# Patient Record
Sex: FEMALE | Race: WHITE | Employment: UNEMPLOYED | URBAN - METROPOLITAN AREA
[De-identification: names, ages, dates, MRNs, and addresses within clinical notes are randomized per-mention and may not be internally consistent; named-entity substitution may affect disease eponyms.]

---

## 2017-01-04 ENCOUNTER — OFFICE VISIT (OUTPATIENT)
Dept: FAMILY MEDICINE CLINIC | Age: 62
End: 2017-01-04

## 2017-01-04 VITALS
BODY MASS INDEX: 45.99 KG/M2 | OXYGEN SATURATION: 97 % | DIASTOLIC BLOOD PRESSURE: 80 MMHG | SYSTOLIC BLOOD PRESSURE: 120 MMHG | HEART RATE: 76 BPM | RESPIRATION RATE: 14 BRPM | HEIGHT: 67 IN | TEMPERATURE: 98.1 F | WEIGHT: 293 LBS

## 2017-01-04 DIAGNOSIS — N18.2 CKD (CHRONIC KIDNEY DISEASE), STAGE 2 (MILD): ICD-10-CM

## 2017-01-04 DIAGNOSIS — I10 ESSENTIAL HYPERTENSION: ICD-10-CM

## 2017-01-04 DIAGNOSIS — D50.8 OTHER IRON DEFICIENCY ANEMIA: ICD-10-CM

## 2017-01-04 DIAGNOSIS — E87.5 HYPERKALEMIA: ICD-10-CM

## 2017-01-04 DIAGNOSIS — E66.01 MORBID OBESITY DUE TO EXCESS CALORIES (HCC): ICD-10-CM

## 2017-01-04 DIAGNOSIS — J18.9 CAP (COMMUNITY ACQUIRED PNEUMONIA): Primary | ICD-10-CM

## 2017-01-04 LAB
ANION GAP SERPL CALCULATED.3IONS-SCNC: 17 MMOL/L (ref 3–16)
BUN BLDV-MCNC: 32 MG/DL (ref 7–20)
CALCIUM SERPL-MCNC: 9.1 MG/DL (ref 8.3–10.6)
CHLORIDE BLD-SCNC: 101 MMOL/L (ref 99–110)
CO2: 18 MMOL/L (ref 21–32)
CREAT SERPL-MCNC: 1.7 MG/DL (ref 0.6–1.1)
GFR AFRICAN AMERICAN: 37
GFR NON-AFRICAN AMERICAN: 31
GLUCOSE BLD-MCNC: 142 MG/DL (ref 70–99)
POTASSIUM SERPL-SCNC: 4.8 MMOL/L (ref 3.5–5.1)
SODIUM BLD-SCNC: 136 MMOL/L (ref 136–145)

## 2017-01-04 PROCEDURE — 99214 OFFICE O/P EST MOD 30 MIN: CPT | Performed by: FAMILY MEDICINE

## 2017-01-04 RX ORDER — PREDNISONE 20 MG/1
TABLET ORAL
Qty: 10 TABLET | Refills: 0 | Status: SHIPPED | OUTPATIENT
Start: 2017-01-04 | End: 2017-01-23

## 2017-01-04 RX ORDER — PROMETHAZINE HYDROCHLORIDE AND CODEINE PHOSPHATE 6.25; 1 MG/5ML; MG/5ML
SYRUP ORAL
Qty: 120 ML | Refills: 0 | Status: SHIPPED | OUTPATIENT
Start: 2017-01-04 | End: 2017-01-18 | Stop reason: SDUPTHER

## 2017-01-04 RX ORDER — LEVOFLOXACIN 500 MG/1
TABLET, FILM COATED ORAL
Qty: 7 TABLET | Refills: 0 | Status: SHIPPED | OUTPATIENT
Start: 2017-01-04 | End: 2017-01-23

## 2017-01-17 RX ORDER — FLUOXETINE HYDROCHLORIDE 40 MG/1
80 CAPSULE ORAL DAILY
Qty: 180 CAPSULE | Refills: 3 | Status: SHIPPED | OUTPATIENT
Start: 2017-01-17 | End: 2018-01-15 | Stop reason: SDUPTHER

## 2017-01-18 RX ORDER — PROMETHAZINE HYDROCHLORIDE AND CODEINE PHOSPHATE 6.25; 1 MG/5ML; MG/5ML
SYRUP ORAL
Qty: 120 ML | Refills: 0 | Status: SHIPPED | OUTPATIENT
Start: 2017-01-18 | End: 2017-01-23

## 2017-01-23 ENCOUNTER — OFFICE VISIT (OUTPATIENT)
Dept: FAMILY MEDICINE CLINIC | Age: 62
End: 2017-01-23

## 2017-01-23 VITALS
SYSTOLIC BLOOD PRESSURE: 136 MMHG | HEIGHT: 67 IN | DIASTOLIC BLOOD PRESSURE: 88 MMHG | HEART RATE: 102 BPM | BODY MASS INDEX: 45.99 KG/M2 | OXYGEN SATURATION: 97 % | TEMPERATURE: 98.4 F | WEIGHT: 293 LBS | RESPIRATION RATE: 12 BRPM

## 2017-01-23 DIAGNOSIS — J44.1 CHRONIC OBSTRUCTIVE PULMONARY DISEASE WITH ACUTE EXACERBATION (HCC): Primary | ICD-10-CM

## 2017-01-23 DIAGNOSIS — R05.9 COUGH: ICD-10-CM

## 2017-01-23 DIAGNOSIS — F17.200 TOBACCO USE DISORDER: ICD-10-CM

## 2017-01-23 DIAGNOSIS — R06.2 WHEEZING: ICD-10-CM

## 2017-01-23 PROCEDURE — 99214 OFFICE O/P EST MOD 30 MIN: CPT | Performed by: NURSE PRACTITIONER

## 2017-01-23 RX ORDER — ALBUTEROL SULFATE 2.5 MG/3ML
2.5 SOLUTION RESPIRATORY (INHALATION) ONCE
Status: COMPLETED | OUTPATIENT
Start: 2017-01-23 | End: 2017-01-23

## 2017-01-23 RX ADMIN — ALBUTEROL SULFATE 2.5 MG: 2.5 SOLUTION RESPIRATORY (INHALATION) at 10:52

## 2017-01-23 ASSESSMENT — PATIENT HEALTH QUESTIONNAIRE - PHQ9
SUM OF ALL RESPONSES TO PHQ9 QUESTIONS 1 & 2: 0
2. FEELING DOWN, DEPRESSED OR HOPELESS: 0
SUM OF ALL RESPONSES TO PHQ QUESTIONS 1-9: 0
1. LITTLE INTEREST OR PLEASURE IN DOING THINGS: 0

## 2017-01-23 ASSESSMENT — ENCOUNTER SYMPTOMS
SHORTNESS OF BREATH: 1
CHEST TIGHTNESS: 1
RHINORRHEA: 1
WHEEZING: 1
SINUS PRESSURE: 0
COUGH: 1
SORE THROAT: 0

## 2017-01-24 ENCOUNTER — HOSPITAL ENCOUNTER (OUTPATIENT)
Dept: OTHER | Age: 62
Discharge: HOME | End: 2017-01-24
Attending: FAMILY MEDICINE | Admitting: FAMILY MEDICINE

## 2017-01-24 DIAGNOSIS — M54.5 CHRONIC LOW BACK PAIN, UNSPECIFIED BACK PAIN LATERALITY, WITH SCIATICA PRESENCE UNSPECIFIED: ICD-10-CM

## 2017-01-24 DIAGNOSIS — G89.29 CHRONIC LOW BACK PAIN, UNSPECIFIED BACK PAIN LATERALITY, WITH SCIATICA PRESENCE UNSPECIFIED: ICD-10-CM

## 2017-01-24 DIAGNOSIS — J18.9 CAP (COMMUNITY ACQUIRED PNEUMONIA): ICD-10-CM

## 2017-01-24 RX ORDER — CLARITHROMYCIN 500 MG/1
TABLET, COATED ORAL
Qty: 20 TABLET | Refills: 0 | Status: SHIPPED | OUTPATIENT
Start: 2017-01-24 | End: 2017-09-11 | Stop reason: ALTCHOICE

## 2017-01-24 RX ORDER — HYDROCODONE BITARTRATE AND ACETAMINOPHEN 7.5; 325 MG/1; MG/1
1 TABLET ORAL EVERY 6 HOURS PRN
Qty: 120 TABLET | Refills: 0 | Status: SHIPPED | OUTPATIENT
Start: 2017-01-24 | End: 2017-01-24 | Stop reason: SDUPTHER

## 2017-01-31 RX ORDER — PROMETHAZINE HYDROCHLORIDE AND CODEINE PHOSPHATE 6.25; 1 MG/5ML; MG/5ML
SYRUP ORAL
COMMUNITY
Start: 2017-01-18 | End: 2017-01-31 | Stop reason: SDUPTHER

## 2017-02-01 RX ORDER — PROMETHAZINE HYDROCHLORIDE AND CODEINE PHOSPHATE 6.25; 1 MG/5ML; MG/5ML
5 SYRUP ORAL 4 TIMES DAILY PRN
Qty: 120 ML | Refills: 0 | Status: SHIPPED | OUTPATIENT
Start: 2017-02-01 | End: 2017-02-28 | Stop reason: SDUPTHER

## 2017-02-20 ENCOUNTER — TELEPHONE (OUTPATIENT)
Dept: FAMILY MEDICINE CLINIC | Age: 62
End: 2017-02-20

## 2017-02-20 RX ORDER — PREDNISONE 20 MG/1
TABLET ORAL
Qty: 10 TABLET | Refills: 0 | Status: SHIPPED | OUTPATIENT
Start: 2017-02-20 | End: 2017-09-11 | Stop reason: ALTCHOICE

## 2017-02-28 RX ORDER — PROMETHAZINE HYDROCHLORIDE AND CODEINE PHOSPHATE 6.25; 1 MG/5ML; MG/5ML
5 SYRUP ORAL 4 TIMES DAILY PRN
Qty: 120 ML | Refills: 0 | Status: SHIPPED | OUTPATIENT
Start: 2017-02-28 | End: 2017-03-13 | Stop reason: SDUPTHER

## 2017-03-13 RX ORDER — PROMETHAZINE HYDROCHLORIDE AND CODEINE PHOSPHATE 6.25; 1 MG/5ML; MG/5ML
5 SYRUP ORAL 4 TIMES DAILY PRN
Qty: 120 ML | Refills: 0 | Status: SHIPPED | OUTPATIENT
Start: 2017-03-13 | End: 2017-09-11 | Stop reason: ALTCHOICE

## 2017-03-20 DIAGNOSIS — M54.5 CHRONIC LOW BACK PAIN, UNSPECIFIED BACK PAIN LATERALITY, WITH SCIATICA PRESENCE UNSPECIFIED: ICD-10-CM

## 2017-03-20 DIAGNOSIS — G89.29 CHRONIC LOW BACK PAIN, UNSPECIFIED BACK PAIN LATERALITY, WITH SCIATICA PRESENCE UNSPECIFIED: ICD-10-CM

## 2017-03-20 RX ORDER — HYDROCODONE BITARTRATE AND ACETAMINOPHEN 7.5; 325 MG/1; MG/1
1 TABLET ORAL EVERY 6 HOURS PRN
Qty: 120 TABLET | Refills: 0 | Status: SHIPPED | OUTPATIENT
Start: 2017-03-20 | End: 2017-03-27

## 2017-03-20 RX ORDER — HYDROCODONE BITARTRATE AND ACETAMINOPHEN 7.5; 325 MG/1; MG/1
TABLET ORAL
Status: CANCELLED | OUTPATIENT
Start: 2017-03-20

## 2017-04-17 RX ORDER — HYDROCODONE BITARTRATE AND ACETAMINOPHEN 7.5; 325 MG/1; MG/1
1 TABLET ORAL EVERY 6 HOURS PRN
Qty: 120 TABLET | Refills: 0 | Status: SHIPPED | OUTPATIENT
Start: 2017-04-17 | End: 2017-05-15 | Stop reason: SDUPTHER

## 2017-05-15 ENCOUNTER — TELEPHONE (OUTPATIENT)
Dept: FAMILY MEDICINE CLINIC | Age: 62
End: 2017-05-15

## 2017-05-15 ENCOUNTER — OFFICE VISIT (OUTPATIENT)
Dept: FAMILY MEDICINE CLINIC | Age: 62
End: 2017-05-15

## 2017-05-15 VITALS
DIASTOLIC BLOOD PRESSURE: 80 MMHG | SYSTOLIC BLOOD PRESSURE: 130 MMHG | OXYGEN SATURATION: 97 % | WEIGHT: 289 LBS | BODY MASS INDEX: 45.26 KG/M2 | HEART RATE: 61 BPM

## 2017-05-15 DIAGNOSIS — D50.8 OTHER IRON DEFICIENCY ANEMIA: ICD-10-CM

## 2017-05-15 DIAGNOSIS — E78.2 MIXED HYPERLIPIDEMIA: ICD-10-CM

## 2017-05-15 DIAGNOSIS — G56.02 CARPAL TUNNEL SYNDROME OF LEFT WRIST: ICD-10-CM

## 2017-05-15 DIAGNOSIS — N17.9 AKI (ACUTE KIDNEY INJURY) (HCC): Primary | ICD-10-CM

## 2017-05-15 DIAGNOSIS — N17.9 AKI (ACUTE KIDNEY INJURY) (HCC): ICD-10-CM

## 2017-05-15 DIAGNOSIS — I10 ESSENTIAL HYPERTENSION: ICD-10-CM

## 2017-05-15 DIAGNOSIS — R79.89 ELEVATED SERUM CREATININE: ICD-10-CM

## 2017-05-15 DIAGNOSIS — I25.10 CORONARY ARTERY DISEASE INVOLVING NATIVE CORONARY ARTERY OF NATIVE HEART WITHOUT ANGINA PECTORIS: ICD-10-CM

## 2017-05-15 DIAGNOSIS — G89.4 CHRONIC PAIN DISORDER: ICD-10-CM

## 2017-05-15 LAB
A/G RATIO: 1.6 (ref 1.1–2.2)
ALBUMIN SERPL-MCNC: 4.4 G/DL (ref 3.4–5)
ALP BLD-CCNC: 101 U/L (ref 40–129)
ALT SERPL-CCNC: 10 U/L (ref 10–40)
ANION GAP SERPL CALCULATED.3IONS-SCNC: 19 MMOL/L (ref 3–16)
AST SERPL-CCNC: 13 U/L (ref 15–37)
BILIRUB SERPL-MCNC: <0.2 MG/DL (ref 0–1)
BUN BLDV-MCNC: 42 MG/DL (ref 7–20)
CALCIUM SERPL-MCNC: 9.2 MG/DL (ref 8.3–10.6)
CHLORIDE BLD-SCNC: 105 MMOL/L (ref 99–110)
CHOLESTEROL, TOTAL: 259 MG/DL (ref 0–199)
CO2: 18 MMOL/L (ref 21–32)
CREAT SERPL-MCNC: 1.7 MG/DL (ref 0.6–1.1)
GFR AFRICAN AMERICAN: 37
GFR NON-AFRICAN AMERICAN: 31
GLOBULIN: 2.8 G/DL
GLUCOSE BLD-MCNC: 97 MG/DL (ref 70–99)
HCT VFR BLD CALC: 39.4 % (ref 36–48)
HDLC SERPL-MCNC: 44 MG/DL (ref 40–60)
HEMOGLOBIN: 12.2 G/DL (ref 12–16)
LDL CHOLESTEROL CALCULATED: ABNORMAL MG/DL
LDL CHOLESTEROL DIRECT: 127 MG/DL
MCH RBC QN AUTO: 28.2 PG (ref 26–34)
MCHC RBC AUTO-ENTMCNC: 31.1 G/DL (ref 31–36)
MCV RBC AUTO: 90.7 FL (ref 80–100)
PDW BLD-RTO: 17.5 % (ref 12.4–15.4)
PLATELET # BLD: 257 K/UL (ref 135–450)
PMV BLD AUTO: 7.3 FL (ref 5–10.5)
POTASSIUM SERPL-SCNC: 5.5 MMOL/L (ref 3.5–5.1)
RBC # BLD: 4.35 M/UL (ref 4–5.2)
SODIUM BLD-SCNC: 142 MMOL/L (ref 136–145)
TOTAL PROTEIN: 7.2 G/DL (ref 6.4–8.2)
TRIGL SERPL-MCNC: 441 MG/DL (ref 0–150)
TSH SERPL DL<=0.05 MIU/L-ACNC: 0.8 UIU/ML (ref 0.27–4.2)
VLDLC SERPL CALC-MCNC: ABNORMAL MG/DL
WBC # BLD: 7.6 K/UL (ref 4–11)

## 2017-05-15 PROCEDURE — 99214 OFFICE O/P EST MOD 30 MIN: CPT | Performed by: FAMILY MEDICINE

## 2017-05-15 RX ORDER — HYDROCODONE BITARTRATE AND ACETAMINOPHEN 7.5; 325 MG/1; MG/1
1 TABLET ORAL EVERY 6 HOURS PRN
Qty: 120 TABLET | Refills: 0 | Status: SHIPPED | OUTPATIENT
Start: 2017-05-15 | End: 2017-06-12 | Stop reason: SDUPTHER

## 2017-05-16 ENCOUNTER — PROCEDURE VISIT (OUTPATIENT)
Dept: NEUROLOGY | Age: 62
End: 2017-05-16

## 2017-05-16 DIAGNOSIS — G56.02 LEFT CARPAL TUNNEL SYNDROME: Primary | ICD-10-CM

## 2017-05-16 LAB
ESTIMATED AVERAGE GLUCOSE: 105.4 MG/DL
HBA1C MFR BLD: 5.3 %

## 2017-05-16 PROCEDURE — 95909 NRV CNDJ TST 5-6 STUDIES: CPT | Performed by: PSYCHIATRY & NEUROLOGY

## 2017-05-16 PROCEDURE — 95886 MUSC TEST DONE W/N TEST COMP: CPT | Performed by: PSYCHIATRY & NEUROLOGY

## 2017-05-17 DIAGNOSIS — G56.02 CARPAL TUNNEL SYNDROME OF LEFT WRIST: Primary | ICD-10-CM

## 2017-05-18 ENCOUNTER — TELEPHONE (OUTPATIENT)
Dept: FAMILY MEDICINE CLINIC | Age: 62
End: 2017-05-18

## 2017-06-12 DIAGNOSIS — G89.4 CHRONIC PAIN DISORDER: ICD-10-CM

## 2017-06-12 RX ORDER — HYDROCODONE BITARTRATE AND ACETAMINOPHEN 7.5; 325 MG/1; MG/1
1 TABLET ORAL EVERY 6 HOURS PRN
Qty: 120 TABLET | Refills: 0 | Status: SHIPPED | OUTPATIENT
Start: 2017-06-12 | End: 2017-06-12 | Stop reason: SDUPTHER

## 2017-06-12 RX ORDER — HYDROCODONE BITARTRATE AND ACETAMINOPHEN 7.5; 325 MG/1; MG/1
1 TABLET ORAL EVERY 6 HOURS PRN
Qty: 120 TABLET | Refills: 0 | Status: SHIPPED | OUTPATIENT
Start: 2017-06-12 | End: 2017-07-10 | Stop reason: SDUPTHER

## 2017-07-10 DIAGNOSIS — G89.4 CHRONIC PAIN DISORDER: ICD-10-CM

## 2017-07-10 RX ORDER — HYDROCODONE BITARTRATE AND ACETAMINOPHEN 7.5; 325 MG/1; MG/1
1 TABLET ORAL EVERY 6 HOURS PRN
Qty: 120 TABLET | Refills: 0 | Status: SHIPPED | OUTPATIENT
Start: 2017-07-10 | End: 2017-08-08 | Stop reason: SDUPTHER

## 2017-08-08 DIAGNOSIS — G89.4 CHRONIC PAIN DISORDER: ICD-10-CM

## 2017-08-08 RX ORDER — HYDROCODONE BITARTRATE AND ACETAMINOPHEN 7.5; 325 MG/1; MG/1
1 TABLET ORAL EVERY 6 HOURS PRN
Qty: 120 TABLET | Refills: 0 | Status: SHIPPED | OUTPATIENT
Start: 2017-08-08 | End: 2017-09-06 | Stop reason: SDUPTHER

## 2017-08-15 ENCOUNTER — OFFICE VISIT (OUTPATIENT)
Dept: FAMILY MEDICINE CLINIC | Age: 62
End: 2017-08-15

## 2017-08-15 VITALS
RESPIRATION RATE: 18 BRPM | DIASTOLIC BLOOD PRESSURE: 88 MMHG | SYSTOLIC BLOOD PRESSURE: 145 MMHG | BODY MASS INDEX: 45.52 KG/M2 | HEART RATE: 81 BPM | OXYGEN SATURATION: 98 % | WEIGHT: 290 LBS | HEIGHT: 67 IN

## 2017-08-15 DIAGNOSIS — Z95.1 STATUS POST CORONARY ARTERY BYPASS GRAFT: ICD-10-CM

## 2017-08-15 DIAGNOSIS — I25.10 CORONARY ARTERY DISEASE INVOLVING NATIVE CORONARY ARTERY OF NATIVE HEART WITHOUT ANGINA PECTORIS: ICD-10-CM

## 2017-08-15 DIAGNOSIS — F17.200 TOBACCO USE DISORDER: Primary | ICD-10-CM

## 2017-08-15 DIAGNOSIS — G56.02 CARPAL TUNNEL SYNDROME OF LEFT WRIST: ICD-10-CM

## 2017-08-15 DIAGNOSIS — E66.01 MORBID OBESITY DUE TO EXCESS CALORIES (HCC): ICD-10-CM

## 2017-08-15 DIAGNOSIS — E78.2 MIXED HYPERLIPIDEMIA: ICD-10-CM

## 2017-08-15 DIAGNOSIS — I10 ESSENTIAL HYPERTENSION: ICD-10-CM

## 2017-08-15 PROCEDURE — 99214 OFFICE O/P EST MOD 30 MIN: CPT | Performed by: FAMILY MEDICINE

## 2017-08-21 ENCOUNTER — OFFICE VISIT (OUTPATIENT)
Dept: ORTHOPEDIC SURGERY | Age: 62
End: 2017-08-21

## 2017-08-21 VITALS
HEIGHT: 67 IN | WEIGHT: 290 LBS | DIASTOLIC BLOOD PRESSURE: 87 MMHG | HEART RATE: 68 BPM | SYSTOLIC BLOOD PRESSURE: 136 MMHG | RESPIRATION RATE: 16 BRPM | BODY MASS INDEX: 45.52 KG/M2

## 2017-08-21 DIAGNOSIS — G56.02 CARPAL TUNNEL SYNDROME OF LEFT WRIST: ICD-10-CM

## 2017-08-21 PROCEDURE — 99243 OFF/OP CNSLTJ NEW/EST LOW 30: CPT | Performed by: ORTHOPAEDIC SURGERY

## 2017-09-01 DIAGNOSIS — G89.4 CHRONIC PAIN DISORDER: ICD-10-CM

## 2017-09-01 RX ORDER — HYDROCODONE BITARTRATE AND ACETAMINOPHEN 7.5; 325 MG/1; MG/1
1 TABLET ORAL EVERY 6 HOURS PRN
Qty: 120 TABLET | Refills: 0 | OUTPATIENT
Start: 2017-09-01

## 2017-09-06 RX ORDER — HYDROCODONE BITARTRATE AND ACETAMINOPHEN 7.5; 325 MG/1; MG/1
1 TABLET ORAL EVERY 6 HOURS PRN
Qty: 120 TABLET | Refills: 0 | Status: SHIPPED | OUTPATIENT
Start: 2017-09-06 | End: 2017-10-06 | Stop reason: SDUPTHER

## 2017-09-11 ENCOUNTER — OFFICE VISIT (OUTPATIENT)
Dept: FAMILY MEDICINE CLINIC | Age: 62
End: 2017-09-11

## 2017-09-11 VITALS
OXYGEN SATURATION: 98 % | HEART RATE: 92 BPM | WEIGHT: 285.2 LBS | RESPIRATION RATE: 16 BRPM | TEMPERATURE: 98.6 F | HEIGHT: 67 IN | BODY MASS INDEX: 44.76 KG/M2 | SYSTOLIC BLOOD PRESSURE: 142 MMHG | DIASTOLIC BLOOD PRESSURE: 85 MMHG

## 2017-09-11 DIAGNOSIS — Z01.818 PREOP EXAMINATION: ICD-10-CM

## 2017-09-11 DIAGNOSIS — I25.10 CORONARY ARTERY DISEASE INVOLVING NATIVE CORONARY ARTERY OF NATIVE HEART WITHOUT ANGINA PECTORIS: Primary | ICD-10-CM

## 2017-09-11 DIAGNOSIS — I10 ESSENTIAL HYPERTENSION: ICD-10-CM

## 2017-09-11 DIAGNOSIS — G56.02 CARPAL TUNNEL SYNDROME OF LEFT WRIST: ICD-10-CM

## 2017-09-11 DIAGNOSIS — Z95.1 STATUS POST CORONARY ARTERY BYPASS GRAFT: ICD-10-CM

## 2017-09-11 DIAGNOSIS — F17.200 TOBACCO USE DISORDER: ICD-10-CM

## 2017-09-11 DIAGNOSIS — E78.2 MIXED HYPERLIPIDEMIA: ICD-10-CM

## 2017-09-11 DIAGNOSIS — E66.01 MORBID OBESITY DUE TO EXCESS CALORIES (HCC): ICD-10-CM

## 2017-09-11 DIAGNOSIS — D50.9 IRON DEFICIENCY ANEMIA, UNSPECIFIED IRON DEFICIENCY ANEMIA TYPE: ICD-10-CM

## 2017-09-11 LAB
ANION GAP SERPL CALCULATED.3IONS-SCNC: 15 MMOL/L (ref 3–16)
BUN BLDV-MCNC: 33 MG/DL (ref 7–20)
CALCIUM SERPL-MCNC: 9.4 MG/DL (ref 8.3–10.6)
CHLORIDE BLD-SCNC: 104 MMOL/L (ref 99–110)
CO2: 20 MMOL/L (ref 21–32)
CREAT SERPL-MCNC: 1.7 MG/DL (ref 0.6–1.1)
GFR AFRICAN AMERICAN: 37
GFR NON-AFRICAN AMERICAN: 31
GLUCOSE BLD-MCNC: 108 MG/DL (ref 70–99)
HCT VFR BLD CALC: 38.3 % (ref 36–48)
HEMOGLOBIN: 12.3 G/DL (ref 12–16)
MCH RBC QN AUTO: 30.3 PG (ref 26–34)
MCHC RBC AUTO-ENTMCNC: 32.1 G/DL (ref 31–36)
MCV RBC AUTO: 94.2 FL (ref 80–100)
PDW BLD-RTO: 15.9 % (ref 12.4–15.4)
PLATELET # BLD: 270 K/UL (ref 135–450)
PMV BLD AUTO: 7.3 FL (ref 5–10.5)
POTASSIUM SERPL-SCNC: 5 MMOL/L (ref 3.5–5.1)
RBC # BLD: 4.07 M/UL (ref 4–5.2)
SODIUM BLD-SCNC: 139 MMOL/L (ref 136–145)
WBC # BLD: 8.6 K/UL (ref 4–11)

## 2017-09-11 PROCEDURE — 99243 OFF/OP CNSLTJ NEW/EST LOW 30: CPT | Performed by: NURSE PRACTITIONER

## 2017-09-11 RX ORDER — ALBUTEROL SULFATE 90 UG/1
2 AEROSOL, METERED RESPIRATORY (INHALATION) EVERY 6 HOURS PRN
Qty: 1 INHALER | Refills: 3 | Status: SHIPPED | OUTPATIENT
Start: 2017-09-11 | End: 2017-10-30 | Stop reason: SDUPTHER

## 2017-09-15 ENCOUNTER — PAT TELEPHONE (OUTPATIENT)
Dept: PREADMISSION TESTING | Age: 62
End: 2017-09-15

## 2017-09-18 ENCOUNTER — SURG/PROC ORDERS (OUTPATIENT)
Dept: ANESTHESIOLOGY | Age: 62
End: 2017-09-18

## 2017-09-18 ENCOUNTER — TELEPHONE (OUTPATIENT)
Dept: ORTHOPEDIC SURGERY | Age: 62
End: 2017-09-18

## 2017-09-18 VITALS — BODY MASS INDEX: 44.73 KG/M2 | WEIGHT: 285 LBS | HEIGHT: 67 IN

## 2017-09-18 RX ORDER — SODIUM CHLORIDE 0.9 % (FLUSH) 0.9 %
10 SYRINGE (ML) INJECTION EVERY 12 HOURS SCHEDULED
Status: CANCELLED | OUTPATIENT
Start: 2017-09-18

## 2017-09-18 RX ORDER — SODIUM CHLORIDE 9 MG/ML
INJECTION, SOLUTION INTRAVENOUS CONTINUOUS
Status: CANCELLED | OUTPATIENT
Start: 2017-09-18

## 2017-09-18 RX ORDER — SODIUM CHLORIDE 0.9 % (FLUSH) 0.9 %
10 SYRINGE (ML) INJECTION PRN
Status: CANCELLED | OUTPATIENT
Start: 2017-09-18

## 2017-09-19 ENCOUNTER — OFFICE VISIT (OUTPATIENT)
Dept: FAMILY MEDICINE CLINIC | Age: 62
End: 2017-09-19

## 2017-09-19 VITALS
DIASTOLIC BLOOD PRESSURE: 80 MMHG | HEART RATE: 67 BPM | SYSTOLIC BLOOD PRESSURE: 130 MMHG | WEIGHT: 287.2 LBS | HEIGHT: 67 IN | BODY MASS INDEX: 45.08 KG/M2 | OXYGEN SATURATION: 96 %

## 2017-09-19 DIAGNOSIS — Z23 NEED FOR TDAP VACCINATION: ICD-10-CM

## 2017-09-19 DIAGNOSIS — Z12.39 SCREENING FOR BREAST CANCER: ICD-10-CM

## 2017-09-19 DIAGNOSIS — R79.89 ELEVATED SERUM CREATININE: ICD-10-CM

## 2017-09-19 DIAGNOSIS — L02.92 BOIL: Primary | ICD-10-CM

## 2017-09-19 PROCEDURE — 90471 IMMUNIZATION ADMIN: CPT | Performed by: FAMILY MEDICINE

## 2017-09-19 PROCEDURE — 99214 OFFICE O/P EST MOD 30 MIN: CPT | Performed by: FAMILY MEDICINE

## 2017-09-19 PROCEDURE — 90715 TDAP VACCINE 7 YRS/> IM: CPT | Performed by: FAMILY MEDICINE

## 2017-09-19 RX ORDER — CEPHALEXIN 500 MG/1
500 CAPSULE ORAL 2 TIMES DAILY
Qty: 20 CAPSULE | Refills: 0 | Status: SHIPPED | OUTPATIENT
Start: 2017-09-19 | End: 2017-11-10 | Stop reason: ALTCHOICE

## 2017-10-02 DIAGNOSIS — Z12.39 SCREENING FOR BREAST CANCER: ICD-10-CM

## 2017-10-02 RX ORDER — PRAVASTATIN SODIUM 80 MG/1
TABLET ORAL
Qty: 90 TABLET | Refills: 3 | Status: SHIPPED | OUTPATIENT
Start: 2017-10-02 | End: 2018-10-18 | Stop reason: SDUPTHER

## 2017-10-06 DIAGNOSIS — G89.4 CHRONIC PAIN DISORDER: ICD-10-CM

## 2017-10-06 RX ORDER — HYDROCODONE BITARTRATE AND ACETAMINOPHEN 7.5; 325 MG/1; MG/1
1 TABLET ORAL EVERY 6 HOURS PRN
Qty: 120 TABLET | Refills: 0 | Status: SHIPPED | OUTPATIENT
Start: 2017-10-06 | End: 2017-11-03 | Stop reason: SDUPTHER

## 2017-10-11 ENCOUNTER — OFFICE VISIT (OUTPATIENT)
Dept: FAMILY MEDICINE CLINIC | Age: 62
End: 2017-10-11

## 2017-10-11 VITALS
OXYGEN SATURATION: 94 % | DIASTOLIC BLOOD PRESSURE: 80 MMHG | SYSTOLIC BLOOD PRESSURE: 124 MMHG | HEART RATE: 80 BPM | BODY MASS INDEX: 45.89 KG/M2 | HEIGHT: 67 IN | WEIGHT: 292.4 LBS

## 2017-10-11 DIAGNOSIS — L02.429 BOIL, AXILLA: Primary | ICD-10-CM

## 2017-10-11 DIAGNOSIS — E78.5 HYPERLIPIDEMIA, UNSPECIFIED HYPERLIPIDEMIA TYPE: ICD-10-CM

## 2017-10-11 DIAGNOSIS — I10 ESSENTIAL HYPERTENSION: ICD-10-CM

## 2017-10-11 DIAGNOSIS — Z23 FLU VACCINE NEED: ICD-10-CM

## 2017-10-11 DIAGNOSIS — G56.01 CARPAL TUNNEL SYNDROME OF RIGHT WRIST: ICD-10-CM

## 2017-10-11 DIAGNOSIS — R79.89 ELEVATED SERUM CREATININE: ICD-10-CM

## 2017-10-11 PROCEDURE — 90688 IIV4 VACCINE SPLT 0.5 ML IM: CPT | Performed by: FAMILY MEDICINE

## 2017-10-11 PROCEDURE — 99214 OFFICE O/P EST MOD 30 MIN: CPT | Performed by: FAMILY MEDICINE

## 2017-10-11 PROCEDURE — 90471 IMMUNIZATION ADMIN: CPT | Performed by: FAMILY MEDICINE

## 2017-10-11 NOTE — PROGRESS NOTES
albuterol sulfate HFA (PROAIR HFA) 108 (90 Base) MCG/ACT inhaler Inhale 2 puffs into the lungs every 6 hours as needed for Wheezing Yes Jefry Manriquez CNP   metoprolol tartrate (LOPRESSOR) 25 MG tablet TAKE 1 TABLET TWICE A DAY Yes Marybeth Van MD   FLUoxetine (PROZAC) 40 MG capsule Take 2 capsules by mouth daily Yes Marybeth Van MD   carbidopa-levodopa (SINEMET)  MG per tablet Take 1 tablet by mouth 2 times daily  Patient taking differently: Take 2 tablets by mouth nightly  Yes Marybeth Van MD   teriparatide, recombinant, (FORTEO) 600 MCG/2.4ML SOLN injection Inject 0.08 mLs into the skin daily Yes Eli Denver, MD   aspirin 81 MG EC tablet Take 81 mg by mouth daily. Yes Historical Provider, MD       Past Medical History:   Diagnosis Date    Arthritis     ASHD (arteriosclerotic heart disease)     Chronic kidney disease     COPD (chronic obstructive pulmonary disease) ([address]     unknown     Coronary artery disease involving native coronary artery of native heart without angina pectoris 3/31/2016    Depression     Full dentures     HTN     Hyperlipidemia     Iron deficiency anemia 3/31/2016    Kidney stone 2012    Morbid obesity ([address]     Restless legs syndrome     pt on cabidopa/levodopa    Wears glasses        Social History   Substance Use Topics    Smoking status: Current Every Day Smoker     Packs/day: 0.50     Years: 30.00     Types: Cigarettes     Last attempt to quit: 2/29/2016    Smokeless tobacco: Never Used    Alcohol use No       Family History   Problem Relation Age of Onset    Stroke Mother     Diabetes Mother     Heart Disease Mother     High Blood Pressure Mother     High Cholesterol Mother     Cancer Neg Hx        OBJECTIVE:  /80   Pulse 80   Ht 5' 7\" (1.702 m)   Wt 292 lb 6.4 oz (132.6 kg)   LMP 01/01/2004   SpO2 94%   BMI 45.80 kg/m²   GEN:  in NAD  NECK:  Supple without adenopathy.   Left axilla: No evidence of infection  CV:  Regular

## 2017-10-30 RX ORDER — ALBUTEROL SULFATE 90 UG/1
2 AEROSOL, METERED RESPIRATORY (INHALATION) EVERY 6 HOURS PRN
Qty: 1 INHALER | Refills: 3 | Status: SHIPPED | OUTPATIENT
Start: 2017-10-30 | End: 2018-04-18 | Stop reason: ALTCHOICE

## 2017-10-30 NOTE — TELEPHONE ENCOUNTER
Medication and Quantity requested: albuterol sulfate HFA (PROAIR HFA) 108 (90 Base) MCG/ACT inhaler   Qty 90 days     Last Visit  10/10/17    Pharmacy and phone number updated in [facility]:  yes

## 2017-10-31 ENCOUNTER — OFFICE VISIT (OUTPATIENT)
Dept: FAMILY MEDICINE CLINIC | Age: 62
End: 2017-10-31

## 2017-10-31 VITALS
BODY MASS INDEX: 45.99 KG/M2 | DIASTOLIC BLOOD PRESSURE: 84 MMHG | OXYGEN SATURATION: 96 % | SYSTOLIC BLOOD PRESSURE: 130 MMHG | WEIGHT: 293 LBS | HEIGHT: 67 IN | HEART RATE: 66 BPM

## 2017-10-31 DIAGNOSIS — Z01.818 PREOP EXAMINATION: ICD-10-CM

## 2017-10-31 DIAGNOSIS — I10 ESSENTIAL HYPERTENSION: ICD-10-CM

## 2017-10-31 DIAGNOSIS — R06.83 SNORES: ICD-10-CM

## 2017-10-31 DIAGNOSIS — G56.02 LEFT CARPAL TUNNEL SYNDROME: Primary | ICD-10-CM

## 2017-10-31 DIAGNOSIS — R79.89 ELEVATED SERUM CREATININE: ICD-10-CM

## 2017-10-31 PROCEDURE — 99243 OFF/OP CNSLTJ NEW/EST LOW 30: CPT | Performed by: FAMILY MEDICINE

## 2017-10-31 RX ORDER — PREDNISONE 20 MG/1
40 TABLET ORAL DAILY
Qty: 10 TABLET | Refills: 0 | Status: SHIPPED | OUTPATIENT
Start: 2017-10-31 | End: 2017-11-05

## 2017-10-31 NOTE — PROGRESS NOTES
Packs/day: 0.50     Years: 30.00     Types: Cigarettes     Last attempt to quit: 2/29/2016    Smokeless tobacco: Never Used    Alcohol use No    Drug use: No    Sexual activity: Yes     Partners: Male     Other Topics Concern    Not on file     Social History Narrative    No narrative on file       REVIEW OF SYSTEMS:   CONSTITUTIONAL: No major weight gain or loss, fatigue, weakness, night sweats or fever. HEENT: No new vision difficulties or ringing in the ears. RESPIRATORY: No new SOB, PND, orthopnea or cough. CARDIOVASCULAR: no CP, palpitations or SOB with exertion  GI: No nausea, vomiting, diarrhea, constipation, abdominal pain or changes in bowel habits. : No urinary frequency, urgency, incontinence hematuria or dysuria. SKIN: No cyanosis or skin lesions. MUSCULOSKELETAL: No new muscle or joint pain. NEUROLOGICAL: No syncope or TIA-like symptoms. PSYCHIATRIC: No anxiety, insomnia or depression     Physical Exam   /84   Pulse 66   Ht 5' 7.01\" (1.702 m)   Wt 296 lb (134.3 kg)   LMP 01/01/2004   SpO2 96%   BMI 46.35 kg/m²   Constitutional: Patient is oriented to person, place, and time. She appears in no distress. Head: Normocephalic and atraumatic. Right Ear: Tympanic membrane, external ear and ear canal normal.   Left Ear: Tympanic membrane, external ear and ear canal normal.   Nose: Nose normal.   Mouth/Throat: Oropharynx is clear and moist, and mucous membranes are normal.  There is no cervical adenopathy. There are no loose teeth. Eyes: Conjunctivae and extraocular motions are normal. Pupils are equal, round, and reactive to light bilaterally. Neck: Neck supple. No JVD present. No mass and no thyromegaly present. Cardiovascular: Normal rate, regular rhythm, normal heart sounds and intact distal pulses. Exam reveals no gallop and no friction rub. No murmur heard. Pulmonary/Chest: Effort normal and breath sounds normal. No respiratory distress.  Mild scattered wheezes  Abdominal: Soft, non-tender. Normal bowel sounds and aorta. There is no organomegaly, bruit or palpable mass. Neurological: She is alert and oriented to person, place, and time. She has normal reflexes. No cranial nerve deficit. Coordination normal.   Skin: Skin is warm and dry. There is no rash or erythema. No suspicious lesions noted. Psychiatric: She has a normal mood and affect. Speech and behavior are normal. Judgment, cognition and memory are normal.     EKG Interpretation: not performed        Lab Results   Component Value Date     09/11/2017    K 5.0 09/11/2017     09/11/2017    CO2 20 (L) 09/11/2017     Lab Results   Component Value Date    CREATININE 1.7 (H) 09/11/2017     Lab Results   Component Value Date    WBC 8.6 09/11/2017    HGB 12.3 09/11/2017    HCT 38.3 09/11/2017    MCV 94.2 09/11/2017     09/11/2017       Assessment:  Encounter Diagnoses   Name Primary?  Left carpal tunnel syndrome Yes    Preop examination     Elevated serum creatinine     Snores     Essential hypertension         63 y.o. patient with planned surgery as above. Known risk factors for perioperative complications: None        Plan:    1. Preoperative workup as follows: none. 2. Change in medication regimen before surgery: discontinue NSAIDs (asa) 7d before surgery. 3. Prophylaxis for cardiac events with perioperative beta-blockers: on b blocker for HTN. 4. Invasive hemodynamic monitoring perioperatively: not indicated. 5. Patient is cleared for upcoming surgery. 6. Needs to see Nephrology   7. Needs sleep study in future       If you have questions, please do not hesitate to call me. Sincerely,        Missael Gómez M.D.

## 2017-11-03 DIAGNOSIS — G89.4 CHRONIC PAIN DISORDER: ICD-10-CM

## 2017-11-03 RX ORDER — HYDROCODONE BITARTRATE AND ACETAMINOPHEN 7.5; 325 MG/1; MG/1
1 TABLET ORAL EVERY 6 HOURS PRN
Qty: 120 TABLET | Refills: 0 | Status: SHIPPED | OUTPATIENT
Start: 2017-11-03 | End: 2017-12-01 | Stop reason: SDUPTHER

## 2017-11-10 ENCOUNTER — PAT TELEPHONE (OUTPATIENT)
Dept: PREADMISSION TESTING | Age: 62
End: 2017-11-10

## 2017-11-10 VITALS — HEIGHT: 67 IN | WEIGHT: 280 LBS | BODY MASS INDEX: 43.95 KG/M2

## 2017-11-10 RX ORDER — SODIUM CHLORIDE 0.9 % (FLUSH) 0.9 %
10 SYRINGE (ML) INJECTION EVERY 12 HOURS SCHEDULED
Status: CANCELLED | OUTPATIENT
Start: 2017-11-10

## 2017-11-10 RX ORDER — SODIUM CHLORIDE 0.9 % (FLUSH) 0.9 %
10 SYRINGE (ML) INJECTION PRN
Status: CANCELLED | OUTPATIENT
Start: 2017-11-10

## 2017-11-10 RX ORDER — SODIUM CHLORIDE 9 MG/ML
INJECTION, SOLUTION INTRAVENOUS CONTINUOUS
Status: CANCELLED | OUTPATIENT
Start: 2017-11-10

## 2017-11-10 NOTE — PRE-PROCEDURE INSTRUCTIONS
[address] [facility] time__0730__________        Surgery time_____0900_______    Take the following medications with a sip of water: Jules Bayard, LOPRESSOR, Durrell Brittle    Do not eat or drink anything after 12:00 midnight prior to your surgery. This includes water chewing gum, mints and ice chips. You may brush your teeth and gargle the morning of your surgery, but do not swallow the water     Please see your family doctor/pediatrician for a history and physical and/or concerning medications. Bring any test results/reports from your physicians office. If you are under the care of a heart doctor or specialist doctor, please be aware that you may be asked to them for clearance    You may be asked to stop blood thinners such as Coumadin, Plavix, Fragmin, Lovenox, etc., or any anti-inflammatories such as:  Aspirin, Ibuprofen, Advil, Naproxen prior to your surgery. We also ask that you stop any OTC medications such as fish oil, vitamin E, glucosamine, garlic, Multivitamins, COQ 10, etc.    We ask that you do not smoke 24 hours prior to surgery  We ask that you do not  drink any alcoholic beverages 24 hours prior to surgery     You must make arrangements for a responsible adult to take you home after your surgery. For your safety you will not be allowed to leave alone or drive yourself home. Your surgery will be cancelled if you do not have a ride home. Also for your safety, it is strongly suggested that someone stay with you the first 24 hours after your surgery. A parent or legal guardian must accompany a child scheduled for surgery and plan to stay at the hospital until the child is discharged. Please do not bring other children with you. For your comfort, please wear simple loose fitting clothing to the hospital.  Please do not bring valuables.     Do not wear any make-up or nail polish on your fingers or toes      For your safety, please do not wear any jewelry or body piercing's on the day of surgery. All jewelry must be removed. If you have dentures, they will be removed before going to operating room. For your convenience, we will provide you with a container. If you wear contact lenses or glasses, they will be removed, please bring a case for them. If you have a living will and a durable power of  for healthcare, please bring in a copy. As part of our patient safety program to minimize surgical site infections, we ask you to do the following:    · Please notify your surgeon if you develop any illness between         now and the  day of your surgery. · This includes a cough, cold, fever, sore throat, nausea,         or vomiting, and diarrhea, etc.  ·  Please notify your surgeon if you experience dizziness, shortness         of breath or blurred vision between now and the time of your surgery. Do not shave your operative site 96 hours prior to surgery. For face and neck surgery, men may use an electric razor 48 hours   prior to surgery. You may shower the night before surgery or the morning of   your surgery with an antibacterial soap. You will need to bring a photo ID and insurance card    [facility] has an onsite pharmacy, would you like to utilize our pharmacy     If you will be staying overnight and use a C-pap machine, please bring   your C-pap to hospital     Our goal is to provide you with excellent care, therefore, visitors will be limited to two(2) in the room at a time so that we may focus on providing this care for you. Please contact pre-admission testing if you have any further questions. [facility] phone number:  [address] [facility] fax number:  [phone]  Please note these are generalized instructions for all surgical cases, you may be provided with more specific instructions according to your surgery.

## 2017-11-14 ENCOUNTER — HOSPITAL ENCOUNTER (OUTPATIENT)
Dept: SURGERY | Age: 62
Discharge: HOME | End: 2017-11-14
Attending: ORTHOPAEDIC SURGERY | Admitting: ORTHOPAEDIC SURGERY

## 2017-11-14 VITALS
BODY MASS INDEX: 46.52 KG/M2 | RESPIRATION RATE: 20 BRPM | SYSTOLIC BLOOD PRESSURE: 135 MMHG | WEIGHT: 289.46 LBS | HEART RATE: 69 BPM | DIASTOLIC BLOOD PRESSURE: 94 MMHG | HEIGHT: 66 IN | OXYGEN SATURATION: 97 % | TEMPERATURE: 97.8 F

## 2017-11-14 RX ORDER — FENTANYL CITRATE 50 UG/ML
25 INJECTION, SOLUTION INTRAMUSCULAR; INTRAVENOUS EVERY 5 MIN PRN
Status: DISCONTINUED | OUTPATIENT
Start: 2017-11-14 | End: 2017-11-15 | Stop reason: HOSPADM

## 2017-11-14 RX ORDER — HYDROCODONE BITARTRATE AND ACETAMINOPHEN 5; 325 MG/1; MG/1
2 TABLET ORAL
Status: COMPLETED | OUTPATIENT
Start: 2017-11-14 | End: 2017-11-14

## 2017-11-14 RX ORDER — SODIUM CHLORIDE 9 MG/ML
INJECTION, SOLUTION INTRAVENOUS CONTINUOUS
Status: DISCONTINUED | OUTPATIENT
Start: 2017-11-14 | End: 2017-11-15 | Stop reason: HOSPADM

## 2017-11-14 RX ORDER — ONDANSETRON 2 MG/ML
4 INJECTION INTRAMUSCULAR; INTRAVENOUS
Status: ACTIVE | OUTPATIENT
Start: 2017-11-14 | End: 2017-11-14

## 2017-11-14 RX ORDER — SODIUM CHLORIDE 0.9 % (FLUSH) 0.9 %
10 SYRINGE (ML) INJECTION EVERY 12 HOURS SCHEDULED
Status: DISCONTINUED | OUTPATIENT
Start: 2017-11-14 | End: 2017-11-15 | Stop reason: HOSPADM

## 2017-11-14 RX ORDER — SODIUM CHLORIDE 0.9 % (FLUSH) 0.9 %
10 SYRINGE (ML) INJECTION PRN
Status: DISCONTINUED | OUTPATIENT
Start: 2017-11-14 | End: 2017-11-15 | Stop reason: HOSPADM

## 2017-11-14 RX ADMIN — SODIUM CHLORIDE: 9 INJECTION, SOLUTION INTRAVENOUS at 07:57

## 2017-11-14 RX ADMIN — HYDROCODONE BITARTRATE AND ACETAMINOPHEN 2 TABLET: 5; 325 TABLET ORAL at 09:18

## 2017-11-14 ASSESSMENT — PAIN SCALES - GENERAL
PAINLEVEL_OUTOF10: 4
PAINLEVEL_OUTOF10: 5
PAINLEVEL_OUTOF10: 4

## 2017-11-14 ASSESSMENT — LIFESTYLE VARIABLES: SMOKING_STATUS: 1

## 2017-11-14 ASSESSMENT — PAIN - FUNCTIONAL ASSESSMENT: PAIN_FUNCTIONAL_ASSESSMENT: 0-10

## 2017-11-14 ASSESSMENT — ENCOUNTER SYMPTOMS: SHORTNESS OF BREATH: 0

## 2017-11-14 NOTE — OP NOTE
the carpal tunnel were inspected and found to be free of mass, lesion or other abnormality. Digital palpation revealed no further constriction about the median nerve. The wound was irrigated copiously with sterile saline for irrigation and the pneumotourniquet was deflated after a period of 3 minutes elevation. The fingers were immediately pink & well perfused. Hemostasis was easily obtained with direct pressure and electrocautery and the wound was closed with interrupted absorbable sutures. The wound was dressed with adaptic, dry sterile dressings and a bulky soft hand & wrist dressing was applied. Ms. Shanice Holden  was awakened from light sedation, having tolerated the procedure without apparent complication. She  was returned to the recovery room in stable condition. At the conclusion of the procedure all needle, instrument, and sponge counts were correct. Chelly Saleh MD   11/14/2017, 8:49 AM

## 2017-11-14 NOTE — H&P
Pre-operative Update of H&P:    I  have seen & examined Ms. Bre Deshpande related solely to her hand and upper extremity conditions, prior to the scheduled procedure on the date of her surgery. The indications for the planned surgical procedure & and her upper-extremity conditionare unchanged. Please see the Anesthesia Pre-Op Note from date of surgery for Ms. Kimberly Je Hall's systemic evaluation.

## 2017-11-14 NOTE — PROGRESS NOTES
Pt awake. States pain 5/10. Dr. Gerald Gonzalez notified. See orders.   To phase 2 care per stretcher with RN assist.

## 2017-11-14 NOTE — PROGRESS NOTES
"              After Visit Summary   10/16/2017    Joe Copeland    MRN: [id number]           Patient Information     Date Of Birth          1959        Visit Information        Provider Department      10/16/2017 9:40 AM Daljit Ryan MD [facility]        Today's Diagnoses     Viral syndrome    -  1    Cough        Acute conjunctivitis of both eyes, unspecified acute conjunctivitis type        Need for prophylactic vaccination and inoculation against influenza        Fatigue, unspecified type           Follow-ups after your visit        Who to contact     Please call your clinic at [phone] to:    Ask questions about your health    Make or cancel appointments    Discuss your medicines    Learn about your test results    Speak to your doctor   If you have compliments or concerns about an experience at your clinic, or if you wish to file a complaint, please contact [facility] Patient Relations at [phone] or email us at [email or web address]         Additional Information About Your Visit        Care EveryWhere ID     This is your Care EveryWhere ID. This could be used by other organizations to access your [facility] medical records  [id number]        Your Vitals Were     Pulse Temperature Height Pulse Oximetry BMI (Body Mass Index)       62 99  F (37.2  C) (Oral) 6' 3\" (190.5 cm) 98% 28.27 kg/m2        Blood Pressure from Last 3 Encounters:   10/16/17 129/83   08/21/17 135/89   09/28/16 137/90    Weight from Last 3 Encounters:   10/16/17 226 lb 3.2 oz (102.6 kg)   08/21/17 229 lb 3.2 oz (104 kg)   09/28/16 229 lb 3.2 oz (104 kg)              We Performed the Following     CBC with Diff Plt (P FM)     XR CHEST 2 VW          Today's Medication Changes          These changes are accurate as of: 10/16/17 11:59 PM.  If you have any questions, ask your nurse or doctor.               Start taking these medicines.        Dose/Directions    " In phase two awake alert oriented says she feels good and is Lawrence Salmeron trimethoprim-polymyxin b ophthalmic solution   Commonly known as:  POLYTRIM   Used for:  Acute conjunctivitis of both eyes, unspecified acute conjunctivitis type   Started by:  Daljit Ryan MD        Dose:  1 drop   Place 1 drop into both eyes 4 times daily for 7 days   Quantity:  2 mL   Refills:  0            Where to get your medicines      These medications were sent to [facility] [address]  [address] [place] [id number]     Phone:  [phone]     trimethoprim-polymyxin b ophthalmic solution                Primary Care Provider Office Phone # Fax #    Daljit Ryan [phone]       [facility] [address] [facility] [address]        Equal Access to Services     [facility] : Hadii karly ku hadasho Soomaali, waaxda luqadaha, qaybta kaalmada adeegyada, waxay maxin hayabbyn naomy azevedo . So [facility] [phone].    ATENCIÓN: Si habla español, tiene a gar disposición servicios gratuitos de asistencia lingüística. Llame al [phone].    We comply with applicable federal civil rights laws and [place] laws. We do not discriminate on the basis of race, color, national origin, age, disability, sex, sexual orientation, or gender identity.            Thank you!     Thank you for choosing [facility]  for your care. Our goal is always to provide you with excellent care. Hearing back from our patients is one way we can continue to improve our services. Please take a few minutes to complete the written survey that you may receive in the mail after your visit with us. Thank you!             Your Updated Medication List - Protect others around you: Learn how to safely use, store and throw away your medicines at www.disposemymeds.org.          This list is accurate as of: 10/16/17 11:59 PM.  Always use your most recent med list.                   Brand Name Dispense Instructions for use Diagnosis    ADVIL 200 MG tablet   Generic drug:   ibuprofen      Take 600 mg by mouth as needed.        cetirizine 10 MG tablet    zyrTEC    90 tablet    Take 1 tablet (10 mg) by mouth every evening    Seasonal allergic rhinitis       fluticasone 50 MCG/ACT spray    FLONASE    1 Bottle    Spray 1-2 sprays into both nostrils daily    Seasonal allergic rhinitis       omeprazole 20 MG CR capsule    priLOSEC    90 capsule    Take 1 capsule (20 mg) by mouth daily    Gastroesophageal reflux disease without esophagitis       trimethoprim-polymyxin b ophthalmic solution    POLYTRIM    2 mL    Place 1 drop into both eyes 4 times daily for 7 days    Acute conjunctivitis of both eyes, unspecified acute conjunctivitis type

## 2017-11-14 NOTE — ANESTHESIA POST-OP
[facility] Department of Anesthesiology  Post-Anesthesia Note       Name:  Kobe Fatima                                         Age:  63 y.o.   MRN:  [id number]     Last Vitals & Oxygen Saturation: BP (!) 135/94   Pulse 69   Temp 97.8 °F (36.6 °C) (Temporal)   Resp 20   Ht 5' 6\" (1.676 m)   Wt 289 lb 7.4 oz (131.3 kg)   LMP 01/01/2004   SpO2 97%   BMI 46.72 kg/m²   Patient Vitals for the past 4 hrs:   BP Temp Temp src Pulse Resp SpO2 Height Weight   11/14/17 0919 (!) 135/94 - - 69 20 97 % - -   11/14/17 0912 126/81 97.8 °F (36.6 °C) Temporal 76 20 97 % - -   11/14/17 0903 122/70 98 °F (36.7 °C) Temporal 70 22 98 % - -   11/14/17 0901 101/80 - - 70 15 97 % - -   11/14/17 0856 105/76 - - 72 19 97 % - -   11/14/17 0854 85/65 97.2 °F (36.2 °C) Temporal 74 19 100 % - -   11/14/17 0737 136/84 98 °F (36.7 °C) - 76 14 98 % 5' 6\" (1.676 m) 289 lb 7.4 oz (131.3 kg)       Level of consciousness: awake, alert and oriented    Respiratory: stable     Cardiovascular: stable     Hydration: stable     PONV: stable     Post-op pain: adequate analgesia    Post-op assessment: no apparent anesthetic complications and tolerated procedure well    Complications:  none    Vita Gomez MD  November 14, 2017   9:28 AM

## 2017-11-14 NOTE — ANESTHESIA PRE-OP
[facility] Department of Anesthesiology  Pre-Anesthesia Evaluation/Consultation       Name:  Faustino Chacko  : 1955  Age:  63 y.o. MRN:  [id number]  Date: 2017           Procedure (Scheduled):  Left CTR  Surgeon:  Dr. John Rosenberg [Meloxicam] Nausea Only     Stomach would get upset after taking this medication    Morphine Other (See Comments)     When taken in  pt had an adverse reaction to morphine. She ended up in  hospital with kidney failure, dehydration, and in ICU. She prefers this not to be given ever.  Other      ?  Suture from CABG caused blister (10/30/2015)     Patient Active Problem List   Diagnosis    Primary localized osteoarthrosis, lower leg    DJD (degenerative joint disease)    Obesity    HTN (hypertension)    Hyperlipidemia    Abscess of skin of abdomen    Arthritis    STAN (acute kidney injury) ([address]    Intertrochanteric fracture of left femur ([address]    Osteopenia    Anemia    Failed total hip arthroplasty ([address]    History of total left hip arthroplasty 11/4/15    Coronary artery disease involving native coronary artery of native heart without angina pectoris    Iron deficiency anemia    Status post coronary artery bypass graft    Superficial postoperative wound infection    Arthritis of knee, right    Tobacco use disorder    Carpal tunnel syndrome of left wrist    Elevated serum creatinine     Past Medical History:   Diagnosis Date    Arthritis     ASHD (arteriosclerotic heart disease)     Chronic kidney disease     COPD (chronic obstructive pulmonary disease) ([address]     unknown     Coronary artery disease involving native coronary artery of native heart without angina pectoris 3/31/2016    Depression     Full dentures     HTN     Hyperlipidemia     Iron deficiency anemia 3/31/2016    Kidney stone     Morbid obesity (HCC)     Restless legs syndrome pt on cabidopa/levodopa    Wears glasses      Past Surgical History:   Procedure Laterality Date    APPENDECTOMY      CORONARY ARTERY BYPASS GRAFT  9/28/10    4 way bypass    HIP SURGERY Left 2016    I&D of joint    KNEE ARTHROSCOPY Right 2000    TOTAL HIP ARTHROPLASTY Left 11-4-2015    TOTAL KNEE ARTHROPLASTY Right 8-24-10    TOTAL KNEE ARTHROPLASTY Left 4/8/11    TUBAL LIGATION       Social History   Substance Use Topics    Smoking status: Current Every Day Smoker     Packs/day: 0.50     Years: 30.00     Types: Cigarettes     Last attempt to quit: 2/29/2016    Smokeless tobacco: Never Used    Alcohol use No     Medications  Current Outpatient Prescriptions on File Prior to Encounter   Medication Sig Dispense Refill    albuterol sulfate HFA (PROAIR HFA) 108 (90 Base) MCG/ACT inhaler Inhale 2 puffs into the lungs every 6 hours as needed for Wheezing 1 Inhaler 3    pravastatin (PRAVACHOL) 80 MG tablet TAKE 1 TABLET NIGHTLY 90 tablet 3    metoprolol tartrate (LOPRESSOR) 25 MG tablet TAKE 1 TABLET TWICE A  tablet 3    FLUoxetine (PROZAC) 40 MG capsule Take 2 capsules by mouth daily 180 capsule 3    carbidopa-levodopa (SINEMET)  MG per tablet Take 1 tablet by mouth 2 times daily (Patient taking differently: Take 2 tablets by mouth nightly ) 180 tablet 3    HYDROcodone-acetaminophen (NORCO) 7.5-325 MG per tablet Take 1 tablet by mouth every 6 hours as needed for Pain . 120 tablet 0    aspirin 81 MG EC tablet Take 81 mg by mouth daily. No current facility-administered medications on file prior to encounter.       Current Outpatient Prescriptions   Medication Sig Dispense Refill    albuterol sulfate HFA (PROAIR HFA) 108 (90 Base) MCG/ACT inhaler Inhale 2 puffs into the lungs every 6 hours as needed for Wheezing 1 Inhaler 3    pravastatin (PRAVACHOL) 80 MG tablet TAKE 1 TABLET NIGHTLY 90 tablet 3    metoprolol tartrate (LOPRESSOR) 25 MG tablet TAKE 1 TABLET TWICE A  tablet 3 lb 7.4 oz (131.3 kg). CBC   Lab Results   Component Value Date    WBC 8.6 09/11/2017    RBC 4.07 09/11/2017    HGB 12.3 09/11/2017    HCT 38.3 09/11/2017    MCV 94.2 09/11/2017    RDW 15.9 09/11/2017     09/11/2017     CMP    Lab Results   Component Value Date     09/11/2017    K 5.0 09/11/2017     09/11/2017    CO2 20 09/11/2017    BUN 33 09/11/2017    CREATININE 1.7 09/11/2017    GFRAA 37 09/11/2017    GFRAA 38 09/15/2012    AGRATIO 1.6 05/15/2017    LABGLOM 31 09/11/2017    GLUCOSE 108 09/11/2017    PROT 7.2 05/15/2017    PROT 6.9 09/15/2012    CALCIUM 9.4 09/11/2017    BILITOT <0.2 05/15/2017    ALKPHOS 101 05/15/2017    AST 13 05/15/2017    ALT 10 05/15/2017     BMP    Lab Results   Component Value Date     09/11/2017    K 5.0 09/11/2017     09/11/2017    CO2 20 09/11/2017    BUN 33 09/11/2017    CREATININE 1.7 09/11/2017    CALCIUM 9.4 09/11/2017    GFRAA 37 09/11/2017    GFRAA 38 09/15/2012    LABGLOM 31 09/11/2017    GLUCOSE 108 09/11/2017     POCGlucose  No results for input(s): GLUCOSE in the last 72 hours.    Coags    Lab Results   Component Value Date    PROTIME 12.9 11/06/2015    INR 1.6 11/30/2015    APTT 27.9 52/22/9917     HCG (If Applicable) No results found for: PREGTESTUR, PREGSERUM, HCG, HCGQUANT   ABGs   Lab Results   Component Value Date    PHART 7.470 09/11/2012    PO2ART 96.2 09/11/2012    XYU1NRP 31.5 09/11/2012    MVH0BSB 22.6 09/11/2012    BEART -0.1 09/11/2012    X7EVPVVC 97.4 09/11/2012      Type & Screen (If Applicable)  Lab Results   Component Value Date    LABABO A 04/05/2011    [address] Positive 04/05/2011                            BMI: Wt Readings from Last 3 Encounters:       NPO Status:   Date of last liquid consumption: 11/14/17   Time of last liquid consumption: 0000   Date of last solid food consumption: 11/14/17      Time of last solid consumption: 0000       Anesthesia Evaluation  Patient summary reviewed no history of anesthetic complications:

## 2017-11-14 NOTE — PROGRESS NOTES
To pacu from OR. Pt awake. States left hand \"a little sore\". Dressing to left hand dry and intact. Fingers to left hand warm with brisk cap refill. IV infusing. Monitor in sinus rhythm.

## 2017-11-20 ENCOUNTER — OFFICE VISIT (OUTPATIENT)
Dept: ORTHOPEDIC SURGERY | Age: 62
End: 2017-11-20

## 2017-11-20 VITALS — WEIGHT: 289 LBS | RESPIRATION RATE: 15 BRPM | BODY MASS INDEX: 46.45 KG/M2 | HEIGHT: 66 IN

## 2017-11-20 DIAGNOSIS — G56.02 CARPAL TUNNEL SYNDROME OF LEFT WRIST: ICD-10-CM

## 2017-11-20 DIAGNOSIS — M25.531 RIGHT WRIST PAIN: Primary | ICD-10-CM

## 2017-11-20 PROCEDURE — L3908 WHO COCK-UP NONMOLDE PRE OTS: HCPCS | Performed by: ORTHOPAEDIC SURGERY

## 2017-11-20 PROCEDURE — 99214 OFFICE O/P EST MOD 30 MIN: CPT | Performed by: ORTHOPAEDIC SURGERY

## 2017-11-20 NOTE — Clinical Note
Dear  Desmond Warren MD,  Thank you very much for your referral or Ms. Lisha Ott to me for evaluation and treatment of her Hand & Wrist condition. I appreciate your confidence in me and thank you for allowing me the opportunity to care for your patients. If I can be of any further assistance to you on this or any other patient, please do not hesitate to contact me. Sincerely,  Judi Akbar MD

## 2017-11-20 NOTE — PATIENT INSTRUCTIONS
Postoperative Instructions After Carpal Tunnel Release    Dr. Mack Gabriel        1. After bandages are removed one week from surgery, you may chose to wear a small bandage over the incision if you wish, though you do not need to. 2. Keep incision dry for a total of 12 days from the day of surgery. Thereafter, you may wash with mild soap and water and shower normally. 3. Once your stiches have fully disappeared, you should begin gently massaging the incision with Vitamin E (may use Vitamin E lotion or contents of Vitamin E capsule). 4. Work hard on motion of the fingers and wrist, straightening each finger fully and bending each finger fully, bending wrist forward and bending wrist backwards. Do not be concerned if you experience discomfort. This will not damage the surgery. 5. You may begin using the hand as it feels comfortable beginning 12-14 days from the day of surgery. You may not feel entirely comfortable gripping or lifting heavy objects for several weeks. 6. The stitches are dissolvable and may take up to three weeks from day of surgery to completely disappear. 7. You may expect to see some skin peel off around the incision. You may be left with a small area of pink baby skin. This is quite normal.      Thank you for choosing [facility]) [facility] for your Hand and Upper Extremity needs. If we can be of any further assistance to you, please do not hesitate to contact us.     Office Phone Number:  [phone]  or  [phone]

## 2017-11-20 NOTE — PROGRESS NOTES
Ms. Reginald Boland returns today in follow-up of her previously treated  left Carpal Tunnel Syndrome. She was last seen November 14, 2017 at which time she was treated with left carpal tunnel release. She experienced moderate relief of her initial symptoms. She  has noticed symptoms progressing on the right over the last several months. She returns today with Improved symptoms of left Carpal Tunnel Syndrome, and now more symptomatic right carpal tunnel syndrome requesting further treatment. The patient's , past medical history, medications, allergies,  family history, social history, and review of systems have been reviewed and are recorded in the chart. Physical Exam:  Vitals  Resp: 15  Height: 5' 6\" (167.6 cm)  Weight: 289 lb (131.1 kg)  Ms. Reginald Boland appears well, she is in no apparent distress, she demonstrates appropriate mood & affect. Skin, bilaterally: Skin color, texture, turgor normal. No rashes or lesions. left side skin incision is healing well There is no erythema, drainage, or sign of infection. Digital range of motion is full, equal bilateral.  Wrist range of motion is full, equal bilateral.   There is no evidence of gross joint instability bilaterally. Sensation is subjectively tingling in the Median Innervated Digits and objectively normal in the same distribution on the Right, normal on the Left. Vascular examination reveals normal and good capillary refill bilaterally. Swelling is mild in the distal volar forearm on the Left, normal on the Right. Muscular strength is clinically appropriate bilaterally. Impression:  Ms. Reginald Boland is showing evidence of much improved left and  Persistent right  Carpal Tunnel Syndrome after previous treatment. She requests additional treatment at this time. Plan:    After discussion of the treatment options available for her right carpal tunnel syndrome, I have outlined for Ms. Reginald Boland a conservative plan of treatment

## 2017-11-22 ENCOUNTER — TELEPHONE (OUTPATIENT)
Dept: ORTHOPEDIC SURGERY | Age: 62
End: 2017-11-22

## 2017-11-22 DIAGNOSIS — G56.02 CARPAL TUNNEL SYNDROME OF LEFT WRIST: Primary | ICD-10-CM

## 2017-11-22 RX ORDER — CEPHALEXIN 500 MG/1
500 CAPSULE ORAL 4 TIMES DAILY
Qty: 28 CAPSULE | Refills: 0 | Status: SHIPPED | OUTPATIENT
Start: 2017-11-22 | End: 2018-02-26 | Stop reason: ALTCHOICE

## 2017-11-22 NOTE — TELEPHONE ENCOUNTER
Patient was seen in office Monday- states that her incision was fine    But last night she noticed blood over her hand and noticed that the wound is open, mostly the bottom part  She states they tried putting strips over it but that does not seem to be helping    She also states that there is some white pus in the wound, she is concerned about infection and needs to know what she should do?   Pls call patient at [phone]

## 2017-11-22 NOTE — TELEPHONE ENCOUNTER
Pt is calling back and she wanted to know what to do. Please see message below    Please call today.   [phone]

## 2017-11-29 ENCOUNTER — TELEPHONE (OUTPATIENT)
Dept: ORTHOPEDIC SURGERY | Age: 62
End: 2017-11-29

## 2017-11-29 NOTE — TELEPHONE ENCOUNTER
Pt is calling about her incision. She said that it is has drainage/puss yellow drainage. The split is still wide open please advise.

## 2017-11-29 NOTE — TELEPHONE ENCOUNTER
Spoke with pt regarding incision. Stated she started doing soaks and the incision was looking much better. She never picked up abx rx. Advised to continue doing soaks and to  abx and start taking. Pt will call Friday with update and if sooner if needed.

## 2017-12-01 DIAGNOSIS — G89.4 CHRONIC PAIN DISORDER: ICD-10-CM

## 2017-12-01 RX ORDER — HYDROCODONE BITARTRATE AND ACETAMINOPHEN 7.5; 325 MG/1; MG/1
1 TABLET ORAL EVERY 6 HOURS PRN
Qty: 120 TABLET | Refills: 0 | Status: SHIPPED | OUTPATIENT
Start: 2017-12-01 | End: 2017-12-28 | Stop reason: SDUPTHER

## 2017-12-28 DIAGNOSIS — G89.4 CHRONIC PAIN DISORDER: ICD-10-CM

## 2017-12-28 RX ORDER — HYDROCODONE BITARTRATE AND ACETAMINOPHEN 7.5; 325 MG/1; MG/1
1 TABLET ORAL EVERY 6 HOURS PRN
Qty: 120 TABLET | Refills: 0 | Status: SHIPPED | OUTPATIENT
Start: 2017-12-28 | End: 2018-01-24 | Stop reason: SDUPTHER

## 2018-01-13 NOTE — TELEPHONE ENCOUNTER
Medication and Quantity requested: FLUoxetine (PROZAC) 40 MG capsule  Qty 180       Last Visit  10/31/17    Pharmacy and phone number updated in EPIC:  yes

## 2018-01-15 RX ORDER — FLUOXETINE HYDROCHLORIDE 40 MG/1
80 CAPSULE ORAL DAILY
Qty: 180 CAPSULE | Refills: 3 | Status: SHIPPED | OUTPATIENT
Start: 2018-01-15 | End: 2019-01-31 | Stop reason: SDUPTHER

## 2018-01-24 DIAGNOSIS — G89.4 CHRONIC PAIN DISORDER: ICD-10-CM

## 2018-01-24 RX ORDER — HYDROCODONE BITARTRATE AND ACETAMINOPHEN 7.5; 325 MG/1; MG/1
1 TABLET ORAL EVERY 6 HOURS PRN
Qty: 120 TABLET | Refills: 0 | Status: SHIPPED | OUTPATIENT
Start: 2018-01-24 | End: 2018-02-26 | Stop reason: SDUPTHER

## 2018-02-23 DIAGNOSIS — G89.4 CHRONIC PAIN DISORDER: ICD-10-CM

## 2018-02-23 RX ORDER — HYDROCODONE BITARTRATE AND ACETAMINOPHEN 7.5; 325 MG/1; MG/1
1 TABLET ORAL EVERY 6 HOURS PRN
Qty: 120 TABLET | Refills: 0 | OUTPATIENT
Start: 2018-02-23 | End: 2018-03-25

## 2018-02-26 ENCOUNTER — OFFICE VISIT (OUTPATIENT)
Dept: FAMILY MEDICINE CLINIC | Age: 63
End: 2018-02-26

## 2018-02-26 VITALS
BODY MASS INDEX: 45.99 KG/M2 | WEIGHT: 293 LBS | RESPIRATION RATE: 14 BRPM | OXYGEN SATURATION: 97 % | HEART RATE: 80 BPM | SYSTOLIC BLOOD PRESSURE: 150 MMHG | DIASTOLIC BLOOD PRESSURE: 95 MMHG | HEIGHT: 67 IN

## 2018-02-26 DIAGNOSIS — I10 ESSENTIAL HYPERTENSION: ICD-10-CM

## 2018-02-26 DIAGNOSIS — Z72.0 TOBACCO ABUSE: ICD-10-CM

## 2018-02-26 DIAGNOSIS — E66.01 MORBID OBESITY (HCC): ICD-10-CM

## 2018-02-26 DIAGNOSIS — Z12.39 BREAST CANCER SCREENING: Primary | ICD-10-CM

## 2018-02-26 DIAGNOSIS — G89.4 CHRONIC PAIN DISORDER: ICD-10-CM

## 2018-02-26 PROCEDURE — 99214 OFFICE O/P EST MOD 30 MIN: CPT | Performed by: FAMILY MEDICINE

## 2018-02-26 RX ORDER — HYDROCODONE BITARTRATE AND ACETAMINOPHEN 7.5; 325 MG/1; MG/1
1 TABLET ORAL EVERY 6 HOURS PRN
Qty: 120 TABLET | Refills: 0 | Status: SHIPPED | OUTPATIENT
Start: 2018-02-26 | End: 2018-03-23 | Stop reason: SDUPTHER

## 2018-02-26 ASSESSMENT — PATIENT HEALTH QUESTIONNAIRE - PHQ9
2. FEELING DOWN, DEPRESSED OR HOPELESS: 0
1. LITTLE INTEREST OR PLEASURE IN DOING THINGS: 0
SUM OF ALL RESPONSES TO PHQ QUESTIONS 1-9: 0
SUM OF ALL RESPONSES TO PHQ9 QUESTIONS 1 & 2: 0

## 2018-02-26 NOTE — PROGRESS NOTES
Mammography Screening    3. Essential hypertension  Elevated today  Low salt, low etoh, more exercise, recheck at home and in 3 mo - if still up  , add arb  4. Morbid obesity ([address]  Add exercise    5. Tobacco abuse  I told pt it is very important to quit smoking! When ready, I would like to schedule an appointment to discuss the various tools we can offer, such as classes, hypnosis, accupuncture, or/and medications. Suggest pt. get to a class, pick a quit date, and RTO to discuss. Multiple approaches toward motivating the patient were explored.       Spent 25 min with pt, over 50% trying to encourage her to quit tob, exercise, adot healthy lifestyle

## 2018-02-26 NOTE — PATIENT INSTRUCTIONS
20 to 44: Some experts recommend that women have a clinical breast exam every 3 years, starting at age 21. Ask your doctor how often you should have this test. If you have a high risk for breast cancer, talk with your doctor about when to start yearly mammograms and other screening tests. · Ages 36 and older: Talk with your doctor about how often you should have mammograms and clinical breast exams. What is your risk for breast cancer? If you don't already know your risk of breast cancer, you can ask your doctor about it. You can also look it up at www.cancer.gov/bcrisktool/. If your doctor says that you have a high or very high risk, ask about ways to reduce your risk. These could include getting extra screening, taking medicine, or having surgery. If you have a strong family history of breast cancer, ask your doctor about genetic testing. What steps can you take to stay healthy? Some things that increase your risk of breast cancer, such as your age and being female, cannot be controlled. But you can do some things to stay as healthy as you can. · Learn what your breasts normally look and feel like. If you notice any changes, tell your doctor. · Drink alcohol wisely. Your risk goes up the more you drink. For the best health, women should have no more than 1 drink a day or 7 drinks a week. · If you smoke, quit. When you quit smoking, you lower your chances of getting many types of cancer. You can also do your best to eat well, be active, and stay at a healthy weight. Eating healthy foods and being active every day, as well as staying at a healthy weight, may help prevent cancer. Where can you learn more? Go to https://zahraa.[employer]. org and sign in to your [employer] account. Enter X411 in the [employer] box to learn more about \"Learning About Breast Cancer Screening. \"     If you do not have an account, please click on the \"Sign Up Now\" link. Current as of:  May 12, 2017  Content

## 2018-03-02 LAB
6-ACETYLMORPHINE: NOT DETECTED
7-AMINOCLONAZEPAM: NOT DETECTED
ALPHA-OH-ALPRAZOLAM: NOT DETECTED
ALPRAZOLAM: NOT DETECTED
AMPHETAMINE: NOT DETECTED
BARBITURATES: NOT DETECTED
BENZOYLECGONINE: NOT DETECTED
BUPRENORPHINE: NOT DETECTED
CARISOPRODOL: NOT DETECTED
CLONAZEPAM: NOT DETECTED
CODEINE: NOT DETECTED
CREATININE URINE: 60.5 MG/DL (ref 20–400)
DIAZEPAM: NOT DETECTED
DRUGS EXPECTED: NORMAL
EER PAIN MGT DRUG PANEL, HIGH RES/EMIT U: NORMAL
ETHYL GLUCURONIDE: NOT DETECTED
FENTANYL: NOT DETECTED
HYDROCODONE: NOT DETECTED
HYDROMORPHONE: NOT DETECTED
LORAZEPAM: NOT DETECTED
MARIJUANA METABOLITE: NOT DETECTED
MDA: NOT DETECTED
MDEA: NOT DETECTED
MDMA URINE: NOT DETECTED
MEPERIDINE: NOT DETECTED
METHADONE: NOT DETECTED
METHAMPHETAMINE: NOT DETECTED
METHYLPHENIDATE: NOT DETECTED
MIDAZOLAM: NOT DETECTED
MORPHINE: NOT DETECTED
NORBUPRENORPHINE, FREE: NOT DETECTED
NORDIAZEPAM: NOT DETECTED
NORFENTANYL: NOT DETECTED
NORHYDROCODONE, URINE: NOT DETECTED
NOROXYCODONE: NOT DETECTED
NOROXYMORPHONE, URINE: NOT DETECTED
OXAZEPAM: NOT DETECTED
OXYCODONE: NOT DETECTED
OXYMORPHONE: NOT DETECTED
PAIN MANAGEMENT DRUG PANEL: NORMAL
PAIN MANAGEMENT DRUG PANEL: NORMAL
PCP: NOT DETECTED
PHENTERMINE: NOT DETECTED
PROPOXYPHENE: NOT DETECTED
TAPENTADOL, URINE: NOT DETECTED
TAPENTADOL-O-SULFATE, URINE: NOT DETECTED
TEMAZEPAM: NOT DETECTED
TRAMADOL: NOT DETECTED
ZOLPIDEM: NOT DETECTED

## 2018-03-07 ENCOUNTER — TELEPHONE (OUTPATIENT)
Dept: FAMILY MEDICINE CLINIC | Age: 63
End: 2018-03-07

## 2018-03-07 NOTE — TELEPHONE ENCOUNTER
Patient is calling with complaint of Cough, chest congestion, runny nose    This has been going on  3days     Patient was offered an appt she did not want to come in she was just here for an appointment last week   Has patient tried any over the counter medications? yes - Mucinex   Patients preferred pharmacy?  [facility]      Best number to reach patient today [phone]

## 2018-03-15 ENCOUNTER — OFFICE VISIT (OUTPATIENT)
Dept: FAMILY MEDICINE CLINIC | Age: 63
End: 2018-03-15

## 2018-03-15 VITALS
DIASTOLIC BLOOD PRESSURE: 84 MMHG | TEMPERATURE: 98.3 F | HEIGHT: 67 IN | SYSTOLIC BLOOD PRESSURE: 124 MMHG | OXYGEN SATURATION: 95 % | RESPIRATION RATE: 16 BRPM | WEIGHT: 288.8 LBS | HEART RATE: 62 BPM | BODY MASS INDEX: 45.33 KG/M2

## 2018-03-15 DIAGNOSIS — J40 BRONCHITIS: Primary | ICD-10-CM

## 2018-03-15 PROCEDURE — 99213 OFFICE O/P EST LOW 20 MIN: CPT | Performed by: FAMILY MEDICINE

## 2018-03-15 RX ORDER — METHYLPREDNISOLONE 4 MG/1
TABLET ORAL
Qty: 1 KIT | Refills: 0 | Status: SHIPPED | OUTPATIENT
Start: 2018-03-15 | End: 2018-03-21

## 2018-03-15 RX ORDER — PROMETHAZINE HYDROCHLORIDE AND CODEINE PHOSPHATE 6.25; 1 MG/5ML; MG/5ML
5 SYRUP ORAL 4 TIMES DAILY PRN
Qty: 120 ML | Refills: 0 | Status: SHIPPED | OUTPATIENT
Start: 2018-03-15 | End: 2018-03-22

## 2018-03-15 RX ORDER — AZITHROMYCIN 250 MG/1
TABLET, FILM COATED ORAL
Qty: 6 TABLET | Refills: 0 | Status: SHIPPED | OUTPATIENT
Start: 2018-03-15 | End: 2018-03-25

## 2018-03-15 ASSESSMENT — ENCOUNTER SYMPTOMS
COUGH: 1
SINUS PRESSURE: 1
SHORTNESS OF BREATH: 1
GASTROINTESTINAL NEGATIVE: 1
WHEEZING: 1
SORE THROAT: 1
RHINORRHEA: 1

## 2018-03-23 DIAGNOSIS — G89.4 CHRONIC PAIN DISORDER: ICD-10-CM

## 2018-03-23 RX ORDER — HYDROCODONE BITARTRATE AND ACETAMINOPHEN 7.5; 325 MG/1; MG/1
1 TABLET ORAL EVERY 6 HOURS PRN
Qty: 120 TABLET | Refills: 0 | Status: ON HOLD | OUTPATIENT
Start: 2018-03-23 | End: 2018-04-26 | Stop reason: HOSPADM

## 2018-04-18 ENCOUNTER — OFFICE VISIT (OUTPATIENT)
Dept: FAMILY MEDICINE CLINIC | Age: 63
End: 2018-04-18

## 2018-04-18 VITALS
BODY MASS INDEX: 43.95 KG/M2 | HEART RATE: 118 BPM | WEIGHT: 280 LBS | RESPIRATION RATE: 16 BRPM | OXYGEN SATURATION: 98 % | HEIGHT: 67 IN | DIASTOLIC BLOOD PRESSURE: 96 MMHG | SYSTOLIC BLOOD PRESSURE: 136 MMHG

## 2018-04-18 DIAGNOSIS — K61.0 PERIANAL ABSCESS: Primary | ICD-10-CM

## 2018-04-18 DIAGNOSIS — R00.0 TACHYCARDIA: ICD-10-CM

## 2018-04-18 PROBLEM — L02.91 ABSCESS: Status: ACTIVE | Noted: 2018-04-18

## 2018-04-18 PROBLEM — A41.9 SEPSIS (HCC): Status: ACTIVE | Noted: 2018-04-18

## 2018-04-18 PROCEDURE — 99214 OFFICE O/P EST MOD 30 MIN: CPT | Performed by: FAMILY MEDICINE

## 2018-04-19 PROBLEM — L03.317 CELLULITIS AND ABSCESS OF BUTTOCK: Status: ACTIVE | Noted: 2018-04-18

## 2018-04-19 PROBLEM — L02.31 GLUTEAL ABSCESS: Status: ACTIVE | Noted: 2018-04-18

## 2018-04-19 PROBLEM — N18.9 ACUTE-ON-CHRONIC KIDNEY INJURY (HCC): Status: ACTIVE | Noted: 2018-04-19

## 2018-04-19 PROBLEM — E87.20 METABOLIC ACIDOSIS, NORMAL ANION GAP (NAG): Status: ACTIVE | Noted: 2018-04-19

## 2018-04-19 PROBLEM — E66.01 MORBID OBESITY (HCC): Status: ACTIVE | Noted: 2018-04-19

## 2018-04-19 PROBLEM — N17.9 ACUTE-ON-CHRONIC KIDNEY INJURY (HCC): Status: ACTIVE | Noted: 2018-04-19

## 2018-04-26 DIAGNOSIS — L02.211 ABSCESS OF SKIN OF ABDOMEN: Primary | ICD-10-CM

## 2018-04-27 ENCOUNTER — CARE COORDINATION (OUTPATIENT)
Dept: CASE MANAGEMENT | Age: 63
End: 2018-04-27

## 2018-04-27 DIAGNOSIS — T84.018D FAILURE OF TOTAL HIP ARTHROPLASTY, SUBSEQUENT ENCOUNTER: ICD-10-CM

## 2018-04-27 DIAGNOSIS — M17.11 ARTHRITIS OF KNEE, RIGHT: Primary | ICD-10-CM

## 2018-04-27 DIAGNOSIS — L02.211 ABSCESS OF SKIN OF ABDOMEN: ICD-10-CM

## 2018-04-27 DIAGNOSIS — Z96.649 FAILURE OF TOTAL HIP ARTHROPLASTY, SUBSEQUENT ENCOUNTER: ICD-10-CM

## 2018-04-27 RX ORDER — HYDROCODONE BITARTRATE AND ACETAMINOPHEN 5; 325 MG/1; MG/1
1 TABLET ORAL EVERY 6 HOURS PRN
Qty: 10 TABLET | Refills: 0 | Status: CANCELLED | OUTPATIENT
Start: 2018-04-27 | End: 2018-04-30

## 2018-04-27 RX ORDER — HYDROCODONE BITARTRATE AND ACETAMINOPHEN 7.5; 325 MG/1; MG/1
TABLET ORAL
Qty: 120 TABLET | Refills: 0 | Status: SHIPPED | OUTPATIENT
Start: 2018-04-27 | End: 2018-05-23 | Stop reason: SDUPTHER

## 2018-04-27 RX ORDER — HYDROCODONE BITARTRATE AND ACETAMINOPHEN 5; 325 MG/1; MG/1
1 TABLET ORAL EVERY 6 HOURS PRN
Qty: 108 TABLET | Refills: 0 | Status: CANCELLED | OUTPATIENT
Start: 2018-04-27 | End: 2018-04-30

## 2018-04-27 RX ORDER — HYDROCODONE BITARTRATE AND ACETAMINOPHEN 5; 325 MG/1; MG/1
1 TABLET ORAL EVERY 6 HOURS PRN
Qty: 12 TABLET | Refills: 0 | Status: SHIPPED | OUTPATIENT
Start: 2018-04-27 | End: 2018-04-30

## 2018-05-02 ENCOUNTER — CARE COORDINATION (OUTPATIENT)
Dept: CASE MANAGEMENT | Age: 63
End: 2018-05-02

## 2018-05-03 ENCOUNTER — OFFICE VISIT (OUTPATIENT)
Dept: SURGERY | Age: 63
End: 2018-05-03

## 2018-05-03 DIAGNOSIS — L02.31 LEFT BUTTOCK ABSCESS: Primary | ICD-10-CM

## 2018-05-03 PROCEDURE — 99024 POSTOP FOLLOW-UP VISIT: CPT | Performed by: SURGERY

## 2018-05-10 ENCOUNTER — CARE COORDINATION (OUTPATIENT)
Dept: CASE MANAGEMENT | Age: 63
End: 2018-05-10

## 2018-05-14 ENCOUNTER — OFFICE VISIT (OUTPATIENT)
Dept: SURGERY | Age: 63
End: 2018-05-14

## 2018-05-14 DIAGNOSIS — L02.31 LEFT BUTTOCK ABSCESS: Primary | ICD-10-CM

## 2018-05-14 PROCEDURE — 99024 POSTOP FOLLOW-UP VISIT: CPT | Performed by: SURGERY

## 2018-05-15 ENCOUNTER — TELEPHONE (OUTPATIENT)
Dept: SURGERY | Age: 63
End: 2018-05-15

## 2018-05-18 ENCOUNTER — TELEPHONE (OUTPATIENT)
Dept: FAMILY MEDICINE CLINIC | Age: 63
End: 2018-05-18

## 2018-05-23 DIAGNOSIS — Z96.649 FAILURE OF TOTAL HIP ARTHROPLASTY, SUBSEQUENT ENCOUNTER: ICD-10-CM

## 2018-05-23 DIAGNOSIS — T84.018D FAILURE OF TOTAL HIP ARTHROPLASTY, SUBSEQUENT ENCOUNTER: ICD-10-CM

## 2018-05-23 DIAGNOSIS — M17.11 ARTHRITIS OF KNEE, RIGHT: ICD-10-CM

## 2018-05-23 RX ORDER — HYDROCODONE BITARTRATE AND ACETAMINOPHEN 7.5; 325 MG/1; MG/1
TABLET ORAL
Qty: 120 TABLET | Refills: 0 | Status: SHIPPED | OUTPATIENT
Start: 2018-05-23 | End: 2018-05-25 | Stop reason: SDUPTHER

## 2018-05-25 ENCOUNTER — TELEPHONE (OUTPATIENT)
Dept: FAMILY MEDICINE CLINIC | Age: 63
End: 2018-05-25

## 2018-05-25 DIAGNOSIS — Z96.649 FAILURE OF TOTAL HIP ARTHROPLASTY, SUBSEQUENT ENCOUNTER: ICD-10-CM

## 2018-05-25 DIAGNOSIS — T84.018D FAILURE OF TOTAL HIP ARTHROPLASTY, SUBSEQUENT ENCOUNTER: ICD-10-CM

## 2018-05-25 DIAGNOSIS — M17.11 ARTHRITIS OF KNEE, RIGHT: ICD-10-CM

## 2018-05-25 RX ORDER — HYDROCODONE BITARTRATE AND ACETAMINOPHEN 7.5; 325 MG/1; MG/1
TABLET ORAL
Qty: 120 TABLET | Refills: 0 | Status: SHIPPED | OUTPATIENT
Start: 2018-05-25 | End: 2018-06-20 | Stop reason: SDUPTHER

## 2018-05-31 ENCOUNTER — OFFICE VISIT (OUTPATIENT)
Dept: SURGERY | Age: 63
End: 2018-05-31

## 2018-05-31 DIAGNOSIS — L02.31 LEFT BUTTOCK ABSCESS: Primary | ICD-10-CM

## 2018-05-31 PROCEDURE — 99024 POSTOP FOLLOW-UP VISIT: CPT | Performed by: SURGERY

## 2018-06-14 ENCOUNTER — OFFICE VISIT (OUTPATIENT)
Dept: SURGERY | Age: 63
End: 2018-06-14

## 2018-06-14 DIAGNOSIS — L02.31 LEFT BUTTOCK ABSCESS: Primary | ICD-10-CM

## 2018-06-14 PROCEDURE — 99024 POSTOP FOLLOW-UP VISIT: CPT | Performed by: SURGERY

## 2018-06-20 DIAGNOSIS — Z96.649 FAILURE OF TOTAL HIP ARTHROPLASTY, SUBSEQUENT ENCOUNTER: ICD-10-CM

## 2018-06-20 DIAGNOSIS — T84.018D FAILURE OF TOTAL HIP ARTHROPLASTY, SUBSEQUENT ENCOUNTER: ICD-10-CM

## 2018-06-20 DIAGNOSIS — M17.11 ARTHRITIS OF KNEE, RIGHT: ICD-10-CM

## 2018-06-20 RX ORDER — HYDROCODONE BITARTRATE AND ACETAMINOPHEN 7.5; 325 MG/1; MG/1
TABLET ORAL
Qty: 120 TABLET | Refills: 0 | Status: SHIPPED | OUTPATIENT
Start: 2018-06-20 | End: 2018-07-20

## 2018-07-19 DIAGNOSIS — T84.018D FAILURE OF TOTAL HIP ARTHROPLASTY, SUBSEQUENT ENCOUNTER: ICD-10-CM

## 2018-07-19 DIAGNOSIS — M17.11 ARTHRITIS OF KNEE, RIGHT: ICD-10-CM

## 2018-07-19 DIAGNOSIS — Z96.649 FAILURE OF TOTAL HIP ARTHROPLASTY, SUBSEQUENT ENCOUNTER: ICD-10-CM

## 2018-07-19 RX ORDER — HYDROCODONE BITARTRATE AND ACETAMINOPHEN 7.5; 325 MG/1; MG/1
TABLET ORAL
Qty: 120 TABLET | Refills: 0 | OUTPATIENT
Start: 2018-07-19 | End: 2018-08-18

## 2018-07-19 NOTE — TELEPHONE ENCOUNTER
Medication and Quantity requested: HYDROcodone-acetaminophen (Sharlotte Eis) 7.5-325 MG per tablet [[id number]]          Last Visit 04/18/2018    Pharmacy and phone number updated in EPIC:  yes

## 2018-07-23 ENCOUNTER — OFFICE VISIT (OUTPATIENT)
Dept: FAMILY MEDICINE CLINIC | Age: 63
End: 2018-07-23

## 2018-07-23 VITALS
HEART RATE: 82 BPM | OXYGEN SATURATION: 98 % | BODY MASS INDEX: 41.69 KG/M2 | DIASTOLIC BLOOD PRESSURE: 100 MMHG | SYSTOLIC BLOOD PRESSURE: 155 MMHG | WEIGHT: 266.2 LBS

## 2018-07-23 DIAGNOSIS — M15.9 PRIMARY OSTEOARTHRITIS INVOLVING MULTIPLE JOINTS: ICD-10-CM

## 2018-07-23 DIAGNOSIS — E66.01 MORBID OBESITY (HCC): Primary | ICD-10-CM

## 2018-07-23 DIAGNOSIS — I10 ESSENTIAL HYPERTENSION: ICD-10-CM

## 2018-07-23 DIAGNOSIS — F17.200 TOBACCO DEPENDENCE: ICD-10-CM

## 2018-07-23 DIAGNOSIS — N18.30 CKD (CHRONIC KIDNEY DISEASE) STAGE 3, GFR 30-59 ML/MIN (HCC): ICD-10-CM

## 2018-07-23 PROCEDURE — 99214 OFFICE O/P EST MOD 30 MIN: CPT | Performed by: FAMILY MEDICINE

## 2018-07-23 RX ORDER — LOSARTAN POTASSIUM 50 MG/1
50 TABLET ORAL DAILY
Qty: 30 TABLET | Refills: 3 | Status: SHIPPED | OUTPATIENT
Start: 2018-07-23 | End: 2019-02-15 | Stop reason: ALTCHOICE

## 2018-07-23 RX ORDER — HYDROCODONE BITARTRATE AND ACETAMINOPHEN 7.5; 325 MG/1; MG/1
1 TABLET ORAL EVERY 6 HOURS PRN
Qty: 120 TABLET | Refills: 0 | Status: SHIPPED | OUTPATIENT
Start: 2018-07-23 | End: 2018-08-20 | Stop reason: SDUPTHER

## 2018-07-23 RX ORDER — HYDROCODONE BITARTRATE AND ACETAMINOPHEN 7.5; 325 MG/1; MG/1
1 TABLET ORAL EVERY 6 HOURS PRN
COMMUNITY
End: 2018-07-23 | Stop reason: SDUPTHER

## 2018-07-23 NOTE — PROGRESS NOTES
06/28/2018    AGRATIO 1.3 06/28/2018    GLOB 3.6 06/28/2018     Lab Results   Component Value Date    WBC 6.0 06/28/2018    HGB 13.1 06/28/2018    HCT 38.8 06/28/2018    MCV 89.1 06/28/2018     06/28/2018   OARRS report reviewed and assessed. Consistent. ASSESSMENT/PLAN:  1. Morbid obesity ([address]  Improving slowly  To just adding water aerobics  2. Essential hypertension  Poorly controlled blood pressure  Reviewed low-salt diet more exercise no alcohol  Increase metoprolol to 25 mg twice a day as it is supposed be taken  Add losartan  - losartan (COZAAR) 50 MG tablet; Take 1 tablet by mouth daily  Dispense: 30 tablet; Refill: 3    3. Primary osteoarthritis involving multiple joints  Stable continue refilling medication  - HYDROcodone-acetaminophen (NORCO) 7.5-325 MG per tablet; Take 1 tablet by mouth every 6 hours as needed for Pain for up to 30 days. .  Dispense: 120 tablet; Refill: 0    4. Tobacco dependence  Doing well but needs to continue decreasing her smoking amount and finally quit  I told pt it is very important to quit smoking! When ready, I would like to schedule an appointment to discuss the various tools we can offer, such as classes, hypnosis, accupuncture, or/and medications. Suggest pt. get to a class, pick a quit date, and RTO to discuss. Multiple approaches toward motivating the patient were explored.     5. CKD (chronic kidney disease) stage 3, GFR 30-59 ml/min  Slowly improving hopefully by treating the blood pressure more aggressively this will be further improved  BMP in 3 months    Spent 25 minutes with patient greater than 50% of time reviewing blood pressure treatment and healthy lifestyle

## 2018-08-20 DIAGNOSIS — M15.9 PRIMARY OSTEOARTHRITIS INVOLVING MULTIPLE JOINTS: ICD-10-CM

## 2018-08-20 RX ORDER — HYDROCODONE BITARTRATE AND ACETAMINOPHEN 7.5; 325 MG/1; MG/1
1 TABLET ORAL EVERY 6 HOURS PRN
Qty: 120 TABLET | Refills: 0 | Status: SHIPPED | OUTPATIENT
Start: 2018-08-20 | End: 2018-09-18 | Stop reason: SDUPTHER

## 2018-08-20 NOTE — TELEPHONE ENCOUNTER
Medication and Quantity requested: HYDROcodone-acetaminophen (NORCO) 7.5-325 MG per tablet          Last Visit  7/23/18    Pharmacy and phone number updated in EPIC:  yes

## 2018-09-18 DIAGNOSIS — M15.9 PRIMARY OSTEOARTHRITIS INVOLVING MULTIPLE JOINTS: ICD-10-CM

## 2018-09-18 RX ORDER — HYDROCODONE BITARTRATE AND ACETAMINOPHEN 7.5; 325 MG/1; MG/1
1 TABLET ORAL EVERY 6 HOURS PRN
Qty: 120 TABLET | Refills: 0 | Status: SHIPPED | OUTPATIENT
Start: 2018-09-18 | End: 2018-10-15 | Stop reason: SDUPTHER

## 2018-09-18 NOTE — TELEPHONE ENCOUNTER
Medication and Quantity requested:    HYDROcodone-acetaminophen (NORCO) 7.5-325 MG per tablet    Qty: 120      Last Visit  07/23/2018    Pharmacy and phone number updated in EPIC:  yes

## 2018-10-15 DIAGNOSIS — M15.9 PRIMARY OSTEOARTHRITIS INVOLVING MULTIPLE JOINTS: ICD-10-CM

## 2018-10-15 RX ORDER — HYDROCODONE BITARTRATE AND ACETAMINOPHEN 7.5; 325 MG/1; MG/1
1 TABLET ORAL EVERY 6 HOURS PRN
Qty: 120 TABLET | Refills: 0 | Status: SHIPPED | OUTPATIENT
Start: 2018-10-15 | End: 2018-11-14 | Stop reason: SDUPTHER

## 2018-10-18 DIAGNOSIS — Z12.39 SCREENING FOR BREAST CANCER: ICD-10-CM

## 2018-10-18 RX ORDER — PRAVASTATIN SODIUM 80 MG/1
TABLET ORAL
Qty: 90 TABLET | Refills: 3 | Status: SHIPPED | OUTPATIENT
Start: 2018-10-18 | End: 2019-12-07 | Stop reason: SDUPTHER

## 2018-10-19 ENCOUNTER — TELEPHONE (OUTPATIENT)
Dept: FAMILY MEDICINE CLINIC | Age: 63
End: 2018-10-19

## 2018-10-19 NOTE — TELEPHONE ENCOUNTER
Spoke with patient. Stated that she had fallen on Tuesday this week. She was downtown walking outside and just fell and hit some bricks. Hurt her chest and arm. Bruising all over chest. She said that her arm is pretty messed up. Patient denies hitting her head, but has a small bruise around her eye. She did not lose conscious. Denies feeling dizzy before fall. Denies SOB. I had informed patient to go to ER if she is having chest pain. Patient will go to ER because she is concerned that the bruising on her chest may be something serious.

## 2018-11-13 ENCOUNTER — TELEPHONE (OUTPATIENT)
Dept: FAMILY MEDICINE CLINIC | Age: 63
End: 2018-11-13

## 2018-11-13 DIAGNOSIS — M15.9 PRIMARY OSTEOARTHRITIS INVOLVING MULTIPLE JOINTS: ICD-10-CM

## 2018-11-13 RX ORDER — HYDROCODONE BITARTRATE AND ACETAMINOPHEN 7.5; 325 MG/1; MG/1
1 TABLET ORAL EVERY 6 HOURS PRN
Qty: 120 TABLET | Refills: 0 | Status: CANCELLED | OUTPATIENT
Start: 2018-11-13 | End: 2018-12-13

## 2018-11-14 ENCOUNTER — OFFICE VISIT (OUTPATIENT)
Dept: FAMILY MEDICINE CLINIC | Age: 63
End: 2018-11-14
Payer: COMMERCIAL

## 2018-11-14 VITALS
DIASTOLIC BLOOD PRESSURE: 70 MMHG | HEART RATE: 71 BPM | OXYGEN SATURATION: 98 % | HEIGHT: 67 IN | WEIGHT: 274.6 LBS | BODY MASS INDEX: 43.1 KG/M2 | SYSTOLIC BLOOD PRESSURE: 128 MMHG

## 2018-11-14 DIAGNOSIS — N17.9 ACUTE RENAL FAILURE SUPERIMPOSED ON STAGE 3 CHRONIC KIDNEY DISEASE, UNSPECIFIED ACUTE RENAL FAILURE TYPE: ICD-10-CM

## 2018-11-14 DIAGNOSIS — R52 PAIN MANAGEMENT: ICD-10-CM

## 2018-11-14 DIAGNOSIS — J40 BRONCHITIS: ICD-10-CM

## 2018-11-14 DIAGNOSIS — E66.01 MORBID OBESITY (HCC): ICD-10-CM

## 2018-11-14 DIAGNOSIS — I25.10 CORONARY ARTERY DISEASE INVOLVING NATIVE CORONARY ARTERY OF NATIVE HEART WITHOUT ANGINA PECTORIS: ICD-10-CM

## 2018-11-14 DIAGNOSIS — N18.3 ACUTE RENAL FAILURE SUPERIMPOSED ON STAGE 3 CHRONIC KIDNEY DISEASE, UNSPECIFIED ACUTE RENAL FAILURE TYPE: Primary | ICD-10-CM

## 2018-11-14 DIAGNOSIS — Z02.89 PAIN MEDICATION AGREEMENT: ICD-10-CM

## 2018-11-14 DIAGNOSIS — G89.4 CHRONIC PAIN SYNDROME: ICD-10-CM

## 2018-11-14 DIAGNOSIS — N17.9 ACUTE RENAL FAILURE SUPERIMPOSED ON STAGE 3 CHRONIC KIDNEY DISEASE, UNSPECIFIED ACUTE RENAL FAILURE TYPE: Primary | ICD-10-CM

## 2018-11-14 DIAGNOSIS — M15.9 PRIMARY OSTEOARTHRITIS INVOLVING MULTIPLE JOINTS: ICD-10-CM

## 2018-11-14 DIAGNOSIS — F17.200 TOBACCO USE DISORDER: ICD-10-CM

## 2018-11-14 DIAGNOSIS — N18.3 ACUTE RENAL FAILURE SUPERIMPOSED ON STAGE 3 CHRONIC KIDNEY DISEASE, UNSPECIFIED ACUTE RENAL FAILURE TYPE: ICD-10-CM

## 2018-11-14 PROCEDURE — 99214 OFFICE O/P EST MOD 30 MIN: CPT | Performed by: FAMILY MEDICINE

## 2018-11-14 RX ORDER — DOXYCYCLINE HYCLATE 100 MG
100 TABLET ORAL 2 TIMES DAILY
Qty: 20 TABLET | Refills: 0 | Status: SHIPPED | OUTPATIENT
Start: 2018-11-14 | End: 2018-11-24

## 2018-11-14 RX ORDER — HYDROCODONE BITARTRATE AND ACETAMINOPHEN 7.5; 325 MG/1; MG/1
1 TABLET ORAL EVERY 6 HOURS PRN
Qty: 120 TABLET | Refills: 0 | Status: SHIPPED | OUTPATIENT
Start: 2018-11-14 | End: 2018-12-11 | Stop reason: SDUPTHER

## 2018-11-14 RX ORDER — FLUTICASONE FUROATE AND VILANTEROL 200; 25 UG/1; UG/1
200 POWDER RESPIRATORY (INHALATION) DAILY
Qty: 1 EACH | Refills: 0 | Status: SHIPPED | OUTPATIENT
Start: 2018-11-14 | End: 2018-12-10 | Stop reason: SDUPTHER

## 2018-11-14 RX ORDER — FLUTICASONE FUROATE AND VILANTEROL 200; 25 UG/1; UG/1
200 POWDER RESPIRATORY (INHALATION) DAILY
Qty: 1 EACH | Refills: 0 | COMMUNITY
Start: 2018-11-14 | End: 2019-01-11 | Stop reason: SDUPTHER

## 2018-11-15 LAB
ANION GAP SERPL CALCULATED.3IONS-SCNC: 14 MMOL/L (ref 3–16)
BUN BLDV-MCNC: 36 MG/DL (ref 7–20)
CALCIUM SERPL-MCNC: 9.5 MG/DL (ref 8.3–10.6)
CHLORIDE BLD-SCNC: 104 MMOL/L (ref 99–110)
CO2: 20 MMOL/L (ref 21–32)
CREAT SERPL-MCNC: 1.8 MG/DL (ref 0.6–1.2)
ESTIMATED AVERAGE GLUCOSE: 105.4 MG/DL
GFR AFRICAN AMERICAN: 35
GFR NON-AFRICAN AMERICAN: 29
GLUCOSE BLD-MCNC: 93 MG/DL (ref 70–99)
HBA1C MFR BLD: 5.3 %
POTASSIUM SERPL-SCNC: 5.2 MMOL/L (ref 3.5–5.1)
SODIUM BLD-SCNC: 138 MMOL/L (ref 136–145)

## 2018-11-17 LAB
6-ACETYLMORPHINE: NOT DETECTED
7-AMINOCLONAZEPAM: NOT DETECTED
ALPHA-OH-ALPRAZOLAM: NOT DETECTED
ALPRAZOLAM: NOT DETECTED
AMPHETAMINE: NOT DETECTED
BARBITURATES: NOT DETECTED
BENZOYLECGONINE: NOT DETECTED
BUPRENORPHINE: NOT DETECTED
CARISOPRODOL: NOT DETECTED
CLONAZEPAM: NOT DETECTED
CODEINE: NOT DETECTED
CREATININE URINE: 49.3 MG/DL (ref 20–400)
DIAZEPAM: NOT DETECTED
EER PAIN MGT DRUG PANEL, HIGH RES/EMIT U: NORMAL
ETHYL GLUCURONIDE: NOT DETECTED
FENTANYL: NOT DETECTED
HYDROCODONE: NOT DETECTED
HYDROMORPHONE: NOT DETECTED
LORAZEPAM: NOT DETECTED
MARIJUANA METABOLITE: NOT DETECTED
MDA: NOT DETECTED
MDEA: NOT DETECTED
MDMA URINE: NOT DETECTED
MEPERIDINE: NOT DETECTED
METHADONE: NOT DETECTED
METHAMPHETAMINE: NOT DETECTED
METHYLPHENIDATE: NOT DETECTED
MIDAZOLAM: NOT DETECTED
MORPHINE: NOT DETECTED
NORBUPRENORPHINE, FREE: NOT DETECTED
NORDIAZEPAM: NOT DETECTED
NORFENTANYL: NOT DETECTED
NORHYDROCODONE, URINE: NOT DETECTED
NOROXYCODONE: NOT DETECTED
NOROXYMORPHONE, URINE: NOT DETECTED
OXAZEPAM: NOT DETECTED
OXYCODONE: NOT DETECTED
OXYMORPHONE: NOT DETECTED
PAIN MANAGEMENT DRUG PANEL: NORMAL
PCP: NOT DETECTED
PHENTERMINE: NOT DETECTED
PROPOXYPHENE: NOT DETECTED
TAPENTADOL, URINE: NOT DETECTED
TAPENTADOL-O-SULFATE, URINE: NOT DETECTED
TEMAZEPAM: NOT DETECTED
TRAMADOL: NOT DETECTED
ZOLPIDEM: NOT DETECTED

## 2018-12-04 ENCOUNTER — HOSPITAL ENCOUNTER (EMERGENCY)
Age: 63
Discharge: HOME | End: 2018-12-04
Payer: COMMERCIAL

## 2018-12-04 ENCOUNTER — APPOINTMENT (OUTPATIENT)
Dept: GENERAL RADIOLOGY | Age: 63
End: 2018-12-04
Payer: COMMERCIAL

## 2018-12-04 VITALS
DIASTOLIC BLOOD PRESSURE: 103 MMHG | SYSTOLIC BLOOD PRESSURE: 167 MMHG | RESPIRATION RATE: 18 BRPM | OXYGEN SATURATION: 98 % | BODY MASS INDEX: 42.63 KG/M2 | WEIGHT: 271.61 LBS | TEMPERATURE: 97.2 F | HEIGHT: 67 IN | HEART RATE: 76 BPM

## 2018-12-04 DIAGNOSIS — S46.912A MUSCLE STRAIN OF LEFT UPPER ARM, INITIAL ENCOUNTER: Primary | ICD-10-CM

## 2018-12-04 PROCEDURE — 99283 EMERGENCY DEPT VISIT LOW MDM: CPT

## 2018-12-04 PROCEDURE — 73080 X-RAY EXAM OF ELBOW: CPT

## 2018-12-04 PROCEDURE — 6370000000 HC RX 637 (ALT 250 FOR IP): Performed by: PHYSICIAN ASSISTANT

## 2018-12-04 RX ORDER — OXYCODONE HYDROCHLORIDE AND ACETAMINOPHEN 5; 325 MG/1; MG/1
1 TABLET ORAL ONCE
Status: COMPLETED | OUTPATIENT
Start: 2018-12-04 | End: 2018-12-04

## 2018-12-04 RX ADMIN — OXYCODONE HYDROCHLORIDE AND ACETAMINOPHEN 1 TABLET: 5; 325 TABLET ORAL at 09:54

## 2018-12-04 ASSESSMENT — ENCOUNTER SYMPTOMS
ABDOMINAL PAIN: 0
COLOR CHANGE: 0
SHORTNESS OF BREATH: 0
NAUSEA: 0
CHEST TIGHTNESS: 0
VOMITING: 0

## 2018-12-04 ASSESSMENT — PAIN DESCRIPTION - ORIENTATION: ORIENTATION: LEFT;UPPER

## 2018-12-04 ASSESSMENT — PAIN DESCRIPTION - DESCRIPTORS: DESCRIPTORS: ACHING;SHARP

## 2018-12-04 ASSESSMENT — PAIN SCALES - GENERAL
PAINLEVEL_OUTOF10: 8
PAINLEVEL_OUTOF10: 9

## 2018-12-04 ASSESSMENT — PAIN DESCRIPTION - FREQUENCY: FREQUENCY: CONTINUOUS

## 2018-12-04 ASSESSMENT — PAIN DESCRIPTION - PAIN TYPE: TYPE: ACUTE PAIN

## 2018-12-04 ASSESSMENT — PAIN DESCRIPTION - LOCATION: LOCATION: ARM

## 2018-12-04 NOTE — ED NOTES
Sling applied, pt tolerated well. Discharge and education instructions reviewed. Patient instructed to follow-up as noted - return to emergency department if symptoms worsen. Patient verbalized understanding. Patient denied questions at this time. No acute distress noted. Discharged per provider with discharge instructions.           Harley Ingram RN  12/04/18 [id number]

## 2018-12-05 ENCOUNTER — OFFICE VISIT (OUTPATIENT)
Dept: ORTHOPEDIC SURGERY | Age: 63
End: 2018-12-05
Payer: COMMERCIAL

## 2018-12-05 VITALS
SYSTOLIC BLOOD PRESSURE: 163 MMHG | DIASTOLIC BLOOD PRESSURE: 88 MMHG | RESPIRATION RATE: 16 BRPM | WEIGHT: 271.61 LBS | BODY MASS INDEX: 42.63 KG/M2 | HEIGHT: 67 IN | HEART RATE: 75 BPM

## 2018-12-05 DIAGNOSIS — M25.512 LEFT SHOULDER PAIN, UNSPECIFIED CHRONICITY: Primary | ICD-10-CM

## 2018-12-05 PROCEDURE — 99203 OFFICE O/P NEW LOW 30 MIN: CPT | Performed by: ORTHOPAEDIC SURGERY

## 2018-12-06 RX ORDER — MELOXICAM 7.5 MG/1
7.5 TABLET ORAL DAILY
Qty: 30 TABLET | Refills: 0 | Status: SHIPPED | OUTPATIENT
Start: 2018-12-06 | End: 2019-01-08 | Stop reason: SDUPTHER

## 2018-12-11 DIAGNOSIS — M15.9 PRIMARY OSTEOARTHRITIS INVOLVING MULTIPLE JOINTS: ICD-10-CM

## 2018-12-11 RX ORDER — HYDROCODONE BITARTRATE AND ACETAMINOPHEN 7.5; 325 MG/1; MG/1
1 TABLET ORAL EVERY 6 HOURS PRN
Qty: 120 TABLET | Refills: 0 | Status: SHIPPED | OUTPATIENT
Start: 2018-12-11 | End: 2019-01-08 | Stop reason: SDUPTHER

## 2019-01-02 RX ORDER — FLUTICASONE FUROATE AND VILANTEROL 200; 25 UG/1; UG/1
POWDER RESPIRATORY (INHALATION)
Qty: 1 EACH | Refills: 2 | Status: SHIPPED | OUTPATIENT
Start: 2019-01-02 | End: 2019-04-03

## 2019-01-08 ENCOUNTER — TELEPHONE (OUTPATIENT)
Dept: FAMILY MEDICINE CLINIC | Age: 64
End: 2019-01-08

## 2019-01-08 DIAGNOSIS — M15.9 PRIMARY OSTEOARTHRITIS INVOLVING MULTIPLE JOINTS: ICD-10-CM

## 2019-01-08 RX ORDER — HYDROCODONE BITARTRATE AND ACETAMINOPHEN 7.5; 325 MG/1; MG/1
1 TABLET ORAL EVERY 6 HOURS PRN
Qty: 120 TABLET | Refills: 0 | Status: SHIPPED | OUTPATIENT
Start: 2019-01-08 | End: 2019-02-05 | Stop reason: SDUPTHER

## 2019-01-11 RX ORDER — FLUTICASONE FUROATE AND VILANTEROL 200; 25 UG/1; UG/1
200 POWDER RESPIRATORY (INHALATION) DAILY
Qty: 1 EACH | Refills: 5 | Status: SHIPPED | OUTPATIENT
Start: 2019-01-11 | End: 2019-04-03

## 2019-01-31 RX ORDER — FLUOXETINE HYDROCHLORIDE 40 MG/1
CAPSULE ORAL
Qty: 180 CAPSULE | Refills: 3 | Status: SHIPPED | OUTPATIENT
Start: 2019-01-31 | End: 2020-03-07 | Stop reason: SDUPTHER

## 2019-02-05 DIAGNOSIS — M15.9 PRIMARY OSTEOARTHRITIS INVOLVING MULTIPLE JOINTS: ICD-10-CM

## 2019-02-05 RX ORDER — HYDROCODONE BITARTRATE AND ACETAMINOPHEN 7.5; 325 MG/1; MG/1
1 TABLET ORAL EVERY 6 HOURS PRN
Qty: 120 TABLET | Refills: 0 | Status: SHIPPED | OUTPATIENT
Start: 2019-02-05 | End: 2019-03-04 | Stop reason: SDUPTHER

## 2019-02-15 ENCOUNTER — OFFICE VISIT (OUTPATIENT)
Dept: FAMILY MEDICINE CLINIC | Age: 64
End: 2019-02-15
Payer: COMMERCIAL

## 2019-02-15 VITALS
OXYGEN SATURATION: 96 % | TEMPERATURE: 99 F | HEART RATE: 75 BPM | BODY MASS INDEX: 41.59 KG/M2 | HEIGHT: 67 IN | SYSTOLIC BLOOD PRESSURE: 100 MMHG | DIASTOLIC BLOOD PRESSURE: 62 MMHG | WEIGHT: 265 LBS

## 2019-02-15 DIAGNOSIS — K21.9 GASTROESOPHAGEAL REFLUX DISEASE, ESOPHAGITIS PRESENCE NOT SPECIFIED: ICD-10-CM

## 2019-02-15 DIAGNOSIS — R19.5 LOOSE STOOLS: ICD-10-CM

## 2019-02-15 DIAGNOSIS — R11.0 NAUSEA: ICD-10-CM

## 2019-02-15 DIAGNOSIS — R10.30 LOWER ABDOMINAL PAIN: ICD-10-CM

## 2019-02-15 DIAGNOSIS — R10.30 LOWER ABDOMINAL PAIN: Primary | ICD-10-CM

## 2019-02-15 LAB
A/G RATIO: 1.5 (ref 1.1–2.2)
ALBUMIN SERPL-MCNC: 4.3 G/DL (ref 3.4–5)
ALP BLD-CCNC: 101 U/L (ref 40–129)
ALT SERPL-CCNC: 8 U/L (ref 10–40)
ANION GAP SERPL CALCULATED.3IONS-SCNC: 15 MMOL/L (ref 3–16)
AST SERPL-CCNC: 15 U/L (ref 15–37)
BILIRUB SERPL-MCNC: <0.2 MG/DL (ref 0–1)
BILIRUBIN, POC: NORMAL
BLOOD URINE, POC: NORMAL
BUN BLDV-MCNC: 43 MG/DL (ref 7–20)
CALCIUM SERPL-MCNC: 9.2 MG/DL (ref 8.3–10.6)
CHLORIDE BLD-SCNC: 107 MMOL/L (ref 99–110)
CLARITY, POC: CLEAR
CO2: 16 MMOL/L (ref 21–32)
COLOR, POC: YELLOW
CREAT SERPL-MCNC: 2.1 MG/DL (ref 0.6–1.2)
GFR AFRICAN AMERICAN: 29
GFR NON-AFRICAN AMERICAN: 24
GLOBULIN: 2.8 G/DL
GLUCOSE BLD-MCNC: 99 MG/DL (ref 70–99)
GLUCOSE URINE, POC: NORMAL
KETONES, POC: NORMAL
LEUKOCYTE EST, POC: NORMAL
NITRITE, POC: NORMAL
PH, POC: 5.5
POTASSIUM SERPL-SCNC: 4.9 MMOL/L (ref 3.5–5.1)
PROTEIN, POC: 100
SODIUM BLD-SCNC: 138 MMOL/L (ref 136–145)
SPECIFIC GRAVITY, POC: 1.02
TOTAL PROTEIN: 7.1 G/DL (ref 6.4–8.2)
UROBILINOGEN, POC: 0.2

## 2019-02-15 PROCEDURE — 99214 OFFICE O/P EST MOD 30 MIN: CPT | Performed by: FAMILY MEDICINE

## 2019-02-15 PROCEDURE — 81002 URINALYSIS NONAUTO W/O SCOPE: CPT | Performed by: FAMILY MEDICINE

## 2019-02-15 RX ORDER — OMEPRAZOLE 40 MG/1
40 CAPSULE, DELAYED RELEASE ORAL
Qty: 30 CAPSULE | Refills: 1 | Status: SHIPPED | OUTPATIENT
Start: 2019-02-15 | End: 2019-08-19

## 2019-02-15 RX ORDER — ONDANSETRON 4 MG/1
4 TABLET, FILM COATED ORAL EVERY 8 HOURS PRN
Qty: 30 TABLET | Refills: 0 | Status: SHIPPED | OUTPATIENT
Start: 2019-02-15 | End: 2019-04-03 | Stop reason: ALTCHOICE

## 2019-02-15 ASSESSMENT — PATIENT HEALTH QUESTIONNAIRE - PHQ9
SUM OF ALL RESPONSES TO PHQ QUESTIONS 1-9: 0
SUM OF ALL RESPONSES TO PHQ QUESTIONS 1-9: 0
1. LITTLE INTEREST OR PLEASURE IN DOING THINGS: 0
2. FEELING DOWN, DEPRESSED OR HOPELESS: 0
SUM OF ALL RESPONSES TO PHQ9 QUESTIONS 1 & 2: 0

## 2019-03-04 DIAGNOSIS — M15.9 PRIMARY OSTEOARTHRITIS INVOLVING MULTIPLE JOINTS: ICD-10-CM

## 2019-03-04 RX ORDER — HYDROCODONE BITARTRATE AND ACETAMINOPHEN 7.5; 325 MG/1; MG/1
1 TABLET ORAL EVERY 6 HOURS PRN
Qty: 120 TABLET | Refills: 0 | Status: SHIPPED | OUTPATIENT
Start: 2019-03-04 | End: 2019-04-03 | Stop reason: SDUPTHER

## 2019-04-03 ENCOUNTER — OFFICE VISIT (OUTPATIENT)
Dept: FAMILY MEDICINE CLINIC | Age: 64
End: 2019-04-03
Payer: COMMERCIAL

## 2019-04-03 VITALS
WEIGHT: 276 LBS | DIASTOLIC BLOOD PRESSURE: 88 MMHG | HEART RATE: 69 BPM | TEMPERATURE: 98.3 F | BODY MASS INDEX: 43.22 KG/M2 | OXYGEN SATURATION: 97 % | SYSTOLIC BLOOD PRESSURE: 132 MMHG

## 2019-04-03 DIAGNOSIS — E66.01 MORBID OBESITY (HCC): ICD-10-CM

## 2019-04-03 DIAGNOSIS — F19.20 DEPENDENCY ON PAIN MEDICATION (HCC): ICD-10-CM

## 2019-04-03 DIAGNOSIS — M54.50 CHRONIC MIDLINE LOW BACK PAIN WITHOUT SCIATICA: ICD-10-CM

## 2019-04-03 DIAGNOSIS — I10 ESSENTIAL HYPERTENSION: Primary | ICD-10-CM

## 2019-04-03 DIAGNOSIS — N18.30 STAGE 3 CHRONIC KIDNEY DISEASE (HCC): ICD-10-CM

## 2019-04-03 DIAGNOSIS — I25.10 CORONARY ARTERY DISEASE INVOLVING NATIVE CORONARY ARTERY OF NATIVE HEART WITHOUT ANGINA PECTORIS: ICD-10-CM

## 2019-04-03 DIAGNOSIS — G89.29 CHRONIC MIDLINE LOW BACK PAIN WITHOUT SCIATICA: ICD-10-CM

## 2019-04-03 DIAGNOSIS — M15.9 PRIMARY OSTEOARTHRITIS INVOLVING MULTIPLE JOINTS: ICD-10-CM

## 2019-04-03 DIAGNOSIS — E78.2 MIXED HYPERLIPIDEMIA: ICD-10-CM

## 2019-04-03 LAB
AMPHETAMINE SCREEN, URINE: NORMAL
BARBITURATE SCREEN URINE: NORMAL
BENZODIAZEPINE SCREEN, URINE: NORMAL
BILIRUBIN, POC: NEGATIVE
BLOOD URINE, POC: NEGATIVE
CANNABINOID SCREEN URINE: NORMAL
CLARITY, POC: CLEAR
COCAINE METABOLITE SCREEN URINE: NORMAL
COLOR, POC: YELLOW
GLUCOSE URINE, POC: NEGATIVE
KETONES, POC: NEGATIVE
LEUKOCYTE EST, POC: NEGATIVE
Lab: NORMAL
METHADONE SCREEN, URINE: NORMAL
NITRITE, POC: NEGATIVE
OPIATE SCREEN URINE: NORMAL
OXYCODONE URINE: NORMAL
PH UA: 5
PH, POC: 5.5
PHENCYCLIDINE SCREEN URINE: NORMAL
PROPOXYPHENE SCREEN: NORMAL
PROTEIN, POC: 100
SPECIFIC GRAVITY, POC: 1.02
UROBILINOGEN, POC: 0.2

## 2019-04-03 PROCEDURE — 99214 OFFICE O/P EST MOD 30 MIN: CPT | Performed by: FAMILY MEDICINE

## 2019-04-03 PROCEDURE — 81002 URINALYSIS NONAUTO W/O SCOPE: CPT | Performed by: FAMILY MEDICINE

## 2019-04-03 RX ORDER — HYDROCODONE BITARTRATE AND ACETAMINOPHEN 7.5; 325 MG/1; MG/1
1 TABLET ORAL EVERY 6 HOURS PRN
Qty: 120 TABLET | Refills: 0 | Status: SHIPPED | OUTPATIENT
Start: 2019-04-03 | End: 2019-04-30 | Stop reason: SDUPTHER

## 2019-04-03 NOTE — LETTER
MEDICATION AGREEMENT     Maryvipulgarima Earing  [id number]      For certain conditions, multiple classes of medications may be used to help better manage your symptoms, and to improve your ability to function at home, work and in social settings. However, these medications do have risks, which will be discussed with you, including addiction and dependency. The following prescribed medications need frequent monitoring and will require you to partner and assist in your healthcare. Medication  Dose, instructions and quantity as indicated on current prescription bottle Diagnosis/Reason(s) for Taking Category                                  Benefits and goals of Controlled Substance Medications: There are two potential goals for your treatment: (1) decreased pain and suffering (2) improved daily life functions. There are many possible treatments for your chronic condition(s), and, in addition to controlled substance medications, we will try alternatives such as physical therapy, yoga, massage, home daily exercise, meditation, relaxation techniques, injections, chiropractic manipulations, surgery, cognitive therapy, hypnosis and many medications that are not habit-forming. Use of controlled substance medications may be helpful, but they are unlikely to resolve all of your symptoms or restore all function. Risks of Controlled Substance Medications:    Opioid pain medications: These medications can lead to problems such as addiction/dependence, sedation, lightheadedness/dizziness, memory issues, falls, constipation, nausea, or vomiting. They may also impair the ability to drive or operate machinery. Additionally, these medications may lower testosterone levels, leading to loss of bone strength, stamina and sex drive. They may cause problems with breathing, sleep apnea and reduced coughing, which are especially dangerous for patients with lung disease.   Overdose or dangerous interactions with alcohol and other medications may occur, leading to death. Hyperalgesia may develop, in which patients receiving opioids for the treatment of pain may actually become more sensitive to certain painful stimuli, and in some cases, experience pain from ordinarily non-painful stimuli. Women between the ages of 14-53 who could become pregnant should carefully weigh the risks and benefits of opioids with their physicians, as these medications increase the risk of pregnancy complications, including miscarriage,  delivery and stillbirth. It is also possible for babies to be born addicted to opioids. Opioid dependence withdrawal symptoms may include; feelings of uneasiness, increased pain, irritability, belly pain, diarrhea, sweats and goose-flesh. Benzodiazepines and non-benzodiazepine sleep medications: These medications can lead to problems such as addiction/dependence, sedation, fatigue, lightheadedness, dizziness, incoordination, falls, depression, hallucinations, and impaired judgment, memory and concentration. The ability to drive and operate machinery may also be affected. Abnormal sleep-related behaviors have been reported, including sleep walking, driving, making telephone calls, eating, or having sex while not fully awake. These medications can suppress breathing and worsen sleep apnea, particularly when combined with alcohol or other sedating medications, potentially leading to death. Dependence withdrawal symptoms may include tremors, anxiety, hallucinations and seizures. Stimulants:  Common adverse effects include addiction/dependence, increased blood pressure and heart rate, decreased appetite, nausea, involuntary weight loss, insomnia, irritability, and headaches. These risks may increase when these medications are combined with other stimulants, such as caffeine pills or energy drinks, certain weight loss supplements and oral decongestants.   Dependence withdrawal symptoms may include depressed mood, loss of interest, suicidal thoughts, anxiety, fatigue, appetite changes and agitation. Testosterone replacement therapy:  Potential side effects include increased risk of stroke and heart attack, blood clots, increased blood pressure, increased cholesterol, enlarged prostate, sleep apnea, irritability/aggression and other mood disorders, and decreased fertility. Other:     1. I understand that I have the following responsibilities:  · I will take medications at the dose and frequency prescribed. · I will not increase or change how I take my medications without the approval of the health care provider who signs this Medication Agreement. · I will arrange for refills at the prescribed interval ONLY during regular office hours. I will not ask for refills earlier than agreed, after-hours, on holidays or on weekends. · I will obtain all refills for these medications at  ·  ____________________________________  pharmacy (phone number  ·  ________________________), with full consent for my provider and pharmacist to exchange information in writing or verbally. · I will not request any pain medications or controlled substances from other providers and will inform this provider of all other medications I am taking. · I will inform my other health care providers that I am taking these medications and of the existence of this [address]. In the event of an emergency, I will provide the same information to the emergency department providers. · I will protect my prescriptions and medications. I understand that lost or misplaced prescriptions will not be replaced. · I will keep medications only for my own use and will not share them with others. I will keep all medications away from children. · I agree to participate in any medical, psychological or psychiatric assessments recommended by my provider.   · I will actively participate in any program designed to improve function, including social, physical, psychological and daily or work activities. 2. I will not use illegal or street drugs or another person's prescription. If I have an addiction problem with drugs or alcohol and my provider asks me to enter a program to address this issue, I agree to follow through. Such programs may include:  · 12-Step program and securing a sponsor  · Individual counseling   · Inpatient or outpatient treatment  · Other:_____________________________________________________________________________________________________________________________________________    If in treatment, I will request that a copy of the programs initial evaluation and treatment recommendations be sent to this provider and will not expect refills until that is received. I will also request written monthly updates be sent to this provider to verify my continuing treatment. 3. I will consent to drug screening upon my providers request to assure I am only taking the prescribed drugs, described in this MEDICATION AGREEMENT. I understand that a drug screen is a laboratory test in which a sample of my urine, blood or saliva is checked to see what drugs I have been taking. 4. I agree that I will treat the providers and staff at this office with respect at all times. I will keep all of my scheduled appointments, but if I need to cancel my appointment, I will do so a minimum of 24 hours before it is scheduled. 5. I understand that this provider may stop prescribing the medications listed if:  · I do not show any improvement in pain, or my activity has not improved. · I develop rapid tolerance or loss of improvement, as described in my treatment plan. · I develop significant side effects from the medication. · My behavior is inconsistent with the responsibilities outlined above, which may also result in my being prevented from receiving further care from this office.   · Other:____________________________________________________________________    AGREEMENT:    I have read the above and have had all of my questions answered. For chronic disease management, I know that my symptoms can be managed with many types of treatments. A chronic medication trial may be part of my treatment, but I must be an active participant in my care. Medication therapy is only one part of my symptom management plan. In some cases, there may be limited scientific evidence to support the chronic use of certain medications to improve symptoms and daily function. Furthermore, in certain circumstances, there may be scientific information that suggests that use of chronic controlled substances may actually worsen my symptoms and increase my risk of unintentional death directly related to this medication therapy. I know that if my provider feels my risk from controlled medications is greater than my benefit, I will have my controlled substance medication(s) compassionately lowered or removed altogether. I agree to a controlled substance medication trial.      I further agree to allow this office to contact my HIPAA contact on file if there are concerns about my safety and use of controlled medications. I have agreed to use the following medications above as instructed by my physician and as stated in this [address].      Patient Signature:  ______________________  BQOE:2/4/4747 or _____________    Provider Signature:______________________  Date:4/3/2019 or _____________

## 2019-04-03 NOTE — PROGRESS NOTES
Jostin Sinclair is a 66 y.o. female. HPI: Here for complex medical visit  Very proud that she has quit smoking  Doesn't sleep well and wonders if she might have sleep apnea  Depression is stable on current medication of Prozac 80 mg daily  Meds, vitamins and allergies reviewed with pt    ROS: No TIA's or unusual headaches, no dysphagia. No prolonged cough. No dyspnea or chest pain on exertion. No abdominal pain, change in bowel habits, black or bloody stools. No urinary tract symptoms. No new or unusual musculoskeletal symptoms. Prior to Visit Medications    Medication Sig Taking? Authorizing Provider   carbidopa-levodopa (SINEMET)  MG per tablet TAKE 2 TABLETS BY MOUTH EVERY DAY IN THE EVENING Yes Hannah Romano MD   HYDROcodone-acetaminophen (NORCO) 7.5-325 MG per tablet Take 1 tablet by mouth every 6 hours as needed for Pain for up to 30 days. Yes Hannah Romano MD   metoprolol tartrate (LOPRESSOR) 25 MG tablet TAKE 1 TABLET TWICE A DAY Yes Hannah Romano MD   omeprazole (PRILOSEC) 40 MG delayed release capsule Take 1 capsule by mouth every morning (before breakfast) Yes Jorge Pollack MD   FLUoxetine (PROZAC) 40 MG capsule TAKE 2 CAPSULES DAILY Yes Hannah Romano MD   pravastatin (PRAVACHOL) 80 MG tablet TAKE 1 TABLET NIGHTLY Yes Hannah Romano MD   aspirin 81 MG EC tablet Take 81 mg by mouth daily.  Yes Historical Provider, MD       Past Medical History:   Diagnosis Date    Arthritis     ASHD (arteriosclerotic heart disease)     Chronic kidney disease     Chronic midline low back pain without sciatica 4/3/2019    COPD (chronic obstructive pulmonary disease) ([facility] [address]     unknown     Coronary artery disease involving native coronary artery of native heart without angina pectoris 3/31/2016    Depression     Full dentures     HTN     Hyperlipidemia     Iron deficiency anemia 3/31/2016    Kidney stone 2012    Morbid obesity (HCC)     Restless legs syndrome     pt on cabidopa/levodopa    Stage 3 chronic kidney disease ([facility] [address] 4/3/2019    Wears glasses        Social History     Tobacco Use    Smoking status: Current Every Day Smoker     Packs/day: 0.50     Years: 30.00     Pack years: 15.00     Types: Cigarettes     Last attempt to quit: 2/29/2016     Years since quitting: 3.0    Smokeless tobacco: Never Used   Substance Use Topics    Alcohol use: No     Alcohol/week: 0.0 oz    Drug use: No       Family History   Problem Relation Age of Onset    Stroke Mother     Diabetes Mother     Heart Disease Mother     High Blood Pressure Mother     High Cholesterol Mother     Cancer Neg Hx        Allergies   Allergen Reactions    Mobic [Meloxicam] Nausea Only     Stomach would get upset after taking this medication    Morphine Other (See Comments)     When taken in 2013 pt had an adverse reaction to morphine. She ended up in he hospital with kidney failure, dehydration, and in ICU. She prefers this not to be given ever.  Other      ? Suture from CABG caused blister (10/30/2015)       OBJECTIVE:  /88 (Site: Right Upper Arm, Position: Sitting, Cuff Size: Large Adult)   Pulse 69   Temp 98.3 °F (36.8 °C) (Tympanic)   Wt 276 lb (125.2 kg)   LMP 01/01/2004   SpO2 97%   BMI 43.22 kg/m²   GEN:  in NAD morbidly obese, unfortunately weight up 11 pounds the last 6 weeks  NECK:  Supple without adenopathy. No bruits  CV:  Regular rate and rhythm, S1 and S2 normal, no murmurs, clicks  PULM:  Chest is clear, no wheezing ,  symmetric air entry throughout both lung fields. EXT: No rash or edema  NEURO: nonfocal  OARRS report reviewed and assessed. Consistent.   Lab Results   Component Value Date     02/15/2019    K 4.9 02/15/2019     02/15/2019    CO2 16 02/15/2019    BUN 43 02/15/2019    CREATININE 2.1 02/15/2019    GLUCOSE 99 02/15/2019    CALCIUM 9.2 02/15/2019      Lab Results   Component Value Date    CHOL 259 (H) 05/15/2017    CHOL 274 (H) 12/08/2016    CHOL 143 09/10/2012     Lab Results   Component Value Date    TRIG 441 (H) 05/15/2017    TRIG 523 (H) 12/08/2016    TRIG 235 (H) 09/10/2012     Lab Results   Component Value Date    HDL 44 05/15/2017    HDL 40 12/08/2016    HDL 21 (L) 09/10/2012     Lab Results   Component Value Date    LDLCALC see below 05/15/2017    [address] see below 12/08/2016    [address] 09/10/2012     Lab Results   Component Value Date    LABVLDL see below 05/15/2017    LABVLDL see below 12/08/2016    LABVLDL 47 09/10/2012     No results found for: CHOLHDLRATIO  ASSESSMENT/PLAN:  1. Primary osteoarthritis involving multiple joints    - HYDROcodone-acetaminophen (NORCO) 7.5-325 MG per tablet; Take 1 tablet by mouth every 6 hours as needed for Pain for up to 30 days. Dispense: 120 tablet; Refill: 0    2. Essential hypertension  Stable continue present medication    3. Mixed hyperlipidemia  Stable continue present medication    4. Coronary artery disease involving native coronary artery of native heart without angina pectoris  Stable continue aggressive risk factor modification    5. Morbid obesity ([address]  More exercise  Low calorie diet    6. Stage 3 chronic kidney disease (HCC)  Stable continue present medication    7. Chronic midline low back pain without sciatica  Discussed use of narcotic pain meds, benefits and risks. Patient aware he/she will need to be seen every 3 months, will be subject to random urine drug screens, will be asked to sign a medication agreement, and may be required to see a pain specialist, especially if his/her morphine equivalent is over 80    8 NICOLE?   Spouse to observe her sleep, if he notices apnea then we will set up a sleep study    9 COPD  Quit tob    10 RLSStable continue present medication    11 - insomina  Trial melatonin      12 depression  stable    Spent 25 minutes with patient greater than 50% of time touching on multiple issues, reviewing her medications, and counseling her on weight loss and exercise

## 2019-04-30 ENCOUNTER — TELEPHONE (OUTPATIENT)
Dept: FAMILY MEDICINE CLINIC | Age: 64
End: 2019-04-30

## 2019-04-30 DIAGNOSIS — M15.9 PRIMARY OSTEOARTHRITIS INVOLVING MULTIPLE JOINTS: ICD-10-CM

## 2019-04-30 RX ORDER — HYDROCODONE BITARTRATE AND ACETAMINOPHEN 7.5; 325 MG/1; MG/1
1 TABLET ORAL EVERY 6 HOURS PRN
Qty: 120 TABLET | Refills: 0 | Status: SHIPPED | OUTPATIENT
Start: 2019-04-30 | End: 2019-05-28 | Stop reason: SDUPTHER

## 2019-04-30 NOTE — TELEPHONE ENCOUNTER
Patient requesting a medication refill. Medication HYDROcodone-acetaminophen (NORCO) 7.5-325 MG per tablet  Pharmacy [id number]  [facility], [address] [place] [address].  Luanne Rivas [phone] Emil Adams [phone]  Last office visit:   Next office visit: Visit date not found no

## 2019-05-17 ENCOUNTER — APPOINTMENT (OUTPATIENT)
Dept: GENERAL RADIOLOGY | Age: 64
End: 2019-05-17
Payer: COMMERCIAL

## 2019-05-17 ENCOUNTER — HOSPITAL ENCOUNTER (EMERGENCY)
Age: 64
Discharge: HOME | End: 2019-05-17
Payer: COMMERCIAL

## 2019-05-17 VITALS
RESPIRATION RATE: 17 BRPM | HEART RATE: 76 BPM | TEMPERATURE: 97.7 F | DIASTOLIC BLOOD PRESSURE: 98 MMHG | OXYGEN SATURATION: 97 % | SYSTOLIC BLOOD PRESSURE: 142 MMHG

## 2019-05-17 DIAGNOSIS — M25.562 ACUTE BILATERAL KNEE PAIN: ICD-10-CM

## 2019-05-17 DIAGNOSIS — M25.561 ACUTE BILATERAL KNEE PAIN: ICD-10-CM

## 2019-05-17 DIAGNOSIS — M25.552 LEFT HIP PAIN: Primary | ICD-10-CM

## 2019-05-17 PROCEDURE — 73502 X-RAY EXAM HIP UNI 2-3 VIEWS: CPT

## 2019-05-17 PROCEDURE — 99283 EMERGENCY DEPT VISIT LOW MDM: CPT

## 2019-05-17 PROCEDURE — 6370000000 HC RX 637 (ALT 250 FOR IP): Performed by: NURSE PRACTITIONER

## 2019-05-17 RX ORDER — IBUPROFEN 400 MG/1
400 TABLET ORAL ONCE
Status: COMPLETED | OUTPATIENT
Start: 2019-05-17 | End: 2019-05-17

## 2019-05-17 RX ORDER — ACETAMINOPHEN 500 MG
1000 TABLET ORAL ONCE
Status: COMPLETED | OUTPATIENT
Start: 2019-05-17 | End: 2019-05-17

## 2019-05-17 RX ADMIN — ACETAMINOPHEN 1000 MG: 500 TABLET ORAL at 12:07

## 2019-05-17 RX ADMIN — IBUPROFEN 400 MG: 400 TABLET ORAL at 12:07

## 2019-05-17 ASSESSMENT — PAIN SCALES - GENERAL
PAINLEVEL_OUTOF10: 9

## 2019-05-17 ASSESSMENT — PAIN DESCRIPTION - PROGRESSION: CLINICAL_PROGRESSION: NOT CHANGED

## 2019-05-17 ASSESSMENT — PAIN DESCRIPTION - PAIN TYPE
TYPE: ACUTE PAIN
TYPE: ACUTE PAIN

## 2019-05-17 ASSESSMENT — PAIN DESCRIPTION - DESCRIPTORS: DESCRIPTORS: STABBING

## 2019-05-17 ASSESSMENT — ENCOUNTER SYMPTOMS
GASTROINTESTINAL NEGATIVE: 1
RESPIRATORY NEGATIVE: 1

## 2019-05-17 ASSESSMENT — PAIN DESCRIPTION - FREQUENCY: FREQUENCY: CONTINUOUS

## 2019-05-17 ASSESSMENT — PAIN DESCRIPTION - LOCATION
LOCATION: HIP
LOCATION: BUTTOCKS

## 2019-05-17 ASSESSMENT — PAIN - FUNCTIONAL ASSESSMENT: PAIN_FUNCTIONAL_ASSESSMENT: 0-10

## 2019-05-17 NOTE — ED PROVIDER NOTES
mouth daily. !! - Potential duplicate medications found. Please discuss with provider. ALLERGIES     Mobic [meloxicam]; Morphine; and Other    FAMILY HISTORY           Problem Relation Age of Onset    Stroke Mother     Diabetes Mother     Heart Disease Mother     High Blood Pressure Mother     High Cholesterol Mother     Cancer Neg Hx      Family Status   Relation Name Status    Mother   at age 79        Stroke    Father  Alive    Neg Hx  (Not Specified)        SOCIAL HISTORY      reports that she has been smoking cigarettes. She has a 15.00 pack-year smoking history. She has never used smokeless tobacco. She reports that she does not drink alcohol or use drugs. PHYSICAL EXAM    (up to 7 for level 4, 8 or more for level 5)      ED Triage Vitals [19 1045]   BP Temp Temp Source Pulse Resp SpO2 Height Weight   (!) 142/98 97.7 °F (36.5 °C) Oral 76 17 97 % -- --       Physical Exam   Constitutional: She is oriented to person, place, and time. She appears well-developed and well-nourished. No distress. HENT:   Head: Normocephalic. Cardiovascular: Normal rate and regular rhythm. Pulmonary/Chest: Effort normal.   Musculoskeletal:   Patient is able to ambulate however she has a shuffling antalgic gait. She is able to perform bilateral straight leg raise. However with the left lower extremity straight leg raise this induces pain in the left hip joint. Bilateral lower extremities pink warm and dry. The left knee appears to be slightly larger than the right however both knees have scars consistent with surgical history both are warm and dry. No evidence of effusions. No evidence of erythema or warmth. Bilateral knee flexion and extension passively actively and against resistance perform strength 5 out of 5. Bilateral plantar flexion versus flexion strength 5 out of 5.  Bilateral femoral dorsalis pedis pulses strong and regular symmetrical.    Left hip flexion passively performed of pain, abduction and abduction with external rotation positive pain elicited in the left lateral hip. Left hip scar noted. No erythema. No bulging. Neurological: She is alert and oriented to person, place, and time. Skin: Skin is warm and dry. She is not diaphoretic. Nursing note and vitals reviewed. DIAGNOSTIC RESULTS     RADIOLOGY:   Non-plain film images such as CT, Ultrasound and MRI are read by the radiologist. Plain radiographic images are visualized and preliminarily interpreted by DELIA Singh CNP with the below findings:        Interpretation per the Radiologist below, if available at the time of this note:    XR HIP LEFT (2-3 VIEWS)   Final Result   Unremarkable AP pelvis and left hip radiographs status post left hip   arthroplasty. LABS:  Labs Reviewed - No data to display    All other labs were within normal range or not returned as of this dictation. EMERGENCY DEPARTMENT COURSE and DIFFERENTIAL DIAGNOSIS/MDM:   Vitals:    Vitals:    05/17/19 1045   BP: (!) 142/98   Pulse: 76   Resp: 17   Temp: 97.7 °F (36.5 °C)   TempSrc: Oral   SpO2: 97%     Medications   acetaminophen (TYLENOL) tablet 1,000 mg (1,000 mg Oral Given 5/17/19 1207)   ibuprofen (ADVIL;MOTRIN) tablet 400 mg (400 mg Oral Given 5/17/19 1207)       MDM    Patient was seen and evaluate per myself. Dr. Jeannette Dominique was present and available for consultation as needed. Differential diagnoses: Left hip bursitis, prosthetic dislocation, hardware malrotation. Differential diagnosis regarding bilateral knees: Exacerbation of chronic arthritic changes within the knee joints. Clinically there is no evidence of septic joint. I have a low index of suspicion for a hip dislocation given the fact that she can't bear weight it just is uncomfortable. OARRS report reviewed: Overdose risk score 060, 27 prescriptions, one pharmacy, 2 providers.  Dr. Alannah Peace is been prescribing the patient monthly prescriptions of hydrocodone 7.5 mg, #120 January through April. Last prescription received April 30. X-ray of the left hip negative for any evidence of fracture subluxation dislocation. Prosthetic femoral head lines and acetabulum. X-ray reviewed per myself. Patient will be discharged. She can continue using her own hydrocodone. I will encourage her to add and ibuprofen if she can tolerate it. Otherwise she can follow-up with her primary care provider and/or Dr. Sharyle Saber, orthopedics. Patient's discharged home in good condition. PROCEDURES:  Procedures    FINAL IMPRESSION      1. Left hip pain    2. Acute bilateral knee pain          DISPOSITION/PLAN   DISPOSITION        PATIENT REFERRED TO:  Tahira Calloway MD  [facility].   [address] [place] [address]  [phone]    Schedule an appointment as soon as possible for a visit in 3 days      Aldo Schilder, [address] [place] [id number] [phone]    Schedule an appointment as soon as possible for a visit in 3 days  As needed, If symptoms worsen      DISCHARGE MEDICATIONS:  Current Discharge Medication List          (Please note that portions of this note werecompleted with a voice recognition program.  Efforts were made to edit the dictations but occasionally words are mis-transcribed.)    Maris Parker, [facility], APRN - CNP  05/17/19 1129

## 2019-05-17 NOTE — ED TRIAGE NOTES
Patient arrived to ED via private vehicle for tailbone pain. Patient reports she twisted and felt \"something crack\" in the tailbone. Reports she took a percocet at 1900 yesterday.

## 2019-05-17 NOTE — ED NOTES
Patient medicated according to order. Denies additional needs at this time.      Shannon Dinh RN  05/17/19 7244

## 2019-05-20 NOTE — TELEPHONE ENCOUNTER
Medication and Quantity requested: carbidopa-levodopa (SINEMET)  MG per tablet  90 DAY SUPPLY         Last Visit  4/3/19    Pharmacy and phone number updated in EPIC:  Yes                                                  [facility]

## 2019-05-28 ENCOUNTER — TELEPHONE (OUTPATIENT)
Dept: FAMILY MEDICINE CLINIC | Age: 64
End: 2019-05-28

## 2019-05-28 DIAGNOSIS — M15.9 PRIMARY OSTEOARTHRITIS INVOLVING MULTIPLE JOINTS: ICD-10-CM

## 2019-05-28 RX ORDER — HYDROCODONE BITARTRATE AND ACETAMINOPHEN 7.5; 325 MG/1; MG/1
1 TABLET ORAL EVERY 6 HOURS PRN
Qty: 120 TABLET | Refills: 0 | Status: SHIPPED | OUTPATIENT
Start: 2019-05-28 | End: 2019-06-25 | Stop reason: SDUPTHER

## 2019-06-25 DIAGNOSIS — M15.9 PRIMARY OSTEOARTHRITIS INVOLVING MULTIPLE JOINTS: ICD-10-CM

## 2019-06-25 RX ORDER — HYDROCODONE BITARTRATE AND ACETAMINOPHEN 7.5; 325 MG/1; MG/1
1 TABLET ORAL EVERY 6 HOURS PRN
Qty: 120 TABLET | Refills: 0 | Status: SHIPPED | OUTPATIENT
Start: 2019-06-25 | End: 2019-07-22 | Stop reason: SDUPTHER

## 2019-07-22 ENCOUNTER — TELEPHONE (OUTPATIENT)
Dept: FAMILY MEDICINE CLINIC | Age: 64
End: 2019-07-22

## 2019-07-22 DIAGNOSIS — M15.9 PRIMARY OSTEOARTHRITIS INVOLVING MULTIPLE JOINTS: ICD-10-CM

## 2019-07-22 RX ORDER — HYDROCODONE BITARTRATE AND ACETAMINOPHEN 7.5; 325 MG/1; MG/1
1 TABLET ORAL EVERY 6 HOURS PRN
Qty: 120 TABLET | Refills: 0 | Status: SHIPPED | OUTPATIENT
Start: 2019-07-22 | End: 2019-08-19 | Stop reason: SDUPTHER

## 2019-07-28 ENCOUNTER — HOSPITAL ENCOUNTER (INPATIENT)
Age: 64
LOS: 2 days | Discharge: HOME | DRG: 191 | End: 2019-07-30
Attending: EMERGENCY MEDICINE | Admitting: HOSPITALIST
Payer: COMMERCIAL

## 2019-07-28 ENCOUNTER — APPOINTMENT (OUTPATIENT)
Dept: GENERAL RADIOLOGY | Age: 64
DRG: 191 | End: 2019-07-28

## 2019-07-28 DIAGNOSIS — R07.9 CHEST PAIN, UNSPECIFIED TYPE: Primary | ICD-10-CM

## 2019-07-28 DIAGNOSIS — J44.1 COPD EXACERBATION (HCC): ICD-10-CM

## 2019-07-28 PROBLEM — R06.02 SOB (SHORTNESS OF BREATH): Status: ACTIVE | Noted: 2019-07-28

## 2019-07-28 LAB
ALBUMIN SERPL-MCNC: 4.1 G/DL (ref 3.4–5)
ALP BLD-CCNC: 115 U/L (ref 40–129)
ALT SERPL-CCNC: <5 U/L (ref 10–40)
ANION GAP SERPL CALCULATED.3IONS-SCNC: 16 MMOL/L (ref 3–16)
AST SERPL-CCNC: 16 U/L (ref 15–37)
BASOPHILS ABSOLUTE: 0.1 K/UL (ref 0–0.2)
BASOPHILS RELATIVE PERCENT: 0.6 %
BILIRUB SERPL-MCNC: <0.2 MG/DL (ref 0–1)
BILIRUBIN DIRECT: <0.2 MG/DL (ref 0–0.3)
BILIRUBIN, INDIRECT: ABNORMAL MG/DL (ref 0–1)
BUN BLDV-MCNC: 27 MG/DL (ref 7–20)
CALCIUM SERPL-MCNC: 9.3 MG/DL (ref 8.3–10.6)
CARBOXYHEMOGLOBIN: 2.3 %
CHLORIDE BLD-SCNC: 105 MMOL/L (ref 99–110)
CO2: 16 MMOL/L (ref 21–32)
CREAT SERPL-MCNC: 1.5 MG/DL (ref 0.6–1.2)
EOSINOPHILS ABSOLUTE: 0.2 K/UL (ref 0–0.6)
EOSINOPHILS RELATIVE PERCENT: 2.3 %
GFR AFRICAN AMERICAN: 43
GFR NON-AFRICAN AMERICAN: 35
GLUCOSE BLD-MCNC: 102 MG/DL (ref 70–99)
HCT VFR BLD CALC: 36.5 % (ref 36–48)
HEMOGLOBIN: 12.1 G/DL (ref 12–16)
LYMPHOCYTES ABSOLUTE: 1.4 K/UL (ref 1–5.1)
LYMPHOCYTES RELATIVE PERCENT: 14.7 %
MCH RBC QN AUTO: 31.1 PG (ref 26–34)
MCHC RBC AUTO-ENTMCNC: 33.1 G/DL (ref 31–36)
MCV RBC AUTO: 93.9 FL (ref 80–100)
MONOCYTES ABSOLUTE: 0.7 K/UL (ref 0–1.3)
MONOCYTES RELATIVE PERCENT: 7.2 %
NEUTROPHILS ABSOLUTE: 7 K/UL (ref 1.7–7.7)
NEUTROPHILS RELATIVE PERCENT: 75.2 %
PDW BLD-RTO: 15 % (ref 12.4–15.4)
PLATELET # BLD: 278 K/UL (ref 135–450)
PMV BLD AUTO: 6.6 FL (ref 5–10.5)
POTASSIUM REFLEX MAGNESIUM: 4.5 MMOL/L (ref 3.5–5.1)
PRO-BNP: 3114 PG/ML (ref 0–124)
RBC # BLD: 3.89 M/UL (ref 4–5.2)
SODIUM BLD-SCNC: 137 MMOL/L (ref 136–145)
TOTAL PROTEIN: 7.8 G/DL (ref 6.4–8.2)
TROPONIN: <0.01 NG/ML
WBC # BLD: 9.4 K/UL (ref 4–11)

## 2019-07-28 PROCEDURE — 85379 FIBRIN DEGRADATION QUANT: CPT

## 2019-07-28 PROCEDURE — 71046 X-RAY EXAM CHEST 2 VIEWS: CPT

## 2019-07-28 PROCEDURE — 96374 THER/PROPH/DIAG INJ IV PUSH: CPT

## 2019-07-28 PROCEDURE — 80048 BASIC METABOLIC PNL TOTAL CA: CPT

## 2019-07-28 PROCEDURE — 83880 ASSAY OF NATRIURETIC PEPTIDE: CPT

## 2019-07-28 PROCEDURE — 99285 EMERGENCY DEPT VISIT HI MDM: CPT

## 2019-07-28 PROCEDURE — 94640 AIRWAY INHALATION TREATMENT: CPT

## 2019-07-28 PROCEDURE — 93005 ELECTROCARDIOGRAM TRACING: CPT | Performed by: EMERGENCY MEDICINE

## 2019-07-28 PROCEDURE — 94761 N-INVAS EAR/PLS OXIMETRY MLT: CPT

## 2019-07-28 PROCEDURE — 84484 ASSAY OF TROPONIN QUANT: CPT

## 2019-07-28 PROCEDURE — 82375 ASSAY CARBOXYHB QUANT: CPT

## 2019-07-28 PROCEDURE — 2580000003 HC RX 258: Performed by: PHYSICIAN ASSISTANT

## 2019-07-28 PROCEDURE — 80076 HEPATIC FUNCTION PANEL: CPT

## 2019-07-28 PROCEDURE — 85025 COMPLETE CBC W/AUTO DIFF WBC: CPT

## 2019-07-28 PROCEDURE — 6370000000 HC RX 637 (ALT 250 FOR IP): Performed by: PHYSICIAN ASSISTANT

## 2019-07-28 PROCEDURE — 1200000000 HC SEMI PRIVATE

## 2019-07-28 PROCEDURE — 6360000002 HC RX W HCPCS: Performed by: PHYSICIAN ASSISTANT

## 2019-07-28 PROCEDURE — 36415 COLL VENOUS BLD VENIPUNCTURE: CPT

## 2019-07-28 RX ORDER — IPRATROPIUM BROMIDE AND ALBUTEROL SULFATE 2.5; .5 MG/3ML; MG/3ML
1 SOLUTION RESPIRATORY (INHALATION) ONCE
Status: COMPLETED | OUTPATIENT
Start: 2019-07-28 | End: 2019-07-28

## 2019-07-28 RX ORDER — METHYLPREDNISOLONE SODIUM SUCCINATE 125 MG/2ML
125 INJECTION, POWDER, LYOPHILIZED, FOR SOLUTION INTRAMUSCULAR; INTRAVENOUS ONCE
Status: COMPLETED | OUTPATIENT
Start: 2019-07-28 | End: 2019-07-28

## 2019-07-28 RX ORDER — 0.9 % SODIUM CHLORIDE 0.9 %
500 INTRAVENOUS SOLUTION INTRAVENOUS ONCE
Status: COMPLETED | OUTPATIENT
Start: 2019-07-28 | End: 2019-07-28

## 2019-07-28 RX ORDER — BENZONATATE 100 MG/1
200 CAPSULE ORAL ONCE
Status: DISCONTINUED | OUTPATIENT
Start: 2019-07-28 | End: 2019-07-28

## 2019-07-28 RX ORDER — ASPIRIN 81 MG/1
324 TABLET, CHEWABLE ORAL ONCE
Status: COMPLETED | OUTPATIENT
Start: 2019-07-28 | End: 2019-07-28

## 2019-07-28 RX ADMIN — IPRATROPIUM BROMIDE AND ALBUTEROL SULFATE 1 AMPULE: .5; 3 SOLUTION RESPIRATORY (INHALATION) at 21:18

## 2019-07-28 RX ADMIN — ASPIRIN 81 MG 324 MG: 81 TABLET ORAL at 23:00

## 2019-07-28 RX ADMIN — SODIUM CHLORIDE 500 ML: 9 INJECTION, SOLUTION INTRAVENOUS at 22:33

## 2019-07-28 RX ADMIN — METHYLPREDNISOLONE SODIUM SUCCINATE 125 MG: 125 INJECTION, POWDER, FOR SOLUTION INTRAMUSCULAR; INTRAVENOUS at 20:08

## 2019-07-28 ASSESSMENT — ENCOUNTER SYMPTOMS
COUGH: 1
ABDOMINAL PAIN: 0
DIARRHEA: 1
SHORTNESS OF BREATH: 1
BACK PAIN: 0
COLOR CHANGE: 0
VOMITING: 0

## 2019-07-28 ASSESSMENT — HEART SCORE: ECG: 1

## 2019-07-29 LAB
D DIMER: 238 NG/ML DDU (ref 0–229)
EKG ATRIAL RATE: 81 BPM
EKG DIAGNOSIS: NORMAL
EKG P AXIS: 109 DEGREES
EKG P-R INTERVAL: 116 MS
EKG Q-T INTERVAL: 396 MS
EKG QRS DURATION: 92 MS
EKG QTC CALCULATION (BAZETT): 460 MS
EKG R AXIS: -18 DEGREES
EKG T AXIS: 114 DEGREES
EKG VENTRICULAR RATE: 81 BPM

## 2019-07-29 PROCEDURE — 6360000002 HC RX W HCPCS: Performed by: INTERNAL MEDICINE

## 2019-07-29 PROCEDURE — 96372 THER/PROPH/DIAG INJ SC/IM: CPT

## 2019-07-29 PROCEDURE — 6370000000 HC RX 637 (ALT 250 FOR IP): Performed by: HOSPITALIST

## 2019-07-29 PROCEDURE — 6370000000 HC RX 637 (ALT 250 FOR IP): Performed by: NURSE PRACTITIONER

## 2019-07-29 PROCEDURE — 94640 AIRWAY INHALATION TREATMENT: CPT

## 2019-07-29 PROCEDURE — 94761 N-INVAS EAR/PLS OXIMETRY MLT: CPT

## 2019-07-29 PROCEDURE — 1200000000 HC SEMI PRIVATE

## 2019-07-29 PROCEDURE — 93010 ELECTROCARDIOGRAM REPORT: CPT | Performed by: INTERNAL MEDICINE

## 2019-07-29 PROCEDURE — 2580000003 HC RX 258: Performed by: INTERNAL MEDICINE

## 2019-07-29 PROCEDURE — 6370000000 HC RX 637 (ALT 250 FOR IP): Performed by: INTERNAL MEDICINE

## 2019-07-29 RX ORDER — SODIUM CHLORIDE 0.9 % (FLUSH) 0.9 %
10 SYRINGE (ML) INJECTION EVERY 12 HOURS SCHEDULED
Status: DISCONTINUED | OUTPATIENT
Start: 2019-07-29 | End: 2019-07-30 | Stop reason: HOSPADM

## 2019-07-29 RX ORDER — PRAVASTATIN SODIUM 40 MG
80 TABLET ORAL NIGHTLY
Status: DISCONTINUED | OUTPATIENT
Start: 2019-07-29 | End: 2019-07-30 | Stop reason: HOSPADM

## 2019-07-29 RX ORDER — ASPIRIN 81 MG/1
81 TABLET ORAL DAILY
Status: DISCONTINUED | OUTPATIENT
Start: 2019-07-29 | End: 2019-07-30 | Stop reason: HOSPADM

## 2019-07-29 RX ORDER — SODIUM CHLORIDE 0.9 % (FLUSH) 0.9 %
10 SYRINGE (ML) INJECTION PRN
Status: DISCONTINUED | OUTPATIENT
Start: 2019-07-29 | End: 2019-07-30 | Stop reason: HOSPADM

## 2019-07-29 RX ORDER — HYDROCODONE BITARTRATE AND ACETAMINOPHEN 7.5; 325 MG/1; MG/1
1 TABLET ORAL ONCE
Status: COMPLETED | OUTPATIENT
Start: 2019-07-29 | End: 2019-07-29

## 2019-07-29 RX ORDER — PANTOPRAZOLE SODIUM 40 MG/1
40 TABLET, DELAYED RELEASE ORAL
Status: DISCONTINUED | OUTPATIENT
Start: 2019-07-29 | End: 2019-07-30 | Stop reason: HOSPADM

## 2019-07-29 RX ORDER — IPRATROPIUM BROMIDE AND ALBUTEROL SULFATE 2.5; .5 MG/3ML; MG/3ML
1 SOLUTION RESPIRATORY (INHALATION)
Status: DISCONTINUED | OUTPATIENT
Start: 2019-07-29 | End: 2019-07-30 | Stop reason: HOSPADM

## 2019-07-29 RX ORDER — NICOTINE 21 MG/24HR
1 PATCH, TRANSDERMAL 24 HOURS TRANSDERMAL DAILY
Status: DISCONTINUED | OUTPATIENT
Start: 2019-07-29 | End: 2019-07-30 | Stop reason: HOSPADM

## 2019-07-29 RX ORDER — PRAVASTATIN SODIUM 40 MG
80 TABLET ORAL ONCE
Status: COMPLETED | OUTPATIENT
Start: 2019-07-29 | End: 2019-07-29

## 2019-07-29 RX ORDER — LISINOPRIL 10 MG/1
10 TABLET ORAL DAILY
Status: DISCONTINUED | OUTPATIENT
Start: 2019-07-29 | End: 2019-07-30 | Stop reason: HOSPADM

## 2019-07-29 RX ORDER — ONDANSETRON 2 MG/ML
4 INJECTION INTRAMUSCULAR; INTRAVENOUS EVERY 6 HOURS PRN
Status: DISCONTINUED | OUTPATIENT
Start: 2019-07-29 | End: 2019-07-30 | Stop reason: HOSPADM

## 2019-07-29 RX ORDER — FLUOXETINE HYDROCHLORIDE 20 MG/1
40 CAPSULE ORAL DAILY
Status: DISCONTINUED | OUTPATIENT
Start: 2019-07-29 | End: 2019-07-30 | Stop reason: HOSPADM

## 2019-07-29 RX ORDER — HYDROCODONE BITARTRATE AND ACETAMINOPHEN 7.5; 325 MG/1; MG/1
1 TABLET ORAL EVERY 6 HOURS PRN
Status: DISCONTINUED | OUTPATIENT
Start: 2019-07-29 | End: 2019-07-30 | Stop reason: HOSPADM

## 2019-07-29 RX ORDER — LANOLIN ALCOHOL/MO/W.PET/CERES
3 CREAM (GRAM) TOPICAL ONCE
Status: COMPLETED | OUTPATIENT
Start: 2019-07-29 | End: 2019-07-29

## 2019-07-29 RX ADMIN — METOPROLOL TARTRATE 25 MG: 25 TABLET ORAL at 08:54

## 2019-07-29 RX ADMIN — FLUOXETINE 40 MG: 20 CAPSULE ORAL at 08:54

## 2019-07-29 RX ADMIN — Medication 3 MG: at 23:02

## 2019-07-29 RX ADMIN — METOPROLOL TARTRATE 25 MG: 25 TABLET ORAL at 02:37

## 2019-07-29 RX ADMIN — IPRATROPIUM BROMIDE AND ALBUTEROL SULFATE 1 AMPULE: .5; 3 SOLUTION RESPIRATORY (INHALATION) at 20:36

## 2019-07-29 RX ADMIN — ASPIRIN 81 MG: 81 TABLET, COATED ORAL at 08:54

## 2019-07-29 RX ADMIN — ENOXAPARIN SODIUM 40 MG: 40 INJECTION SUBCUTANEOUS at 20:12

## 2019-07-29 RX ADMIN — CARBIDOPA AND LEVODOPA 2 TABLET: 10; 100 TABLET ORAL at 20:12

## 2019-07-29 RX ADMIN — Medication 10 ML: at 20:12

## 2019-07-29 RX ADMIN — PRAVASTATIN SODIUM 80 MG: 40 TABLET ORAL at 02:38

## 2019-07-29 RX ADMIN — HYDROCODONE BITARTRATE AND ACETAMINOPHEN 1 TABLET: 7.5; 325 TABLET ORAL at 17:39

## 2019-07-29 RX ADMIN — ENOXAPARIN SODIUM 120 MG: 120 INJECTION SUBCUTANEOUS at 01:11

## 2019-07-29 RX ADMIN — LISINOPRIL 10 MG: 10 TABLET ORAL at 10:18

## 2019-07-29 RX ADMIN — IPRATROPIUM BROMIDE AND ALBUTEROL SULFATE 1 AMPULE: .5; 3 SOLUTION RESPIRATORY (INHALATION) at 08:54

## 2019-07-29 RX ADMIN — Medication 10 ML: at 08:55

## 2019-07-29 RX ADMIN — METOPROLOL TARTRATE 25 MG: 25 TABLET ORAL at 20:12

## 2019-07-29 RX ADMIN — CARBIDOPA AND LEVODOPA 2 TABLET: 10; 100 TABLET ORAL at 02:38

## 2019-07-29 RX ADMIN — HYDROCODONE BITARTRATE AND ACETAMINOPHEN 1 TABLET: 7.5; 325 TABLET ORAL at 03:26

## 2019-07-29 RX ADMIN — HYDROCODONE BITARTRATE AND ACETAMINOPHEN 1 TABLET: 7.5; 325 TABLET ORAL at 10:11

## 2019-07-29 RX ADMIN — PRAVASTATIN SODIUM 80 MG: 40 TABLET ORAL at 20:12

## 2019-07-29 RX ADMIN — PANTOPRAZOLE SODIUM 40 MG: 40 TABLET, DELAYED RELEASE ORAL at 06:43

## 2019-07-29 RX ADMIN — IPRATROPIUM BROMIDE AND ALBUTEROL SULFATE 1 AMPULE: .5; 3 SOLUTION RESPIRATORY (INHALATION) at 16:09

## 2019-07-29 ASSESSMENT — PAIN - FUNCTIONAL ASSESSMENT
PAIN_FUNCTIONAL_ASSESSMENT: ACTIVITIES ARE NOT PREVENTED
PAIN_FUNCTIONAL_ASSESSMENT: PREVENTS OR INTERFERES SOME ACTIVE ACTIVITIES AND ADLS

## 2019-07-29 ASSESSMENT — PAIN DESCRIPTION - LOCATION
LOCATION: BACK;HIP
LOCATION: BACK;HIP
LOCATION: BACK
LOCATION: BACK

## 2019-07-29 ASSESSMENT — PAIN SCALES - GENERAL
PAINLEVEL_OUTOF10: 5
PAINLEVEL_OUTOF10: 6
PAINLEVEL_OUTOF10: 4
PAINLEVEL_OUTOF10: 6
PAINLEVEL_OUTOF10: 5
PAINLEVEL_OUTOF10: 3

## 2019-07-29 ASSESSMENT — PAIN DESCRIPTION - ONSET
ONSET: ON-GOING

## 2019-07-29 ASSESSMENT — PAIN DESCRIPTION - DESCRIPTORS
DESCRIPTORS: ACHING

## 2019-07-29 ASSESSMENT — PAIN DESCRIPTION - PAIN TYPE
TYPE: CHRONIC PAIN

## 2019-07-29 ASSESSMENT — PAIN DESCRIPTION - ORIENTATION
ORIENTATION: LOWER;LEFT
ORIENTATION: LOWER;LEFT
ORIENTATION: LOWER
ORIENTATION: RIGHT;LEFT;LOWER

## 2019-07-29 ASSESSMENT — PAIN DESCRIPTION - FREQUENCY
FREQUENCY: INTERMITTENT
FREQUENCY: CONTINUOUS

## 2019-07-29 ASSESSMENT — PAIN SCALES - WONG BAKER
WONGBAKER_NUMERICALRESPONSE: 0
WONGBAKER_NUMERICALRESPONSE: 2
WONGBAKER_NUMERICALRESPONSE: 2

## 2019-07-29 ASSESSMENT — PAIN DESCRIPTION - PROGRESSION
CLINICAL_PROGRESSION: GRADUALLY WORSENING
CLINICAL_PROGRESSION: NOT CHANGED
CLINICAL_PROGRESSION: NOT CHANGED

## 2019-07-29 NOTE — H&P
BUN 27*   CREATININE 1.5*   GLUCOSE 102*     Hepatic:   Recent Labs     07/28/19 1906   AST 16   ALT <5*   BILITOT <0.2   ALKPHOS 115     Troponin:   Recent Labs     07/28/19 1906   TROPONINI <0.01     BNP: No results for input(s): BNP in the last 72 hours. INR: No results for input(s): INR in the last 72 hours. Lab Results   Component Value Date    LABA1C 5.3 11/14/2018           No results for input(s): CKTOTAL in the last 72 hours. -----------------------------------------------------------------  XR CHEST STANDARD (2 VW)   No acute process. EKG  Sinus rhythm with Premature atrial complexes  Left ventricular hypertrophy with repolarization abnormality        Assessment / Plan     COPD Exacerbation J44.1  Steroids and bronchodilators and antibiotics  bronchial higiene  Consult to pulmonary if no improvement      Parkinson's disease G20  Continue on Sinemet      Anxiety depression F 41.9  Continue on home medication    Essential primary hypertension I10  Continue patient on home medication          DVT and GI prophylaxis      Full Code      Tanya Worrell M.D    This note was transcribed using [facility][1]. Please disregard any translational errors.

## 2019-07-29 NOTE — ED NOTES
ED SBAR report provider to [facility], [address] [place] [address]. Patient to be transported to Room 4278 via stretcher by ED tech. Patient transported with bedside cardiac monitor. IV site clean, dry, and intact. MEWS score and pain assessed and documented. Updated patient on plan of care.      Katina Butt RN  07/29/19 0111

## 2019-07-29 NOTE — CARE COORDINATION
INITIAL CASE MANAGEMENT ASSESSMENT    Reviewed chart, met with patient to assess possible discharge needs. Explained Case Management role/services. Living Situation: pt resides in a single story home w/ a few steps to enter w/ her      ADLs: independent     DME: w/c, walker, BSC, ETS, shr seat, cane    PT/OT Recs: not currently ordered     Active Services: none     Transportation: both she and her  drive     Medications: confirmed Betances as health insurance and rx are covered and obtained at [facility]    PCP: confirmed Keven Bennett      HD/PD: n/a    PLAN/COMMENTS: denied dc needs     SW/CM provided contact information for patient or family to call with any questions. SW/CM will follow and assist as needed.     Electronically signed by Anabela Vazquez RN on 7/29/2019 at 1:04 PM

## 2019-07-30 VITALS
OXYGEN SATURATION: 98 % | DIASTOLIC BLOOD PRESSURE: 89 MMHG | RESPIRATION RATE: 18 BRPM | HEART RATE: 80 BPM | WEIGHT: 271.83 LBS | SYSTOLIC BLOOD PRESSURE: 137 MMHG | HEIGHT: 67 IN | TEMPERATURE: 97.9 F | BODY MASS INDEX: 42.66 KG/M2

## 2019-07-30 PROCEDURE — 2580000003 HC RX 258: Performed by: INTERNAL MEDICINE

## 2019-07-30 PROCEDURE — 6370000000 HC RX 637 (ALT 250 FOR IP): Performed by: NURSE PRACTITIONER

## 2019-07-30 PROCEDURE — 6370000000 HC RX 637 (ALT 250 FOR IP): Performed by: INTERNAL MEDICINE

## 2019-07-30 PROCEDURE — 93306 TTE W/DOPPLER COMPLETE: CPT

## 2019-07-30 PROCEDURE — 6370000000 HC RX 637 (ALT 250 FOR IP): Performed by: HOSPITALIST

## 2019-07-30 PROCEDURE — 94761 N-INVAS EAR/PLS OXIMETRY MLT: CPT

## 2019-07-30 PROCEDURE — 94640 AIRWAY INHALATION TREATMENT: CPT

## 2019-07-30 RX ORDER — DIPHENHYDRAMINE HCL 25 MG
25 TABLET ORAL ONCE
Status: DISCONTINUED | OUTPATIENT
Start: 2019-07-30 | End: 2019-07-30 | Stop reason: HOSPADM

## 2019-07-30 RX ORDER — ALBUTEROL SULFATE 90 UG/1
2 AEROSOL, METERED RESPIRATORY (INHALATION) EVERY 6 HOURS PRN
Qty: 1 INHALER | Refills: 0 | Status: ON HOLD | OUTPATIENT
Start: 2019-07-30 | End: 2019-09-17 | Stop reason: HOSPADM

## 2019-07-30 RX ORDER — PREDNISONE 20 MG/1
40 TABLET ORAL DAILY
Qty: 10 TABLET | Refills: 0 | Status: SHIPPED | OUTPATIENT
Start: 2019-07-30 | End: 2019-08-04

## 2019-07-30 RX ADMIN — Medication 10 ML: at 08:38

## 2019-07-30 RX ADMIN — HYDROCODONE BITARTRATE AND ACETAMINOPHEN 1 TABLET: 7.5; 325 TABLET ORAL at 07:16

## 2019-07-30 RX ADMIN — METOPROLOL TARTRATE 25 MG: 25 TABLET ORAL at 08:35

## 2019-07-30 RX ADMIN — HYDROCODONE BITARTRATE AND ACETAMINOPHEN 1 TABLET: 7.5; 325 TABLET ORAL at 01:30

## 2019-07-30 RX ADMIN — PANTOPRAZOLE SODIUM 40 MG: 40 TABLET, DELAYED RELEASE ORAL at 05:43

## 2019-07-30 RX ADMIN — FLUOXETINE 40 MG: 20 CAPSULE ORAL at 08:35

## 2019-07-30 RX ADMIN — ASPIRIN 81 MG: 81 TABLET, COATED ORAL at 08:35

## 2019-07-30 RX ADMIN — HYDROCODONE BITARTRATE AND ACETAMINOPHEN 1 TABLET: 7.5; 325 TABLET ORAL at 13:53

## 2019-07-30 RX ADMIN — CARBIDOPA AND LEVODOPA 1 TABLET: 10; 100 TABLET ORAL at 13:57

## 2019-07-30 RX ADMIN — LISINOPRIL 10 MG: 10 TABLET ORAL at 08:35

## 2019-07-30 RX ADMIN — IPRATROPIUM BROMIDE AND ALBUTEROL SULFATE 1 AMPULE: .5; 3 SOLUTION RESPIRATORY (INHALATION) at 07:48

## 2019-07-30 ASSESSMENT — PAIN DESCRIPTION - PAIN TYPE
TYPE: CHRONIC PAIN

## 2019-07-30 ASSESSMENT — PAIN DESCRIPTION - PROGRESSION
CLINICAL_PROGRESSION: GRADUALLY IMPROVING

## 2019-07-30 ASSESSMENT — PAIN DESCRIPTION - ORIENTATION
ORIENTATION: MID
ORIENTATION: MID;LOWER

## 2019-07-30 ASSESSMENT — PAIN DESCRIPTION - FREQUENCY
FREQUENCY: CONTINUOUS

## 2019-07-30 ASSESSMENT — PAIN - FUNCTIONAL ASSESSMENT
PAIN_FUNCTIONAL_ASSESSMENT: ACTIVITIES ARE NOT PREVENTED

## 2019-07-30 ASSESSMENT — PAIN DESCRIPTION - ONSET
ONSET: ON-GOING

## 2019-07-30 ASSESSMENT — PAIN DESCRIPTION - LOCATION
LOCATION: BACK

## 2019-07-30 ASSESSMENT — PAIN DESCRIPTION - DESCRIPTORS
DESCRIPTORS: ACHING

## 2019-07-30 ASSESSMENT — PAIN SCALES - GENERAL
PAINLEVEL_OUTOF10: 2
PAINLEVEL_OUTOF10: 2
PAINLEVEL_OUTOF10: 6
PAINLEVEL_OUTOF10: 5
PAINLEVEL_OUTOF10: 4
PAINLEVEL_OUTOF10: 6

## 2019-07-30 NOTE — PLAN OF CARE
Problem: Breathing Pattern - Ineffective:  Goal: Ability to achieve and maintain a regular respiratory rate will improve  Description  Ability to achieve and maintain a regular respiratory rate will improve  7/30/2019 1442 by Patricia Mccauley RN  Outcome: Ongoing  Note:   Pt able to maintain normal respiratory rate at this time on room air. 7/30/2019 0211 by Samra Shannon RN  Outcome: Ongoing     Problem: Pain Control  Goal: Maintain pain level at or below patient's acceptable level (or 5 if patient is unable to determine acceptable level)  7/30/2019 1442 by Patricia Mccauley RN  Outcome: Ongoing  Note:   Pt able to express presence and absence of pain using numerical pain scale. Pt pain is managed by PRN analgesics as ordered by MD. Pain reassess after each interventions. Will continue to monitor as needed. Problem: Cardiovascular  Goal: No DVT, peripheral vascular complications  4/69/4831 1442 by Patricia Mccauley RN  Outcome: Ongoing     Problem: Respiratory  Goal: No pulmonary complications  9/04/8341 1442 by Patricia Mccauley RN  Outcome: Ongoing     Problem: Pain:  Goal: Pain level will decrease  Description  Pain level will decrease  Outcome: Ongoing  Note:   Pt able to express presence and absence of pain using numerical pain scale. Pt pain is managed by PRN analgesics as ordered by MD. Pain reassess after each interventions. Will continue to monitor as needed.

## 2019-07-30 NOTE — PROGRESS NOTES
Pt reported chronic back pain and requested prn pain med. Pain med given to the pt as ordered.  Electronically signed by Javier Brown RN on 7/30/2019 at 2:48 PM

## 2019-07-31 ENCOUNTER — TELEPHONE (OUTPATIENT)
Dept: FAMILY MEDICINE CLINIC | Age: 64
End: 2019-07-31

## 2019-07-31 ENCOUNTER — CARE COORDINATION (OUTPATIENT)
Dept: CASE MANAGEMENT | Age: 64
End: 2019-07-31

## 2019-07-31 DIAGNOSIS — R06.02 SOB (SHORTNESS OF BREATH): Primary | ICD-10-CM

## 2019-08-01 ENCOUNTER — CARE COORDINATION (OUTPATIENT)
Dept: CASE MANAGEMENT | Age: 64
End: 2019-08-01

## 2019-08-05 ENCOUNTER — SCHEDULED TELEPHONE ENCOUNTER (OUTPATIENT)
Dept: PHARMACY | Age: 64
End: 2019-08-05

## 2019-08-06 ENCOUNTER — TELEPHONE (OUTPATIENT)
Dept: FAMILY MEDICINE CLINIC | Age: 64
End: 2019-08-06

## 2019-08-13 ENCOUNTER — CARE COORDINATION (OUTPATIENT)
Dept: CASE MANAGEMENT | Age: 64
End: 2019-08-13

## 2019-08-18 NOTE — PROGRESS NOTES
Paresh Hughes is a 66 y.o. female. HPI:  Here For hospital follow-up, greater than 2 weeks since she was discharged  Seen for COPD exacerbation, treated with steroids and bronchodilators, signif improved    unable to access echo adults, will call over to cardiology    30 pk yr hx smoking thoracic aortic dilatation, needs repeat chest CT  Snores, discussed sleep study, agreeable    Quit smoking 2 weeks ago, \"I can breathe better now\"  Meds, vitamins and allergies reviewed with pt    Wt Readings from Last 3 Encounters:   08/19/19 272 lb (123.4 kg)   07/30/19 271 lb 13.2 oz (123.3 kg)   04/03/19 276 lb (125.2 kg)       REVIEW OF SYSTEMS:   CONSTITUTIONAL: See history of present illness,   Weight noted   HEENT: No new vision difficulties or ringing in the ears. RESPIRATORY: No new SOB, PND, orthopnea or cough. CARDIOVASCULAR: no CP, palpitations or SOB with exertion  GI: No nausea, vomiting, diarrhea, constipation, abdominal pain or changes in bowel habits. : No urinary frequency, urgency, incontinence hematuria or dysuria. SKIN: No cyanosis or skin lesions. MUSCULOSKELETAL: No new muscle or joint pain. NEUROLOGICAL: No syncope or TIA-like symptoms. PSYCHIATRIC: No anxiety, insomnia or depression     Allergies   Allergen Reactions    Mobic [Meloxicam] Nausea Only     Stomach would get upset after taking this medication    Morphine Other (See Comments)     When taken in 2013 pt had an adverse reaction to morphine. She ended up in  hospital with kidney failure, dehydration, and in ICU. She prefers this not to be given ever.  Other      ? Suture from CABG caused blister (10/30/2015)       Prior to Visit Medications    Medication Sig Taking? Authorizing Provider   HYDROcodone-acetaminophen (NORCO) 7.5-325 MG per tablet Take 1 tablet by mouth every 6 hours as needed for Pain for up to 30 days.  Yes Yoon Reyes MD   lisinopril (PRINIVIL;ZESTRIL) 5 MG tablet Take 1 tablet by mouth daily Yes Rica HILL

## 2019-08-19 ENCOUNTER — OFFICE VISIT (OUTPATIENT)
Dept: FAMILY MEDICINE CLINIC | Age: 64
End: 2019-08-19
Payer: COMMERCIAL

## 2019-08-19 ENCOUNTER — TELEPHONE (OUTPATIENT)
Dept: PULMONOLOGY | Age: 64
End: 2019-08-19

## 2019-08-19 VITALS
HEART RATE: 62 BPM | SYSTOLIC BLOOD PRESSURE: 136 MMHG | OXYGEN SATURATION: 97 % | WEIGHT: 272 LBS | DIASTOLIC BLOOD PRESSURE: 90 MMHG | BODY MASS INDEX: 42.6 KG/M2

## 2019-08-19 DIAGNOSIS — Z09 HOSPITAL DISCHARGE FOLLOW-UP: Primary | ICD-10-CM

## 2019-08-19 DIAGNOSIS — Z12.39 BREAST CANCER SCREENING: ICD-10-CM

## 2019-08-19 DIAGNOSIS — I77.810 DILATATION OF THORACIC AORTA (HCC): ICD-10-CM

## 2019-08-19 DIAGNOSIS — M15.9 PRIMARY OSTEOARTHRITIS INVOLVING MULTIPLE JOINTS: ICD-10-CM

## 2019-08-19 DIAGNOSIS — Z12.11 COLON CANCER SCREENING: ICD-10-CM

## 2019-08-19 DIAGNOSIS — Z76.0 ENCOUNTER FOR MEDICATION REFILL: ICD-10-CM

## 2019-08-19 DIAGNOSIS — F17.200 TOBACCO USE DISORDER: ICD-10-CM

## 2019-08-19 DIAGNOSIS — J44.1 COPD WITH EXACERBATION (HCC): ICD-10-CM

## 2019-08-19 DIAGNOSIS — R06.83 SNORES: ICD-10-CM

## 2019-08-19 DIAGNOSIS — I10 ESSENTIAL HYPERTENSION: ICD-10-CM

## 2019-08-19 PROCEDURE — 99214 OFFICE O/P EST MOD 30 MIN: CPT | Performed by: FAMILY MEDICINE

## 2019-08-19 RX ORDER — LISINOPRIL 5 MG/1
5 TABLET ORAL DAILY
Qty: 30 TABLET | Refills: 5 | Status: SHIPPED | OUTPATIENT
Start: 2019-08-19 | End: 2020-01-23 | Stop reason: ALTCHOICE

## 2019-08-19 RX ORDER — HYDROCODONE BITARTRATE AND ACETAMINOPHEN 7.5; 325 MG/1; MG/1
1 TABLET ORAL EVERY 6 HOURS PRN
Qty: 120 TABLET | Refills: 0 | Status: ON HOLD | OUTPATIENT
Start: 2019-08-19 | End: 2019-09-16 | Stop reason: SDUPTHER

## 2019-08-23 ENCOUNTER — TELEPHONE (OUTPATIENT)
Dept: FAMILY MEDICINE CLINIC | Age: 64
End: 2019-08-23

## 2019-09-13 ENCOUNTER — HOSPITAL ENCOUNTER (INPATIENT)
Age: 64
LOS: 3 days | Discharge: HOME | DRG: 247 | End: 2019-09-17
Attending: EMERGENCY MEDICINE | Admitting: INTERNAL MEDICINE
Payer: COMMERCIAL

## 2019-09-13 DIAGNOSIS — R07.9 CHEST PAIN, UNSPECIFIED TYPE: ICD-10-CM

## 2019-09-13 DIAGNOSIS — I50.31 ACUTE DIASTOLIC CONGESTIVE HEART FAILURE (HCC): Primary | ICD-10-CM

## 2019-09-13 DIAGNOSIS — J81.0 ACUTE PULMONARY EDEMA (HCC): ICD-10-CM

## 2019-09-13 DIAGNOSIS — R77.8 ELEVATED TROPONIN: ICD-10-CM

## 2019-09-13 DIAGNOSIS — N17.9 AKI (ACUTE KIDNEY INJURY) (HCC): ICD-10-CM

## 2019-09-13 DIAGNOSIS — J44.1 COPD EXACERBATION (HCC): ICD-10-CM

## 2019-09-13 PROCEDURE — 99285 EMERGENCY DEPT VISIT HI MDM: CPT

## 2019-09-14 ENCOUNTER — APPOINTMENT (OUTPATIENT)
Dept: GENERAL RADIOLOGY | Age: 64
DRG: 247 | End: 2019-09-14
Payer: COMMERCIAL

## 2019-09-14 PROBLEM — J44.1 COPD EXACERBATION (HCC): Status: ACTIVE | Noted: 2019-09-14

## 2019-09-14 LAB
A/G RATIO: 1.2 (ref 1.1–2.2)
ALBUMIN SERPL-MCNC: 4 G/DL (ref 3.4–5)
ALP BLD-CCNC: 89 U/L (ref 40–129)
ALT SERPL-CCNC: 5 U/L (ref 10–40)
ANION GAP SERPL CALCULATED.3IONS-SCNC: 13 MMOL/L (ref 3–16)
ANION GAP SERPL CALCULATED.3IONS-SCNC: 14 MMOL/L (ref 3–16)
APTT: 139.3 SEC (ref 26–36)
APTT: 31.8 SEC (ref 26–36)
AST SERPL-CCNC: 23 U/L (ref 15–37)
BASOPHILS ABSOLUTE: 0 K/UL (ref 0–0.2)
BASOPHILS ABSOLUTE: 0 K/UL (ref 0–0.2)
BASOPHILS RELATIVE PERCENT: 0.5 %
BASOPHILS RELATIVE PERCENT: 0.6 %
BILIRUB SERPL-MCNC: <0.2 MG/DL (ref 0–1)
BUN BLDV-MCNC: 43 MG/DL (ref 7–20)
BUN BLDV-MCNC: 44 MG/DL (ref 7–20)
CALCIUM SERPL-MCNC: 8.8 MG/DL (ref 8.3–10.6)
CALCIUM SERPL-MCNC: 8.9 MG/DL (ref 8.3–10.6)
CHLORIDE BLD-SCNC: 107 MMOL/L (ref 99–110)
CHLORIDE BLD-SCNC: 108 MMOL/L (ref 99–110)
CO2: 17 MMOL/L (ref 21–32)
CO2: 18 MMOL/L (ref 21–32)
CREAT SERPL-MCNC: 2.1 MG/DL (ref 0.6–1.2)
CREAT SERPL-MCNC: 2.2 MG/DL (ref 0.6–1.2)
EKG ATRIAL RATE: 70 BPM
EKG DIAGNOSIS: NORMAL
EKG P AXIS: 71 DEGREES
EKG P-R INTERVAL: 144 MS
EKG Q-T INTERVAL: 398 MS
EKG QRS DURATION: 84 MS
EKG QTC CALCULATION (BAZETT): 429 MS
EKG R AXIS: -12 DEGREES
EKG T AXIS: -40 DEGREES
EKG VENTRICULAR RATE: 70 BPM
EOSINOPHILS ABSOLUTE: 0.1 K/UL (ref 0–0.6)
EOSINOPHILS ABSOLUTE: 0.2 K/UL (ref 0–0.6)
EOSINOPHILS RELATIVE PERCENT: 1.6 %
EOSINOPHILS RELATIVE PERCENT: 2.9 %
ESTIMATED AVERAGE GLUCOSE: 119.8 MG/DL
GFR AFRICAN AMERICAN: 27
GFR AFRICAN AMERICAN: 29
GFR NON-AFRICAN AMERICAN: 23
GFR NON-AFRICAN AMERICAN: 24
GLOBULIN: 3.4 G/DL
GLUCOSE BLD-MCNC: 101 MG/DL (ref 70–99)
GLUCOSE BLD-MCNC: 148 MG/DL (ref 70–99)
GLUCOSE BLD-MCNC: 155 MG/DL (ref 70–99)
GLUCOSE BLD-MCNC: 187 MG/DL (ref 70–99)
GLUCOSE BLD-MCNC: 268 MG/DL (ref 70–99)
HBA1C MFR BLD: 5.8 %
HCT VFR BLD CALC: 33.3 % (ref 36–48)
HCT VFR BLD CALC: 33.9 % (ref 36–48)
HEMOGLOBIN: 10.7 G/DL (ref 12–16)
HEMOGLOBIN: 11.2 G/DL (ref 12–16)
LYMPHOCYTES ABSOLUTE: 0.4 K/UL (ref 1–5.1)
LYMPHOCYTES ABSOLUTE: 1.1 K/UL (ref 1–5.1)
LYMPHOCYTES RELATIVE PERCENT: 14.4 %
LYMPHOCYTES RELATIVE PERCENT: 6.3 %
MCH RBC QN AUTO: 30.4 PG (ref 26–34)
MCH RBC QN AUTO: 31 PG (ref 26–34)
MCHC RBC AUTO-ENTMCNC: 32.1 G/DL (ref 31–36)
MCHC RBC AUTO-ENTMCNC: 32.9 G/DL (ref 31–36)
MCV RBC AUTO: 94.3 FL (ref 80–100)
MCV RBC AUTO: 94.8 FL (ref 80–100)
MONOCYTES ABSOLUTE: 0.1 K/UL (ref 0–1.3)
MONOCYTES ABSOLUTE: 0.4 K/UL (ref 0–1.3)
MONOCYTES RELATIVE PERCENT: 1.5 %
MONOCYTES RELATIVE PERCENT: 5.8 %
NEUTROPHILS ABSOLUTE: 5.6 K/UL (ref 1.7–7.7)
NEUTROPHILS ABSOLUTE: 6.2 K/UL (ref 1.7–7.7)
NEUTROPHILS RELATIVE PERCENT: 76.3 %
NEUTROPHILS RELATIVE PERCENT: 90.1 %
PDW BLD-RTO: 14.5 % (ref 12.4–15.4)
PDW BLD-RTO: 14.8 % (ref 12.4–15.4)
PERFORMED ON: ABNORMAL
PLATELET # BLD: 205 K/UL (ref 135–450)
PLATELET # BLD: 207 K/UL (ref 135–450)
PMV BLD AUTO: 7.2 FL (ref 5–10.5)
PMV BLD AUTO: 7.3 FL (ref 5–10.5)
POTASSIUM REFLEX MAGNESIUM: 4.8 MMOL/L (ref 3.5–5.1)
POTASSIUM REFLEX MAGNESIUM: 5.3 MMOL/L (ref 3.5–5.1)
PRO-BNP: 3279 PG/ML (ref 0–124)
RBC # BLD: 3.51 M/UL (ref 4–5.2)
RBC # BLD: 3.6 M/UL (ref 4–5.2)
REASON FOR REJECTION: NORMAL
REJECTED TEST: NORMAL
SODIUM BLD-SCNC: 138 MMOL/L (ref 136–145)
SODIUM BLD-SCNC: 139 MMOL/L (ref 136–145)
TOTAL PROTEIN: 7.4 G/DL (ref 6.4–8.2)
TROPONIN: 0.02 NG/ML
TROPONIN: 0.02 NG/ML
TROPONIN: <0.01 NG/ML
TROPONIN: <0.01 NG/ML
WBC # BLD: 6.9 K/UL (ref 4–11)
WBC # BLD: 7.4 K/UL (ref 4–11)

## 2019-09-14 PROCEDURE — 85730 THROMBOPLASTIN TIME PARTIAL: CPT

## 2019-09-14 PROCEDURE — 6360000002 HC RX W HCPCS: Performed by: INTERNAL MEDICINE

## 2019-09-14 PROCEDURE — 94640 AIRWAY INHALATION TREATMENT: CPT

## 2019-09-14 PROCEDURE — 93005 ELECTROCARDIOGRAM TRACING: CPT | Performed by: INTERNAL MEDICINE

## 2019-09-14 PROCEDURE — 93010 ELECTROCARDIOGRAM REPORT: CPT | Performed by: INTERNAL MEDICINE

## 2019-09-14 PROCEDURE — 99223 1ST HOSP IP/OBS HIGH 75: CPT | Performed by: INTERNAL MEDICINE

## 2019-09-14 PROCEDURE — 80053 COMPREHEN METABOLIC PANEL: CPT

## 2019-09-14 PROCEDURE — 2060000000 HC ICU INTERMEDIATE R&B

## 2019-09-14 PROCEDURE — 85025 COMPLETE CBC W/AUTO DIFF WBC: CPT

## 2019-09-14 PROCEDURE — 6370000000 HC RX 637 (ALT 250 FOR IP)

## 2019-09-14 PROCEDURE — 6370000000 HC RX 637 (ALT 250 FOR IP): Performed by: INTERNAL MEDICINE

## 2019-09-14 PROCEDURE — 84484 ASSAY OF TROPONIN QUANT: CPT

## 2019-09-14 PROCEDURE — 83036 HEMOGLOBIN GLYCOSYLATED A1C: CPT

## 2019-09-14 PROCEDURE — 71046 X-RAY EXAM CHEST 2 VIEWS: CPT

## 2019-09-14 PROCEDURE — 83880 ASSAY OF NATRIURETIC PEPTIDE: CPT

## 2019-09-14 PROCEDURE — 96374 THER/PROPH/DIAG INJ IV PUSH: CPT

## 2019-09-14 PROCEDURE — 93005 ELECTROCARDIOGRAM TRACING: CPT | Performed by: EMERGENCY MEDICINE

## 2019-09-14 PROCEDURE — 2580000003 HC RX 258: Performed by: INTERNAL MEDICINE

## 2019-09-14 PROCEDURE — 36415 COLL VENOUS BLD VENIPUNCTURE: CPT

## 2019-09-14 PROCEDURE — 94760 N-INVAS EAR/PLS OXIMETRY 1: CPT

## 2019-09-14 PROCEDURE — 6360000002 HC RX W HCPCS: Performed by: EMERGENCY MEDICINE

## 2019-09-14 RX ORDER — METOPROLOL SUCCINATE 25 MG/1
1 TABLET, EXTENDED RELEASE ORAL
COMMUNITY
End: 2020-01-07 | Stop reason: SDUPTHER

## 2019-09-14 RX ORDER — ALBUTEROL SULFATE 1.25 MG/3ML
0.63 SOLUTION RESPIRATORY (INHALATION) EVERY 6 HOURS PRN
Status: DISCONTINUED | OUTPATIENT
Start: 2019-09-14 | End: 2019-09-17 | Stop reason: HOSPADM

## 2019-09-14 RX ORDER — IPRATROPIUM BROMIDE AND ALBUTEROL SULFATE 2.5; .5 MG/3ML; MG/3ML
1 SOLUTION RESPIRATORY (INHALATION)
Status: DISCONTINUED | OUTPATIENT
Start: 2019-09-14 | End: 2019-09-14

## 2019-09-14 RX ORDER — NICOTINE 21 MG/24HR
1 PATCH, TRANSDERMAL 24 HOURS TRANSDERMAL DAILY
Status: DISCONTINUED | OUTPATIENT
Start: 2019-09-14 | End: 2019-09-17 | Stop reason: HOSPADM

## 2019-09-14 RX ORDER — FUROSEMIDE 10 MG/ML
60 INJECTION INTRAMUSCULAR; INTRAVENOUS ONCE
Status: COMPLETED | OUTPATIENT
Start: 2019-09-14 | End: 2019-09-14

## 2019-09-14 RX ORDER — METHYLPREDNISOLONE SODIUM SUCCINATE 40 MG/ML
40 INJECTION, POWDER, LYOPHILIZED, FOR SOLUTION INTRAMUSCULAR; INTRAVENOUS EVERY 12 HOURS
Status: DISCONTINUED | OUTPATIENT
Start: 2019-09-14 | End: 2019-09-14

## 2019-09-14 RX ORDER — HEPARIN SODIUM 1000 [USP'U]/ML
60 INJECTION, SOLUTION INTRAVENOUS; SUBCUTANEOUS PRN
Status: DISCONTINUED | OUTPATIENT
Start: 2019-09-14 | End: 2019-09-14 | Stop reason: ALTCHOICE

## 2019-09-14 RX ORDER — FAMOTIDINE 20 MG/1
20 TABLET, FILM COATED ORAL DAILY
Status: DISCONTINUED | OUTPATIENT
Start: 2019-09-14 | End: 2019-09-17 | Stop reason: HOSPADM

## 2019-09-14 RX ORDER — HEPARIN SODIUM 1000 [USP'U]/ML
30 INJECTION, SOLUTION INTRAVENOUS; SUBCUTANEOUS PRN
Status: DISCONTINUED | OUTPATIENT
Start: 2019-09-14 | End: 2019-09-14 | Stop reason: ALTCHOICE

## 2019-09-14 RX ORDER — DEXTROSE MONOHYDRATE 25 G/50ML
12.5 INJECTION, SOLUTION INTRAVENOUS PRN
Status: DISCONTINUED | OUTPATIENT
Start: 2019-09-14 | End: 2019-09-17 | Stop reason: HOSPADM

## 2019-09-14 RX ORDER — SODIUM CHLORIDE FOR INHALATION 0.9 %
3 VIAL, NEBULIZER (ML) INHALATION EVERY 8 HOURS PRN
Status: DISCONTINUED | OUTPATIENT
Start: 2019-09-14 | End: 2019-09-17 | Stop reason: HOSPADM

## 2019-09-14 RX ORDER — BACITRACIN, NEOMYCIN, POLYMYXIN B 400; 3.5; 5 [USP'U]/G; MG/G; [USP'U]/G
OINTMENT TOPICAL 2 TIMES DAILY
Status: DISCONTINUED | OUTPATIENT
Start: 2019-09-14 | End: 2019-09-17 | Stop reason: HOSPADM

## 2019-09-14 RX ORDER — ASPIRIN 81 MG/1
81 TABLET ORAL DAILY
Status: DISCONTINUED | OUTPATIENT
Start: 2019-09-14 | End: 2019-09-16 | Stop reason: SDUPTHER

## 2019-09-14 RX ORDER — METOPROLOL SUCCINATE 50 MG/1
50 TABLET, EXTENDED RELEASE ORAL 2 TIMES DAILY
Status: DISCONTINUED | OUTPATIENT
Start: 2019-09-14 | End: 2019-09-17 | Stop reason: HOSPADM

## 2019-09-14 RX ORDER — HYDROCODONE BITARTRATE AND ACETAMINOPHEN 7.5; 325 MG/1; MG/1
1 TABLET ORAL EVERY 6 HOURS PRN
Status: DISCONTINUED | OUTPATIENT
Start: 2019-09-14 | End: 2019-09-17 | Stop reason: HOSPADM

## 2019-09-14 RX ORDER — ONDANSETRON 2 MG/ML
4 INJECTION INTRAMUSCULAR; INTRAVENOUS EVERY 4 HOURS PRN
Status: DISCONTINUED | OUTPATIENT
Start: 2019-09-14 | End: 2019-09-17 | Stop reason: HOSPADM

## 2019-09-14 RX ORDER — SODIUM CHLORIDE 0.9 % (FLUSH) 0.9 %
10 SYRINGE (ML) INJECTION PRN
Status: DISCONTINUED | OUTPATIENT
Start: 2019-09-14 | End: 2019-09-17 | Stop reason: HOSPADM

## 2019-09-14 RX ORDER — FLUOXETINE HYDROCHLORIDE 20 MG/1
80 CAPSULE ORAL DAILY
Status: DISCONTINUED | OUTPATIENT
Start: 2019-09-14 | End: 2019-09-17 | Stop reason: HOSPADM

## 2019-09-14 RX ORDER — IPRATROPIUM BROMIDE AND ALBUTEROL SULFATE 2.5; .5 MG/3ML; MG/3ML
1 SOLUTION RESPIRATORY (INHALATION) EVERY 4 HOURS PRN
Status: DISCONTINUED | OUTPATIENT
Start: 2019-09-14 | End: 2019-09-14

## 2019-09-14 RX ORDER — NITROGLYCERIN 0.4 MG/1
TABLET SUBLINGUAL
Status: COMPLETED
Start: 2019-09-14 | End: 2019-09-14

## 2019-09-14 RX ORDER — HEPARIN SODIUM 10000 [USP'U]/100ML
15 INJECTION, SOLUTION INTRAVENOUS CONTINUOUS
Status: DISCONTINUED | OUTPATIENT
Start: 2019-09-14 | End: 2019-09-16

## 2019-09-14 RX ORDER — NICOTINE 21 MG/24HR
1 PATCH, TRANSDERMAL 24 HOURS TRANSDERMAL DAILY
Status: DISCONTINUED | OUTPATIENT
Start: 2019-09-14 | End: 2019-09-14

## 2019-09-14 RX ORDER — NITROGLYCERIN 0.4 MG/1
0.4 TABLET SUBLINGUAL EVERY 5 MIN PRN
Status: DISCONTINUED | OUTPATIENT
Start: 2019-09-14 | End: 2019-09-17 | Stop reason: HOSPADM

## 2019-09-14 RX ORDER — METHYLPREDNISOLONE SODIUM SUCCINATE 40 MG/ML
40 INJECTION, POWDER, LYOPHILIZED, FOR SOLUTION INTRAMUSCULAR; INTRAVENOUS EVERY 6 HOURS
Status: DISCONTINUED | OUTPATIENT
Start: 2019-09-14 | End: 2019-09-14

## 2019-09-14 RX ORDER — NICOTINE POLACRILEX 4 MG
15 LOZENGE BUCCAL PRN
Status: DISCONTINUED | OUTPATIENT
Start: 2019-09-14 | End: 2019-09-17 | Stop reason: HOSPADM

## 2019-09-14 RX ORDER — DEXTROSE MONOHYDRATE 50 MG/ML
100 INJECTION, SOLUTION INTRAVENOUS PRN
Status: DISCONTINUED | OUTPATIENT
Start: 2019-09-14 | End: 2019-09-17 | Stop reason: HOSPADM

## 2019-09-14 RX ORDER — PRAVASTATIN SODIUM 40 MG
80 TABLET ORAL NIGHTLY
Status: DISCONTINUED | OUTPATIENT
Start: 2019-09-14 | End: 2019-09-16

## 2019-09-14 RX ORDER — METOPROLOL SUCCINATE 25 MG/1
25 TABLET, EXTENDED RELEASE ORAL DAILY
Status: DISCONTINUED | OUTPATIENT
Start: 2019-09-14 | End: 2019-09-14

## 2019-09-14 RX ORDER — ISOSORBIDE MONONITRATE 60 MG/1
60 TABLET, EXTENDED RELEASE ORAL 2 TIMES DAILY
Status: DISCONTINUED | OUTPATIENT
Start: 2019-09-14 | End: 2019-09-16

## 2019-09-14 RX ORDER — FUROSEMIDE 10 MG/ML
40 INJECTION INTRAMUSCULAR; INTRAVENOUS 2 TIMES DAILY
Status: DISCONTINUED | OUTPATIENT
Start: 2019-09-14 | End: 2019-09-14

## 2019-09-14 RX ORDER — SODIUM CHLORIDE 0.9 % (FLUSH) 0.9 %
10 SYRINGE (ML) INJECTION EVERY 12 HOURS SCHEDULED
Status: DISCONTINUED | OUTPATIENT
Start: 2019-09-14 | End: 2019-09-17 | Stop reason: HOSPADM

## 2019-09-14 RX ORDER — HEPARIN SODIUM 1000 [USP'U]/ML
4000 INJECTION, SOLUTION INTRAVENOUS; SUBCUTANEOUS ONCE
Status: COMPLETED | OUTPATIENT
Start: 2019-09-14 | End: 2019-09-14

## 2019-09-14 RX ORDER — METOPROLOL SUCCINATE 50 MG/1
50 TABLET, EXTENDED RELEASE ORAL DAILY
Status: DISCONTINUED | OUTPATIENT
Start: 2019-09-15 | End: 2019-09-14

## 2019-09-14 RX ORDER — PREDNISONE 20 MG/1
40 TABLET ORAL DAILY
Status: DISCONTINUED | OUTPATIENT
Start: 2019-09-15 | End: 2019-09-17 | Stop reason: HOSPADM

## 2019-09-14 RX ADMIN — METOPROLOL SUCCINATE 50 MG: 50 TABLET, EXTENDED RELEASE ORAL at 21:17

## 2019-09-14 RX ADMIN — INSULIN LISPRO 1 UNITS: 100 INJECTION, SOLUTION INTRAVENOUS; SUBCUTANEOUS at 21:17

## 2019-09-14 RX ADMIN — METOPROLOL SUCCINATE 25 MG: 25 TABLET, EXTENDED RELEASE ORAL at 09:01

## 2019-09-14 RX ADMIN — PRAVASTATIN SODIUM 80 MG: 40 TABLET ORAL at 21:17

## 2019-09-14 RX ADMIN — METHYLPREDNISOLONE SODIUM SUCCINATE 40 MG: 40 INJECTION, POWDER, FOR SOLUTION INTRAMUSCULAR; INTRAVENOUS at 03:48

## 2019-09-14 RX ADMIN — HEPARIN SODIUM 4000 UNITS: 1000 INJECTION INTRAVENOUS; SUBCUTANEOUS at 10:34

## 2019-09-14 RX ADMIN — FLUOXETINE 80 MG: 20 CAPSULE ORAL at 09:01

## 2019-09-14 RX ADMIN — ISOSORBIDE MONONITRATE 60 MG: 60 TABLET ORAL at 10:34

## 2019-09-14 RX ADMIN — ASPIRIN 81 MG: 81 TABLET ORAL at 09:01

## 2019-09-14 RX ADMIN — NITROGLYCERIN: 0.4 TABLET, ORALLY DISINTEGRATING SUBLINGUAL at 09:12

## 2019-09-14 RX ADMIN — TICAGRELOR 90 MG: 90 TABLET ORAL at 10:34

## 2019-09-14 RX ADMIN — NITROGLYCERIN 0.4 MG: 0.4 TABLET, ORALLY DISINTEGRATING SUBLINGUAL at 09:48

## 2019-09-14 RX ADMIN — ISOSORBIDE MONONITRATE 60 MG: 60 TABLET ORAL at 21:17

## 2019-09-14 RX ADMIN — POLYMYXIN B SULFATE, BACITRACIN ZINC, NEOMYCIN SULFATE: 5000; 3.5; 4 OINTMENT TOPICAL at 21:17

## 2019-09-14 RX ADMIN — INSULIN LISPRO 1 UNITS: 100 INJECTION, SOLUTION INTRAVENOUS; SUBCUTANEOUS at 17:00

## 2019-09-14 RX ADMIN — FUROSEMIDE 40 MG: 10 INJECTION, SOLUTION INTRAMUSCULAR; INTRAVENOUS at 09:01

## 2019-09-14 RX ADMIN — IPRATROPIUM BROMIDE AND ALBUTEROL SULFATE 1 AMPULE: .5; 3 SOLUTION RESPIRATORY (INHALATION) at 08:28

## 2019-09-14 RX ADMIN — CARBIDOPA AND LEVODOPA 1 TABLET: 10; 100 TABLET ORAL at 13:39

## 2019-09-14 RX ADMIN — FAMOTIDINE 20 MG: 20 TABLET ORAL at 09:01

## 2019-09-14 RX ADMIN — SODIUM CHLORIDE, PRESERVATIVE FREE 10 ML: 5 INJECTION INTRAVENOUS at 11:35

## 2019-09-14 RX ADMIN — HYDROCODONE BITARTRATE AND ACETAMINOPHEN 1 TABLET: 7.5; 325 TABLET ORAL at 16:57

## 2019-09-14 RX ADMIN — HYDROCODONE BITARTRATE AND ACETAMINOPHEN 1 TABLET: 7.5; 325 TABLET ORAL at 21:45

## 2019-09-14 RX ADMIN — POLYMYXIN B SULFATE, BACITRACIN ZINC, NEOMYCIN SULFATE: 5000; 3.5; 4 OINTMENT TOPICAL at 11:35

## 2019-09-14 RX ADMIN — TICAGRELOR 90 MG: 90 TABLET ORAL at 21:17

## 2019-09-14 RX ADMIN — HYDROCODONE BITARTRATE AND ACETAMINOPHEN 1 TABLET: 7.5; 325 TABLET ORAL at 03:48

## 2019-09-14 RX ADMIN — HEPARIN SODIUM AND DEXTROSE 10 ML/HR: 10000; 5 INJECTION INTRAVENOUS at 10:35

## 2019-09-14 RX ADMIN — FUROSEMIDE 60 MG: 10 INJECTION, SOLUTION INTRAMUSCULAR; INTRAVENOUS at 02:14

## 2019-09-14 RX ADMIN — HYDROCODONE BITARTRATE AND ACETAMINOPHEN 1 TABLET: 7.5; 325 TABLET ORAL at 09:58

## 2019-09-14 RX ADMIN — SODIUM CHLORIDE, PRESERVATIVE FREE 10 ML: 5 INJECTION INTRAVENOUS at 21:17

## 2019-09-14 RX ADMIN — CARBIDOPA AND LEVODOPA 1 TABLET: 10; 100 TABLET ORAL at 21:17

## 2019-09-14 ASSESSMENT — PAIN DESCRIPTION - DESCRIPTORS
DESCRIPTORS: DISCOMFORT
DESCRIPTORS: HEADACHE
DESCRIPTORS: HEADACHE
DESCRIPTORS: STABBING
DESCRIPTORS: HEADACHE

## 2019-09-14 ASSESSMENT — PAIN SCALES - GENERAL
PAINLEVEL_OUTOF10: 7
PAINLEVEL_OUTOF10: 6
PAINLEVEL_OUTOF10: 2
PAINLEVEL_OUTOF10: 6
PAINLEVEL_OUTOF10: 5
PAINLEVEL_OUTOF10: 0
PAINLEVEL_OUTOF10: 0
PAINLEVEL_OUTOF10: 1
PAINLEVEL_OUTOF10: 8
PAINLEVEL_OUTOF10: 0

## 2019-09-14 ASSESSMENT — PAIN DESCRIPTION - PROGRESSION
CLINICAL_PROGRESSION: NOT CHANGED
CLINICAL_PROGRESSION: NOT CHANGED

## 2019-09-14 ASSESSMENT — PAIN DESCRIPTION - LOCATION
LOCATION: HEAD
LOCATION: HEAD
LOCATION: CHEST
LOCATION: BACK

## 2019-09-14 ASSESSMENT — PAIN - FUNCTIONAL ASSESSMENT
PAIN_FUNCTIONAL_ASSESSMENT: ACTIVITIES ARE NOT PREVENTED
PAIN_FUNCTIONAL_ASSESSMENT: ACTIVITIES ARE NOT PREVENTED

## 2019-09-14 ASSESSMENT — PAIN DESCRIPTION - PAIN TYPE
TYPE: ACUTE PAIN
TYPE: CHRONIC PAIN

## 2019-09-14 ASSESSMENT — HEART SCORE: ECG: 0

## 2019-09-14 ASSESSMENT — PAIN DESCRIPTION - ONSET
ONSET: ON-GOING
ONSET: ON-GOING

## 2019-09-14 ASSESSMENT — PAIN DESCRIPTION - FREQUENCY
FREQUENCY: CONTINUOUS
FREQUENCY: CONTINUOUS

## 2019-09-14 ASSESSMENT — PAIN DESCRIPTION - ORIENTATION: ORIENTATION: RIGHT

## 2019-09-14 NOTE — ED PROVIDER NOTES
Emergency Physician Note    Chief Complaint  Chest Pain (x 3 days, right side breast. ) and Shortness of Breath (has h/o COPD, had one BT on way to ER, breathing easy, non labored on arrival. )       History of Present Illness  Demi Pace is a 66 y.o. female who presents to the ED for shortness of breath and chest pain. Patient was very sleepy when I was in her room because she is taking her Sinemet. Most the history is obtained from the  at the bedside. Patient has left-sided upper chest chest pain that she describes as an achiness. It does not radiate. This pain is been going on for 3 days. Patient had worsening shortness of breath tonight she was treated with nebulizer and is felt significantly better. At this time she states she does not have any chest pain anymore. Patient recently admitted for the same pain in which she had shortness of breath with chest pain. At this time she still has mild shortness of breath. Denies fever, chills, malaise, chest pain, shortness of breath, cough, abdominal pain, nausea, vomiting, diarrhea, headache, sore throat, dysuria, back pain, rash. No palliative/provocative factors. Unless otherwise stated in this report or unable to obtain because of the patient's clinical or mental status as evidenced by the medical record, this patient's positive and negative responses for review of systems, constitutional, psych, eyes, ENT, cardiovascular, respiratory, gastrointestinal, neurological, genitourinary, musculoskeletal, integument systems and systems related to the presenting problem are either stated in the preceding paragraph or were not pertinent or were negative for the symptoms and/or complaints related to the medical problem. I have reviewed the following from the nursing documentation:      Prior to Admission medications    Medication Sig Start Date End Date Taking?  Authorizing Provider   YVONNE ELLIPTA 200-25 MCG/INH AEPB INHALE 1 PUFF EVERY DAY STANDARD (2 VW)    CBC Auto Differential    Comprehensive Metabolic Panel w/ Reflex to MG    Troponin    Initiate Oxygen Therapy Protocol    EKG 12 Lead    Saline lock IV       Medications ordered for this visit  Orders Placed This Encounter   Medications    furosemide (LASIX) injection 60 mg       HEART SCORE:    History: +0 for low suspicion  \"Highly suspicious\" = middle of left-sided, heavy chest pain, initiated by exercise, emotions or cold, radiation and/or relief of symptoms by sublingual nitrates  EKG: +0 for normal EKG   \"Nonspecific changes\" = nonspecific depolarization, LVH or any bundle branch block (even if old)  Age: +2 for age 44-72 years  Risk factors (includes HLD, HTN, DM, tobacco use, obesity, and +FHx): +2 for known CAD or 3+ risk factors  \"Smoking\" = Current or within the past month, \"family history\" = parents, siblings, children with diagnoses of CAD  \"Obesity\" = BMI > 30  Initial troponin: +1 for troponin 1-3x normal limit    Heart score: 4.   This falls under the following category: Score of 4-6, which indicates low/moderate risk (12-16.6%) for major adverse cardiac event and supports observation with repeated troponins and/or non-invasive testing    Results for orders placed or performed during the hospital encounter of 09/13/19   CBC Auto Differential   Result Value Ref Range    WBC 7.4 4.0 - 11.0 K/uL    RBC 3.51 (L) 4.00 - 5.20 M/uL    Hemoglobin 10.7 (L) 12.0 - 16.0 g/dL    Hematocrit 33.3 (L) 36.0 - 48.0 %    MCV 94.8 80.0 - 100.0 fL    MCH 30.4 26.0 - 34.0 pg    MCHC 32.1 31.0 - 36.0 g/dL    RDW 14.5 12.4 - 15.4 %    Platelets 081 468 - 599 K/uL    MPV 7.2 5.0 - 10.5 fL    Neutrophils % 76.3 %    Lymphocytes % 14.4 %    Monocytes % 5.8 %    Eosinophils % 2.9 %    Basophils % 0.6 %    Neutrophils Absolute 5.6 1.7 - 7.7 K/uL    Lymphocytes Absolute 1.1 1.0 - 5.1 K/uL    Monocytes Absolute 0.4 0.0 - 1.3 K/uL    Eosinophils Absolute 0.2 0.0 - 0.6 K/uL    Basophils Absolute 0.0 0.0 -

## 2019-09-14 NOTE — PLAN OF CARE
Problem: SAFETY  Goal: Free from accidental physical injury  Outcome: Ongoing     Problem: DAILY CARE  Goal: Daily care needs are met  Outcome: Ongoing     Problem: PAIN  Goal: Patient's pain/discomfort is manageable  Outcome: Ongoing     Problem: KNOWLEDGE DEFICIT  Goal: Patient/S.O. demonstrates understanding of disease process, treatment plan, medications, and discharge instructions.   Outcome: Ongoing     Problem: DISCHARGE BARRIERS  Goal: Patient's continuum of care needs are met  Outcome: Ongoing     Problem: Tobacco Use:  Goal: Inpatient tobacco use cessation counseling participation  Description  Inpatient tobacco use cessation counseling participation  Outcome: Ongoing

## 2019-09-14 NOTE — ED NOTES
Report given to [facility]. RN to assume care.       Indra Vallejo RN  09/14/19 0050

## 2019-09-14 NOTE — H&P
drink alcohol. Family History:  Reviewed in detail and negative for DM, Early CAD, Cancer, CVA. Positive as follows:        Problem Relation Age of Onset    Stroke Mother     Diabetes Mother     Heart Disease Mother     High Blood Pressure Mother     High Cholesterol Mother     Cancer Neg Hx        REVIEW OF SYSTEMS:   As noted in the HPI. All other systems reviewed and negative. PHYSICAL EXAM:    BP (!) 175/96   Pulse 116   Temp 97.9 °F (36.6 °C) (Oral)   Resp 18   Ht 5' 7\" (1.702 m)   Wt 274 lb 11.1 oz (124.6 kg)   LMP 01/01/2004   SpO2 93%   BMI 43.02 kg/m²     General appearance: mild distress with dyspnea and chest pain. HEENT Normal cephalic, atraumatic without obvious deformity. Pupils equal, round, and reactive to light. Extra ocular muscles intact. Conjunctivae/corneas clear. Neck: Supple, No jugular venous distention/bruits. Trachea midline without thyromegaly or adenopathy with full range of motion. Lungs: Clear to auscultation, bilaterally without Rales/Wheezes/Rhonchi with good respiratory effort. Diminished overall. Heart: Regular rate and rhythm with Normal S1/S2 without murmurs, rubs or gallops, point of maximum impulse non-displaced  Abdomen: Soft, non-tender or non-distended without rigidity or guarding and positive bowel sounds all four quadrants. Extremities: No clubbing, cyanosis, or edema bilaterally. Full range of motion without deformity and normal gait intact. Skin: Skin color, texture, turgor normal.  No rashes or lesions. Neurologic: Alert and oriented X 3, neurovascularly intact with sensory/motor intact upper extremities/lower extremities, bilaterally. Cranial nerves: II-XII intact, grossly non-focal.  Mental status: Alert, oriented, thought content appropriate.   Capillary Refill: Acceptable  < 3 seconds  Peripheral Pulses: +3 Easily felt, not easily obliterated with pressure        CBC   Recent Labs     09/14/19  0024 09/14/19  0617   WBC 7.4 6.9

## 2019-09-14 NOTE — CONSULTS
thyroid, parathyroid disease      Intake/Output Summary (Last 24 hours) at 9/14/2019 0950  Last data filed at 9/14/2019 0551  Gross per 24 hour   Intake 240 ml   Output 1350 ml   Net -1110 ml       Physical Examination:    BP (!) 194/88   Pulse 111   Temp 97.9 °F (36.6 °C) (Oral)   Resp 18   Ht 5' 7\" (1.702 m)   Wt 274 lb 11.1 oz (124.6 kg)   LMP 01/01/2004   SpO2 93%   BMI 43.02 kg/m²    HEENT:  Face: Atraumatic, Conjunctiva: Pink; non icteric,  Mucous Memb:  Moist, No thyromegaly or Lymphadenopathy  Respiratory:  Resp Assessment: normal, Resp Auscultation: clear   Cardiovascular: Auscultation: nl S1 & S2, Palpation:  Nl PMI;  No heaves or thrills, JVP:  normal  Abdomen: Soft, non-tender, Normal bowel sounds,  No organomegaly  Extremities: No Cyanosis or Clubbing; Edema none  Neurological: Oriented to time, place, and person, Non-anxious  Psychiatric: Normal mood and affect  Skin: Warm and dry,  No rash seen      Current Facility-Administered Medications: pravastatin (PRAVACHOL) tablet 80 mg, 80 mg, Oral, Nightly  metoprolol succinate (TOPROL XL) extended release tablet 25 mg, 25 mg, Oral, Daily  HYDROcodone-acetaminophen (NORCO) 7.5-325 MG per tablet 1 tablet, 1 tablet, Oral, Q6H PRN  FLUoxetine (PROZAC) capsule 80 mg, 80 mg, Oral, Daily  carbidopa-levodopa (SINEMET)  MG per tablet 2 tablet, 2 tablet, Oral, Nightly  aspirin EC tablet 81 mg, 81 mg, Oral, Daily  furosemide (LASIX) injection 40 mg, 40 mg, Intravenous, BID  ipratropium-albuterol (DUONEB) nebulizer solution 1 ampule, 1 ampule, Inhalation, Q4H WA  ipratropium-albuterol (DUONEB) nebulizer solution 1 ampule, 1 ampule, Inhalation, Q4H PRN  sodium chloride flush 0.9 % injection 10 mL, 10 mL, Intravenous, 2 times per day  sodium chloride flush 0.9 % injection 10 mL, 10 mL, Intravenous, PRN  magnesium hydroxide (MILK OF MAGNESIA) 400 MG/5ML suspension 30 mL, 30 mL, Oral, Daily PRN  ondansetron (ZOFRAN) injection 4 mg, 4 mg, Intravenous, Q4H This artery shows an area of 60% to 70% narrowing in its proximal portion at the origin of the first major diagonal branch. There is an area of 90% narrowing after the origin of the first major diagonal branch. 4.  Circumflex:  The obtuse marginal shows an area of 90% narrowing in its proximal portion. The AV branch shows an area of 80% narrowing in its proximal portion. LEFT VENTRICLE:  The end systolic and end diastolic volumes are within normal limits. There is minimal hypokinesis involving the anterior wall. Overall contractility is well preserved. Estimated ejection fraction is approximately 55%. There is insignificant mitral regurgitation. ASSESSMENT AND PLAN:        59-year-old obese patient with history of coronary artery disease CABG in 2009 presenting with unstable angina  This morning she had prolonged episode of pain at rest which was relieved with nitroglycerin.   EKG shows objective evidence of ischemia  Possible progression of disease or graft stenosis  We will place her on heparin, increase Toprol to 50 twice daily add imdur and Brilinta  Plan to perform intervention on Monday; if she has unrelenting pain then we will proceed with more urgent revascularization      COPD  On inhalers    Chronic kidney disease  proBNP elevated because of CKD  Discontinue Lasix  Slow hydration    Nigel Rivera M.D  9/14/2019

## 2019-09-15 LAB
ANION GAP SERPL CALCULATED.3IONS-SCNC: 15 MMOL/L (ref 3–16)
APTT: 27.5 SEC (ref 26–36)
APTT: 32 SEC (ref 26–36)
APTT: 35.6 SEC (ref 26–36)
BASOPHILS ABSOLUTE: 0 K/UL (ref 0–0.2)
BASOPHILS RELATIVE PERCENT: 0.4 %
BUN BLDV-MCNC: 43 MG/DL (ref 7–20)
CALCIUM SERPL-MCNC: 8.9 MG/DL (ref 8.3–10.6)
CHLORIDE BLD-SCNC: 105 MMOL/L (ref 99–110)
CO2: 19 MMOL/L (ref 21–32)
CREAT SERPL-MCNC: 1.9 MG/DL (ref 0.6–1.2)
EKG ATRIAL RATE: 115 BPM
EKG ATRIAL RATE: 80 BPM
EKG DIAGNOSIS: NORMAL
EKG DIAGNOSIS: NORMAL
EKG P AXIS: 71 DEGREES
EKG P AXIS: 76 DEGREES
EKG P-R INTERVAL: 144 MS
EKG P-R INTERVAL: 170 MS
EKG Q-T INTERVAL: 326 MS
EKG Q-T INTERVAL: 384 MS
EKG QRS DURATION: 86 MS
EKG QRS DURATION: 90 MS
EKG QTC CALCULATION (BAZETT): 442 MS
EKG QTC CALCULATION (BAZETT): 450 MS
EKG R AXIS: -15 DEGREES
EKG R AXIS: -5 DEGREES
EKG T AXIS: 131 DEGREES
EKG T AXIS: 238 DEGREES
EKG VENTRICULAR RATE: 115 BPM
EKG VENTRICULAR RATE: 80 BPM
EOSINOPHILS ABSOLUTE: 0.1 K/UL (ref 0–0.6)
EOSINOPHILS RELATIVE PERCENT: 0.9 %
GFR AFRICAN AMERICAN: 32
GFR NON-AFRICAN AMERICAN: 27
GLUCOSE BLD-MCNC: 114 MG/DL (ref 70–99)
GLUCOSE BLD-MCNC: 145 MG/DL (ref 70–99)
GLUCOSE BLD-MCNC: 147 MG/DL (ref 70–99)
GLUCOSE BLD-MCNC: 163 MG/DL (ref 70–99)
GLUCOSE BLD-MCNC: 235 MG/DL (ref 70–99)
HCT VFR BLD CALC: 33.5 % (ref 36–48)
HCT VFR BLD CALC: 33.6 % (ref 36–48)
HEMOGLOBIN: 11.1 G/DL (ref 12–16)
HEMOGLOBIN: 11.2 G/DL (ref 12–16)
LYMPHOCYTES ABSOLUTE: 1.2 K/UL (ref 1–5.1)
LYMPHOCYTES RELATIVE PERCENT: 13 %
MCH RBC QN AUTO: 31 PG (ref 26–34)
MCH RBC QN AUTO: 31.1 PG (ref 26–34)
MCHC RBC AUTO-ENTMCNC: 33.1 G/DL (ref 31–36)
MCHC RBC AUTO-ENTMCNC: 33.2 G/DL (ref 31–36)
MCV RBC AUTO: 93.7 FL (ref 80–100)
MCV RBC AUTO: 93.7 FL (ref 80–100)
MONOCYTES ABSOLUTE: 0.7 K/UL (ref 0–1.3)
MONOCYTES RELATIVE PERCENT: 8 %
NEUTROPHILS ABSOLUTE: 7.1 K/UL (ref 1.7–7.7)
NEUTROPHILS RELATIVE PERCENT: 77.7 %
PDW BLD-RTO: 14.7 % (ref 12.4–15.4)
PDW BLD-RTO: 14.8 % (ref 12.4–15.4)
PERFORMED ON: ABNORMAL
PLATELET # BLD: 228 K/UL (ref 135–450)
PLATELET # BLD: 237 K/UL (ref 135–450)
PMV BLD AUTO: 7.4 FL (ref 5–10.5)
PMV BLD AUTO: 7.4 FL (ref 5–10.5)
POTASSIUM REFLEX MAGNESIUM: 4.7 MMOL/L (ref 3.5–5.1)
RBC # BLD: 3.58 M/UL (ref 4–5.2)
RBC # BLD: 3.59 M/UL (ref 4–5.2)
SODIUM BLD-SCNC: 139 MMOL/L (ref 136–145)
WBC # BLD: 8 K/UL (ref 4–11)
WBC # BLD: 9.1 K/UL (ref 4–11)

## 2019-09-15 PROCEDURE — 6370000000 HC RX 637 (ALT 250 FOR IP): Performed by: INTERNAL MEDICINE

## 2019-09-15 PROCEDURE — 2580000003 HC RX 258: Performed by: INTERNAL MEDICINE

## 2019-09-15 PROCEDURE — 80048 BASIC METABOLIC PNL TOTAL CA: CPT

## 2019-09-15 PROCEDURE — 94760 N-INVAS EAR/PLS OXIMETRY 1: CPT

## 2019-09-15 PROCEDURE — 85730 THROMBOPLASTIN TIME PARTIAL: CPT

## 2019-09-15 PROCEDURE — 36415 COLL VENOUS BLD VENIPUNCTURE: CPT

## 2019-09-15 PROCEDURE — 85025 COMPLETE CBC W/AUTO DIFF WBC: CPT

## 2019-09-15 PROCEDURE — 99233 SBSQ HOSP IP/OBS HIGH 50: CPT | Performed by: INTERNAL MEDICINE

## 2019-09-15 PROCEDURE — 93010 ELECTROCARDIOGRAM REPORT: CPT | Performed by: INTERNAL MEDICINE

## 2019-09-15 PROCEDURE — 2060000000 HC ICU INTERMEDIATE R&B

## 2019-09-15 PROCEDURE — 6360000002 HC RX W HCPCS: Performed by: INTERNAL MEDICINE

## 2019-09-15 PROCEDURE — 85027 COMPLETE CBC AUTOMATED: CPT

## 2019-09-15 RX ORDER — HEPARIN SODIUM 1000 [USP'U]/ML
INJECTION, SOLUTION INTRAVENOUS; SUBCUTANEOUS
Status: DISPENSED
Start: 2019-09-15 | End: 2019-09-15

## 2019-09-15 RX ORDER — HEPARIN SODIUM 1000 [USP'U]/ML
4000 INJECTION, SOLUTION INTRAVENOUS; SUBCUTANEOUS ONCE
Status: COMPLETED | OUTPATIENT
Start: 2019-09-15 | End: 2019-09-15

## 2019-09-15 RX ORDER — ALPRAZOLAM 0.5 MG/1
0.5 TABLET ORAL
Status: DISCONTINUED | OUTPATIENT
Start: 2019-09-16 | End: 2019-09-16 | Stop reason: ALTCHOICE

## 2019-09-15 RX ORDER — SODIUM CHLORIDE 9 MG/ML
INJECTION, SOLUTION INTRAVENOUS CONTINUOUS
Status: DISCONTINUED | OUTPATIENT
Start: 2019-09-15 | End: 2019-09-17

## 2019-09-15 RX ORDER — SODIUM CHLORIDE 0.9 % (FLUSH) 0.9 %
10 SYRINGE (ML) INJECTION EVERY 12 HOURS SCHEDULED
Status: DISCONTINUED | OUTPATIENT
Start: 2019-09-15 | End: 2019-09-15 | Stop reason: SDUPTHER

## 2019-09-15 RX ORDER — DIPHENHYDRAMINE HYDROCHLORIDE 50 MG/ML
50 INJECTION INTRAMUSCULAR; INTRAVENOUS
Status: ACTIVE | OUTPATIENT
Start: 2019-09-16 | End: 2019-09-16

## 2019-09-15 RX ORDER — HEPARIN SODIUM 1000 [USP'U]/ML
4000 INJECTION, SOLUTION INTRAVENOUS; SUBCUTANEOUS ONCE
Status: COMPLETED | OUTPATIENT
Start: 2019-09-15 | End: 2019-09-16

## 2019-09-15 RX ORDER — SODIUM CHLORIDE 9 MG/ML
INJECTION, SOLUTION INTRAVENOUS CONTINUOUS
Status: DISCONTINUED | OUTPATIENT
Start: 2019-09-16 | End: 2019-09-16 | Stop reason: ALTCHOICE

## 2019-09-15 RX ORDER — SODIUM CHLORIDE 0.9 % (FLUSH) 0.9 %
10 SYRINGE (ML) INJECTION PRN
Status: DISCONTINUED | OUTPATIENT
Start: 2019-09-15 | End: 2019-09-15 | Stop reason: SDUPTHER

## 2019-09-15 RX ADMIN — TICAGRELOR 90 MG: 90 TABLET ORAL at 08:55

## 2019-09-15 RX ADMIN — POLYMYXIN B SULFATE, BACITRACIN ZINC, NEOMYCIN SULFATE: 5000; 3.5; 4 OINTMENT TOPICAL at 08:55

## 2019-09-15 RX ADMIN — INSULIN LISPRO 1 UNITS: 100 INJECTION, SOLUTION INTRAVENOUS; SUBCUTANEOUS at 13:30

## 2019-09-15 RX ADMIN — ASPIRIN 81 MG: 81 TABLET ORAL at 08:55

## 2019-09-15 RX ADMIN — HEPARIN SODIUM AND DEXTROSE 10 ML/HR: 10000; 5 INJECTION INTRAVENOUS at 21:31

## 2019-09-15 RX ADMIN — ISOSORBIDE MONONITRATE 60 MG: 60 TABLET ORAL at 08:55

## 2019-09-15 RX ADMIN — POLYMYXIN B SULFATE, BACITRACIN ZINC, NEOMYCIN SULFATE: 5000; 3.5; 4 OINTMENT TOPICAL at 21:30

## 2019-09-15 RX ADMIN — CARBIDOPA AND LEVODOPA 1 TABLET: 10; 100 TABLET ORAL at 08:55

## 2019-09-15 RX ADMIN — METOPROLOL SUCCINATE 50 MG: 50 TABLET, EXTENDED RELEASE ORAL at 08:55

## 2019-09-15 RX ADMIN — HEPARIN SODIUM 4000 UNITS: 1000 INJECTION, SOLUTION INTRAVENOUS; SUBCUTANEOUS at 07:45

## 2019-09-15 RX ADMIN — INSULIN LISPRO 1 UNITS: 100 INJECTION, SOLUTION INTRAVENOUS; SUBCUTANEOUS at 21:30

## 2019-09-15 RX ADMIN — INSULIN LISPRO 2 UNITS: 100 INJECTION, SOLUTION INTRAVENOUS; SUBCUTANEOUS at 18:15

## 2019-09-15 RX ADMIN — CARBIDOPA AND LEVODOPA 1 TABLET: 10; 100 TABLET ORAL at 21:30

## 2019-09-15 RX ADMIN — HYDROCODONE BITARTRATE AND ACETAMINOPHEN 1 TABLET: 7.5; 325 TABLET ORAL at 11:19

## 2019-09-15 RX ADMIN — PREDNISONE 40 MG: 20 TABLET ORAL at 08:55

## 2019-09-15 RX ADMIN — HEPARIN SODIUM 4000 UNITS: 1000 INJECTION INTRAVENOUS; SUBCUTANEOUS at 15:19

## 2019-09-15 RX ADMIN — PRAVASTATIN SODIUM 80 MG: 40 TABLET ORAL at 21:30

## 2019-09-15 RX ADMIN — FAMOTIDINE 20 MG: 20 TABLET ORAL at 08:55

## 2019-09-15 RX ADMIN — METOPROLOL SUCCINATE 50 MG: 50 TABLET, EXTENDED RELEASE ORAL at 21:30

## 2019-09-15 RX ADMIN — FLUOXETINE 80 MG: 20 CAPSULE ORAL at 08:55

## 2019-09-15 RX ADMIN — INSULIN LISPRO 1 UNITS: 100 INJECTION, SOLUTION INTRAVENOUS; SUBCUTANEOUS at 08:55

## 2019-09-15 RX ADMIN — SODIUM CHLORIDE: 9 INJECTION, SOLUTION INTRAVENOUS at 13:30

## 2019-09-15 RX ADMIN — HYDROCODONE BITARTRATE AND ACETAMINOPHEN 1 TABLET: 7.5; 325 TABLET ORAL at 17:02

## 2019-09-15 RX ADMIN — SODIUM CHLORIDE, PRESERVATIVE FREE 10 ML: 5 INJECTION INTRAVENOUS at 08:55

## 2019-09-15 RX ADMIN — ISOSORBIDE MONONITRATE 60 MG: 60 TABLET ORAL at 21:30

## 2019-09-15 RX ADMIN — SODIUM CHLORIDE, PRESERVATIVE FREE 10 ML: 5 INJECTION INTRAVENOUS at 21:30

## 2019-09-15 RX ADMIN — TICAGRELOR 90 MG: 90 TABLET ORAL at 21:30

## 2019-09-15 ASSESSMENT — PAIN DESCRIPTION - PAIN TYPE: TYPE: ACUTE PAIN

## 2019-09-15 ASSESSMENT — PAIN SCALES - GENERAL
PAINLEVEL_OUTOF10: 2
PAINLEVEL_OUTOF10: 7
PAINLEVEL_OUTOF10: 0
PAINLEVEL_OUTOF10: 7
PAINLEVEL_OUTOF10: 7
PAINLEVEL_OUTOF10: 0

## 2019-09-15 ASSESSMENT — PAIN DESCRIPTION - ONSET: ONSET: ON-GOING

## 2019-09-15 ASSESSMENT — PAIN DESCRIPTION - FREQUENCY: FREQUENCY: CONTINUOUS

## 2019-09-15 ASSESSMENT — PAIN DESCRIPTION - DESCRIPTORS: DESCRIPTORS: HEADACHE

## 2019-09-15 ASSESSMENT — PAIN - FUNCTIONAL ASSESSMENT: PAIN_FUNCTIONAL_ASSESSMENT: ACTIVITIES ARE NOT PREVENTED

## 2019-09-15 ASSESSMENT — PAIN DESCRIPTION - PROGRESSION: CLINICAL_PROGRESSION: NOT CHANGED

## 2019-09-15 ASSESSMENT — PAIN DESCRIPTION - LOCATION: LOCATION: HEAD

## 2019-09-15 NOTE — PROGRESS NOTES
S1/S2 without murmurs, rubs or gallops. Abdomen: Soft, non-tender, non-distended with normal bowel sounds. Musculoskeletal: No clubbing, cyanosis or edema bilaterally. Full range of motion without deformity. Skin: Skin color, texture, turgor normal.  No rashes or lesions. Neurologic:  Neurovascularly intact without any focal sensory/motor deficits. Cranial nerves: II-XII intact, grossly non-focal.  Psychiatric: Alert and oriented, thought content appropriate, normal insight  Capillary Refill: Brisk,< 3 seconds   Peripheral Pulses: +2 palpable, equal bilaterally       Labs:   Recent Labs     09/14/19  0024 09/14/19  0617 09/15/19  0624   WBC 7.4 6.9 9.1   HGB 10.7* 11.2* 11.2*   HCT 33.3* 33.9* 33.6*    207 228     Recent Labs     09/14/19  0024 09/14/19  0617 09/15/19  0624    138 139   K 5.3* 4.8 4.7    107 105   CO2 17* 18* 19*   BUN 44* 43* 43*   CREATININE 2.2* 2.1* 1.9*   CALCIUM 8.8 8.9 8.9     Recent Labs     09/14/19  0024   AST 23   ALT 5*   BILITOT <0.2   ALKPHOS 89     No results for input(s): INR in the last 72 hours. Recent Labs     09/14/19  0617 09/14/19  0923 09/14/19  1200   TROPONINI 0.02* <0.01 <0.01       Urinalysis:      Lab Results   Component Value Date    NITRU Negative 06/28/2018    WBCUA 2 06/28/2018    BACTERIA RARE 06/28/2018    RBCUA 1 06/28/2018    BLOODU Negative 04/03/2019    BLOODU Negative 06/28/2018    SPECGRAV 1.020 04/03/2019    SPECGRAV 1.020 06/28/2018    GLUCOSEU Negative 04/03/2019    GLUCOSEU Negative 06/28/2018    GLUCOSEU NEGATIVE 04/05/2011       Radiology:  XR CHEST STANDARD (2 VW)   Final Result   Findings most compatible with pulmonary edema. The cardial-pericardial silhouette is enlarged, and that has increased when   compared to the previous exam.  Differential considerations include   pericardial effusion and cardiomyopathy.                  Assessment/Plan:    Active Hospital Problems    Diagnosis    Unstable angina ([facility] [I20.0]   

## 2019-09-15 NOTE — PROGRESS NOTES
Clinical Pharmacy Note  Heparin Dosing       Lab Results   Component Value Date    APTT 27.5 09/15/2019     Lab Results   Component Value Date    HGB 11.2 09/15/2019    HCT 33.6 09/15/2019     09/15/2019    INR 1.6 11/30/2015       Current Infusion Rate: 2.5 mL/hr    Plan:   Bolus 4000 Units Heparin IV x 1 (this was already given during computer downtime)  Increase rate to 7.5ml/hr    Next aPTT: 1300 on 9-15-19    Pharmacy will continue to monitor and adjust based on aPTT results.   Migue Bell, [address] [facility]  9/15/2019  10:14 AM

## 2019-09-16 ENCOUNTER — APPOINTMENT (OUTPATIENT)
Dept: ULTRASOUND IMAGING | Age: 64
DRG: 247 | End: 2019-09-16
Payer: COMMERCIAL

## 2019-09-16 LAB
ALBUMIN SERPL-MCNC: 3.8 G/DL (ref 3.4–5)
ANION GAP SERPL CALCULATED.3IONS-SCNC: 13 MMOL/L (ref 3–16)
APTT: 45.8 SEC (ref 26–36)
BASOPHILS ABSOLUTE: 0 K/UL (ref 0–0.2)
BASOPHILS RELATIVE PERCENT: 0.5 %
BUN BLDV-MCNC: 31 MG/DL (ref 7–20)
CALCIUM SERPL-MCNC: 8.7 MG/DL (ref 8.3–10.6)
CHLORIDE BLD-SCNC: 107 MMOL/L (ref 99–110)
CO2: 20 MMOL/L (ref 21–32)
CREAT SERPL-MCNC: 1.6 MG/DL (ref 0.6–1.2)
EKG ATRIAL RATE: 60 BPM
EKG DIAGNOSIS: NORMAL
EKG P AXIS: 53 DEGREES
EKG P-R INTERVAL: 138 MS
EKG Q-T INTERVAL: 430 MS
EKG QRS DURATION: 84 MS
EKG QTC CALCULATION (BAZETT): 430 MS
EKG R AXIS: -24 DEGREES
EKG T AXIS: -1 DEGREES
EKG VENTRICULAR RATE: 60 BPM
EOSINOPHILS ABSOLUTE: 0.1 K/UL (ref 0–0.6)
EOSINOPHILS RELATIVE PERCENT: 1 %
GFR AFRICAN AMERICAN: 40
GFR NON-AFRICAN AMERICAN: 33
GLUCOSE BLD-MCNC: 103 MG/DL (ref 70–99)
GLUCOSE BLD-MCNC: 111 MG/DL (ref 70–99)
GLUCOSE BLD-MCNC: 172 MG/DL (ref 70–99)
GLUCOSE BLD-MCNC: 218 MG/DL (ref 70–99)
HCT VFR BLD CALC: 32.7 % (ref 36–48)
HEMOGLOBIN: 10.8 G/DL (ref 12–16)
LYMPHOCYTES ABSOLUTE: 1.4 K/UL (ref 1–5.1)
LYMPHOCYTES RELATIVE PERCENT: 16.6 %
MCH RBC QN AUTO: 30.9 PG (ref 26–34)
MCHC RBC AUTO-ENTMCNC: 32.9 G/DL (ref 31–36)
MCV RBC AUTO: 93.9 FL (ref 80–100)
MONOCYTES ABSOLUTE: 0.6 K/UL (ref 0–1.3)
MONOCYTES RELATIVE PERCENT: 7.3 %
NEUTROPHILS ABSOLUTE: 6.2 K/UL (ref 1.7–7.7)
NEUTROPHILS RELATIVE PERCENT: 74.6 %
PARATHYROID HORMONE INTACT: 52.6 PG/ML (ref 14–72)
PDW BLD-RTO: 14.7 % (ref 12.4–15.4)
PERFORMED ON: ABNORMAL
PHOSPHORUS: 2.7 MG/DL (ref 2.5–4.9)
PLATELET # BLD: 210 K/UL (ref 135–450)
PMV BLD AUTO: 7.1 FL (ref 5–10.5)
POC ACT LR: 171 SEC
POC ACT LR: 208 SEC
POC ACT LR: 326 SEC
POTASSIUM REFLEX MAGNESIUM: 4.6 MMOL/L (ref 3.5–5.1)
POTASSIUM SERPL-SCNC: 4.6 MMOL/L (ref 3.5–5.1)
RBC # BLD: 3.49 M/UL (ref 4–5.2)
SODIUM BLD-SCNC: 140 MMOL/L (ref 136–145)
VITAMIN D 25-HYDROXY: 29 NG/ML
WBC # BLD: 8.3 K/UL (ref 4–11)

## 2019-09-16 PROCEDURE — B2111ZZ FLUOROSCOPY OF MULTIPLE CORONARY ARTERIES USING LOW OSMOLAR CONTRAST: ICD-10-PCS | Performed by: INTERNAL MEDICINE

## 2019-09-16 PROCEDURE — 6360000002 HC RX W HCPCS

## 2019-09-16 PROCEDURE — 93459 L HRT ART/GRFT ANGIO: CPT | Performed by: INTERNAL MEDICINE

## 2019-09-16 PROCEDURE — 2580000003 HC RX 258: Performed by: INTERNAL MEDICINE

## 2019-09-16 PROCEDURE — 99153 MOD SED SAME PHYS/QHP EA: CPT

## 2019-09-16 PROCEDURE — 84165 PROTEIN E-PHORESIS SERUM: CPT

## 2019-09-16 PROCEDURE — B2121ZZ FLUOROSCOPY OF SINGLE CORONARY ARTERY BYPASS GRAFT USING LOW OSMOLAR CONTRAST: ICD-10-PCS | Performed by: INTERNAL MEDICINE

## 2019-09-16 PROCEDURE — 6360000002 HC RX W HCPCS: Performed by: INTERNAL MEDICINE

## 2019-09-16 PROCEDURE — 6370000000 HC RX 637 (ALT 250 FOR IP): Performed by: INTERNAL MEDICINE

## 2019-09-16 PROCEDURE — 85025 COMPLETE CBC W/AUTO DIFF WBC: CPT

## 2019-09-16 PROCEDURE — 80069 RENAL FUNCTION PANEL: CPT

## 2019-09-16 PROCEDURE — 6370000000 HC RX 637 (ALT 250 FOR IP)

## 2019-09-16 PROCEDURE — 2060000000 HC ICU INTERMEDIATE R&B

## 2019-09-16 PROCEDURE — B2151ZZ FLUOROSCOPY OF LEFT HEART USING LOW OSMOLAR CONTRAST: ICD-10-PCS | Performed by: INTERNAL MEDICINE

## 2019-09-16 PROCEDURE — 99152 MOD SED SAME PHYS/QHP 5/>YRS: CPT

## 2019-09-16 PROCEDURE — 36415 COLL VENOUS BLD VENIPUNCTURE: CPT

## 2019-09-16 PROCEDURE — 85347 COAGULATION TIME ACTIVATED: CPT

## 2019-09-16 PROCEDURE — 82306 VITAMIN D 25 HYDROXY: CPT

## 2019-09-16 PROCEDURE — 93459 L HRT ART/GRFT ANGIO: CPT

## 2019-09-16 PROCEDURE — C1725 CATH, TRANSLUMIN NON-LASER: HCPCS

## 2019-09-16 PROCEDURE — C1769 GUIDE WIRE: HCPCS

## 2019-09-16 PROCEDURE — 2500000003 HC RX 250 WO HCPCS

## 2019-09-16 PROCEDURE — 93010 ELECTROCARDIOGRAM REPORT: CPT | Performed by: INTERNAL MEDICINE

## 2019-09-16 PROCEDURE — 92937 PRQ TRLUML REVSC CAB GRF 1: CPT

## 2019-09-16 PROCEDURE — 2709999900 HC NON-CHARGEABLE SUPPLY

## 2019-09-16 PROCEDURE — 84155 ASSAY OF PROTEIN SERUM: CPT

## 2019-09-16 PROCEDURE — 93005 ELECTROCARDIOGRAM TRACING: CPT | Performed by: INTERNAL MEDICINE

## 2019-09-16 PROCEDURE — 6360000004 HC RX CONTRAST MEDICATION: Performed by: INTERNAL MEDICINE

## 2019-09-16 PROCEDURE — 027034Z DILATION OF CORONARY ARTERY, ONE ARTERY WITH DRUG-ELUTING INTRALUMINAL DEVICE, PERCUTANEOUS APPROACH: ICD-10-PCS | Performed by: INTERNAL MEDICINE

## 2019-09-16 PROCEDURE — 83970 ASSAY OF PARATHORMONE: CPT

## 2019-09-16 PROCEDURE — C1874 STENT, COATED/COV W/DEL SYS: HCPCS

## 2019-09-16 PROCEDURE — 4A023N7 MEASUREMENT OF CARDIAC SAMPLING AND PRESSURE, LEFT HEART, PERCUTANEOUS APPROACH: ICD-10-PCS | Performed by: INTERNAL MEDICINE

## 2019-09-16 PROCEDURE — 76770 US EXAM ABDO BACK WALL COMP: CPT

## 2019-09-16 PROCEDURE — C1894 INTRO/SHEATH, NON-LASER: HCPCS

## 2019-09-16 PROCEDURE — 85730 THROMBOPLASTIN TIME PARTIAL: CPT

## 2019-09-16 PROCEDURE — 92928 PRQ TCAT PLMT NTRAC ST 1 LES: CPT | Performed by: INTERNAL MEDICINE

## 2019-09-16 RX ORDER — MIDAZOLAM HYDROCHLORIDE 1 MG/ML
2 INJECTION INTRAMUSCULAR; INTRAVENOUS
Status: ACTIVE | OUTPATIENT
Start: 2019-09-16 | End: 2019-09-16

## 2019-09-16 RX ORDER — ACETAMINOPHEN 325 MG/1
650 TABLET ORAL EVERY 4 HOURS PRN
Status: DISCONTINUED | OUTPATIENT
Start: 2019-09-16 | End: 2019-09-17 | Stop reason: HOSPADM

## 2019-09-16 RX ORDER — SODIUM CHLORIDE 0.9 % (FLUSH) 0.9 %
10 SYRINGE (ML) INJECTION PRN
Status: DISCONTINUED | OUTPATIENT
Start: 2019-09-16 | End: 2019-09-16 | Stop reason: SDUPTHER

## 2019-09-16 RX ORDER — ONDANSETRON 2 MG/ML
4 INJECTION INTRAMUSCULAR; INTRAVENOUS EVERY 6 HOURS PRN
Status: DISCONTINUED | OUTPATIENT
Start: 2019-09-16 | End: 2019-09-16 | Stop reason: SDUPTHER

## 2019-09-16 RX ORDER — ATORVASTATIN CALCIUM 40 MG/1
40 TABLET, FILM COATED ORAL NIGHTLY
Status: DISCONTINUED | OUTPATIENT
Start: 2019-09-16 | End: 2019-09-17 | Stop reason: HOSPADM

## 2019-09-16 RX ORDER — 0.9 % SODIUM CHLORIDE 0.9 %
500 INTRAVENOUS SOLUTION INTRAVENOUS PRN
Status: DISCONTINUED | OUTPATIENT
Start: 2019-09-16 | End: 2019-09-17

## 2019-09-16 RX ORDER — MORPHINE SULFATE 2 MG/ML
2 INJECTION, SOLUTION INTRAMUSCULAR; INTRAVENOUS
Status: ACTIVE | OUTPATIENT
Start: 2019-09-16 | End: 2019-09-16

## 2019-09-16 RX ORDER — SODIUM CHLORIDE 9 MG/ML
INJECTION, SOLUTION INTRAVENOUS CONTINUOUS
Status: DISCONTINUED | OUTPATIENT
Start: 2019-09-16 | End: 2019-09-16

## 2019-09-16 RX ORDER — SODIUM CHLORIDE 0.9 % (FLUSH) 0.9 %
10 SYRINGE (ML) INJECTION EVERY 12 HOURS SCHEDULED
Status: DISCONTINUED | OUTPATIENT
Start: 2019-09-16 | End: 2019-09-16 | Stop reason: SDUPTHER

## 2019-09-16 RX ORDER — ASPIRIN 81 MG/1
81 TABLET, CHEWABLE ORAL DAILY
Status: DISCONTINUED | OUTPATIENT
Start: 2019-09-17 | End: 2019-09-17 | Stop reason: HOSPADM

## 2019-09-16 RX ORDER — FENTANYL CITRATE 50 UG/ML
25 INJECTION, SOLUTION INTRAMUSCULAR; INTRAVENOUS
Status: ACTIVE | OUTPATIENT
Start: 2019-09-16 | End: 2019-09-16

## 2019-09-16 RX ORDER — ATROPINE SULFATE 0.4 MG/ML
0.5 AMPUL (ML) INJECTION
Status: DISPENSED | OUTPATIENT
Start: 2019-09-16 | End: 2019-09-16

## 2019-09-16 RX ADMIN — PREDNISONE 40 MG: 20 TABLET ORAL at 11:55

## 2019-09-16 RX ADMIN — HYDROCODONE BITARTRATE AND ACETAMINOPHEN 1 TABLET: 7.5; 325 TABLET ORAL at 06:09

## 2019-09-16 RX ADMIN — HYDROCODONE BITARTRATE AND ACETAMINOPHEN 1 TABLET: 7.5; 325 TABLET ORAL at 00:02

## 2019-09-16 RX ADMIN — METOPROLOL SUCCINATE 50 MG: 50 TABLET, EXTENDED RELEASE ORAL at 21:38

## 2019-09-16 RX ADMIN — HEPARIN SODIUM 4000 UNITS: 1000 INJECTION INTRAVENOUS; SUBCUTANEOUS at 00:02

## 2019-09-16 RX ADMIN — METOPROLOL SUCCINATE 50 MG: 50 TABLET, EXTENDED RELEASE ORAL at 11:54

## 2019-09-16 RX ADMIN — POLYMYXIN B SULFATE, BACITRACIN ZINC, NEOMYCIN SULFATE: 5000; 3.5; 4 OINTMENT TOPICAL at 12:08

## 2019-09-16 RX ADMIN — ISOSORBIDE MONONITRATE 60 MG: 60 TABLET ORAL at 11:53

## 2019-09-16 RX ADMIN — FLUOXETINE 80 MG: 20 CAPSULE ORAL at 12:07

## 2019-09-16 RX ADMIN — TICAGRELOR 90 MG: 90 TABLET ORAL at 11:54

## 2019-09-16 RX ADMIN — HYDROCODONE BITARTRATE AND ACETAMINOPHEN 1 TABLET: 7.5; 325 TABLET ORAL at 13:33

## 2019-09-16 RX ADMIN — CARBIDOPA AND LEVODOPA 1 TABLET: 10; 100 TABLET ORAL at 21:53

## 2019-09-16 RX ADMIN — TICAGRELOR 90 MG: 90 TABLET ORAL at 21:38

## 2019-09-16 RX ADMIN — PREDNISONE 50 MG: 10 TABLET ORAL at 11:54

## 2019-09-16 RX ADMIN — IOPAMIDOL 140 ML: 755 INJECTION, SOLUTION INTRAVENOUS at 09:10

## 2019-09-16 RX ADMIN — SODIUM CHLORIDE: 9 INJECTION, SOLUTION INTRAVENOUS at 11:56

## 2019-09-16 RX ADMIN — CARBIDOPA AND LEVODOPA 1 TABLET: 10; 100 TABLET ORAL at 11:34

## 2019-09-16 RX ADMIN — INSULIN LISPRO 1 UNITS: 100 INJECTION, SOLUTION INTRAVENOUS; SUBCUTANEOUS at 21:41

## 2019-09-16 RX ADMIN — ATORVASTATIN CALCIUM 40 MG: 40 TABLET, FILM COATED ORAL at 21:38

## 2019-09-16 RX ADMIN — HYDROCODONE BITARTRATE AND ACETAMINOPHEN 1 TABLET: 7.5; 325 TABLET ORAL at 18:55

## 2019-09-16 ASSESSMENT — PAIN SCALES - GENERAL
PAINLEVEL_OUTOF10: 9
PAINLEVEL_OUTOF10: 7
PAINLEVEL_OUTOF10: 0
PAINLEVEL_OUTOF10: 4
PAINLEVEL_OUTOF10: 7
PAINLEVEL_OUTOF10: 0
PAINLEVEL_OUTOF10: 0
PAINLEVEL_OUTOF10: 4
PAINLEVEL_OUTOF10: 8
PAINLEVEL_OUTOF10: 5

## 2019-09-16 ASSESSMENT — PAIN DESCRIPTION - PROGRESSION
CLINICAL_PROGRESSION: GRADUALLY WORSENING
CLINICAL_PROGRESSION: GRADUALLY IMPROVING
CLINICAL_PROGRESSION: NOT CHANGED
CLINICAL_PROGRESSION: NOT CHANGED

## 2019-09-16 ASSESSMENT — PAIN DESCRIPTION - FREQUENCY
FREQUENCY: CONTINUOUS

## 2019-09-16 ASSESSMENT — PAIN DESCRIPTION - ONSET
ONSET: ON-GOING

## 2019-09-16 ASSESSMENT — PAIN DESCRIPTION - DESCRIPTORS
DESCRIPTORS: HEADACHE

## 2019-09-16 ASSESSMENT — PAIN DESCRIPTION - ORIENTATION
ORIENTATION: RIGHT

## 2019-09-16 ASSESSMENT — PAIN DESCRIPTION - LOCATION
LOCATION: HEAD

## 2019-09-16 ASSESSMENT — PAIN DESCRIPTION - PAIN TYPE
TYPE: ACUTE PAIN

## 2019-09-16 NOTE — PROCEDURES
segment. There is  right-to-right collaterals. The RV branch has an 80% to 90% ostial  lesion. It is a pretty big branch. The distal right coronary artery  has collaterals coming from septals from the left anterior descending  artery. You can also see the saphenous vein graft to the RCA fill  retrograde. 5.  Saphenous vein graft to the RCA:  I could not engage and did not  want to use too much contrast because of the patient's renal  insufficiency. I assumed it is occluded, but since selective  angiography was not performed, I am not certain of its patency. No  aortic root shot was given, again, to reduce contrast load. 6.  SKYLAR graft to the LAD is patent. The saphenous vein graft to the  diagonal is patent. The portion of the graft to the OM has a 99%  stenosis and may be the culprit vessel. CONCLUSION:  A 99% stenosis in the saphenous vein graft to OM. Saphenous vein graft to the RCA could not be located but is assumed to  be occluded. You can see it fill retrogradely through collaterals. The  right coronary artery is occluded in the mid segment. The distal right  has collaterals from the left and the right circulation. The LAD is  occluded in the mid segment. The SKYLAR graft to the LAD is widely patent. The 3 to 4+ collaterals are going from the LAD septals into the right  coronary artery. INTERVENTIONAL PROCEDURE CATHETERS USED:  JR4 guide, Runthrough wire, a  2.5 balloon, and a 4.0 KATIE stent. The guide engaged the ostium well and provided good support. The lesion  was crossed with some degree of difficulty. The lesion was quite tight. I did not want to use a FilterWire because of the tightness of the  lesion. I therefore had to predilate it using a 2.5 balloon. I then  proceeded with direct stenting using a 4.0 balloon at single inflation  up to 16 atmospheres which corresponded to a vessel size of 4.28.    Followup angiography showed 0% residual with good antegrade flow.    CONCLUSION:  Successful PCI and stenting of the saphenous vein graft to  the OM, lesion reduced from 99% to 0%. A 4.0 x 12 Sindhu KATIE stent was  used.         EBL: < 30 CC    CATALINA LUNA MD    D: 09/16/2019 9:52:06       T: 09/16/2019 11:36:42     [email or web address]  Job#: [id number]     Doc#: [id number]    CC:

## 2019-09-16 NOTE — PROGRESS NOTES
Component Value Date    MG 2.00 04/06/2015     Phosphorus:    Lab Results   Component Value Date    PHOS 5.0 04/02/2015     Uric Acid:  No results found for: Rosie Child  Last 3 Troponin:    Lab Results   Component Value Date    TROPONINI <0.01 09/14/2019    TROPONINI <0.01 09/14/2019    TROPONINI 0.02 09/14/2019     U/A:    Lab Results   Component Value Date    NITRITE Negative 04/03/2019    COLORU Yellow 04/03/2019    COLORU Yellow 06/28/2018    PROTEINU 100 04/03/2019    PROTEINU 100 06/28/2018    PHUR 5.0 04/03/2019    LABCAST 5-10 Fine Gran. 04/01/2015    WBCUA 2 06/28/2018    RBCUA 1 06/28/2018    MUCUS 1+ 09/09/2012    BACTERIA RARE 06/28/2018    CLARITYU Clear 04/03/2019    CLARITYU Clear 06/28/2018    SPECGRAV 1.020 04/03/2019    SPECGRAV 1.020 06/28/2018    LEUKOCYTESUR Negative 04/03/2019    LEUKOCYTESUR Negative 06/28/2018    UROBILINOGEN 0.2 06/28/2018    BILIRUBINUR Negative 04/03/2019    BILIRUBINUR NEGATIVE 04/05/2011    BLOODU Negative 04/03/2019    BLOODU Negative 06/28/2018    GLUCOSEU Negative 04/03/2019    GLUCOSEU Negative 06/28/2018    GLUCOSEU NEGATIVE 04/05/2011         IMPRESSION/RECOMMENDATIONS:      Diagnosis and Plan     1. CAD  Unstable angina   At risk of contrast nephropathy   Hydration, use minimal amount of dye     2. COPD    3. STAN  renal function stable    4. CKD stage 3   Baseline Cr 1.5 ? CT scan 2018 : ? Atrophic Rt kidney   renal ultrasound pending    5.  HTN   - monitor

## 2019-09-16 NOTE — CARE COORDINATION
DISCHARGE PLAN: Home no needs  ------------------------------------------    Met w/pt to address barriers to dc. HOME: Pt lives w/family and care for self. Denies needs. DME/O2: none    ACTIVE SERVICES: none    TRANSPORTATION: spouse    [facility] [address], denies difficulty obtaining/taking meds    PCP: Damien Galindo    DEMOGRAPHICS: verified address/phone number as correct    INSURANCE:  [place]    HD/PD: no    THERAPY RECS: not ordered    Discharge planning team will remain available for needs. Please consult for any specifics not addressed in this note.     Harry Pugh RN  Case management  [phone]

## 2019-09-16 NOTE — PRE SEDATION
mg Oral BID    carbidopa-levodopa  1 tablet Oral BID    insulin lispro  0-6 Units Subcutaneous TID     insulin lispro  0-3 Units Subcutaneous Nightly    predniSONE  40 mg Oral Daily     Continuous Infusions:    sodium chloride 75 mL/hr at 09/15/19 1330    sodium chloride      heparin (porcine) 15 mL/hr (09/16/19 0003)    dextrose       PRN Meds: ALPRAZolam, predniSONE, diphenhydrAMINE, hydrocortisone sodium succinate PF, HYDROcodone-acetaminophen, sodium chloride flush, magnesium hydroxide, ondansetron, nitroGLYCERIN, sodium chloride nebulizer, albuterol, glucose, dextrose, glucagon (rDNA), dextrose  Home Meds:   Prior to Admission medications    Medication Sig Start Date End Date Taking? Authorizing Provider   metoprolol succinate (TOPROL XL) 25 MG extended release tablet Take 1 tablet by mouth   Yes Historical Provider, MD   HYDROcodone-acetaminophen (NORCO) 7.5-325 MG per tablet Take 1 tablet by mouth every 6 hours as needed for Pain for up to 30 days. 8/19/19 9/18/19 Yes Bandar Lieberman MD   lisinopril (PRINIVIL;ZESTRIL) 5 MG tablet Take 1 tablet by mouth daily 8/19/19  Yes Bandar Lieberman MD   albuterol sulfate HFA (PROAIR HFA) 108 (90 Base) MCG/ACT inhaler Inhale 2 puffs into the lungs every 6 hours as needed for Wheezing 7/30/19  Yes Martha Li MD   carbidopa-levodopa (SINEMET)  MG per tablet Take 2 tablets by mouth nightly 5/20/19  Yes Abimael Awan MD   FLUoxetine (PROZAC) 40 MG capsule TAKE 2 CAPSULES DAILY 1/31/19  Yes Abimael Awan MD   pravastatin (PRAVACHOL) 80 MG tablet TAKE 1 TABLET NIGHTLY 10/18/18  Yes Abimael Awan MD   aspirin 81 MG EC tablet Take 81 mg by mouth daily.    Yes Historical Provider, MD Gibran Torres 200-25 MCG/INH AEPB INHALE 1 PUFF EVERY DAY 7/29/19   Historical Provider, MD     Coumadin Use Last 7 Days:  no  Antiplatelet drug therapy use last 7 days: yes - Brilinta and aspirin  Other anticoagulant use last 7 days: no  Additional Medication Information:        Pre-Sedation Documentation and Exam:   I have personally completed a history, physical exam & review of systems for this patient (see notes).     Mallampati Airway Assessment:  normal    Prior History of Anesthesia Complications:   none    ASA Classification:  Class 3 - A patient with severe systemic disease that limits activity but is not incapacitating    Sedation/ Anesthesia Plan:   intravenous sedation    Medications Planned:   midazolam (Versed) intravenously and fentanyl intravenously    Patient is an appropriate candidate for plan of sedation: yes    Electronically signed by Tony Payne MD on 9/16/2019 at 9:44 AM

## 2019-09-17 ENCOUNTER — TELEPHONE (OUTPATIENT)
Dept: FAMILY MEDICINE CLINIC | Age: 64
End: 2019-09-17

## 2019-09-17 VITALS
SYSTOLIC BLOOD PRESSURE: 132 MMHG | HEART RATE: 63 BPM | TEMPERATURE: 98.1 F | RESPIRATION RATE: 16 BRPM | WEIGHT: 271.61 LBS | BODY MASS INDEX: 42.63 KG/M2 | HEIGHT: 67 IN | DIASTOLIC BLOOD PRESSURE: 87 MMHG | OXYGEN SATURATION: 97 %

## 2019-09-17 LAB
ALBUMIN SERPL-MCNC: 4.1 G/DL (ref 3.4–5)
ANION GAP SERPL CALCULATED.3IONS-SCNC: 14 MMOL/L (ref 3–16)
BASOPHILS ABSOLUTE: 0 K/UL (ref 0–0.2)
BASOPHILS RELATIVE PERCENT: 0.2 %
BUN BLDV-MCNC: 34 MG/DL (ref 7–20)
CALCIUM SERPL-MCNC: 8.9 MG/DL (ref 8.3–10.6)
CHLORIDE BLD-SCNC: 107 MMOL/L (ref 99–110)
CO2: 16 MMOL/L (ref 21–32)
CREAT SERPL-MCNC: 1.6 MG/DL (ref 0.6–1.2)
EKG ATRIAL RATE: 48 BPM
EKG DIAGNOSIS: NORMAL
EKG P AXIS: 85 DEGREES
EKG P-R INTERVAL: 128 MS
EKG Q-T INTERVAL: 448 MS
EKG QRS DURATION: 84 MS
EKG QTC CALCULATION (BAZETT): 400 MS
EKG R AXIS: -16 DEGREES
EKG T AXIS: -27 DEGREES
EKG VENTRICULAR RATE: 48 BPM
EOSINOPHILS ABSOLUTE: 0 K/UL (ref 0–0.6)
EOSINOPHILS RELATIVE PERCENT: 0.1 %
GFR AFRICAN AMERICAN: 40
GFR NON-AFRICAN AMERICAN: 33
GLUCOSE BLD-MCNC: 130 MG/DL (ref 70–99)
GLUCOSE BLD-MCNC: 133 MG/DL (ref 70–99)
GLUCOSE BLD-MCNC: 145 MG/DL (ref 70–99)
HCT VFR BLD CALC: 34.5 % (ref 36–48)
HEMOGLOBIN: 11.4 G/DL (ref 12–16)
LYMPHOCYTES ABSOLUTE: 1.3 K/UL (ref 1–5.1)
LYMPHOCYTES RELATIVE PERCENT: 10.7 %
MCH RBC QN AUTO: 31 PG (ref 26–34)
MCHC RBC AUTO-ENTMCNC: 33.1 G/DL (ref 31–36)
MCV RBC AUTO: 93.7 FL (ref 80–100)
MONOCYTES ABSOLUTE: 0.6 K/UL (ref 0–1.3)
MONOCYTES RELATIVE PERCENT: 5.1 %
NEUTROPHILS ABSOLUTE: 9.9 K/UL (ref 1.7–7.7)
NEUTROPHILS RELATIVE PERCENT: 83.9 %
PDW BLD-RTO: 14.9 % (ref 12.4–15.4)
PERFORMED ON: ABNORMAL
PERFORMED ON: ABNORMAL
PHOSPHORUS: 3.3 MG/DL (ref 2.5–4.9)
PLATELET # BLD: 246 K/UL (ref 135–450)
PMV BLD AUTO: 7.2 FL (ref 5–10.5)
POTASSIUM REFLEX MAGNESIUM: 4.8 MMOL/L (ref 3.5–5.1)
POTASSIUM SERPL-SCNC: 4.8 MMOL/L (ref 3.5–5.1)
RBC # BLD: 3.69 M/UL (ref 4–5.2)
SODIUM BLD-SCNC: 137 MMOL/L (ref 136–145)
WBC # BLD: 11.8 K/UL (ref 4–11)

## 2019-09-17 PROCEDURE — 6370000000 HC RX 637 (ALT 250 FOR IP): Performed by: INTERNAL MEDICINE

## 2019-09-17 PROCEDURE — 2580000003 HC RX 258: Performed by: INTERNAL MEDICINE

## 2019-09-17 PROCEDURE — 36415 COLL VENOUS BLD VENIPUNCTURE: CPT

## 2019-09-17 PROCEDURE — 99233 SBSQ HOSP IP/OBS HIGH 50: CPT | Performed by: INTERNAL MEDICINE

## 2019-09-17 PROCEDURE — 85025 COMPLETE CBC W/AUTO DIFF WBC: CPT

## 2019-09-17 PROCEDURE — 80069 RENAL FUNCTION PANEL: CPT

## 2019-09-17 PROCEDURE — 94760 N-INVAS EAR/PLS OXIMETRY 1: CPT

## 2019-09-17 RX ORDER — LEVALBUTEROL TARTRATE 45 UG/1
1 AEROSOL, METERED ORAL EVERY 6 HOURS PRN
Qty: 1 INHALER | Refills: 3 | Status: SHIPPED | OUTPATIENT
Start: 2019-09-17 | End: 2020-05-27 | Stop reason: CLARIF

## 2019-09-17 RX ADMIN — PREDNISONE 40 MG: 20 TABLET ORAL at 09:55

## 2019-09-17 RX ADMIN — INSULIN LISPRO 1 UNITS: 100 INJECTION, SOLUTION INTRAVENOUS; SUBCUTANEOUS at 09:56

## 2019-09-17 RX ADMIN — HYDROCODONE BITARTRATE AND ACETAMINOPHEN 1 TABLET: 7.5; 325 TABLET ORAL at 01:35

## 2019-09-17 RX ADMIN — FAMOTIDINE 20 MG: 20 TABLET ORAL at 09:55

## 2019-09-17 RX ADMIN — HYDROCODONE BITARTRATE AND ACETAMINOPHEN 1 TABLET: 7.5; 325 TABLET ORAL at 09:55

## 2019-09-17 RX ADMIN — FLUOXETINE 80 MG: 20 CAPSULE ORAL at 09:55

## 2019-09-17 RX ADMIN — SODIUM CHLORIDE: 9 INJECTION, SOLUTION INTRAVENOUS at 09:55

## 2019-09-17 RX ADMIN — TICAGRELOR 90 MG: 90 TABLET ORAL at 09:55

## 2019-09-17 RX ADMIN — METOPROLOL SUCCINATE 50 MG: 50 TABLET, EXTENDED RELEASE ORAL at 09:55

## 2019-09-17 RX ADMIN — ASPIRIN 81 MG 81 MG: 81 TABLET ORAL at 09:55

## 2019-09-17 ASSESSMENT — PAIN SCALES - GENERAL
PAINLEVEL_OUTOF10: 7
PAINLEVEL_OUTOF10: 7
PAINLEVEL_OUTOF10: 0

## 2019-09-17 ASSESSMENT — PAIN DESCRIPTION - ORIENTATION: ORIENTATION: RIGHT

## 2019-09-17 ASSESSMENT — PAIN DESCRIPTION - DESCRIPTORS: DESCRIPTORS: ACHING

## 2019-09-17 ASSESSMENT — PAIN DESCRIPTION - PROGRESSION: CLINICAL_PROGRESSION: GRADUALLY IMPROVING

## 2019-09-17 ASSESSMENT — PAIN - FUNCTIONAL ASSESSMENT: PAIN_FUNCTIONAL_ASSESSMENT: PREVENTS OR INTERFERES SOME ACTIVE ACTIVITIES AND ADLS

## 2019-09-17 ASSESSMENT — PAIN DESCRIPTION - PAIN TYPE: TYPE: CHRONIC PAIN

## 2019-09-17 ASSESSMENT — PAIN DESCRIPTION - ONSET: ONSET: ON-GOING

## 2019-09-17 ASSESSMENT — PAIN DESCRIPTION - LOCATION: LOCATION: BACK

## 2019-09-17 ASSESSMENT — PAIN DESCRIPTION - FREQUENCY: FREQUENCY: CONTINUOUS

## 2019-09-17 NOTE — DISCHARGE INSTR - COC
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, 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    History of total left hip arthroplasty 11/4/15 B46.346    Coronary artery disease involving native coronary artery of native heart without angina pectoris I25.10    Iron deficiency anemia D50.9    Status post coronary artery bypass graft Z95.1    Superficial postoperative wound infection T81.49XA    Arthritis of knee, right M17.11    Tobacco use disorder F17.200    Carpal tunnel syndrome of left wrist G56.02    Elevated serum creatinine R79.89    Cellulitis and abscess of buttock L02.31, L03.317    Sepsis (HCC) A41.9    Acute-on-chronic kidney injury ([facility] N17.9, T33.0    Metabolic acidosis, normal anion gap (NAG) E87.2    Morbid obesity (HCC) E66.01    Stage 3 chronic kidney disease (HCC) N18.3    Chronic midline low back pain without sciatica M54.5, G89.29    SOB (shortness of breath) R06.02    COPD with exacerbation (HCC) J44.1    COPD exacerbation (HCC) J44.1    Unstable angina (HCC) I20.0       Isolation/Infection:   Isolation          No Isolation            Nurse Assessment:  Last Vital Signs: /87   Pulse 63   Temp 98.1 °F (36.7 °C) (Oral)   Resp 16   Ht 5' 7\" (1.702 m)   Wt 271 lb 9.7 oz (123.2 kg)   LMP 01/01/2004   SpO2 97%   BMI 42.54 kg/m²     Last documented pain score (0-10 scale): Pain Level: 7  Last Weight:   Wt Readings from Last 1 Encounters:   09/17/19 271 lb 9.7 oz (123.2 kg)     Mental Status:  {IP PT MENTAL STATUS:20030}    IV Access:  {MH

## 2019-09-17 NOTE — PROGRESS NOTES
[address]  Cardiology Consult Note        CC:    Chest pain            HPI:   This is a 66 y.o. female with coronary artery bypass surgery 9 years ago who presents with chest and shortness of breath. This morning the patient had a rather severe episode of chest pain. ECG shows new ST segment depressions in the high lateral leads. The pain was relieved with nitroglycerin. The patient states that she has been having it for quite some time at least 2 episodes a day.   Patient is fairly obese and has COPD as well as chronic kidney disease    Interval history  Status post PCI and stenting of the saphenous vein graft to the OM yesterday  Doing very well with no chest pain  Feels great      Past Medical History:   Diagnosis Date    Arthritis     ASHD (arteriosclerotic heart disease)     Chronic kidney disease     Chronic midline low back pain without sciatica 4/3/2019    COPD (chronic obstructive pulmonary disease) ([address]     unknown     Coronary artery disease involving native coronary artery of native heart without angina pectoris 3/31/2016    Depression     Full dentures     HTN     Hyperlipidemia     Iron deficiency anemia 3/31/2016    Kidney stone 2012    Morbid obesity ([address]     Restless legs syndrome     pt on cabidopa/levodopa    Stage 3 chronic kidney disease ([address] 4/3/2019    Wears glasses       Past Surgical History:   Procedure Laterality Date    ABSCESS DRAINAGE Left 04/19/2018    incision and drainage of right buttock abscess    APPENDECTOMY      CARPAL TUNNEL RELEASE Left 11/14/2017    CORONARY ARTERY BYPASS GRAFT  9/28/10    4 way bypass    HIP SURGERY Left 2016    I&D of joint    KNEE ARTHROSCOPY Right 2000    TOTAL HIP ARTHROPLASTY Left 11-4-2015    TOTAL KNEE ARTHROPLASTY Right 8-24-10    TOTAL KNEE ARTHROPLASTY Left 4/8/11    TUBAL LIGATION        Family History   Problem Relation Age of Onset    Stroke Mother     Diabetes Mother     Heart Disease Mother    [facility] Transdermal, Daily  neomycin-bacitracin-polymyxin (NEOSPORIN) ointment, , Topical, BID  nitroGLYCERIN (NITROSTAT) SL tablet 0.4 mg, 0.4 mg, Sublingual, Q5 Min PRN  metoprolol succinate (TOPROL XL) extended release tablet 50 mg, 50 mg, Oral, BID  carbidopa-levodopa (SINEMET)  MG per tablet 1 tablet, 1 tablet, Oral, BID  sodium chloride nebulizer 0.9 % solution 3 mL, 3 mL, Nebulization, Q8H PRN  albuterol (ACCUNEB) nebulizer solution 0.63 mg, 0.63 mg, Nebulization, Q6H PRN  insulin lispro (HUMALOG) injection vial 0-6 Units, 0-6 Units, Subcutaneous, TID WC  insulin lispro (HUMALOG) injection vial 0-3 Units, 0-3 Units, Subcutaneous, Nightly  glucose (GLUTOSE) 40 % oral gel 15 g, 15 g, Oral, PRN  dextrose 50 % IV solution, 12.5 g, Intravenous, PRN  glucagon (rDNA) injection 1 mg, 1 mg, Intramuscular, PRN  dextrose 5 % solution, 100 mL/hr, Intravenous, PRN  predniSONE (DELTASONE) tablet 40 mg, 40 mg, Oral, Daily      Labs:   Recent Labs     09/16/19  1216 09/17/19  0629   WBC 8.3 11.8*   HGB 10.8* 11.4*   HCT 32.7* 34.5*    246     Recent Labs     09/16/19  1216 09/17/19  0629    137   K 4.6  4.6 4.8   CO2 20* 16*   BUN 31* 34*   CREATININE 1.6* 1.6*   GLUCOSE 103* 133*     No results for input(s): BNP in the last 72 hours. No results for input(s): PROTIME, INR in the last 72 hours. Recent Labs     09/15/19  2206 09/16/19  1216   APTT 35.6 45.8*     Recent Labs     09/14/19  0923 09/14/19  1200   TROPONINI <0.01 <0.01     Lab Results   Component Value Date    HDL 44 05/15/2017    HDL 36 09/27/2010    LDLDIRECT 127 05/15/2017    LDLCALC see below 05/15/2017    TRIG 441 05/15/2017     Recent Labs     09/16/19  1216 09/17/19  0629   LABALBU 3.8 4.1         EKG:       Chest X-Ray:      ECHO:   Left ventricular cavity size is upper limits of normal. There is mild conc.  LVH. EF  55%.   The left atrium is dilated.   Trivial mitral & tricuspid regurgitation.   Normal right ventricular size.     Corornary

## 2019-09-18 LAB
ALBUMIN SERPL-MCNC: 3.2 G/DL (ref 3.1–4.9)
ALPHA-1-GLOBULIN: 0.2 G/DL (ref 0.2–0.4)
ALPHA-2-GLOBULIN: 1 G/DL (ref 0.4–1.1)
BETA GLOBULIN: 1 G/DL (ref 0.9–1.6)
GAMMA GLOBULIN: 0.9 G/DL (ref 0.6–1.8)
TOTAL PROTEIN: 6.3 G/DL (ref 6.4–8.2)

## 2019-09-19 LAB — SPE/IFE INTERPRETATION: NORMAL

## 2019-09-30 ENCOUNTER — TELEPHONE (OUTPATIENT)
Dept: FAMILY MEDICINE CLINIC | Age: 64
End: 2019-09-30

## 2019-10-29 ENCOUNTER — OFFICE VISIT (OUTPATIENT)
Dept: FAMILY MEDICINE CLINIC | Age: 64
End: 2019-10-29
Payer: COMMERCIAL

## 2019-10-29 VITALS
TEMPERATURE: 98.1 F | OXYGEN SATURATION: 99 % | HEART RATE: 67 BPM | WEIGHT: 253 LBS | DIASTOLIC BLOOD PRESSURE: 84 MMHG | SYSTOLIC BLOOD PRESSURE: 122 MMHG | HEIGHT: 67 IN | BODY MASS INDEX: 39.71 KG/M2

## 2019-10-29 DIAGNOSIS — Z23 FLU VACCINE NEED: ICD-10-CM

## 2019-10-29 DIAGNOSIS — R06.02 SOB (SHORTNESS OF BREATH): Primary | ICD-10-CM

## 2019-10-29 PROCEDURE — 90471 IMMUNIZATION ADMIN: CPT | Performed by: FAMILY MEDICINE

## 2019-10-29 PROCEDURE — 1111F DSCHRG MED/CURRENT MED MERGE: CPT | Performed by: FAMILY MEDICINE

## 2019-10-29 PROCEDURE — 90686 IIV4 VACC NO PRSV 0.5 ML IM: CPT | Performed by: FAMILY MEDICINE

## 2019-10-29 PROCEDURE — 99214 OFFICE O/P EST MOD 30 MIN: CPT | Performed by: FAMILY MEDICINE

## 2019-10-29 RX ORDER — BUPROPION HYDROCHLORIDE 150 MG/1
150 TABLET ORAL DAILY
Qty: 30 TABLET | Refills: 3 | Status: SHIPPED | OUTPATIENT
Start: 2019-10-29 | End: 2020-03-04

## 2019-10-29 RX ORDER — CEPHALEXIN 500 MG/1
500 CAPSULE ORAL 3 TIMES DAILY
Qty: 21 CAPSULE | Refills: 0 | Status: SHIPPED | OUTPATIENT
Start: 2019-10-29 | End: 2019-11-27

## 2019-11-08 DIAGNOSIS — Z76.0 ENCOUNTER FOR MEDICATION REFILL: ICD-10-CM

## 2019-11-08 DIAGNOSIS — M15.9 PRIMARY OSTEOARTHRITIS INVOLVING MULTIPLE JOINTS: ICD-10-CM

## 2019-11-08 RX ORDER — HYDROCODONE BITARTRATE AND ACETAMINOPHEN 7.5; 325 MG/1; MG/1
1 TABLET ORAL EVERY 6 HOURS PRN
Qty: 120 TABLET | Refills: 0 | Status: SHIPPED | OUTPATIENT
Start: 2019-11-08 | End: 2019-12-09 | Stop reason: SDUPTHER

## 2019-11-16 ENCOUNTER — OFFICE VISIT (OUTPATIENT)
Dept: FAMILY MEDICINE CLINIC | Age: 64
End: 2019-11-16
Payer: COMMERCIAL

## 2019-11-16 VITALS
SYSTOLIC BLOOD PRESSURE: 110 MMHG | WEIGHT: 247 LBS | DIASTOLIC BLOOD PRESSURE: 70 MMHG | OXYGEN SATURATION: 98 % | TEMPERATURE: 98.1 F | BODY MASS INDEX: 38.68 KG/M2 | HEART RATE: 91 BPM

## 2019-11-16 DIAGNOSIS — R53.83 FATIGUE, UNSPECIFIED TYPE: Primary | ICD-10-CM

## 2019-11-16 DIAGNOSIS — J44.1 CHRONIC OBSTRUCTIVE PULMONARY DISEASE WITH ACUTE EXACERBATION (HCC): ICD-10-CM

## 2019-11-16 PROCEDURE — 99214 OFFICE O/P EST MOD 30 MIN: CPT | Performed by: FAMILY MEDICINE

## 2019-11-16 RX ORDER — PREDNISONE 20 MG/1
20 TABLET ORAL 2 TIMES DAILY
Qty: 10 TABLET | Refills: 0 | Status: SHIPPED | OUTPATIENT
Start: 2019-11-16 | End: 2019-11-21

## 2019-11-16 RX ORDER — ONDANSETRON 4 MG/1
4 TABLET, FILM COATED ORAL DAILY PRN
Qty: 30 TABLET | Refills: 0 | Status: SHIPPED | OUTPATIENT
Start: 2019-11-16 | End: 2020-02-27

## 2019-11-18 DIAGNOSIS — R53.83 FATIGUE, UNSPECIFIED TYPE: ICD-10-CM

## 2019-11-18 LAB
A/G RATIO: 1.4 (ref 1.1–2.2)
ALBUMIN SERPL-MCNC: 3.9 G/DL (ref 3.4–5)
ALP BLD-CCNC: 97 U/L (ref 40–129)
ALT SERPL-CCNC: <5 U/L (ref 10–40)
ANION GAP SERPL CALCULATED.3IONS-SCNC: 12 MMOL/L (ref 3–16)
AST SERPL-CCNC: 8 U/L (ref 15–37)
BASOPHILS ABSOLUTE: 0 K/UL (ref 0–0.2)
BASOPHILS RELATIVE PERCENT: 0.2 %
BILIRUB SERPL-MCNC: <0.2 MG/DL (ref 0–1)
BUN BLDV-MCNC: 68 MG/DL (ref 7–20)
CALCIUM SERPL-MCNC: 8.8 MG/DL (ref 8.3–10.6)
CHLORIDE BLD-SCNC: 110 MMOL/L (ref 99–110)
CO2: 10 MMOL/L (ref 21–32)
CREAT SERPL-MCNC: 2.9 MG/DL (ref 0.6–1.2)
EOSINOPHILS ABSOLUTE: 0 K/UL (ref 0–0.6)
EOSINOPHILS RELATIVE PERCENT: 0 %
FOLATE: 7.45 NG/ML (ref 4.78–24.2)
GFR AFRICAN AMERICAN: 20
GFR NON-AFRICAN AMERICAN: 16
GLOBULIN: 2.8 G/DL
GLUCOSE BLD-MCNC: 164 MG/DL (ref 70–99)
HCT VFR BLD CALC: 35.5 % (ref 36–48)
HEMOGLOBIN: 11.1 G/DL (ref 12–16)
LYMPHOCYTES ABSOLUTE: 0.7 K/UL (ref 1–5.1)
LYMPHOCYTES RELATIVE PERCENT: 5.1 %
MCH RBC QN AUTO: 30.4 PG (ref 26–34)
MCHC RBC AUTO-ENTMCNC: 31.2 G/DL (ref 31–36)
MCV RBC AUTO: 97.4 FL (ref 80–100)
MONOCYTES ABSOLUTE: 0.6 K/UL (ref 0–1.3)
MONOCYTES RELATIVE PERCENT: 4.9 %
NEUTROPHILS ABSOLUTE: 11.5 K/UL (ref 1.7–7.7)
NEUTROPHILS RELATIVE PERCENT: 89.8 %
PDW BLD-RTO: 15.6 % (ref 12.4–15.4)
PLATELET # BLD: 284 K/UL (ref 135–450)
PMV BLD AUTO: 7.5 FL (ref 5–10.5)
POTASSIUM SERPL-SCNC: 4.7 MMOL/L (ref 3.5–5.1)
RBC # BLD: 3.65 M/UL (ref 4–5.2)
SODIUM BLD-SCNC: 132 MMOL/L (ref 136–145)
TOTAL PROTEIN: 6.7 G/DL (ref 6.4–8.2)
TSH SERPL DL<=0.05 MIU/L-ACNC: 0.32 UIU/ML (ref 0.27–4.2)
VITAMIN B-12: 967 PG/ML (ref 211–911)
WBC # BLD: 12.8 K/UL (ref 4–11)

## 2019-11-19 ENCOUNTER — TELEPHONE (OUTPATIENT)
Dept: FAMILY MEDICINE CLINIC | Age: 64
End: 2019-11-19

## 2019-11-19 LAB
ESTIMATED AVERAGE GLUCOSE: 119.8 MG/DL
HBA1C MFR BLD: 5.8 %

## 2019-11-25 ENCOUNTER — HOSPITAL ENCOUNTER (OUTPATIENT)
Dept: VASCULAR LAB | Age: 64
Discharge: HOME | End: 2019-11-25
Payer: COMMERCIAL

## 2019-11-25 DIAGNOSIS — N17.9 ACUTE RENAL FAILURE, UNSPECIFIED ACUTE RENAL FAILURE TYPE (HCC): ICD-10-CM

## 2019-11-25 DIAGNOSIS — I25.810 CAD OF AUTOLOGOUS BYPASS GRAFT: ICD-10-CM

## 2019-11-25 DIAGNOSIS — N18.30 CKD (CHRONIC KIDNEY DISEASE), STAGE III (HCC): ICD-10-CM

## 2019-11-25 PROCEDURE — 93975 VASCULAR STUDY: CPT

## 2019-11-27 ENCOUNTER — TELEPHONE (OUTPATIENT)
Dept: FAMILY MEDICINE CLINIC | Age: 64
End: 2019-11-27

## 2019-11-27 ENCOUNTER — OFFICE VISIT (OUTPATIENT)
Dept: FAMILY MEDICINE CLINIC | Age: 64
End: 2019-11-27
Payer: COMMERCIAL

## 2019-11-27 VITALS
DIASTOLIC BLOOD PRESSURE: 68 MMHG | HEART RATE: 86 BPM | WEIGHT: 251 LBS | OXYGEN SATURATION: 100 % | SYSTOLIC BLOOD PRESSURE: 110 MMHG | BODY MASS INDEX: 39.31 KG/M2

## 2019-11-27 DIAGNOSIS — R58 ECCHYMOSIS: Primary | ICD-10-CM

## 2019-11-27 DIAGNOSIS — M79.604 RIGHT LEG PAIN: ICD-10-CM

## 2019-11-27 PROCEDURE — 99213 OFFICE O/P EST LOW 20 MIN: CPT | Performed by: FAMILY MEDICINE

## 2019-12-07 DIAGNOSIS — Z12.39 SCREENING FOR BREAST CANCER: ICD-10-CM

## 2019-12-09 DIAGNOSIS — M15.9 PRIMARY OSTEOARTHRITIS INVOLVING MULTIPLE JOINTS: ICD-10-CM

## 2019-12-09 DIAGNOSIS — Z76.0 ENCOUNTER FOR MEDICATION REFILL: ICD-10-CM

## 2019-12-09 RX ORDER — PRAVASTATIN SODIUM 80 MG/1
TABLET ORAL
Qty: 90 TABLET | Refills: 2 | Status: SHIPPED | OUTPATIENT
Start: 2019-12-09 | End: 2020-07-16 | Stop reason: ALTCHOICE

## 2019-12-09 RX ORDER — HYDROCODONE BITARTRATE AND ACETAMINOPHEN 7.5; 325 MG/1; MG/1
1 TABLET ORAL EVERY 6 HOURS PRN
Qty: 120 TABLET | Refills: 0 | Status: SHIPPED | OUTPATIENT
Start: 2019-12-09 | End: 2020-01-06 | Stop reason: SDUPTHER

## 2019-12-16 DIAGNOSIS — I25.810 CAD OF AUTOLOGOUS BYPASS GRAFT: ICD-10-CM

## 2019-12-16 DIAGNOSIS — N18.30 CKD (CHRONIC KIDNEY DISEASE), STAGE III (HCC): ICD-10-CM

## 2019-12-16 DIAGNOSIS — N17.9 ACUTE RENAL FAILURE, UNSPECIFIED ACUTE RENAL FAILURE TYPE (HCC): ICD-10-CM

## 2019-12-16 LAB
ANION GAP SERPL CALCULATED.3IONS-SCNC: 15 MMOL/L (ref 3–16)
BUN BLDV-MCNC: 38 MG/DL (ref 7–20)
CALCIUM SERPL-MCNC: 9 MG/DL (ref 8.3–10.6)
CHLORIDE BLD-SCNC: 107 MMOL/L (ref 99–110)
CO2: 21 MMOL/L (ref 21–32)
CREAT SERPL-MCNC: 2.2 MG/DL (ref 0.6–1.2)
GFR AFRICAN AMERICAN: 27
GFR NON-AFRICAN AMERICAN: 23
GLUCOSE BLD-MCNC: 99 MG/DL (ref 70–99)
POTASSIUM SERPL-SCNC: 5.4 MMOL/L (ref 3.5–5.1)
SODIUM BLD-SCNC: 143 MMOL/L (ref 136–145)

## 2020-01-06 RX ORDER — HYDROCODONE BITARTRATE AND ACETAMINOPHEN 7.5; 325 MG/1; MG/1
1 TABLET ORAL EVERY 6 HOURS PRN
Qty: 120 TABLET | Refills: 0 | Status: SHIPPED | OUTPATIENT
Start: 2020-01-06 | End: 2020-02-03 | Stop reason: SDUPTHER

## 2020-01-06 NOTE — TELEPHONE ENCOUNTER
Patient requesting a medication refill. Medication HYDROcodone-acetaminophen (NORCO) 7.5-325 MG per tablet       Pharmacy [id number]  [facility], [address] [place] [address].  Iliana Vargas [phone] Karla Jaramillo [phone]  Last office visit: 11/27/19  Next office visit: Visit date not found

## 2020-01-07 RX ORDER — METOPROLOL SUCCINATE 25 MG/1
25 TABLET, EXTENDED RELEASE ORAL DAILY
Qty: 90 TABLET | Refills: 3 | Status: SHIPPED | OUTPATIENT
Start: 2020-01-07 | End: 2020-05-27 | Stop reason: SDUPTHER

## 2020-01-31 RX ORDER — HYDROCODONE BITARTRATE AND ACETAMINOPHEN 7.5; 325 MG/1; MG/1
1 TABLET ORAL EVERY 6 HOURS PRN
Qty: 120 TABLET | Refills: 0 | OUTPATIENT
Start: 2020-01-31 | End: 2020-03-01

## 2020-01-31 NOTE — TELEPHONE ENCOUNTER
Medication and Quantity requested: HYDROcodone-acetaminophen (NORCO) 7.5-325 MG per tablet         Last Visit  11/27/19    Pharmacy and phone number updated in EPIC:  [facility]

## 2020-02-03 RX ORDER — HYDROCODONE BITARTRATE AND ACETAMINOPHEN 7.5; 325 MG/1; MG/1
1 TABLET ORAL EVERY 6 HOURS PRN
Qty: 120 TABLET | Refills: 0 | Status: SHIPPED | OUTPATIENT
Start: 2020-02-03 | End: 2020-02-04 | Stop reason: SDUPTHER

## 2020-02-04 ENCOUNTER — TELEPHONE (OUTPATIENT)
Dept: FAMILY MEDICINE CLINIC | Age: 65
End: 2020-02-04

## 2020-02-04 RX ORDER — HYDROCODONE BITARTRATE AND ACETAMINOPHEN 7.5; 325 MG/1; MG/1
1 TABLET ORAL EVERY 6 HOURS PRN
Qty: 120 TABLET | Refills: 0 | Status: SHIPPED | OUTPATIENT
Start: 2020-02-04 | End: 2020-03-03 | Stop reason: SDUPTHER

## 2020-02-19 NOTE — TELEPHONE ENCOUNTER
[facility] Order # [id number] with [facility] - Dr. Ben Mena   Spoke with robert simental in regards to patient. Called and spoke to patient. Patient is to do warm soapy soaks 2-3xs a day. With it being the Holiday abx Keflex was sent to pharmacy just in case for if signs and symptoms worsen.  Advised patient of this and to call if getting worse

## 2020-02-27 ENCOUNTER — OFFICE VISIT (OUTPATIENT)
Dept: FAMILY MEDICINE CLINIC | Age: 65
End: 2020-02-27
Payer: COMMERCIAL

## 2020-02-27 VITALS
HEIGHT: 67 IN | WEIGHT: 269 LBS | HEART RATE: 75 BPM | DIASTOLIC BLOOD PRESSURE: 90 MMHG | OXYGEN SATURATION: 97 % | SYSTOLIC BLOOD PRESSURE: 145 MMHG | BODY MASS INDEX: 42.22 KG/M2

## 2020-02-27 DIAGNOSIS — R73.9 HYPERGLYCEMIA: ICD-10-CM

## 2020-02-27 DIAGNOSIS — I10 ESSENTIAL HYPERTENSION: ICD-10-CM

## 2020-02-27 DIAGNOSIS — E78.2 MIXED HYPERLIPIDEMIA: ICD-10-CM

## 2020-02-27 PROBLEM — G25.81 RESTLESS LEGS SYNDROME: Status: ACTIVE | Noted: 2020-02-27

## 2020-02-27 LAB
ANION GAP SERPL CALCULATED.3IONS-SCNC: 13 MMOL/L (ref 3–16)
BASOPHILS ABSOLUTE: 0.1 K/UL (ref 0–0.2)
BASOPHILS RELATIVE PERCENT: 1.1 %
BUN BLDV-MCNC: 47 MG/DL (ref 7–20)
CALCIUM SERPL-MCNC: 8.9 MG/DL (ref 8.3–10.6)
CHLORIDE BLD-SCNC: 106 MMOL/L (ref 99–110)
CHOLESTEROL, TOTAL: 178 MG/DL (ref 0–199)
CO2: 18 MMOL/L (ref 21–32)
CREAT SERPL-MCNC: 1.9 MG/DL (ref 0.6–1.2)
EOSINOPHILS ABSOLUTE: 0.3 K/UL (ref 0–0.6)
EOSINOPHILS RELATIVE PERCENT: 4.9 %
GFR AFRICAN AMERICAN: 32
GFR NON-AFRICAN AMERICAN: 27
GLUCOSE BLD-MCNC: 98 MG/DL (ref 70–99)
HCT VFR BLD CALC: 31.3 % (ref 36–48)
HDLC SERPL-MCNC: 47 MG/DL (ref 40–60)
HEMOGLOBIN: 10.1 G/DL (ref 12–16)
LDL CHOLESTEROL CALCULATED: 91 MG/DL
LYMPHOCYTES ABSOLUTE: 0.9 K/UL (ref 1–5.1)
LYMPHOCYTES RELATIVE PERCENT: 13.9 %
MCH RBC QN AUTO: 30 PG (ref 26–34)
MCHC RBC AUTO-ENTMCNC: 32.3 G/DL (ref 31–36)
MCV RBC AUTO: 93 FL (ref 80–100)
MONOCYTES ABSOLUTE: 0.5 K/UL (ref 0–1.3)
MONOCYTES RELATIVE PERCENT: 7.1 %
NEUTROPHILS ABSOLUTE: 4.8 K/UL (ref 1.7–7.7)
NEUTROPHILS RELATIVE PERCENT: 73 %
PDW BLD-RTO: 14.7 % (ref 12.4–15.4)
PLATELET # BLD: 236 K/UL (ref 135–450)
PMV BLD AUTO: 7.2 FL (ref 5–10.5)
POTASSIUM SERPL-SCNC: 5.8 MMOL/L (ref 3.5–5.1)
RBC # BLD: 3.36 M/UL (ref 4–5.2)
SODIUM BLD-SCNC: 137 MMOL/L (ref 136–145)
TRIGL SERPL-MCNC: 200 MG/DL (ref 0–150)
VLDLC SERPL CALC-MCNC: 40 MG/DL
WBC # BLD: 6.5 K/UL (ref 4–11)

## 2020-02-27 PROCEDURE — 99214 OFFICE O/P EST MOD 30 MIN: CPT | Performed by: FAMILY MEDICINE

## 2020-02-27 RX ORDER — TRAZODONE HYDROCHLORIDE 50 MG/1
50 TABLET ORAL NIGHTLY PRN
Qty: 30 TABLET | Refills: 5 | Status: SHIPPED | OUTPATIENT
Start: 2020-02-27 | End: 2020-06-29

## 2020-02-27 RX ORDER — METOPROLOL SUCCINATE 50 MG/1
50 TABLET, EXTENDED RELEASE ORAL DAILY
Qty: 30 TABLET | Refills: 3 | Status: SHIPPED | OUTPATIENT
Start: 2020-02-27 | End: 2020-06-29

## 2020-02-27 ASSESSMENT — PATIENT HEALTH QUESTIONNAIRE - PHQ9
SUM OF ALL RESPONSES TO PHQ QUESTIONS 1-9: 0
SUM OF ALL RESPONSES TO PHQ9 QUESTIONS 1 & 2: 0
2. FEELING DOWN, DEPRESSED OR HOPELESS: 0
SUM OF ALL RESPONSES TO PHQ QUESTIONS 1-9: 0
1. LITTLE INTEREST OR PLEASURE IN DOING THINGS: 0

## 2020-02-28 LAB
ESTIMATED AVERAGE GLUCOSE: 102.5 MG/DL
HBA1C MFR BLD: 5.2 %

## 2020-03-01 LAB
6-ACETYLMORPHINE: NOT DETECTED
7-AMINOCLONAZEPAM: NOT DETECTED
ALPHA-OH-ALPRAZOLAM: NOT DETECTED
ALPRAZOLAM: NOT DETECTED
AMPHETAMINE: NOT DETECTED
BARBITURATES: NOT DETECTED
BENZOYLECGONINE: NOT DETECTED
BUPRENORPHINE: NOT DETECTED
CARISOPRODOL: NOT DETECTED
CLONAZEPAM: NOT DETECTED
CODEINE: NOT DETECTED
CREATININE URINE: 93.5 MG/DL (ref 20–400)
DIAZEPAM: NOT DETECTED
EER PAIN MGT DRUG PANEL, HIGH RES/EMIT U: NORMAL
ETHYL GLUCURONIDE: NOT DETECTED
FENTANYL: NOT DETECTED
HYDROCODONE: PRESENT
HYDROMORPHONE: NOT DETECTED
LORAZEPAM: NOT DETECTED
MARIJUANA METABOLITE: PRESENT
MDA: NOT DETECTED
MDEA: NOT DETECTED
MDMA URINE: NOT DETECTED
MEPERIDINE: NOT DETECTED
METHADONE: NOT DETECTED
METHAMPHETAMINE: NOT DETECTED
METHYLPHENIDATE: NOT DETECTED
MIDAZOLAM: NOT DETECTED
MORPHINE: NOT DETECTED
NORBUPRENORPHINE, FREE: NOT DETECTED
NORDIAZEPAM: NOT DETECTED
NORFENTANYL: NOT DETECTED
NORHYDROCODONE, URINE: NOT DETECTED
NOROXYCODONE: NOT DETECTED
NOROXYMORPHONE, URINE: NOT DETECTED
OXAZEPAM: NOT DETECTED
OXYCODONE: NOT DETECTED
OXYMORPHONE: NOT DETECTED
PAIN MANAGEMENT DRUG PANEL: NORMAL
PCP: NOT DETECTED
PHENTERMINE: NOT DETECTED
PROPOXYPHENE: NOT DETECTED
TAPENTADOL, URINE: NOT DETECTED
TAPENTADOL-O-SULFATE, URINE: NOT DETECTED
TEMAZEPAM: NOT DETECTED
TRAMADOL: NOT DETECTED
ZOLPIDEM: NOT DETECTED

## 2020-03-03 RX ORDER — HYDROCODONE BITARTRATE AND ACETAMINOPHEN 7.5; 325 MG/1; MG/1
1 TABLET ORAL EVERY 6 HOURS PRN
Qty: 120 TABLET | Refills: 0 | Status: SHIPPED | OUTPATIENT
Start: 2020-03-03 | End: 2020-03-30 | Stop reason: SDUPTHER

## 2020-03-03 NOTE — TELEPHONE ENCOUNTER
Medication and Quantity requested: norco 7.5mg     Last Visit  02.27.20    Pharmacy and phone number updated in [facility]:  yes    matt rich vomiting

## 2020-03-04 RX ORDER — BUPROPION HYDROCHLORIDE 150 MG/1
TABLET ORAL
Qty: 90 TABLET | Refills: 1 | Status: SHIPPED | OUTPATIENT
Start: 2020-03-04 | End: 2020-07-13

## 2020-03-04 NOTE — TELEPHONE ENCOUNTER
Medication:   Requested Prescriptions     Pending Prescriptions Disp Refills    buPROPion (WELLBUTRIN XL) 150 MG extended release tablet [Pharmacy Med Name: BUPROPION HCL  MG TABLET] 90 tablet 1     Sig: TAKE 1 TABLET BY MOUTH EVERY DAY        Last Filled:  10/29/19 #30, 3RF     Patient Phone Number: [phone] (home)     Last appt: 2/27/2020 HTN  Next appt: Visit date not found

## 2020-03-07 RX ORDER — FLUOXETINE HYDROCHLORIDE 40 MG/1
CAPSULE ORAL
Qty: 180 CAPSULE | Refills: 3 | Status: SHIPPED | OUTPATIENT
Start: 2020-03-07 | End: 2021-03-17

## 2020-03-07 NOTE — TELEPHONE ENCOUNTER
Medication and Quantity requested:  FLUoxetine (PROZAC) 40 MG capsule - qty 90        Last Visit  02/27/20 - Dr Kamala Poon and phone number updated in EPIC:  Yes    Pharmacy:  [address] [facility]

## 2020-03-30 RX ORDER — HYDROCODONE BITARTRATE AND ACETAMINOPHEN 7.5; 325 MG/1; MG/1
1 TABLET ORAL EVERY 6 HOURS PRN
Qty: 120 TABLET | Refills: 0 | Status: SHIPPED | OUTPATIENT
Start: 2020-03-30 | End: 2020-04-27 | Stop reason: SDUPTHER

## 2020-03-30 NOTE — TELEPHONE ENCOUNTER
Medication and Quantity requested: HYDROcodone-acetaminophen (NORCO) 7.5-325 MG per tablet          Last Visit  2/27/20    Pharmacy and phone number updated in EPIC:  yes                                                               [facility]

## 2020-04-20 NOTE — TELEPHONE ENCOUNTER
Medication:   Requested Prescriptions     Pending Prescriptions Disp Refills    carbidopa-levodopa (SINEMET)  MG per tablet [Pharmacy Med Name: CARB/LEVO TAB 10-100MG] 60 tablet 5     Sig: TAKE 2 TABLETS NIGHTLY        Last Filled:  04/09/2020 #180 3rf     Patient Phone Number: [phone] (home)     Last appt: 02/27/2020  Next appt: Visit date not found    Last [place]:   RX Monitoring 3/3/2020   Attestation -   Acute Pain Prescriptions -   Periodic Controlled Substance Monitoring No signs of potential drug abuse or diversion identified.    Chronic Pain > 80 MEDD -

## 2020-04-27 RX ORDER — HYDROCODONE BITARTRATE AND ACETAMINOPHEN 7.5; 325 MG/1; MG/1
1 TABLET ORAL EVERY 6 HOURS PRN
Qty: 120 TABLET | Refills: 0 | Status: SHIPPED | OUTPATIENT
Start: 2020-04-27 | End: 2020-05-27 | Stop reason: SDUPTHER

## 2020-05-06 RX ORDER — SODIUM BICARBONATE 650 MG/1
650 TABLET ORAL 4 TIMES DAILY
Qty: 120 TABLET | Refills: 1 | Status: SHIPPED | OUTPATIENT
Start: 2020-05-06 | End: 2020-06-03

## 2020-05-27 ENCOUNTER — VIRTUAL VISIT (OUTPATIENT)
Dept: FAMILY MEDICINE CLINIC | Age: 65
End: 2020-05-27
Payer: COMMERCIAL

## 2020-05-27 VITALS — WEIGHT: 269 LBS | BODY MASS INDEX: 42.22 KG/M2 | HEIGHT: 67 IN

## 2020-05-27 PROCEDURE — 99214 OFFICE O/P EST MOD 30 MIN: CPT | Performed by: FAMILY MEDICINE

## 2020-05-27 RX ORDER — HYDROCODONE BITARTRATE AND ACETAMINOPHEN 7.5; 325 MG/1; MG/1
1 TABLET ORAL EVERY 6 HOURS PRN
Qty: 120 TABLET | Refills: 0 | Status: SHIPPED | OUTPATIENT
Start: 2020-05-27 | End: 2020-06-25 | Stop reason: SDUPTHER

## 2020-05-27 NOTE — PROGRESS NOTES
Alexa Sales is a 60 y.o. female. Due to the current coronavirus pandemic, this telephone/visit was insisted, with patient's consent, to reduce the patient's risk of exposure to COVID-19 and provide continuity of care for an established patient. The patient was at home while the provider was either at home or at the clinic. Services were provided through a synchronous discussion over the telephone and/or video chat to substitute for in person clinic visit, and coded as such. HPI:    Complex medical visit via virtual encounter today  She denies any chest pain or angina  She is been on Brilinta and aspirin for over a year and wonders if she can stop the Brilinta  She has not been using any inhalers and her breathing is good as she has quit smoking  Still takes Norco 4 times a day to control her chronic low back pain. Her last urine drug screen in February was consistent but also showed marijuana. She is not getting medical marijuana  She tells me she stopped using recreational marijuana since then  She does see a kidney specialist as well and most recent labs showed improving kidney function along with anemia of chronic disease  Pression seems to be well-controlled on her Prozac and Wellbutrin  Restless leg is stable on Sinemet  Meds, vitamins and allergies reviewed with pt    ROS: No TIA's or unusual headaches, no dysphagia. No prolonged cough. No dyspnea or chest pain on exertion. No abdominal pain, change in bowel habits, black or bloody stools. No urinary tract symptoms. No new or unusual musculoskeletal symptoms. Prior to Visit Medications    Medication Sig Taking?  Authorizing Provider   carbidopa-levodopa (SINEMET)  MG per tablet TAKE 2 TABLETS NIGHTLY Yes Brian Dodd MD   sodium bicarbonate 650 MG tablet Take 1 tablet by mouth 4 times daily Yes Brian Dodd MD   HYDROcodone-acetaminophen (NORCO) 7.5-325 MG per tablet Take 1 tablet by mouth every 6 hours as needed for Pain for up to 30 days. Yes Jarrod Chaidez MD   FLUoxetine (PROZAC) 40 MG capsule TAKE 2 CAPSULES DAILY Yes Jarrod Chaidez MD   buPROPion (WELLBUTRIN XL) 150 MG extended release tablet TAKE 1 TABLET BY MOUTH EVERY DAY Yes Jarrod Chaidez MD   metoprolol succinate (TOPROL XL) 50 MG extended release tablet Take 1 tablet by mouth daily Yes Jarrod Chaidez MD   traZODone (DESYREL) 50 MG tablet Take 1 tablet by mouth nightly as needed for Sleep Yes Jarrod Chaidez MD   furosemide (LASIX) 20 MG tablet Take 1 tablet by mouth daily Yes Jonhy Wynne MD   pravastatin (PRAVACHOL) 80 MG tablet TAKE 1 TABLET NIGHTLY Yes Jarrod Chaidez MD   ticagrelor (BRILINTA) 90 MG TABS tablet Take 1 tablet by mouth 2 times daily Yes Jarrod Chaidez MD   levalbuterol (XOPENEX HFA) 45 MCG/ACT inhaler Inhale 1 puff into the lungs every 6 hours as needed for Wheezing or Shortness of Breath Yes Richard Gann MD   aspirin 81 MG EC tablet Take 81 mg by mouth daily.  Yes Historical Provider, MD       Past Medical History:   Diagnosis Date    Arthritis     ASHD (arteriosclerotic heart disease)     Chronic kidney disease     Chronic midline low back pain without sciatica 4/3/2019    COPD (chronic obstructive pulmonary disease) (HCC)     unknown     Coronary artery disease involving native coronary artery of native heart without angina pectoris 3/31/2016    Coronary artery disease involving native coronary artery of native heart without angina pectoris 3/31/2016    Depression     Full dentures     HTN     Hyperlipidemia     Iron deficiency anemia 3/31/2016    Kidney stone 2012    Morbid obesity (HCC)     Restless legs syndrome     pt on cabidopa/levodopa    Stage 3 chronic kidney disease ([facility] 4/3/2019    Wears glasses        Social History     Tobacco Use    Smoking status: Former Smoker     Packs/day: 0.50     Years: 30.00     Pack years: 15.00     Types: Cigarettes     Last attempt to quit: 2/29/2016 [address] 02/27/2020    LDLCALC see below 05/15/2017    LDLCALC see below 12/08/2016     Lab Results   Component Value Date    LABVLDL 40 02/27/2020    LABVLDL see below 05/15/2017    LABVLDL see below 12/08/2016     No results found for: PRAVEEN  . ASSESSMENT/PLAN:  1. Coronary artery disease involving native coronary artery of native heart without angina pectoris  Able, continue present medication  Consult with cardiologist about stopping Brilinta, a year for stent placement    2. Primary osteoarthritis involving multiple joints  Norco refill    3. Chronic midline low back pain without sciatica  Discussed use of narcotic pain meds, benefits and risks. Patient aware he/she will need to be seen every 3 months, will be subject to random urine drug screens, will be asked to sign a medication agreement, and may be required to see a pain specialist, especially if his/her morphine equivalent is over 80    4. COPD with exacerbation ([address] former tobacco abuse  Stable since she quit smoking    5. Essential hypertension  We will recheck in 3 months continue current medication    6. Morbid obesity ([address]  Suggested nonweightbearing exercise as walking makes her back hurt.   Perhaps a recumbent bike or water aerobics    6.5 CKD/anemia  Slow improvement  Repeat labs in 3 months  Follow-up with nephrologist regularly  Avoid nephrotoxins    7  HM  shingrix in 3 mo      Spent 25 minutes with patient via virtual video conference, mainly reviewing medications reviewing labs reviewing pain medicine issues and coordinating her care

## 2020-05-27 NOTE — TELEPHONE ENCOUNTER
[address] [facility] is requesting 90 day supply of Carb/Levo Tab 10-100MG sent on 5/26/20.  Please send new Rx #180

## 2020-05-29 ENCOUNTER — TELEPHONE (OUTPATIENT)
Dept: FAMILY MEDICINE CLINIC | Age: 65
End: 2020-05-29

## 2020-06-03 RX ORDER — SODIUM BICARBONATE 650 MG/1
TABLET ORAL
Qty: 120 TABLET | Refills: 1 | Status: SHIPPED | OUTPATIENT
Start: 2020-06-03 | End: 2020-06-24 | Stop reason: SDUPTHER

## 2020-06-17 ENCOUNTER — APPOINTMENT (OUTPATIENT)
Dept: GENERAL RADIOLOGY | Age: 65
End: 2020-06-17
Payer: COMMERCIAL

## 2020-06-17 ENCOUNTER — HOSPITAL ENCOUNTER (EMERGENCY)
Age: 65
Discharge: HOME | End: 2020-06-18
Payer: COMMERCIAL

## 2020-06-17 VITALS
TEMPERATURE: 97.9 F | OXYGEN SATURATION: 97 % | SYSTOLIC BLOOD PRESSURE: 125 MMHG | RESPIRATION RATE: 18 BRPM | DIASTOLIC BLOOD PRESSURE: 80 MMHG | HEART RATE: 59 BPM

## 2020-06-17 PROCEDURE — 99283 EMERGENCY DEPT VISIT LOW MDM: CPT

## 2020-06-17 PROCEDURE — 73610 X-RAY EXAM OF ANKLE: CPT

## 2020-06-17 PROCEDURE — 73630 X-RAY EXAM OF FOOT: CPT

## 2020-06-17 RX ORDER — PREDNISONE 20 MG/1
20 TABLET ORAL DAILY
Qty: 5 TABLET | Refills: 0 | Status: SHIPPED | OUTPATIENT
Start: 2020-06-17 | End: 2020-06-22

## 2020-06-17 RX ORDER — ACETAMINOPHEN 500 MG
1000 TABLET ORAL
Qty: 42 TABLET | Refills: 0 | Status: SHIPPED | OUTPATIENT
Start: 2020-06-17 | End: 2020-07-09 | Stop reason: ALTCHOICE

## 2020-06-17 ASSESSMENT — PAIN SCALES - WONG BAKER: WONGBAKER_NUMERICALRESPONSE: 6

## 2020-06-17 ASSESSMENT — PAIN DESCRIPTION - ORIENTATION: ORIENTATION: LEFT

## 2020-06-17 ASSESSMENT — PAIN DESCRIPTION - FREQUENCY: FREQUENCY: CONTINUOUS

## 2020-06-17 ASSESSMENT — PAIN DESCRIPTION - PROGRESSION: CLINICAL_PROGRESSION: GRADUALLY WORSENING

## 2020-06-17 ASSESSMENT — PAIN DESCRIPTION - DESCRIPTORS: DESCRIPTORS: THROBBING

## 2020-06-17 ASSESSMENT — PAIN DESCRIPTION - LOCATION: LOCATION: FOOT

## 2020-06-17 ASSESSMENT — PAIN SCALES - GENERAL: PAINLEVEL_OUTOF10: 6

## 2020-06-17 ASSESSMENT — PAIN DESCRIPTION - ONSET: ONSET: ON-GOING

## 2020-06-17 ASSESSMENT — PAIN - FUNCTIONAL ASSESSMENT: PAIN_FUNCTIONAL_ASSESSMENT: PREVENTS OR INTERFERES SOME ACTIVE ACTIVITIES AND ADLS

## 2020-06-24 RX ORDER — SODIUM BICARBONATE 650 MG/1
TABLET ORAL
Qty: 360 TABLET | Refills: 1 | Status: SHIPPED | OUTPATIENT
Start: 2020-06-24 | End: 2020-12-21

## 2020-06-24 NOTE — TELEPHONE ENCOUNTER
Medication:   Requested Prescriptions     Pending Prescriptions Disp Refills    sodium bicarbonate 650 MG tablet 360 tablet 1     Sig: TAKE 1 TABLET BY MOUTH FOUR TIMES A DAY        Last Filled:  6/3/2020 #120 1rf    Patient Phone Number: [phone] (home)     Last appt: 2/27/2020   Next appt: Visit date not found    Last OARRS:   RX Monitoring 3/3/2020   Attestation -   Acute Pain Prescriptions -   Periodic Controlled Substance Monitoring No signs of potential drug abuse or diversion identified.    Chronic Pain > 80 MEDD -

## 2020-06-25 RX ORDER — HYDROCODONE BITARTRATE AND ACETAMINOPHEN 7.5; 325 MG/1; MG/1
1 TABLET ORAL EVERY 6 HOURS PRN
Qty: 120 TABLET | Refills: 0 | Status: SHIPPED | OUTPATIENT
Start: 2020-06-25 | End: 2020-07-22 | Stop reason: SDUPTHER

## 2020-06-25 NOTE — TELEPHONE ENCOUNTER
Medication and Quantity requested: Hydrocodone-Acet., #120    Last Visit  5-27-20,    Pharmacy and phone number updated in EPIC:  Yes,[facility]

## 2020-06-29 ENCOUNTER — TELEPHONE (OUTPATIENT)
Dept: FAMILY MEDICINE CLINIC | Age: 65
End: 2020-06-29

## 2020-06-29 RX ORDER — METOPROLOL SUCCINATE 50 MG/1
TABLET, EXTENDED RELEASE ORAL
Qty: 90 TABLET | Refills: 3 | Status: SHIPPED | OUTPATIENT
Start: 2020-06-29 | End: 2021-02-15 | Stop reason: SDUPTHER

## 2020-06-29 RX ORDER — TRAZODONE HYDROCHLORIDE 50 MG/1
TABLET ORAL
Qty: 90 TABLET | Refills: 1 | Status: SHIPPED | OUTPATIENT
Start: 2020-06-29 | End: 2020-07-09

## 2020-06-29 NOTE — TELEPHONE ENCOUNTER
Medication:   Requested Prescriptions     Pending Prescriptions Disp Refills    metoprolol succinate (TOPROL XL) 50 MG extended release tablet [Pharmacy Med Name: METOPROLOL SUCC ER 50 MG TAB] 90 tablet 1     Sig: TAKE 1 TABLET BY MOUTH EVERY DAY       Last Filled:  2/27/2020 #30 3rf    Patient Phone Number: [phone] (home)     Last appt: 2/27/2020   Next appt: Visit date not found    Lab Results   Component Value Date     02/27/2020    K 5.8 (H) 02/27/2020     02/27/2020    CO2 18 (L) 02/27/2020    BUN 47 (H) 02/27/2020    CREATININE 1.9 (H) 02/27/2020    GLUCOSE 98 02/27/2020    CALCIUM 8.9 02/27/2020    PROT 6.7 11/18/2019    LABALBU 3.9 11/18/2019    BILITOT <0.2 11/18/2019    ALKPHOS 97 11/18/2019    AST 8 (L) 11/18/2019    ALT <5 (L) 11/18/2019    LABGLOM 27 (A) 02/27/2020    GFRAA 32 (A) 02/27/2020    AGRATIO 1.4 11/18/2019    GLOB 2.8 11/18/2019

## 2020-07-06 ENCOUNTER — OFFICE VISIT (OUTPATIENT)
Dept: FAMILY MEDICINE CLINIC | Age: 65
End: 2020-07-06
Payer: COMMERCIAL

## 2020-07-06 ENCOUNTER — TELEPHONE (OUTPATIENT)
Dept: FAMILY MEDICINE CLINIC | Age: 65
End: 2020-07-06

## 2020-07-06 VITALS
DIASTOLIC BLOOD PRESSURE: 82 MMHG | OXYGEN SATURATION: 98 % | SYSTOLIC BLOOD PRESSURE: 122 MMHG | WEIGHT: 260.6 LBS | BODY MASS INDEX: 40.9 KG/M2 | TEMPERATURE: 97.9 F | HEIGHT: 67 IN | HEART RATE: 65 BPM

## 2020-07-06 LAB — HGB, POC: 8.9

## 2020-07-06 PROCEDURE — 85018 HEMOGLOBIN: CPT | Performed by: FAMILY MEDICINE

## 2020-07-06 PROCEDURE — 99214 OFFICE O/P EST MOD 30 MIN: CPT | Performed by: FAMILY MEDICINE

## 2020-07-06 RX ORDER — FAMOTIDINE 20 MG/1
20 TABLET, FILM COATED ORAL 2 TIMES DAILY
COMMUNITY
End: 2021-04-16

## 2020-07-06 RX ORDER — PANTOPRAZOLE SODIUM 40 MG/1
40 TABLET, DELAYED RELEASE ORAL
Qty: 30 TABLET | Refills: 5 | Status: SHIPPED | OUTPATIENT
Start: 2020-07-06 | End: 2020-07-09

## 2020-07-06 RX ORDER — OMEPRAZOLE 40 MG/1
40 CAPSULE, DELAYED RELEASE ORAL DAILY
Qty: 30 CAPSULE | Refills: 3 | Status: SHIPPED | OUTPATIENT
Start: 2020-07-06 | End: 2021-02-17 | Stop reason: CLARIF

## 2020-07-06 NOTE — TELEPHONE ENCOUNTER
Fax received from [facility] pharmacy stating Pantoprazole is not covered by patient's insurance. Please provide an alternative.

## 2020-07-07 ENCOUNTER — TELEPHONE (OUTPATIENT)
Dept: ADMINISTRATIVE | Age: 65
End: 2020-07-07

## 2020-07-08 RX ORDER — OMEPRAZOLE 40 MG/1
40 CAPSULE, DELAYED RELEASE ORAL DAILY
Qty: 30 CAPSULE | Refills: 3 | Status: CANCELLED | OUTPATIENT
Start: 2020-07-08

## 2020-07-09 ENCOUNTER — NURSE TRIAGE (OUTPATIENT)
Dept: OTHER | Facility: CLINIC | Age: 65
End: 2020-07-09

## 2020-07-09 ENCOUNTER — APPOINTMENT (OUTPATIENT)
Dept: GENERAL RADIOLOGY | Age: 65
DRG: 682 | End: 2020-07-09
Payer: COMMERCIAL

## 2020-07-09 ENCOUNTER — HOSPITAL ENCOUNTER (INPATIENT)
Age: 65
LOS: 2 days | Discharge: HOME | DRG: 682 | End: 2020-07-11
Attending: EMERGENCY MEDICINE | Admitting: PEDIATRICS
Payer: COMMERCIAL

## 2020-07-09 PROBLEM — N17.9 ACUTE KIDNEY INJURY (HCC): Status: ACTIVE | Noted: 2020-07-09

## 2020-07-09 LAB
ANION GAP SERPL CALCULATED.3IONS-SCNC: 12 MMOL/L (ref 3–16)
BACTERIA: ABNORMAL /HPF
BASOPHILS ABSOLUTE: 0.1 K/UL (ref 0–0.2)
BASOPHILS RELATIVE PERCENT: 1 %
BILIRUBIN URINE: NEGATIVE
BLOOD, URINE: ABNORMAL
BUN BLDV-MCNC: 52 MG/DL (ref 7–20)
CALCIUM SERPL-MCNC: 8.7 MG/DL (ref 8.3–10.6)
CHLORIDE BLD-SCNC: 110 MMOL/L (ref 99–110)
CLARITY: ABNORMAL
CO2: 14 MMOL/L (ref 21–32)
COLOR: YELLOW
CREAT SERPL-MCNC: 2.6 MG/DL (ref 0.6–1.2)
EOSINOPHILS ABSOLUTE: 0.2 K/UL (ref 0–0.6)
EOSINOPHILS RELATIVE PERCENT: 2.8 %
EPITHELIAL CELLS, UA: 0 /HPF (ref 0–5)
GFR AFRICAN AMERICAN: 23
GFR NON-AFRICAN AMERICAN: 19
GLUCOSE BLD-MCNC: 101 MG/DL (ref 70–99)
GLUCOSE URINE: NEGATIVE MG/DL
HCT VFR BLD CALC: 26.6 % (ref 36–48)
HEMOGLOBIN: 8.5 G/DL (ref 12–16)
HYALINE CASTS: 0 /LPF (ref 0–8)
KETONES, URINE: NEGATIVE MG/DL
LEUKOCYTE ESTERASE, URINE: ABNORMAL
LYMPHOCYTES ABSOLUTE: 1.2 K/UL (ref 1–5.1)
LYMPHOCYTES RELATIVE PERCENT: 14.8 %
MCH RBC QN AUTO: 29.9 PG (ref 26–34)
MCHC RBC AUTO-ENTMCNC: 31.8 G/DL (ref 31–36)
MCV RBC AUTO: 93.9 FL (ref 80–100)
MICROSCOPIC EXAMINATION: YES
MONOCYTES ABSOLUTE: 0.4 K/UL (ref 0–1.3)
MONOCYTES RELATIVE PERCENT: 4.8 %
NEUTROPHILS ABSOLUTE: 6.1 K/UL (ref 1.7–7.7)
NEUTROPHILS RELATIVE PERCENT: 76.6 %
NITRITE, URINE: NEGATIVE
OCCULT BLOOD DIAGNOSTIC: ABNORMAL
PDW BLD-RTO: 15.5 % (ref 12.4–15.4)
PH UA: 6 (ref 5–8)
PLATELET # BLD: 262 K/UL (ref 135–450)
PMV BLD AUTO: 6.6 FL (ref 5–10.5)
POTASSIUM REFLEX MAGNESIUM: 4.8 MMOL/L (ref 3.5–5.1)
PRO-BNP: 3099 PG/ML (ref 0–124)
PROTEIN UA: 30 MG/DL
RBC # BLD: 2.84 M/UL (ref 4–5.2)
RBC UA: 8 /HPF (ref 0–4)
SODIUM BLD-SCNC: 136 MMOL/L (ref 136–145)
SPECIFIC GRAVITY UA: 1.02 (ref 1–1.03)
TROPONIN: <0.01 NG/ML
URINE REFLEX TO CULTURE: YES
URINE TYPE: ABNORMAL
UROBILINOGEN, URINE: 0.2 E.U./DL
WBC # BLD: 8 K/UL (ref 4–11)
WBC UA: 130 /HPF (ref 0–5)

## 2020-07-09 PROCEDURE — 2580000003 HC RX 258: Performed by: PHYSICIAN ASSISTANT

## 2020-07-09 PROCEDURE — 83880 ASSAY OF NATRIURETIC PEPTIDE: CPT

## 2020-07-09 PROCEDURE — 87086 URINE CULTURE/COLONY COUNT: CPT

## 2020-07-09 PROCEDURE — 84484 ASSAY OF TROPONIN QUANT: CPT

## 2020-07-09 PROCEDURE — 87186 SC STD MICRODIL/AGAR DIL: CPT

## 2020-07-09 PROCEDURE — 1200000000 HC SEMI PRIVATE

## 2020-07-09 PROCEDURE — 87088 URINE BACTERIA CULTURE: CPT

## 2020-07-09 PROCEDURE — 96374 THER/PROPH/DIAG INJ IV PUSH: CPT

## 2020-07-09 PROCEDURE — 96375 TX/PRO/DX INJ NEW DRUG ADDON: CPT

## 2020-07-09 PROCEDURE — 80048 BASIC METABOLIC PNL TOTAL CA: CPT

## 2020-07-09 PROCEDURE — C9113 INJ PANTOPRAZOLE SODIUM, VIA: HCPCS | Performed by: PHYSICIAN ASSISTANT

## 2020-07-09 PROCEDURE — 71046 X-RAY EXAM CHEST 2 VIEWS: CPT

## 2020-07-09 PROCEDURE — 6370000000 HC RX 637 (ALT 250 FOR IP): Performed by: PEDIATRICS

## 2020-07-09 PROCEDURE — 81001 URINALYSIS AUTO W/SCOPE: CPT

## 2020-07-09 PROCEDURE — G0328 FECAL BLOOD SCRN IMMUNOASSAY: HCPCS

## 2020-07-09 PROCEDURE — 85025 COMPLETE CBC W/AUTO DIFF WBC: CPT

## 2020-07-09 PROCEDURE — 93005 ELECTROCARDIOGRAM TRACING: CPT | Performed by: PHYSICIAN ASSISTANT

## 2020-07-09 PROCEDURE — 6360000002 HC RX W HCPCS: Performed by: PHYSICIAN ASSISTANT

## 2020-07-09 PROCEDURE — 99284 EMERGENCY DEPT VISIT MOD MDM: CPT

## 2020-07-09 RX ORDER — HYDROCODONE BITARTRATE AND ACETAMINOPHEN 7.5; 325 MG/1; MG/1
1 TABLET ORAL EVERY 6 HOURS PRN
Status: DISCONTINUED | OUTPATIENT
Start: 2020-07-09 | End: 2020-07-11 | Stop reason: HOSPADM

## 2020-07-09 RX ORDER — PANTOPRAZOLE SODIUM 40 MG/10ML
80 INJECTION, POWDER, LYOPHILIZED, FOR SOLUTION INTRAVENOUS ONCE
Status: COMPLETED | OUTPATIENT
Start: 2020-07-09 | End: 2020-07-09

## 2020-07-09 RX ORDER — 0.9 % SODIUM CHLORIDE 0.9 %
1000 INTRAVENOUS SOLUTION INTRAVENOUS ONCE
Status: COMPLETED | OUTPATIENT
Start: 2020-07-09 | End: 2020-07-09

## 2020-07-09 RX ADMIN — CEFTRIAXONE 1 G: 1 INJECTION, POWDER, FOR SOLUTION INTRAMUSCULAR; INTRAVENOUS at 20:20

## 2020-07-09 RX ADMIN — PANTOPRAZOLE SODIUM 80 MG: 40 INJECTION, POWDER, FOR SOLUTION INTRAVENOUS at 19:29

## 2020-07-09 RX ADMIN — SODIUM CHLORIDE 1000 ML: 9 INJECTION, SOLUTION INTRAVENOUS at 19:29

## 2020-07-09 RX ADMIN — CARBIDOPA AND LEVODOPA 1 TABLET: 10; 100 TABLET ORAL at 23:11

## 2020-07-09 RX ADMIN — HYDROCODONE BITARTRATE AND ACETAMINOPHEN 1 TABLET: 7.5; 325 TABLET ORAL at 23:11

## 2020-07-09 ASSESSMENT — PAIN DESCRIPTION - ORIENTATION: ORIENTATION: LEFT;RIGHT

## 2020-07-09 ASSESSMENT — ENCOUNTER SYMPTOMS
VOMITING: 0
SHORTNESS OF BREATH: 0
BLOOD IN STOOL: 1
ABDOMINAL PAIN: 1
NAUSEA: 0

## 2020-07-09 ASSESSMENT — PAIN SCALES - GENERAL
PAINLEVEL_OUTOF10: 7
PAINLEVEL_OUTOF10: 7

## 2020-07-09 ASSESSMENT — PAIN DESCRIPTION - LOCATION: LOCATION: LEG

## 2020-07-09 ASSESSMENT — PAIN DESCRIPTION - PAIN TYPE: TYPE: CHRONIC PAIN

## 2020-07-09 NOTE — ED PROVIDER NOTES
[address] [facility]      Pt Name: Guerline Mata  MRN: [id number]  [place] 1955  Date of evaluation: 7/9/2020  Provider: HARRISON Lewis    This patient was seen and evaluated by the attending physician Rc King. Jason Perez 95       Chief Complaint   Patient presents with    Fatigue     pt states her hemoglobin has been low and she is fatigued. pt ot H/H last week in [address]  (Location/Symptom, Timing/Onset, Context/Setting, Quality, Duration, Modifying Factors, Severity.)   Guerline Mata is a 60 y.o. female who presents to the emergency department for fatigue. Patient reports she recently found that she is anemic. Reports she has been feeling weak and fatigued since June 24 when she started having black stool and diarrhea. Reports it lasted for about 2 days. She was in [place] at the time went to the ER and hemoglobin was around 7. She did not receive a blood transfusion. Reports they told her she was borderline. They wanted to admit her but reports they are leaving the next day to come home. She reports a black stools since stopped. No blood from her rectum. She reports she continues to feel weak and fatigued, which has been worse for the past few days. Reports also diffuse abdominal pain. None right now but had some earlier today. She reports she had a CT of her abdomen and pelvis when she was in the ER in [place] and sounds like it was negative. Patient denies any nausea or vomiting. She denies fevers or chills. She does report some shortness of breath since the end of June. She also reports some dysuria since yesterday with urinary frequency. She reports she last had a colonoscopy about 7 years ago. Reports was normal.  Has had an EGD many many years ago and reports that was normal also.   She normally takes aspirin 81 and also Brilinta but has been off since June 25 at the direction of the ER physician in [place]. Nursing Notes were reviewed and I agree. REVIEW OF SYSTEMS    (2-9 systems for level 4, 10 or more for level 5)     Review of Systems   Constitutional: Positive for fatigue. Negative for chills and fever. Respiratory: Negative for shortness of breath. Gastrointestinal: Positive for abdominal pain (none now) and blood in stool. Negative for nausea and vomiting. Except as noted above the remainder of the review of systems was reviewed and negative. PAST MEDICAL HISTORY         Diagnosis Date    Arthritis     ASHD (arteriosclerotic heart disease)     Chronic kidney disease     Chronic midline low back pain without sciatica 4/3/2019    COPD (chronic obstructive pulmonary disease) (HCC)     unknown     Coronary artery disease involving native coronary artery of native heart without angina pectoris 3/31/2016    Coronary artery disease involving native coronary artery of native heart without angina pectoris 3/31/2016    Depression     Full dentures     HTN     Hyperlipidemia     Iron deficiency anemia 3/31/2016    Kidney stone 2012    Morbid obesity (HCC)     Restless legs syndrome     pt on cabidopa/levodopa    Stage 3 chronic kidney disease ([address] 4/3/2019    Wears glasses        SURGICAL HISTORY           Procedure Laterality Date    ABSCESS DRAINAGE Left 04/19/2018    incision and drainage of right buttock abscess    APPENDECTOMY      CARPAL TUNNEL RELEASE Left 11/14/2017    CORONARY ARTERY BYPASS GRAFT  9/28/10    4 way bypass    HIP SURGERY Left 2016    I&D of joint    KNEE ARTHROSCOPY Right 2000    TOTAL HIP ARTHROPLASTY Left 11-4-2015    TOTAL KNEE ARTHROPLASTY Right 8-24-10    TOTAL KNEE ARTHROPLASTY Left 4/8/11    TUBAL LIGATION         CURRENT MEDICATIONS       Previous Medications    ASPIRIN 81 MG EC TABLET    Take 81 mg by mouth daily.     BUPROPION (WELLBUTRIN XL) 150 MG EXTENDED RELEASE TABLET appearance. She is not ill-appearing, toxic-appearing or diaphoretic. HENT:      Head: Normocephalic and atraumatic. Nose: Nose normal.   Neck:      Musculoskeletal: Normal range of motion and neck supple. Cardiovascular:      Rate and Rhythm: Normal rate and regular rhythm. Heart sounds: Normal heart sounds. Pulmonary:      Effort: Pulmonary effort is normal. No respiratory distress. Breath sounds: Normal breath sounds. Abdominal:      General: There is no distension. Palpations: Abdomen is soft. There is no mass. Tenderness: There is no abdominal tenderness. There is no guarding or rebound. Hernia: No hernia is present. Genitourinary:     Comments: TORIE performed. No hemorrhoids or fissures. Returned brown stool. No blood per rectum    Rosalita Newsome chaperoned  Musculoskeletal: Normal range of motion. Skin:     General: Skin is warm and dry. Neurological:      Mental Status: She is alert. Psychiatric:         Mood and Affect: Mood normal.         Behavior: Behavior normal.         Thought Content: Thought content normal.         Judgment: Judgment normal.         DIFFERENTIAL DIAGNOSIS   GI bleed, gastritis, stomach ulcer, other, anemia, UTI, other    DIAGNOSTICRESULTS     EKG: All EKG's are interpreted by HARRISON Gupta in the absence of a cardiologist.    EKG obtained. See Dr. Luzmaria Trammell note for interpretation    RADIOLOGY:   Non-plain film images such as CT, Ultrasound and MRI are read by the radiologist. Plain radiographic images are visualized and preliminarily interpreted by HARRISON Gupta with the below findings:      Interpretation per the Radiologist below, if available at the time of this note:    XR CHEST STANDARD (2 VW)   Final Result   No radiographic evidence of acute cardiopulmonary disease.                LABS:  Results for orders placed or performed during the hospital encounter of 07/09/20   CBC Auto Differential   Result Value Ref Range    WBC 8.0 4.0 - 11.0 K/uL    RBC 2.84 (L) 4.00 - 5.20 M/uL    Hemoglobin 8.5 (L) 12.0 - 16.0 g/dL    Hematocrit 26.6 (L) 36.0 - 48.0 %    MCV 93.9 80.0 - 100.0 fL    MCH 29.9 26.0 - 34.0 pg    MCHC 31.8 31.0 - 36.0 g/dL    RDW 15.5 (H) 12.4 - 15.4 %    Platelets 493 636 - 657 K/uL    MPV 6.6 5.0 - 10.5 fL    Neutrophils % 76.6 %    Lymphocytes % 14.8 %    Monocytes % 4.8 %    Eosinophils % 2.8 %    Basophils % 1.0 %    Neutrophils Absolute 6.1 1.7 - 7.7 K/uL    Lymphocytes Absolute 1.2 1.0 - 5.1 K/uL    Monocytes Absolute 0.4 0.0 - 1.3 K/uL    Eosinophils Absolute 0.2 0.0 - 0.6 K/uL    Basophils Absolute 0.1 0.0 - 0.2 K/uL   Basic Metabolic Panel w/ Reflex to MG   Result Value Ref Range    Sodium 136 136 - 145 mmol/L    Potassium reflex Magnesium 4.8 3.5 - 5.1 mmol/L    Chloride 110 99 - 110 mmol/L    CO2 14 (LL) 21 - 32 mmol/L    Anion Gap 12 3 - 16    Glucose 101 (H) 70 - 99 mg/dL    BUN 52 (H) 7 - 20 mg/dL    CREATININE 2.6 (H) 0.6 - 1.2 mg/dL    GFR Non- 19 (A) >60    GFR  23 (A) >60    Calcium 8.7 8.3 - 10.6 mg/dL   Troponin   Result Value Ref Range    Troponin <0.01 <0.01 ng/mL   Brain Natriuretic Peptide   Result Value Ref Range    Pro-BNP 3,099 (H) 0 - 124 pg/mL   Urinalysis Reflex to Culture   Result Value Ref Range    Color, UA YELLOW Straw/Yellow    Clarity, UA CLOUDY (A) Clear    Glucose, Ur Negative Negative mg/dL    Bilirubin Urine Negative Negative    Ketones, Urine Negative Negative mg/dL    Specific Gravity, UA 1.016 1.005 - 1.030    Blood, Urine SMALL (A) Negative    pH, UA 6.0 5.0 - 8.0    Protein, UA 30 (A) Negative mg/dL    Urobilinogen, Urine 0.2 <2.0 E.U./dL    Nitrite, Urine Negative Negative    Leukocyte Esterase, Urine LARGE (A) Negative    Microscopic Examination YES     Urine Type NotGiven     Urine Reflex to Culture Yes    Blood Occult Stool #1   Result Value Ref Range    Occult Blood Diagnostic Result: POSITIVE  Normal range: Negative   (A) Microscopic Urinalysis   Result Value Ref Range    Bacteria, UA 2+ (A) None Seen /HPF    Hyaline Casts, UA 0 0 - 8 /LPF    WBC,  (H) 0 - 5 /HPF    RBC, UA 8 (H) 0 - 4 /HPF    Epithelial Cells, UA 0 0 - 5 /HPF       All other labs were withinnormal range or not returned as of this dictation. EMERGENCY DEPARTMENT COURSE and DIFFERENTIAL DIAGNOSIS/MDM:   Vitals:    Vitals:    07/09/20 1637 07/09/20 1716 07/09/20 1732 07/09/20 1918   BP: 133/77 112/60 113/60 (!) 120/45   Pulse:  58 61 62   Resp:  15 16 13   Temp:       TempSrc:       SpO2: 100% 100%  100%   Height:           Patient was nontoxic, well appearing, afebrile with normal vital signs. Saturating well on room air. Her hemoglobin is 8.5 which is decreased from prior of 10.1 in February 2020. Her creatinine is 2.6 which is worse than her baseline of 1.9. Bicarb is 14. BUN is 52. Has acute on chronic renal failure. She was given a liter of fluids. Urine does appear infected. Given Rocephin. Stool was Hemoccult positive. Troponin was negative. Chest x-ray negative. Upon reevaluation she clinically appears well but I believe she requires admission for her GI bleed with anemia along with acute on chronic renal failure. Yandelye Cogan She was given a Protonix bolus. Medicine was consulted. Patient in agreement with plan for admission and is admitted in stable condition. PROCEDURES:  None    FINAL IMPRESSION      1. Anemia, unspecified type    2. Gastrointestinal hemorrhage, unspecified gastrointestinal hemorrhage type    3. Acute renal failure superimposed on chronic kidney disease, unspecified CKD stage, unspecified acute renal failure type ([facility] [address]    4. Acute cystitis without hematuria          DISPOSITION/PLAN   DISPOSITION Decision To Admit 07/09/2020 07:38:57 PM      PATIENT REFERRED TO:  No follow-up provider specified.     DISCHARGE MEDICATIONS:  New Prescriptions    No medications on file       (Please note that portions of this note werecompleted with a voice recognition program.  Efforts were made to edit the dictations but occasionally words are mis-transcribed.)    Sylvester Huerta, [address], [address]  07/09/20 1944

## 2020-07-09 NOTE — TELEPHONE ENCOUNTER
Received call from [facility]. Reason for Disposition   MODERATE weakness from poor fluid intake with no improvement after 2 hours of rest and fluids    Answer Assessment - Initial Assessment Questions  1. DESCRIPTION: \"Describe how you are feeling. \"      Pt states that she feels weak, like she could pass out    2. SEVERITY: \"How bad is it? \"  \"Can you stand and walk? \"    - MILD - Feels weak or tired, but does not interfere with work, school or normal activities    - [facility] to stand and walk; weakness interferes with work, school, or normal activities    - SEVERE - Unable to stand or walk      Moderate    3. ONSET:  \"When did the weakness begin? \"      This morning    4. CAUSE: \"What do you think is causing the weakness? \"      Pt is concerned that her hemoglobin is low    5. MEDICINES: Larjane Stanleyop you recently started a new medicine or had a change in the amount of a medicine? \"      Pt was recently started on Pepcid and Prilosec    6. OTHER SYMPTOMS: \"Do you have any other symptoms? \" (e.g., chest pain, fever, cough, SOB, vomiting, diarrhea, bleeding, other areas of pain)      Nausea     7. PREGNANCY: \"Is there any chance you are pregnant? \" \"When was your last menstrual period? \"      N/a    Protocols used: WEAKNESS (GENERALIZED) AND FATIGUE-ADULT-OH    Pt is calling with c/o weakness and nausea. Pt states that she feels like she could pass out. Pt states that she was in the ED recently and was told that her hemoglobin was low. She states that she was not admitted nor did she receive a blood transfusion. Recommended that pt seen in the ED or an urgent care. Provided pt with care advice. Please do not reply to the triage nurse through this encounter. Any subsequent communication should be directly with the patient.

## 2020-07-09 NOTE — ED PROVIDER NOTES
[facility]  EMERGENCY DEPARTMENT ENCOUNTER      Pt Name: Ronnell Small  MRN: [id number]  Fayegfjani 1955  Date of evaluation: 7/9/2020  Provider: Adam Guo [id number]  Chief Complaint   Patient presents with    Fatigue     pt states her hemoglobin has been low and she is fatigued. pt ot H/H last week in [place]       I have fully participated in the care of Ronnell Small and have had a face-to-face evaluation. I have reviewed and agree with all pertinent clinical information, and midlevel provider's history, and physical exam. I have also reviewed the labs, EKG, and imaging studies and treatment plan. I have also reviewed and agree with the medications, allergies and past medical history section for this Ronnell Small. I agree with the diagnosis, and I concur. I wore personal protective equipment when I was in the room the entire time. This includes gloves, N95 mask, face shield, and a glove over my stethoscope for protection. Past Medical History:   Diagnosis Date    Arthritis     ASHD (arteriosclerotic heart disease)     Chronic kidney disease     Chronic midline low back pain without sciatica 4/3/2019    COPD (chronic obstructive pulmonary disease) (HCC)     unknown     Coronary artery disease involving native coronary artery of native heart without angina pectoris 3/31/2016    Coronary artery disease involving native coronary artery of native heart without angina pectoris 3/31/2016    Depression     Full dentures     HTN     Hyperlipidemia     Iron deficiency anemia 3/31/2016    Kidney stone 2012    Morbid obesity (HCC)     Restless legs syndrome     pt on cabidopa/levodopa    Stage 3 chronic kidney disease ([address] 4/3/2019    Wears glasses        MDM:  Ronnell Small is a 60 y.o. female who presents with anemia.   She was in a hospital in [place] in the emergency department and they told her she had a low hemoglobin and wanted to admit her and transfuse her but she decided be transferred back home. She drove her self with her . She denies any loss of consciousness. She is been feeling increasing fatigue and shortness of breath. Physical exam is noncontributory. Rectal exam showed brown stool with no blood. She was guaiac positive. Her hemoglobin was returned at 8.5. Which is down 3 g. Therefore, patient was admitted to the hospital for further evaluation treatment for active rectal bleeding. Laboratory work also revealed acute cystitis and acute on chronic renal failure. Hospitalist was notified.     Vitals:    07/10/20 0908   BP:    Pulse:    Resp: 18   Temp:    SpO2: 95%       Lab results  Labs Reviewed   CBC WITH AUTO DIFFERENTIAL - Abnormal; Notable for the following components:       Result Value    RBC 2.84 (*)     Hemoglobin 8.5 (*)     Hematocrit 26.6 (*)     RDW 15.5 (*)     All other components within normal limits    Narrative:     Performed at:  [address] [facility]   Phone [phone]   BASIC METABOLIC PANEL W/ REFLEX TO MG FOR LOW K - Abnormal; Notable for the following components:    CO2 14 (*)     Glucose 101 (*)     BUN 52 (*)     CREATININE 2.6 (*)     GFR Non- 19 (*)     GFR  23 (*)     All other components within normal limits    Narrative:     Larissa Jacobo tel. [phone],  Chemistry results called to and read back by Tiffany Manriquez RN, 07/09/2020  14:32, by [facility]  Performed at:  [address] [facility] [address]   Phone [phone]   BRAIN NATRIURETIC PEPTIDE - Abnormal; Notable for the following components:    Pro-BNP 3,099 (*)     All other components within normal limits    Narrative:     Larissa Jacobo tel. [phone],  Chemistry results called to and read back by Tiffany Manriquez RN, 07/09/2020  14:32, by [facility]  Performed at:  [facility]  [address] [place] [address] [employer]   Phone [phone]   URINE RT REFLEX TO CULTURE - Abnormal; Notable for the following components:    Clarity, UA CLOUDY (*)     Blood, Urine SMALL (*)     Protein, UA 30 (*)     Leukocyte Esterase, Urine LARGE (*)     All other components within normal limits    Narrative:     Performed at:  [address] [employer]   Phone [phone]   BLOOD OCCULT STOOL DIAGNOSTIC - Abnormal; Notable for the following components:    Occult Blood Diagnostic   (*)     Value: Result: POSITIVE  Normal range: Negative      All other components within normal limits    Narrative:     ORDER#: [id number]                          ORDERED BY: Kala Matias  SOURCE: Stool                              COLLECTED:  07/09/20 18:45  ANTIBIOTICS AT PEREZ.:                      RECEIVED :  07/09/20 18:49  Performed at:  [facility]  [address] [place] [address] [employer] [address]   Phone [phone]   MICROSCOPIC URINALYSIS - Abnormal; Notable for the following components:    Bacteria, UA 2+ (*)     WBC,  (*)     RBC, UA 8 (*)     All other components within normal limits    Narrative:     Performed at:  [address] [employer]   Phone [phone]   IRON AND TIBC - Abnormal; Notable for the following components:    Iron 24 (*)     TIBC 240 (*)     Iron Saturation 10 (*)     All other components within normal limits    Narrative:     Performed at:  [address] [employer]   Phone [phone]   HEMOGLOBIN AND HEMATOCRIT, BLOOD - Abnormal; Notable for the following components:    Hemoglobin 8.2 (*)     Hematocrit 26.0 (*)     All other components within normal limits    Narrative:     Performed at:  [facility]  Martinpolku 42 mg (150 mg Oral Given 7/10/20 0822)   carbidopa-levodopa (SINEMET)  MG per tablet 1 tablet (1 tablet Oral Given 7/9/20 2311)   FLUoxetine (PROZAC) capsule 40 mg (40 mg Oral Given 7/10/20 0820)   HYDROcodone-acetaminophen ([facility]) 7.5-325 MG per tablet 1 tablet (1 tablet Oral Given 7/10/20 0511)   metoprolol succinate (TOPROL XL) extended release tablet 25 mg (25 mg Oral Given 7/10/20 0820)   pantoprazole (PROTONIX) tablet 40 mg (40 mg Oral Given 7/10/20 0510)   pravastatin (PRAVACHOL) tablet 80 mg (80 mg Oral Given 7/10/20 0116)   sodium bicarbonate tablet 1,300 mg (1,300 mg Oral Given 7/10/20 0823)   sodium chloride flush 0.9 % injection 10 mL (10 mLs Intravenous Not Given 7/10/20 0823)   sodium chloride flush 0.9 % injection 10 mL (has no administration in time range)   acetaminophen (TYLENOL) tablet 650 mg (has no administration in time range)     Or   acetaminophen (TYLENOL) suppository 650 mg (has no administration in time range)   promethazine (PHENERGAN) tablet 12.5 mg (has no administration in time range)     Or   ondansetron (ZOFRAN) injection 4 mg (has no administration in time range)   enoxaparin (LOVENOX) injection 40 mg (40 mg Subcutaneous Given 7/10/20 0821)   lactated ringers infusion ( Intravenous New Bag 7/10/20 0116)   famotidine (PEPCID) tablet 20 mg (20 mg Oral Given 7/10/20 0823)   albuterol (PROVENTIL) nebulizer solution 2.5 mg (has no administration in time range)   pantoprazole (PROTONIX) injection 80 mg (80 mg Intravenous Given 7/9/20 1929)   0.9 % sodium chloride bolus (0 mLs Intravenous Stopped 7/9/20 2020)   cefTRIAXone (ROCEPHIN) 1 g IVPB in 50 mL D5W minibag (0 g Intravenous Stopped 7/9/20 2116)       Current Discharge Medication List          The patient's blood pressure was not found to be elevated according to CMS/Medicare and the Affordable Care Act/ObamaCare criteria. IMPRESSIONS:  1. Anemia, unspecified type    2.  Gastrointestinal hemorrhage, unspecified gastrointestinal hemorrhage type    3. Acute renal failure superimposed on chronic kidney disease, unspecified CKD stage, unspecified acute renal failure type ([facility]    4.  Acute cystitis without hematuria               Armand Shah DO  07/10/20 1118

## 2020-07-09 NOTE — ED TRIAGE NOTES
Pt arrived to ED via private vehicle with complaints of fatigue. On initial assessment, pt states they had their HGB tested last week and they were told their HGB was low. Pt states they are also having dark stools. Pt states they just have a loss of energy. VS noted and stable. Patient A&Ox4. Respirations easy and unlabored. Skin warm and dry and appropriate for ethnicity. No acute distress noted at this time.

## 2020-07-09 NOTE — H&P
Hospital Medicine History & Physical      PCP: Edward Graves MD    Date of Admission: 7/9/2020    Date of Service: 7/9/2020 at [facility] [place]    Chief Complaint:  Fatigue      History Of Present Illness:      60 y.o. female who presented to [facility] who last week was up in [place] on vacation and had dark stools. Went to ED. Was found to be mildly anemic and told needed admission but declined. Prior to discharge she was prescribed a PPI and asked to discontinue her aspirin, Brilinta, and Lasix and follow-up with primary care provider. She states she had been doing well but past 2 days has been very fatigued so presented to emergency department. No melena. Some sob same time frame. No CP. No dysuria or frequency.      Past Medical History:          Diagnosis Date    Arthritis     ASHD (arteriosclerotic heart disease)     Chronic kidney disease     Chronic midline low back pain without sciatica 4/3/2019    COPD (chronic obstructive pulmonary disease) (HCC)     unknown     Coronary artery disease involving native coronary artery of native heart without angina pectoris 3/31/2016    Coronary artery disease involving native coronary artery of native heart without angina pectoris 3/31/2016    Depression     Full dentures     HTN     Hyperlipidemia     Iron deficiency anemia 3/31/2016    Kidney stone 2012    [facility]bid [facility] ([facility]     Restless legs syndrome     pt on cabidopa/levodopa    Stage 3 chronic kidney disease ([address] 4/3/2019    Wears glasses        Past Surgical History:          Procedure Laterality Date    ABSCESS DRAINAGE Left 04/19/2018    incision and drainage of right buttock abscess    APPENDECTOMY      CARPAL TUNNEL RELEASE Left 11/14/2017    CORONARY ARTERY BYPASS GRAFT  9/28/10    4 way bypass    HIP SURGERY Left 2016    I&D of joint    KNEE ARTHROSCOPY Right 2000    TOTAL HIP ARTHROPLASTY Left 11-4-2015    TOTAL KNEE ARTHROPLASTY Right 8-24-10    TOTAL KNEE ARTHROPLASTY Left 4/8/11    TUBAL LIGATION         Medications Prior to Admission:      Prior to Admission medications    Medication Sig Start Date End Date Taking? Authorizing Provider   famotidine (PEPCID) 20 MG tablet Take 20 mg by mouth 2 times daily   Yes Historical Provider, MD   omeprazole (PRILOSEC) 40 MG delayed release capsule Take 1 capsule by mouth daily 7/6/20  Yes Supriya Barr MD   metoprolol succinate (TOPROL XL) 50 MG extended release tablet TAKE 1 TABLET BY MOUTH EVERY DAY 6/29/20  Yes Supriya Barr MD   traZODone (DESYREL) 50 MG tablet TAKE 1 TABLET BY MOUTH EVERY DAY AT BEDTIME AS NEEDED FOR SLEEP 6/29/20  Yes Supriya Barr MD   HYDROcodone-acetaminophen (NORCO) 7.5-325 MG per tablet Take 1 tablet by mouth every 6 hours as needed for Pain for up to 30 days. 6/25/20 7/25/20 Yes Supriya Barr MD   carbidopa-levodopa (SINEMET)  MG per tablet TAKE 2 TABLETS NIGHTLY 5/27/20  Yes Supriya Barr MD   FLUoxetine (PROZAC) 40 MG capsule TAKE 2 CAPSULES DAILY 3/7/20  Yes Supriya Barr MD   buPROPion (WELLBUTRIN XL) 150 MG extended release tablet TAKE 1 TABLET BY MOUTH EVERY DAY 3/4/20  Yes Supriya Barr MD   furosemide (LASIX) 20 MG tablet Take 1 tablet by mouth daily 12/12/19  Yes Dillon Epps MD   pravastatin (PRAVACHOL) 80 MG tablet TAKE 1 TABLET NIGHTLY 12/9/19  Yes Supriya Barr MD   ticagrelor (BRILINTA) 90 MG TABS tablet Take 1 tablet by mouth 2 times daily 11/23/19  Yes Supriya Barr MD   aspirin 81 MG EC tablet Take 81 mg by mouth daily. Yes Historical Provider, MD   sodium bicarbonate 650 MG tablet TAKE 1 TABLET BY MOUTH FOUR TIMES A DAY 6/24/20   Brenden Godfrey MD       Allergies:  Mobic [meloxicam]; Morphine; and Other    Social History:      The patient currently lives at home with her     TOBACCO:   reports that she quit smoking about 4 years ago. Her smoking use included cigarettes. She has a 15.00 pack-year smoking history.  She has never used smokeless tobacco.  ETOH:   reports no history of alcohol use. E-Cigarettes Vaping or Juuling     Questions Responses    Vaping Use Never User    Start Date     Does device contain nicotine? Quit Date     Vaping Type             Family History:               Problem Relation Age of Onset    Stroke Mother     Diabetes Mother     Heart Disease Mother     High Blood Pressure Mother     High Cholesterol Mother     Cancer Neg Hx        REVIEW OF SYSTEMS:   Pertinent positives as noted in the HPI. All other systems reviewed and negative. PHYSICAL EXAM PERFORMED:    BP (!) 120/45   Pulse 62   Temp 98.2 °F (36.8 °C) (Tympanic)   Resp 13   Ht 5' 7\" (1.702 m)   LMP 01/01/2004   SpO2 100%   BMI 40.82 kg/m²     General appearance:  No apparent distress, appears stated age and cooperative. HEENT:  Normal cephalic, atraumatic without obvious deformity. Pupils equal, round, and reactive to light. Extra ocular muscles intact. Conjunctivae/corneas clear. Neck: Supple, with full range of motion. No jugular venous distention. Trachea midline. Respiratory:  Normal respiratory effort. Clear to auscultation, bilaterally without Rales/Wheezes/Rhonchi. Cardiovascular:  Regular rate and rhythm with normal S1/S2 without murmurs, rubs or gallops. Abdomen: Soft, non-tender, non-distended with normal bowel sounds. Musculoskeletal:  No clubbing, cyanosis or edema bilaterally. Full range of motion without deformity. Skin: Skin color, texture, turgor normal.  No rashes or lesions. Neurologic:  Neurovascularly intact without any focal sensory/motor deficits.  Cranial nerves: II-XII intact, grossly non-focal.  Psychiatric:  Alert and oriented, thought content appropriate, normal insight  Capillary Refill: Brisk,< 3 seconds   Peripheral Pulses: +2 palpable, equal bilaterally       Labs:     Recent Labs     07/09/20  1355   WBC 8.0   HGB 8.5*   HCT 26.6*        Recent Labs     07/09/20  1355      K 4.8      CO2 14*   BUN 52*   CREATININE 2.6*   CALCIUM 8.7     No results for input(s): AST, ALT, BILIDIR, BILITOT, ALKPHOS in the last 72 hours. No results for input(s): INR in the last 72 hours. Recent Labs     07/09/20  1355   TROPONINI <0.01       Urinalysis:      Lab Results   Component Value Date    NITRU Negative 07/09/2020    WBCUA 130 07/09/2020    BACTERIA 2+ 07/09/2020    RBCUA 8 07/09/2020    BLOODU SMALL 07/09/2020    SPECGRAV 1.016 07/09/2020    GLUCOSEU Negative 07/09/2020    GLUCOSEU NEGATIVE 04/05/2011       Radiology:     XR CHEST STANDARD (2 VW)   Final Result   No radiographic evidence of acute cardiopulmonary disease. ASSESSMENT:  Acute on chronic kidney disease  Anemia  Metabolic acidosis  Abnormal UA    PLAN:  Admitting for observation  IV hydration  Continue PPI  Continue to hold patient's aspirin and Brilinta  Repeat CBC and BMP in morning  GI consult to facilitate outpatient work-up if needed      DVT Prophylaxis: Enoxaparin  Diet:  Regular  Code Status:Full  Surrogate,  Cathy Guerrero, [address] -admitting to [facility] 5 bed for observation. Anticipate less than 2 midnight stay       Enid Khan MD    Thank you Cleopatra Haro MD for the opportunity to be involved in this patient's care.  If you have any questions or concerns please feel free to contact me at [phone]

## 2020-07-10 ENCOUNTER — ANESTHESIA (OUTPATIENT)
Dept: ENDOSCOPY | Age: 65
DRG: 682 | End: 2020-07-10
Payer: COMMERCIAL

## 2020-07-10 ENCOUNTER — ANESTHESIA EVENT (OUTPATIENT)
Dept: ENDOSCOPY | Age: 65
DRG: 682 | End: 2020-07-10
Payer: COMMERCIAL

## 2020-07-10 VITALS
RESPIRATION RATE: 14 BRPM | DIASTOLIC BLOOD PRESSURE: 61 MMHG | OXYGEN SATURATION: 100 % | SYSTOLIC BLOOD PRESSURE: 108 MMHG

## 2020-07-10 LAB
ANION GAP SERPL CALCULATED.3IONS-SCNC: 12 MMOL/L (ref 3–16)
BUN BLDV-MCNC: 42 MG/DL (ref 7–20)
CALCIUM SERPL-MCNC: 8.4 MG/DL (ref 8.3–10.6)
CHLORIDE BLD-SCNC: 111 MMOL/L (ref 99–110)
CO2: 16 MMOL/L (ref 21–32)
CREAT SERPL-MCNC: 2.2 MG/DL (ref 0.6–1.2)
EKG ATRIAL RATE: 61 BPM
EKG DIAGNOSIS: NORMAL
EKG P AXIS: -7 DEGREES
EKG P-R INTERVAL: 134 MS
EKG Q-T INTERVAL: 382 MS
EKG QRS DURATION: 82 MS
EKG QTC CALCULATION (BAZETT): 384 MS
EKG R AXIS: -13 DEGREES
EKG T AXIS: -11 DEGREES
EKG VENTRICULAR RATE: 61 BPM
GFR AFRICAN AMERICAN: 27
GFR NON-AFRICAN AMERICAN: 23
GLUCOSE BLD-MCNC: 111 MG/DL (ref 70–99)
HCT VFR BLD CALC: 24 % (ref 36–48)
HCT VFR BLD CALC: 26 % (ref 36–48)
HCT VFR BLD CALC: 28.1 % (ref 36–48)
HEMOGLOBIN: 7.8 G/DL (ref 12–16)
HEMOGLOBIN: 8.2 G/DL (ref 12–16)
HEMOGLOBIN: 9.2 G/DL (ref 12–16)
IRON SATURATION: 10 % (ref 15–50)
IRON: 24 UG/DL (ref 37–145)
MCH RBC QN AUTO: 30.4 PG (ref 26–34)
MCHC RBC AUTO-ENTMCNC: 32.4 G/DL (ref 31–36)
MCV RBC AUTO: 93.8 FL (ref 80–100)
PDW BLD-RTO: 15.4 % (ref 12.4–15.4)
PLATELET # BLD: 259 K/UL (ref 135–450)
PMV BLD AUTO: 6.9 FL (ref 5–10.5)
POTASSIUM REFLEX MAGNESIUM: 4.2 MMOL/L (ref 3.5–5.1)
RBC # BLD: 2.56 M/UL (ref 4–5.2)
SARS-COV-2, NAAT: NOT DETECTED
SODIUM BLD-SCNC: 139 MMOL/L (ref 136–145)
TOTAL IRON BINDING CAPACITY: 240 UG/DL (ref 260–445)
WBC # BLD: 8.7 K/UL (ref 4–11)

## 2020-07-10 PROCEDURE — 7100000001 HC PACU RECOVERY - ADDTL 15 MIN: Performed by: INTERNAL MEDICINE

## 2020-07-10 PROCEDURE — 83550 IRON BINDING TEST: CPT

## 2020-07-10 PROCEDURE — 83540 ASSAY OF IRON: CPT

## 2020-07-10 PROCEDURE — 7100000000 HC PACU RECOVERY - FIRST 15 MIN: Performed by: INTERNAL MEDICINE

## 2020-07-10 PROCEDURE — 3700000000 HC ANESTHESIA ATTENDED CARE: Performed by: INTERNAL MEDICINE

## 2020-07-10 PROCEDURE — 3700000001 HC ADD 15 MINUTES (ANESTHESIA): Performed by: INTERNAL MEDICINE

## 2020-07-10 PROCEDURE — 93010 ELECTROCARDIOGRAM REPORT: CPT | Performed by: INTERNAL MEDICINE

## 2020-07-10 PROCEDURE — 2580000003 HC RX 258: Performed by: PEDIATRICS

## 2020-07-10 PROCEDURE — 85018 HEMOGLOBIN: CPT

## 2020-07-10 PROCEDURE — 3609017100 HC EGD: Performed by: INTERNAL MEDICINE

## 2020-07-10 PROCEDURE — U0002 COVID-19 LAB TEST NON-CDC: HCPCS

## 2020-07-10 PROCEDURE — 6370000000 HC RX 637 (ALT 250 FOR IP): Performed by: PEDIATRICS

## 2020-07-10 PROCEDURE — 2709999900 HC NON-CHARGEABLE SUPPLY: Performed by: INTERNAL MEDICINE

## 2020-07-10 PROCEDURE — 36415 COLL VENOUS BLD VENIPUNCTURE: CPT

## 2020-07-10 PROCEDURE — 94760 N-INVAS EAR/PLS OXIMETRY 1: CPT

## 2020-07-10 PROCEDURE — 6370000000 HC RX 637 (ALT 250 FOR IP): Performed by: INTERNAL MEDICINE

## 2020-07-10 PROCEDURE — 1200000000 HC SEMI PRIVATE

## 2020-07-10 PROCEDURE — 85027 COMPLETE CBC AUTOMATED: CPT

## 2020-07-10 PROCEDURE — 0DJ08ZZ INSPECTION OF UPPER INTESTINAL TRACT, VIA NATURAL OR ARTIFICIAL OPENING ENDOSCOPIC: ICD-10-PCS | Performed by: INTERNAL MEDICINE

## 2020-07-10 PROCEDURE — 6360000002 HC RX W HCPCS: Performed by: NURSE ANESTHETIST, CERTIFIED REGISTERED

## 2020-07-10 PROCEDURE — 2580000003 HC RX 258: Performed by: INTERNAL MEDICINE

## 2020-07-10 PROCEDURE — 85014 HEMATOCRIT: CPT

## 2020-07-10 PROCEDURE — 80048 BASIC METABOLIC PNL TOTAL CA: CPT

## 2020-07-10 PROCEDURE — 2500000003 HC RX 250 WO HCPCS: Performed by: NURSE ANESTHETIST, CERTIFIED REGISTERED

## 2020-07-10 PROCEDURE — 6360000002 HC RX W HCPCS: Performed by: PEDIATRICS

## 2020-07-10 PROCEDURE — 2580000003 HC RX 258: Performed by: NURSE ANESTHETIST, CERTIFIED REGISTERED

## 2020-07-10 RX ORDER — FAMOTIDINE 20 MG/1
20 TABLET, FILM COATED ORAL 2 TIMES DAILY
Status: DISCONTINUED | OUTPATIENT
Start: 2020-07-10 | End: 2020-07-10 | Stop reason: DRUGHIGH

## 2020-07-10 RX ORDER — METOPROLOL SUCCINATE 25 MG/1
25 TABLET, EXTENDED RELEASE ORAL DAILY
Status: DISCONTINUED | OUTPATIENT
Start: 2020-07-10 | End: 2020-07-11 | Stop reason: HOSPADM

## 2020-07-10 RX ORDER — ONDANSETRON 2 MG/ML
4 INJECTION INTRAMUSCULAR; INTRAVENOUS
Status: DISCONTINUED | OUTPATIENT
Start: 2020-07-10 | End: 2020-07-10

## 2020-07-10 RX ORDER — PROPOFOL 10 MG/ML
INJECTION, EMULSION INTRAVENOUS PRN
Status: DISCONTINUED | OUTPATIENT
Start: 2020-07-10 | End: 2020-07-10 | Stop reason: SDUPTHER

## 2020-07-10 RX ORDER — PRAVASTATIN SODIUM 40 MG
80 TABLET ORAL NIGHTLY
Status: DISCONTINUED | OUTPATIENT
Start: 2020-07-10 | End: 2020-07-11 | Stop reason: HOSPADM

## 2020-07-10 RX ORDER — BUPROPION HYDROCHLORIDE 150 MG/1
150 TABLET ORAL DAILY
Status: DISCONTINUED | OUTPATIENT
Start: 2020-07-10 | End: 2020-07-11 | Stop reason: HOSPADM

## 2020-07-10 RX ORDER — ACETAMINOPHEN 650 MG/1
650 SUPPOSITORY RECTAL EVERY 6 HOURS PRN
Status: DISCONTINUED | OUTPATIENT
Start: 2020-07-10 | End: 2020-07-11 | Stop reason: HOSPADM

## 2020-07-10 RX ORDER — SODIUM CHLORIDE 0.9 % (FLUSH) 0.9 %
10 SYRINGE (ML) INJECTION PRN
Status: DISCONTINUED | OUTPATIENT
Start: 2020-07-10 | End: 2020-07-11 | Stop reason: HOSPADM

## 2020-07-10 RX ORDER — PROMETHAZINE HYDROCHLORIDE 25 MG/1
12.5 TABLET ORAL EVERY 6 HOURS PRN
Status: DISCONTINUED | OUTPATIENT
Start: 2020-07-10 | End: 2020-07-11 | Stop reason: HOSPADM

## 2020-07-10 RX ORDER — LABETALOL HYDROCHLORIDE 5 MG/ML
5 INJECTION, SOLUTION INTRAVENOUS EVERY 10 MIN PRN
Status: DISCONTINUED | OUTPATIENT
Start: 2020-07-10 | End: 2020-07-10

## 2020-07-10 RX ORDER — LIDOCAINE HYDROCHLORIDE 20 MG/ML
INJECTION, SOLUTION EPIDURAL; INFILTRATION; INTRACAUDAL; PERINEURAL PRN
Status: DISCONTINUED | OUTPATIENT
Start: 2020-07-10 | End: 2020-07-10 | Stop reason: SDUPTHER

## 2020-07-10 RX ORDER — SODIUM CHLORIDE 9 MG/ML
10 INJECTION INTRAVENOUS 2 TIMES DAILY
Status: DISCONTINUED | OUTPATIENT
Start: 2020-07-10 | End: 2020-07-10

## 2020-07-10 RX ORDER — SODIUM CHLORIDE 0.9 % (FLUSH) 0.9 %
10 SYRINGE (ML) INJECTION EVERY 12 HOURS SCHEDULED
Status: DISCONTINUED | OUTPATIENT
Start: 2020-07-10 | End: 2020-07-11 | Stop reason: HOSPADM

## 2020-07-10 RX ORDER — PANTOPRAZOLE SODIUM 40 MG/10ML
40 INJECTION, POWDER, LYOPHILIZED, FOR SOLUTION INTRAVENOUS 2 TIMES DAILY
Status: DISCONTINUED | OUTPATIENT
Start: 2020-07-10 | End: 2020-07-10

## 2020-07-10 RX ORDER — SODIUM BICARBONATE 650 MG/1
1300 TABLET ORAL 2 TIMES DAILY
Status: DISCONTINUED | OUTPATIENT
Start: 2020-07-10 | End: 2020-07-11 | Stop reason: HOSPADM

## 2020-07-10 RX ORDER — ACETAMINOPHEN 325 MG/1
650 TABLET ORAL EVERY 6 HOURS PRN
Status: DISCONTINUED | OUTPATIENT
Start: 2020-07-10 | End: 2020-07-11 | Stop reason: HOSPADM

## 2020-07-10 RX ORDER — SODIUM CHLORIDE, SODIUM LACTATE, POTASSIUM CHLORIDE, CALCIUM CHLORIDE 600; 310; 30; 20 MG/100ML; MG/100ML; MG/100ML; MG/100ML
INJECTION, SOLUTION INTRAVENOUS CONTINUOUS
Status: ACTIVE | OUTPATIENT
Start: 2020-07-10 | End: 2020-07-11

## 2020-07-10 RX ORDER — ONDANSETRON 2 MG/ML
4 INJECTION INTRAMUSCULAR; INTRAVENOUS EVERY 6 HOURS PRN
Status: DISCONTINUED | OUTPATIENT
Start: 2020-07-10 | End: 2020-07-11 | Stop reason: HOSPADM

## 2020-07-10 RX ORDER — PROMETHAZINE HYDROCHLORIDE 25 MG/ML
6.25 INJECTION, SOLUTION INTRAMUSCULAR; INTRAVENOUS
Status: DISCONTINUED | OUTPATIENT
Start: 2020-07-10 | End: 2020-07-10

## 2020-07-10 RX ORDER — PANTOPRAZOLE SODIUM 40 MG/1
40 TABLET, DELAYED RELEASE ORAL
Status: DISCONTINUED | OUTPATIENT
Start: 2020-07-11 | End: 2020-07-11 | Stop reason: HOSPADM

## 2020-07-10 RX ORDER — CEPHALEXIN 250 MG/1
250 CAPSULE ORAL EVERY 8 HOURS SCHEDULED
Status: DISCONTINUED | OUTPATIENT
Start: 2020-07-10 | End: 2020-07-11 | Stop reason: ALTCHOICE

## 2020-07-10 RX ORDER — FAMOTIDINE 20 MG/1
20 TABLET, FILM COATED ORAL DAILY
Status: DISCONTINUED | OUTPATIENT
Start: 2020-07-10 | End: 2020-07-11 | Stop reason: HOSPADM

## 2020-07-10 RX ORDER — PANTOPRAZOLE SODIUM 40 MG/1
40 TABLET, DELAYED RELEASE ORAL
Status: DISCONTINUED | OUTPATIENT
Start: 2020-07-10 | End: 2020-07-10

## 2020-07-10 RX ORDER — FLUOXETINE HYDROCHLORIDE 20 MG/1
40 CAPSULE ORAL DAILY
Status: DISCONTINUED | OUTPATIENT
Start: 2020-07-10 | End: 2020-07-11 | Stop reason: HOSPADM

## 2020-07-10 RX ORDER — SODIUM CHLORIDE, SODIUM LACTATE, POTASSIUM CHLORIDE, CALCIUM CHLORIDE 600; 310; 30; 20 MG/100ML; MG/100ML; MG/100ML; MG/100ML
INJECTION, SOLUTION INTRAVENOUS CONTINUOUS
Status: DISCONTINUED | OUTPATIENT
Start: 2020-07-10 | End: 2020-07-10

## 2020-07-10 RX ORDER — ALBUTEROL SULFATE 2.5 MG/3ML
2.5 SOLUTION RESPIRATORY (INHALATION)
Status: DISCONTINUED | OUTPATIENT
Start: 2020-07-10 | End: 2020-07-11 | Stop reason: HOSPADM

## 2020-07-10 RX ORDER — SODIUM CHLORIDE 9 MG/ML
INJECTION, SOLUTION INTRAVENOUS CONTINUOUS PRN
Status: DISCONTINUED | OUTPATIENT
Start: 2020-07-10 | End: 2020-07-10 | Stop reason: SDUPTHER

## 2020-07-10 RX ADMIN — BUPROPION HYDROCHLORIDE 150 MG: 150 TABLET, EXTENDED RELEASE ORAL at 08:22

## 2020-07-10 RX ADMIN — PROPOFOL 25 MG: 10 INJECTION, EMULSION INTRAVENOUS at 17:43

## 2020-07-10 RX ADMIN — ENOXAPARIN SODIUM 40 MG: 40 INJECTION SUBCUTANEOUS at 08:21

## 2020-07-10 RX ADMIN — SODIUM CHLORIDE, PRESERVATIVE FREE 10 ML: 5 INJECTION INTRAVENOUS at 15:24

## 2020-07-10 RX ADMIN — SODIUM CHLORIDE, POTASSIUM CHLORIDE, SODIUM LACTATE AND CALCIUM CHLORIDE: 600; 310; 30; 20 INJECTION, SOLUTION INTRAVENOUS at 11:24

## 2020-07-10 RX ADMIN — FLUOXETINE 40 MG: 20 CAPSULE ORAL at 08:20

## 2020-07-10 RX ADMIN — METOPROLOL SUCCINATE 25 MG: 25 TABLET, EXTENDED RELEASE ORAL at 08:20

## 2020-07-10 RX ADMIN — FAMOTIDINE 20 MG: 20 TABLET ORAL at 08:23

## 2020-07-10 RX ADMIN — SODIUM CHLORIDE, POTASSIUM CHLORIDE, SODIUM LACTATE AND CALCIUM CHLORIDE: 600; 310; 30; 20 INJECTION, SOLUTION INTRAVENOUS at 18:31

## 2020-07-10 RX ADMIN — PROPOFOL 100 MG: 10 INJECTION, EMULSION INTRAVENOUS at 17:46

## 2020-07-10 RX ADMIN — PROPOFOL 50 MG: 10 INJECTION, EMULSION INTRAVENOUS at 17:48

## 2020-07-10 RX ADMIN — SODIUM BICARBONATE 1300 MG: 650 TABLET ORAL at 08:23

## 2020-07-10 RX ADMIN — CEPHALEXIN 250 MG: 250 CAPSULE ORAL at 13:38

## 2020-07-10 RX ADMIN — HYDROCODONE BITARTRATE AND ACETAMINOPHEN 1 TABLET: 7.5; 325 TABLET ORAL at 05:11

## 2020-07-10 RX ADMIN — PROPOFOL 50 MG: 10 INJECTION, EMULSION INTRAVENOUS at 17:44

## 2020-07-10 RX ADMIN — HYDROCODONE BITARTRATE AND ACETAMINOPHEN 1 TABLET: 7.5; 325 TABLET ORAL at 11:23

## 2020-07-10 RX ADMIN — PROPOFOL 150 MG: 10 INJECTION, EMULSION INTRAVENOUS at 17:41

## 2020-07-10 RX ADMIN — HYDROCODONE BITARTRATE AND ACETAMINOPHEN 1 TABLET: 7.5; 325 TABLET ORAL at 18:29

## 2020-07-10 RX ADMIN — PRAVASTATIN SODIUM 80 MG: 40 TABLET ORAL at 01:16

## 2020-07-10 RX ADMIN — CEPHALEXIN 250 MG: 250 CAPSULE ORAL at 21:06

## 2020-07-10 RX ADMIN — PROPOFOL 25 MG: 10 INJECTION, EMULSION INTRAVENOUS at 17:47

## 2020-07-10 RX ADMIN — CARBIDOPA AND LEVODOPA 1 TABLET: 10; 100 TABLET ORAL at 21:06

## 2020-07-10 RX ADMIN — PRAVASTATIN SODIUM 80 MG: 40 TABLET ORAL at 21:06

## 2020-07-10 RX ADMIN — SODIUM CHLORIDE, POTASSIUM CHLORIDE, SODIUM LACTATE AND CALCIUM CHLORIDE: 600; 310; 30; 20 INJECTION, SOLUTION INTRAVENOUS at 01:16

## 2020-07-10 RX ADMIN — PANTOPRAZOLE SODIUM 40 MG: 40 TABLET, DELAYED RELEASE ORAL at 05:10

## 2020-07-10 RX ADMIN — LIDOCAINE HYDROCHLORIDE 60 MG: 20 INJECTION, SOLUTION EPIDURAL; INFILTRATION; INTRACAUDAL; PERINEURAL at 17:41

## 2020-07-10 RX ADMIN — SODIUM CHLORIDE: 9 INJECTION, SOLUTION INTRAVENOUS at 17:35

## 2020-07-10 RX ADMIN — SODIUM BICARBONATE 1300 MG: 650 TABLET ORAL at 21:06

## 2020-07-10 RX ADMIN — SODIUM BICARBONATE 1300 MG: 650 TABLET ORAL at 01:15

## 2020-07-10 ASSESSMENT — PAIN DESCRIPTION - FREQUENCY
FREQUENCY: CONTINUOUS

## 2020-07-10 ASSESSMENT — PAIN DESCRIPTION - ONSET: ONSET: AWAKENED FROM SLEEP

## 2020-07-10 ASSESSMENT — PULMONARY FUNCTION TESTS
PIF_VALUE: 0
PIF_VALUE: 1
PIF_VALUE: 0
PIF_VALUE: 0
PIF_VALUE: 1
PIF_VALUE: 0
PIF_VALUE: 1
PIF_VALUE: 0
PIF_VALUE: 1
PIF_VALUE: 0

## 2020-07-10 ASSESSMENT — PAIN SCALES - GENERAL
PAINLEVEL_OUTOF10: 0
PAINLEVEL_OUTOF10: 0
PAINLEVEL_OUTOF10: 7
PAINLEVEL_OUTOF10: 4
PAINLEVEL_OUTOF10: 3
PAINLEVEL_OUTOF10: 2
PAINLEVEL_OUTOF10: 0
PAINLEVEL_OUTOF10: 4
PAINLEVEL_OUTOF10: 2
PAINLEVEL_OUTOF10: 6
PAINLEVEL_OUTOF10: 6
PAINLEVEL_OUTOF10: 2

## 2020-07-10 ASSESSMENT — PAIN DESCRIPTION - PROGRESSION
CLINICAL_PROGRESSION: NOT CHANGED
CLINICAL_PROGRESSION: GRADUALLY WORSENING

## 2020-07-10 ASSESSMENT — PAIN DESCRIPTION - PAIN TYPE
TYPE: ACUTE PAIN
TYPE: ACUTE PAIN

## 2020-07-10 ASSESSMENT — PAIN DESCRIPTION - LOCATION
LOCATION: OTHER (COMMENT)
LOCATION: OTHER (COMMENT)

## 2020-07-10 ASSESSMENT — PAIN DESCRIPTION - DESCRIPTORS
DESCRIPTORS: ACHING
DESCRIPTORS: ACHING

## 2020-07-10 ASSESSMENT — PAIN DESCRIPTION - ORIENTATION
ORIENTATION: OTHER (COMMENT)
ORIENTATION: OTHER (COMMENT)

## 2020-07-10 ASSESSMENT — ENCOUNTER SYMPTOMS: SHORTNESS OF BREATH: 1

## 2020-07-10 NOTE — PROGRESS NOTES
Hospitalist Progress Note      PCP: Scar Purvis MD    Date of Admission: 7/9/2020      Subjective:   denies chest pain, nausea, vomiting, shortness of breath, fever or chills. mention feels overall better    Medications:  Reviewed    Infusion Medications   Scheduled Medications    buPROPion  150 mg Oral Daily    FLUoxetine  40 mg Oral Daily    metoprolol succinate  25 mg Oral Daily    pravastatin  80 mg Oral Nightly    sodium bicarbonate  1,300 mg Oral BID    sodium chloride flush  10 mL Intravenous 2 times per day    enoxaparin  40 mg Subcutaneous Daily    famotidine  20 mg Oral Daily    cephALEXin  250 mg Oral 3 times per day    pantoprazole  40 mg Intravenous BID    And    sodium chloride (PF)  10 mL Intravenous Daily    carbidopa-levodopa  1 tablet Oral Nightly     PRN Meds: sodium chloride flush, acetaminophen **OR** acetaminophen, promethazine **OR** ondansetron, albuterol, HYDROcodone-acetaminophen      Intake/Output Summary (Last 24 hours) at 7/10/2020 1532  Last data filed at 7/9/2020 2116  Gross per 24 hour   Intake 1050 ml   Output --   Net 1050 ml       Physical Exam Performed:    /67   Pulse 76   Temp 97.9 °F (36.6 °C) (Oral)   Resp 17   Ht 5' 7\" (1.702 m)   Wt 263 lb 0.1 oz (119.3 kg)   LMP 01/01/2004   SpO2 99%   BMI 41.19 kg/m²     General appearance: No apparent distress,   HEENT:  Conjunctivae/corneas clear. Neck: Supple, with full range of motion. Respiratory:  Normal respiratory effort. Clear to auscultation, bilaterally without Rales/Wheezes/Rhonchi. Cardiovascular: Regular rate and rhythm with normal S1/S2 without murmurs or rubs  Abdomen: Soft, non-tender, non-distended, normal bowel sounds. Musculoskeletal: No cyanosis or edema bilaterally  Neurologic:  without any focal sensory/motor deficits.  grossly non-focal.  Psychiatric: Alert and oriented, Normal mood  Peripheral Pulses: +2 palpable, equal bilaterally       Labs:   Recent Labs 07/09/20  1355 07/10/20  0207 07/10/20  0749 07/10/20  1459   WBC 8.0 8.7  --   --    HGB 8.5* 7.8* 8.2* 9.2*   HCT 26.6* 24.0* 26.0* 28.1*    259  --   --      Recent Labs     07/09/20  1355 07/10/20  0207    139   K 4.8 4.2    111*   CO2 14* 16*   BUN 52* 42*   CREATININE 2.6* 2.2*   CALCIUM 8.7 8.4     No results for input(s): AST, ALT, BILIDIR, BILITOT, ALKPHOS in the last 72 hours. No results for input(s): INR in the last 72 hours. Recent Labs     07/09/20  1355   TROPONINI <0.01       Urinalysis:      Lab Results   Component Value Date    NITRU Negative 07/09/2020    WBCUA 130 07/09/2020    BACTERIA 2+ 07/09/2020    RBCUA 8 07/09/2020    BLOODU SMALL 07/09/2020    SPECGRAV 1.016 07/09/2020    GLUCOSEU Negative 07/09/2020    GLUCOSEU NEGATIVE 04/05/2011       Radiology:  XR CHEST STANDARD (2 VW)   Final Result   No radiographic evidence of acute cardiopulmonary disease. Assessment/Plan:    Active Hospital Problems    Diagnosis    Acute kidney injury [facility]) [N17.9]       Patient is a 60-year-old female who presented to hospital for fatigue. Patient's blood work revealed acute on chronic kidney disease.     Assessment  STAN on CKD  Melena likely upper GI bleed  Acute blood loss anemia secondary to upper GI bleed  UA suggestive of UTI  Hypertension  Hyperlipidemia  Coronary artery disease    Plan  Continue to monitor BMP, continue IV fluid therapy  Gastroenterology has been consulted, plan for EGD  Continue to monitor hemoglobin, continue IV Protonix  Aspirin, Brilinta to be started after clearance from gastroenterology, continue clear liquid diet  Continue home statin  Continue keflex, follow-up on urine culture  Diet: DIET LOW SODIUM 2 GM;  DIET CLEAR LIQUID;  Code Status: Full Code    PT/OT Eval Status: ordered    Dispo -pending clinical improvement, EGD    Tammi Acevedo MD no

## 2020-07-10 NOTE — ANESTHESIA PRE PROCEDURE
[facility] Department of Anesthesiology  Pre-Anesthesia Evaluation/Consultation       Name:  Treva Cannon  : 1955  Age:  60 y.o. MRN:  [id number]  Date: 7/10/2020           Surgeon: Surgeon(s):  Wellington Lauren MD    Procedure: Procedure(s):  EGD DIAGNOSTIC ONLY     Allergies   Allergen Reactions    Mobic [Meloxicam] Nausea Only     Stomach would get upset after taking this medication    Morphine Other (See Comments)     When taken in  pt had an adverse reaction to morphine. She ended up in  hospital with kidney failure, dehydration, and in ICU. She prefers this not to be given ever.  Other      ?  Suture from CABG caused blister (10/30/2015)     Patient Active Problem List   Diagnosis    Primary localized osteoarthrosis, lower leg    DJD (degenerative joint disease)    Obesity    HTN (hypertension)    Hyperlipidemia    Abscess of skin of abdomen    Arthritis    STAN (acute kidney injury) ([address]    Intertrochanteric fracture of left femur ([address]    Osteopenia    Anemia    Failed total hip arthroplasty ([address]    History of total left hip arthroplasty 11/4/15    Coronary artery disease involving native coronary artery of native heart without angina pectoris    Iron deficiency anemia    Status post coronary artery bypass graft    Superficial postoperative wound infection    Arthritis of knee, right    Tobacco use disorder    Carpal tunnel syndrome of left wrist    Elevated serum creatinine    Cellulitis and abscess of buttock    Sepsis ([address]    Acute-on-chronic kidney injury ([address]    Metabolic acidosis, normal anion gap (NAG)    Morbid obesity (HCC)    Stage 3 chronic kidney disease (HCC)    Chronic midline low back pain without sciatica    SOB (shortness of breath)    COPD with exacerbation ([address]    COPD exacerbation ([address]    Unstable angina (HCC)    Restless legs syndrome    Acute kidney injury [facility])     Past Medical History:   Diagnosis Date    Arthritis     ASHD (arteriosclerotic heart disease)     Chronic kidney disease     Chronic midline low back pain without sciatica 4/3/2019    COPD (chronic obstructive pulmonary disease) ([facility])     unknown     Coronary artery disease involving native coronary artery of native heart without angina pectoris 3/31/2016    Coronary artery disease involving native coronary artery of native heart without angina pectoris 3/31/2016    Depression     Full dentures     HTN     Hyperlipidemia     Iron deficiency anemia 3/31/2016    Kidney stone     Morbid obesity (HCC)     Restless legs syndrome     pt on cabidopa/levodopa    Stage 3 chronic kidney disease ([address] 4/3/2019    Wears glasses      Past Surgical History:   Procedure Laterality Date    ABSCESS DRAINAGE Left 2018    incision and drainage of right buttock abscess    APPENDECTOMY      CARPAL TUNNEL RELEASE Left 2017    CORONARY ARTERY BYPASS GRAFT  9/28/10    4 way bypass    HIP SURGERY Left     I&D of joint    KNEE ARTHROSCOPY Right     TOTAL HIP ARTHROPLASTY Left 2015    TOTAL KNEE ARTHROPLASTY Right 8-24-10    TOTAL KNEE ARTHROPLASTY Left 11    TUBAL LIGATION       Social History     Tobacco Use    Smoking status: Former Smoker     Packs/day: 0.50     Years: 30.00     Pack years: 15.00     Types: Cigarettes     Last attempt to quit: 2016     Years since quittin.3    Smokeless tobacco: Never Used   Substance Use Topics    Alcohol use: No     Alcohol/week: 0.0 standard drinks    Drug use: No     Medications  No current facility-administered medications on file prior to encounter.       Current Outpatient Medications on File Prior to Encounter   Medication Sig Dispense Refill    famotidine (PEPCID) 20 MG tablet Take 20 mg by mouth 2 times daily      omeprazole (PRILOSEC) 40 MG delayed release capsule Take 1 capsule by mouth daily 30 capsule 3    metoprolol succinate (TOPROL XL) 50 MG extended release tablet TAKE 1 TABLET BY MOUTH EVERY DAY 90 tablet 3    HYDROcodone-acetaminophen (NORCO) 7.5-325 MG per tablet Take 1 tablet by mouth every 6 hours as needed for Pain for up to 30 days.  120 tablet 0    carbidopa-levodopa (SINEMET)  MG per tablet TAKE 2 TABLETS NIGHTLY 180 tablet 1    FLUoxetine (PROZAC) 40 MG capsule TAKE 2 CAPSULES DAILY 180 capsule 3    buPROPion (WELLBUTRIN XL) 150 MG extended release tablet TAKE 1 TABLET BY MOUTH EVERY DAY 90 tablet 1    pravastatin (PRAVACHOL) 80 MG tablet TAKE 1 TABLET NIGHTLY 90 tablet 2    sodium bicarbonate 650 MG tablet TAKE 1 TABLET BY MOUTH FOUR TIMES A  tablet 1     Current Facility-Administered Medications   Medication Dose Route Frequency Provider Last Rate Last Dose    buPROPion (WELLBUTRIN XL) extended release tablet 150 mg  150 mg Oral Daily Luis Carlos Centeno MD   150 mg at 07/10/20 5311    FLUoxetine (PROZAC) capsule 40 mg  40 mg Oral Daily Luis Carlos Centeno MD   40 mg at 07/10/20 0820    metoprolol succinate (TOPROL XL) extended release tablet 25 mg  25 mg Oral Daily Luis Carlos Centeno MD   25 mg at 07/10/20 0820    pravastatin (PRAVACHOL) tablet 80 mg  80 mg Oral Nightly Kandi Hyde MD   80 mg at 07/10/20 0116    sodium bicarbonate tablet 1,300 mg  1,300 mg Oral BID Kandi Hyde MD   1,300 mg at 07/10/20 4525    sodium chloride flush 0.9 % injection 10 mL  10 mL Intravenous 2 times per day Kandi Hyde MD        sodium chloride flush 0.9 % injection 10 mL  10 mL Intravenous PRN Kandi Hyde MD   10 mL at 07/10/20 1524    acetaminophen (TYLENOL) tablet 650 mg  650 mg Oral Q6H PRN Luis Carlos Centeno MD        Or    acetaminophen (TYLENOL) suppository 650 mg  650 mg Rectal Q6H PRN Luis Carlos Centeno MD        promethazine (PHENERGAN) tablet 12.5 mg  12.5 mg Oral Q6H PRN Luis Carlos Centeno MD        Or    ondansetron (ZOFRAN) injection 4 mg  4 mg Intravenous Q6H PRN Luis Carlos Centeno MD        enoxaparin (LOVENOX) injection 40 mg  40 mg Subcutaneous Daily Kandi Hyde MD   40 mg at 07/10/20 0821    famotidine (PEPCID) tablet 20 mg  20 mg Oral Daily Luis Carlos Centeno MD   20 mg at 07/10/20 0823    albuterol (PROVENTIL) nebulizer solution 2.5 mg  2.5 mg Nebulization Q2H PRN DELIA Rascon - JOSE M        cephALEXin (KEFLEX) capsule 250 mg  250 mg Oral 3 times per day Stephanie Rivera MD   250 mg at 07/10/20 1338    pantoprazole (PROTONIX) injection 40 mg  40 mg Intravenous BID Stephanie Rivera MD        And    sodium chloride (PF) 0.9 % injection 10 mL  10 mL Intravenous BID Stephanie Rivera MD        lactated ringers infusion   Intravenous Continuous Socorro Ward MD        carbidopa-levodopa (SINEMET)  MG per tablet 1 tablet  1 tablet Oral Nightly Kandi Hyde MD   1 tablet at 20 2311    HYDROcodone-acetaminophen (NORCO) 7.5-325 MG per tablet 1 tablet  1 tablet Oral Q6H PRN Kandi Hyde MD   1 tablet at 07/10/20 1123     Vital Signs (Current)   Vitals:    07/10/20 1658   BP: 131/68   Pulse: 50   Resp:    Temp: 98 °F (36.7 °C)   SpO2: 100%     Vital Signs Statistics (for past 48 hrs)     Temp  Av.9 °F (36.6 °C)  Min: 97.7 °F (36.5 °C)   Min taken time: 07/10/20 0343  Max: 98.2 °F (36.8 °C)   Max taken time: 20 1341  Pulse  Av  Min: 48   Min taken time: 07/10/20 1658  Max: 68   Max taken time: 07/10/20 1511  Resp  Av.8  Min: 15   Min taken time: 20  Max: 32   Max taken time: 20 2200  BP  Min: 111/67   Min taken time: 07/10/20 151  Max: 162/83   Max taken time: 07/10/20 0820  MAP (mmHg)  Av.2  Min: 61   Min taken time: 20  Max: 80   Max taken time: 20 2219  SpO2  Av.3 %  Min: 95 %   Min taken time: 07/10/20 0908  Max: 100 %   Max taken time: 07/10/20 1658    BP Readings from Last 3 Encounters:   07/10/20 131/68   20 122/82   20 125/80     BMI  Body mass index is 41.19 kg/m².   Estimated body mass index is 41.19 kg/m² as calculated from the consumption: 0800       Anesthesia Evaluation  Patient summary reviewed no history of anesthetic complications:   Airway: Mallampati: III  TM distance: >3 FB   Neck ROM: full   Dental:    (+) edentulous      Pulmonary:normal exam    (+) COPD:  shortness of breath:  sleep apnea: on noncompliant,                             Cardiovascular:  Exercise tolerance: good (>4 METS),   (+) hypertension (EF 55):, angina: no interval change, CAD:, CABG/stent (Stent approx one year ago):,       ECG reviewed  Rhythm: regular  Rate: normal  Echocardiogram reviewed         Beta Blocker:  Dose within 24 Hrs         Neuro/Psych:   (+) neuromuscular disease:, psychiatric history:            GI/Hepatic/Renal:   (+) renal disease: CRI,           Endo/Other:    (+) blood dyscrasia (Brilinta last dose 2 weeks ago): anticoagulation therapy:., .                 Abdominal:   (+) obese,         Vascular: negative vascular ROS. Anesthesia Plan      MAC     ASA 3       Induction: intravenous. Anesthetic plan and risks discussed with patient. Plan discussed with CRNA. This pre-anesthesia assessment may be used as a history and physical.    DOS STAFF ADDENDUM:    Pt seen and examined, chart reviewed (including anesthesia, drug and allergy history). No interval changes to history and physical examination. Anesthetic plan, risks, benefits, alternatives, and personnel involved discussed with patient. Questions and concerns addressed. Patient(family) verbalized an understanding and agrees to proceed.       Dayana Emery MD  July 10, 2020  5:01 PM

## 2020-07-10 NOTE — CARE COORDINATION
INITIAL CASE MANAGEMENT ASSESSMENT    Reviewed chart, met with patient to assess possible discharge needs. Explained Case Management role/services. Living Situation: in a house with spouse    ADLs: normally independent     DME: cane, shower chair    PT/OT Recs: na     Active Services: none     Transportation: spouse will transport     Medications: independent, [facility]    PCP: Dr Fadi Mireles      HD/PD: na    PLAN/COMMENTS: Pt plans to go home at dc with spouse and anticipates no needs. SW/CM provided contact information for patient or family to call with any questions. SW/CM will follow and assist as needed.   Electronically signed by ZAC Castillo on 7/10/2020 at 2:39 PM

## 2020-07-10 NOTE — PROGRESS NOTES
Pt wakes easy to v/o. VSS. Pt denies pain and nausea. O2 off pt breathes easy on RA. Ab soft. Pt stable ready for transport back to room.

## 2020-07-10 NOTE — PROGRESS NOTES
4 Eyes Skin Assessment     The patient is being assess for: Admission Skin Assessment   I agree that 2 RN's have performed a thorough Head to Toe Skin Assessment on the patient. ALL assessment sites listed below have been assessed.        Areas assessed by both nurses: yes  [x]   Head, Face, and Ears   [x]   Shoulders, Back, and Chest  [x]   Arms, Elbows, and Hands   [x]   Coccyx, Sacrum, and IschIum  [x]   Legs, Feet, and Heels        Does the Patient have Skin Breakdown?  no        [place] Prevention initiated:  no   Wound Care Orders initiated:  no      [facility] nurse consulted for Pressure Injury (Stage 3,4, Unstageable, DTI, NWPT, and Complex wounds), New and Established Ostomies:  no      Nurse 1 eSignature: Electronically signed by Reinier Rodriguez RN on 7/10/20 at 2:00 AM EDT    **SHARE this note so that the co-signing nurse is able to place an eSignature**    Nurse 2 eSignature: Electronically signed by Lenny Pham RN on 7/10/20 at 6:32 AM EDT

## 2020-07-10 NOTE — ANESTHESIA POSTPROCEDURE EVALUATION
[facility] Department of Anesthesiology  Post-Anesthesia Note       Name:  Jignesh Ortiz                                  Age:  60 y.o. MRN:  [id number]     Last Vitals & Oxygen Saturation: /78   Pulse 60   Temp 97.8 °F (36.6 °C) (Temporal)   Resp 16   Ht 5' 7\" (1.702 m)   Wt 263 lb 0.1 oz (119.3 kg)   LMP 01/01/2004   SpO2 91%   BMI 41.19 kg/m²   Patient Vitals for the past 4 hrs:   BP Temp Temp src Pulse Resp SpO2   07/10/20 1801 -- -- -- 60 16 --   07/10/20 1800 118/78 -- -- 61 17 91 %   07/10/20 1758 -- -- -- 63 20 100 %   07/10/20 1757 116/64 -- -- 56 17 100 %   07/10/20 1756 -- -- -- 63 15 100 %   07/10/20 1755 -- -- -- 64 18 95 %   07/10/20 1754 117/75 97.8 °F (36.6 °C) Temporal 64 16 100 %   07/10/20 1658 131/68 98 °F (36.7 °C) Oral 50 -- 100 %   07/10/20 1511 111/67 97.9 °F (36.6 °C) Oral 76 17 99 %       Level of consciousness:  Awake, alert    Respiratory: Respirations easy, no distress. Stable. Cardiovascular: Hemodynamically stable. Hydration: Adequate. PONV: Adequately managed. Post-op pain: Adequately controlled. Post-op assessment: Tolerated anesthetic well without complication. Complications:  None.     Joshua Mansfield MD  July 10, 2020   6:10 PM

## 2020-07-10 NOTE — OP NOTE
Endoscopy Note    Patient: Treva Cannon  : 1955  Acct#:     Procedure: Esophagogastroduodenoscopy                          Date:  7/10/2020     Surgeon:   Roberto Thompson MD    Referring Physician:  Casimiro Youngblood MD    Indications: This is a 60 y.o. female who has a past history notable for HTN, HLD, CKD, coronary artery disease S/P PCI in 2019, depression, RLS, COPD and chronic pain who presented [facility] [place] for evaluation of melena and acute blood loss anemia. We have been consulted regarding concern for upper gastrointestinal bleeding. Postoperative Diagnosis:    1. GAVE  2. Antral and duodenal bulb deformity, with healing lesion in the duodenal bulb consistent with healing peptic ulcer disease. Anesthesia:  MAC    Consent:  The patient or their legal guardian has signed an informed consent, and is aware of the potential risks, benefits, alternatives, and potential complications of this procedure. These include, but are not limited to hemorrhage, bleeding, post procedural pain, perforation, phlebitis, aspiration, hypotension, hypoxia, cardiovascular events such as arryhthmia, and possibly death. Description of Procedure: The patient was then taken to the endoscopy suite, placed in the left lateral decubitus position and the above IV sedation was administrered. The Olympus video endoscope was placed through the patient's oropharynx without difficulty to the extent of the 2nd portion of the duodenum. Both forward and retroflexed views of the stomach were obtained. Findings:    Esophagus: The esophagus appeared normal without evidence of Bloom's esophagus or reflux esophagitis. The Z line was located 40 cm from the incisors. Stomach: The stomach was examinied on forward and retroflexed views. Linear gastric erythema was present in the body and antrum, consistent with GAVE.  Deformity in the gastric antrum was present without obvious ulceration or erosion. Duodenum: The duodenal bulb was notable for deformity with the appearance of a healing ulceration. There was no active bleeding or stigmata of bleeding. The 2nd portions of the duodenum appeared normal with normal villous pattern    The scope was then withdrawn back into the stomach, it was decompressed, and the scope was completely withdrawn. The patient tolerated the procedure well and was taken to the post anesthesia care unit in good condition. Estimated Blood Loss: Minimal    Impression:   1) See post procedure diagnoses    Recommendations:   - Ok to discharge home from GI standpoint.  - Will plan for outpatient colonoscopy. - Elva Laguna from GI standpoint to resume aspirin. Would hold Brilinta, but ensure Dr. Eli Yousif is aware and ok that Ann Bowling is being held. - Resume regular medications. - Resume diet as tolerated. - Will need H. Pylori stool Ag as an outpatient.     ROVERTO Lopez [address] [facility]

## 2020-07-10 NOTE — CONSULTS
artery disease involving native coronary artery of native heart without angina pectoris 3/31/2016    Depression     Full dentures     HTN     Hyperlipidemia     Iron deficiency anemia 3/31/2016    Kidney stone     Morbid obesity ([address]     Restless legs syndrome     pt on cabidopa/levodopa    Stage 3 chronic kidney disease ([address] 4/3/2019    Wears glasses      Past Surgical History:   Procedure Laterality Date    ABSCESS DRAINAGE Left 2018    incision and drainage of right buttock abscess    APPENDECTOMY      CARPAL TUNNEL RELEASE Left 2017    CORONARY ARTERY BYPASS GRAFT  9/28/10    4 way bypass    HIP SURGERY Left     I&D of joint    KNEE ARTHROSCOPY Right     TOTAL HIP ARTHROPLASTY Left 2015    TOTAL KNEE ARTHROPLASTY Right 8-24-10    TOTAL KNEE ARTHROPLASTY Left 11    TUBAL LIGATION       Family History   Problem Relation Age of Onset    Stroke Mother     Diabetes Mother     Heart Disease Mother     High Blood Pressure Mother     High Cholesterol Mother     Cancer Neg Hx      Social History     Socioeconomic History    Marital status:      Spouse name: None    Number of children: None    Years of education: None    Highest education level: None   Occupational History    Occupation:    Social Needs    Financial resource strain: None    Food insecurity     Worry: None     Inability: None    Transportation needs     Medical: None     Non-medical: None   Tobacco Use    Smoking status: Former Smoker     Packs/day: 0.50     Years: 30.00     Pack years: 15.00     Types: Cigarettes     Last attempt to quit: 2016     Years since quittin.3    Smokeless tobacco: Never Used   Substance and Sexual Activity    Alcohol use: No     Alcohol/week: 0.0 standard drinks    Drug use: No    Sexual activity: Yes     Partners: Male   Lifestyle    Physical activity     Days per week: None     Minutes per session: None    Stress: None Relationships    Social connections     Talks on phone: None     Gets together: None     Attends Confucianism service: None     Active member of club or organization: None     Attends meetings of clubs or organizations: None     Relationship status: None    Intimate partner violence     Fear of current or ex partner: None     Emotionally abused: None     Physically abused: None     Forced sexual activity: None   Other Topics Concern    None   Social History Narrative    None       MEDICATIONS   SCHEDULED:  buPROPion, 150 mg, Daily  FLUoxetine, 40 mg, Daily  metoprolol succinate, 25 mg, Daily  pantoprazole, 40 mg, QAM AC  pravastatin, 80 mg, Nightly  sodium bicarbonate, 1,300 mg, BID  sodium chloride flush, 10 mL, 2 times per day  enoxaparin, 40 mg, Daily  famotidine, 20 mg, Daily  cephALEXin, 250 mg, 3 times per day  carbidopa-levodopa, 1 tablet, Nightly      FLUIDS/DRIPS:    PRNs: sodium chloride flush, 10 mL, PRN  acetaminophen, 650 mg, Q6H PRN    Or  acetaminophen, 650 mg, Q6H PRN  promethazine, 12.5 mg, Q6H PRN    Or  ondansetron, 4 mg, Q6H PRN  albuterol, 2.5 mg, Q2H PRN  HYDROcodone-acetaminophen, 1 tablet, Q6H PRN      ALLERGIES:    Allergies   Allergen Reactions    Mobic [Meloxicam] Nausea Only     Stomach would get upset after taking this medication    Morphine Other (See Comments)     When taken in 2013 pt had an adverse reaction to morphine. She ended up in  hospital with kidney failure, dehydration, and in ICU. She prefers this not to be given ever.  Other      ?  Suture from CABG caused blister (10/30/2015)        REVIEW OF SYSTEMS   A full 12 pt ROS is negative other than noted in HPI    PHYSICAL EXAM     Vitals:    07/10/20 0014 07/10/20 0343 07/10/20 0820 07/10/20 0908   BP: 127/84 115/75 (!) 162/83    Pulse: 60 60 62    Resp: 18 18  18   Temp: 97.8 °F (36.6 °C) 97.7 °F (36.5 °C)     TempSrc: Oral Oral     SpO2: 96% 95%  95%   Weight:       Height:           Physical Exam:  Gen: Resting in bed, NAD, obese WF   CV: RRR no MRG   Pul: CTAB, normal work of breathing  Abd: Good bowel sounds throughout, soft, NT/ND, no masses, no HSM   Ext: No edema, atraumatic  Neuro: No gross deficits, follows commands, moves all 4 extremities  Skin: No jaundice, spider angiomas, ac erythema    LABS AND IMAGING     Recent Results (from the past 24 hour(s))   Urinalysis Reflex to Culture    Collection Time: 07/09/20  6:29 PM   Result Value Ref Range    Color, UA YELLOW Straw/Yellow    Clarity, UA CLOUDY (A) Clear    Glucose, Ur Negative Negative mg/dL    Bilirubin Urine Negative Negative    Ketones, Urine Negative Negative mg/dL    Specific Gravity, UA 1.016 1.005 - 1.030    Blood, Urine SMALL (A) Negative    pH, UA 6.0 5.0 - 8.0    Protein, UA 30 (A) Negative mg/dL    Urobilinogen, Urine 0.2 <2.0 E.U./dL    Nitrite, Urine Negative Negative    Leukocyte Esterase, Urine LARGE (A) Negative    Microscopic Examination YES     Urine Type NotGiven     Urine Reflex to Culture Yes    Microscopic Urinalysis    Collection Time: 07/09/20  6:29 PM   Result Value Ref Range    Bacteria, UA 2+ (A) None Seen /HPF    Hyaline Casts, UA 0 0 - 8 /LPF    WBC,  (H) 0 - 5 /HPF    RBC, UA 8 (H) 0 - 4 /HPF    Epithelial Cells, UA 0 0 - 5 /HPF   EKG 12 Lead    Collection Time: 07/09/20  6:34 PM   Result Value Ref Range    Ventricular Rate 61 BPM    Atrial Rate 61 BPM    P-R Interval 134 ms    QRS Duration 82 ms    Q-T Interval 382 ms    QTc Calculation (Bazett) 384 ms    P Axis -7 degrees    R Axis -13 degrees    T Axis -11 degrees    Diagnosis       Normal sinus rhythmConfirmed by JEREMY LLOYD, Lizbeth Abad ([id number]) on 7/10/2020 9:24:06 AM   Blood Occult Stool #1    Collection Time: 07/09/20  6:45 PM   Result Value Ref Range    Occult Blood Diagnostic Result: POSITIVE  Normal range: Negative   (A)    Iron and TIBC    Collection Time: 07/10/20  2:07 AM   Result Value Ref Range    Iron 24 (L) 37 - 145 ug/dL    TIBC 240 (L) 260 - 445 ug/dL    Iron Saturation 10 (L) 15 - 50 %   CBC    Collection Time: 07/10/20  2:07 AM   Result Value Ref Range    WBC 8.7 4.0 - 11.0 K/uL    RBC 2.56 (L) 4.00 - 5.20 M/uL    Hemoglobin 7.8 (L) 12.0 - 16.0 g/dL    Hematocrit 24.0 (L) 36.0 - 48.0 %    MCV 93.8 80.0 - 100.0 fL    MCH 30.4 26.0 - 34.0 pg    MCHC 32.4 31.0 - 36.0 g/dL    RDW 15.4 12.4 - 15.4 %    Platelets 371 358 - 946 K/uL    MPV 6.9 5.0 - 10.5 fL   Basic Metabolic Panel w/ Reflex to MG    Collection Time: 07/10/20  2:07 AM   Result Value Ref Range    Sodium 139 136 - 145 mmol/L    Potassium reflex Magnesium 4.2 3.5 - 5.1 mmol/L    Chloride 111 (H) 99 - 110 mmol/L    CO2 16 (L) 21 - 32 mmol/L    Anion Gap 12 3 - 16    Glucose 111 (H) 70 - 99 mg/dL    BUN 42 (H) 7 - 20 mg/dL    CREATININE 2.2 (H) 0.6 - 1.2 mg/dL    GFR Non-African American 23 (A) >60    GFR  27 (A) >60    Calcium 8.4 8.3 - 10.6 mg/dL   Hemoglobin and Hematocrit, Blood    Collection Time: 07/10/20  7:49 AM   Result Value Ref Range    Hemoglobin 8.2 (L) 12.0 - 16.0 g/dL    Hematocrit 26.0 (L) 36.0 - 48.0 %     Other Labs      Imaging      ASSESSMENT AND RECOMMENDATIONS   Lorraine Olivarez is a 60 y.o. female who has a past history notable for HTN, HLD, CKD, coronary artery disease S/P PCI in 2019, depression, RLS, COPD and chronic pain who presented [facility] [place] for evaluation of melena and acute blood loss anemia. IMPRESSION:    1. GI bleed-Concern for Upper GI source: Patient with painless melena x2 days while taking Brilinta and aspirin, now resolved with cessation of antiplatelet therapy. She has symptomatic anemia, and also needs antiplatelet therapy given underlying coronary artery disease. As a result, prior to reinitiating aspirin +/- Brilinta, will perform EGD to evaluate for source of bleeding. 2. Blood loss anemia: No evidence of active bleeding. Will perform EGD to evaluate source. Will give IV iron replacement  3. CKD: Nephrology consulted and following  4.  FOBT positive: Patient would benefit from colonoscopy, likely as an outpatient for colon cancer screening, and potentially for further evaluation of source of melena if EGD is unremarkable. RECOMMENDATIONS:    -Continue Pantoprazole 40 mg IV bid.  - NPO for now, with plan for EGD to evaluate for source of bleeding. If you have any questions or need any further information, please feel free to contact our consult team.  Thank you for allowing us to participate in the care of Kelle De Luna. Adilson Schmidt.  [address]

## 2020-07-10 NOTE — CONSULTS
Consult received  Full note to follow    Erasmo Alvarado  7/10/2020    [facility] [address]  Office : [phone]  Fax :[phone]

## 2020-07-10 NOTE — PROGRESS NOTES
Pt arrived to [facility]u from endo asleep wakes to v/o. VSS on monitor. O2 on 4L simple mask. Ab soft. Pt denies pain and nausea. Pt falls easily back to sleep.

## 2020-07-10 NOTE — PLAN OF CARE
Patient is A&Ox4. She gets up ad estrellita with a steady gait. Call light is within reach.  in room visiting for the day. Patient states she has generalized body aches \"all over. \"  Pain medications given. (See emar). Will continue to monitor.

## 2020-07-10 NOTE — PROGRESS NOTES
Called report to [place] in Endo. Patient A&Ox4, gets up ad estrellita. IV flushed and working (but finicky if she bends her arm.)  Transport taking patient down by stretcher.

## 2020-07-10 NOTE — CONSULTS
Office: [phone]       Fax: [phone]      Nephrology Initial Consult Note        Patient's Name: Tanika Alfaro Date: 7/9/2020  Date of Visit: 7/10/2020    Reason for Consult: STAN  Requesting Physician:  Zenon Rucker MD  PCP: Mayank Hutchins MD    Chief Complaint:   weakness    History of Present Illness:      Cain Leung is a 60 y.o. female with PMHx of hypertension, coronary artery disease, CKD, COPD who was hospitalized on 7/9/2020 with complaints of weakness  Noted to have low Hgb. Patient had been on ASA, Brilinta. Initial creat at 2.6  No hematuria, nausea or vomiting   No diarrhea  Feels lightheaded.     NSAID use: denies  IV contrast: none    Past Medical History:   Diagnosis Date    Arthritis     ASHD (arteriosclerotic heart disease)     Chronic kidney disease     Chronic midline low back pain without sciatica 4/3/2019    COPD (chronic obstructive pulmonary disease) (HCC)     unknown     Coronary artery disease involving native coronary artery of native heart without angina pectoris 3/31/2016    Coronary artery disease involving native coronary artery of native heart without angina pectoris 3/31/2016    Depression     Full dentures     HTN     Hyperlipidemia     Iron deficiency anemia 3/31/2016    Kidney stone 2012    Morbid obesity ([address]     Restless legs syndrome     pt on cabidopa/levodopa    Stage 3 chronic kidney disease ([address] 4/3/2019    Wears glasses        Past Surgical History:   Procedure Laterality Date    ABSCESS DRAINAGE Left 04/19/2018    incision and drainage of right buttock abscess    APPENDECTOMY      CARPAL TUNNEL RELEASE Left 11/14/2017    CORONARY ARTERY BYPASS GRAFT  9/28/10    4 way bypass    HIP SURGERY Left 2016    I&D of joint    KNEE ARTHROSCOPY Right 2000    TOTAL HIP ARTHROPLASTY Left 11-4-2015    TOTAL KNEE ARTHROPLASTY Right 8-24-10    TOTAL KNEE ARTHROPLASTY Left 4/8/11    TUBAL LIGATION         Family History   Problem Relation Age of Onset    Stroke Mother     Diabetes Mother     Heart Disease Mother     High Blood Pressure Mother     High Cholesterol Mother     Cancer Neg Hx         reports that she quit smoking about 4 years ago. Her smoking use included cigarettes. She has a 15.00 pack-year smoking history. She has never used smokeless tobacco. She reports that she does not drink alcohol or use drugs. Medications: Allergies:  Mobic [meloxicam]; Morphine; and Other    Scheduled Meds:   buPROPion  150 mg Oral Daily    FLUoxetine  40 mg Oral Daily    metoprolol succinate  25 mg Oral Daily    pravastatin  80 mg Oral Nightly    sodium bicarbonate  1,300 mg Oral BID    sodium chloride flush  10 mL Intravenous 2 times per day    enoxaparin  40 mg Subcutaneous Daily    famotidine  20 mg Oral Daily    cephALEXin  250 mg Oral 3 times per day    pantoprazole  40 mg Intravenous BID    And    sodium chloride (PF)  10 mL Intravenous BID    carbidopa-levodopa  1 tablet Oral Nightly     Continuous Infusions:    Labs:  CBC:   Recent Labs     07/09/20  1355 07/10/20  0207 07/10/20  0749 07/10/20  1459   WBC 8.0 8.7  --   --    HGB 8.5* 7.8* 8.2* 9.2*    259  --   --      Ca/Mg/Phos:   Recent Labs     07/09/20  1355 07/10/20  0207   CALCIUM 8.7 8.4     UA:  Recent Labs     07/09/20  1829   COLORU YELLOW   CLARITYU CLOUDY*   GLUCOSEU Negative   BILIRUBINUR Negative   KETUA Negative   SPECGRAV 1.016   BLOODU SMALL*   PHUR 6.0   PROTEINU 30*   UROBILINOGEN 0.2   NITRU Negative   LEUKOCYTESUR LARGE*   LABMICR YES   URINETYPE NotGiven      Urine Microscopic:   Recent Labs     07/09/20  1829   BACTERIA 2+*   HYALCAST 0   WBCUA 130*   RBCUA 8*   EPIU 0     Urine Chemistry: No results for input(s): CLUR, LABCREA, PROTEINUR, NAUR in the last 72 hours.       ROS:     All systems José Miguel Vega  7/10/2020    [facility] [address]  Office : [phone]  Fax :[phone]

## 2020-07-10 NOTE — H&P
Pre-operative History and Physical    Patient: Treva Cannon  : 1955  Acct#:     Intended Procedure:  EGD     HISTORY OF PRESENT ILLNESS:  The patient is a 60 y.o. female who has a past history notable for HTN, HLD, CKD, coronary artery disease S/P PCI in , depression, RLS, COPD and chronic pain who presented [facility] [place] for evaluation of melena and acute blood loss anemia. We have been consulted regarding concern for upper gastrointestinal bleeding. Past Medical History:        Diagnosis Date    Arthritis     ASHD (arteriosclerotic heart disease)     Chronic kidney disease     Chronic midline low back pain without sciatica 4/3/2019    COPD (chronic obstructive pulmonary disease) (HCC)     unknown     Coronary artery disease involving native coronary artery of native heart without angina pectoris 3/31/2016    Coronary artery disease involving native coronary artery of native heart without angina pectoris 3/31/2016    Depression     Full dentures     HTN     Hyperlipidemia     Iron deficiency anemia 3/31/2016    Kidney stone     Morbid obesity (HCC)     Restless legs syndrome     pt on cabidopa/levodopa    Stage 3 chronic kidney disease ([address] 4/3/2019    Wears glasses      Past Surgical History:        Procedure Laterality Date    ABSCESS DRAINAGE Left 2018    incision and drainage of right buttock abscess    APPENDECTOMY      CARPAL TUNNEL RELEASE Left 2017    CORONARY ARTERY BYPASS GRAFT  9/28/10    4 way bypass    HIP SURGERY Left     I&D of joint    KNEE ARTHROSCOPY Right     TOTAL HIP ARTHROPLASTY Left 2015    TOTAL KNEE ARTHROPLASTY Right 8-24-10    TOTAL KNEE ARTHROPLASTY Left 11    TUBAL LIGATION       Medications Prior to Admission:   No current facility-administered medications on file prior to encounter.       Current Outpatient Medications on File Prior to Encounter   Medication Sig Dispense Refill    famotidine (PEPCID) 20 MG tablet Take 20 mg by mouth 2 times daily      omeprazole (PRILOSEC) 40 MG delayed release capsule Take 1 capsule by mouth daily 30 capsule 3    metoprolol succinate (TOPROL XL) 50 MG extended release tablet TAKE 1 TABLET BY MOUTH EVERY DAY 90 tablet 3    HYDROcodone-acetaminophen (NORCO) 7.5-325 MG per tablet Take 1 tablet by mouth every 6 hours as needed for Pain for up to 30 days. 120 tablet 0    carbidopa-levodopa (SINEMET)  MG per tablet TAKE 2 TABLETS NIGHTLY 180 tablet 1    FLUoxetine (PROZAC) 40 MG capsule TAKE 2 CAPSULES DAILY 180 capsule 3    buPROPion (WELLBUTRIN XL) 150 MG extended release tablet TAKE 1 TABLET BY MOUTH EVERY DAY 90 tablet 1    pravastatin (PRAVACHOL) 80 MG tablet TAKE 1 TABLET NIGHTLY 90 tablet 2    sodium bicarbonate 650 MG tablet TAKE 1 TABLET BY MOUTH FOUR TIMES A  tablet 1        Allergies:  Mobic [meloxicam]; Morphine; and Other    Social History:   TOBACCO:   reports that she quit smoking about 4 years ago. Her smoking use included cigarettes. She has a 15.00 pack-year smoking history. She has never used smokeless tobacco.  ETOH:   reports no history of alcohol use. DRUGS:   reports no history of drug use. PHYSICAL EXAM:      Vital Signs: /68   Pulse 50   Temp 98 °F (36.7 °C) (Oral)   Resp 17   Ht 5' 7\" (1.702 m)   Wt 263 lb 0.1 oz (119.3 kg)   LMP 01/01/2004   SpO2 100%   BMI 41.19 kg/m²    Airway: No stridor or wheezing noted. Good air movement  Pulmonary: without wheezes.   Clear to auscultation  Cardiac:regular rate and rhythm without loud murmurs  Abdomen:soft, nontender,  Bowel sounds present    Pre-Procedure Assessment / Plan:  1) EGD     ASA Grade:  ASA 3 - Patient with moderate systemic disease with functional limitations  Mallampati Classification:  Class III    Level of Sedation Plan:MAC    Post Procedure plan: Return to same level of care    I assessed the patient and find that the patient is in satisfactory condition to proceed with the planned procedure and sedation plan. I have explained the risk, benefits, and alternatives to the procedure; the patient understands and agrees to proceed.        Jamesetta Felty, MD  7/10/2020

## 2020-07-11 VITALS
OXYGEN SATURATION: 98 % | HEIGHT: 67 IN | RESPIRATION RATE: 14 BRPM | BODY MASS INDEX: 41.76 KG/M2 | WEIGHT: 266.1 LBS | HEART RATE: 91 BPM | SYSTOLIC BLOOD PRESSURE: 105 MMHG | TEMPERATURE: 97.6 F | DIASTOLIC BLOOD PRESSURE: 70 MMHG

## 2020-07-11 LAB
ANION GAP SERPL CALCULATED.3IONS-SCNC: 10 MMOL/L (ref 3–16)
BUN BLDV-MCNC: 31 MG/DL (ref 7–20)
CALCIUM SERPL-MCNC: 8.9 MG/DL (ref 8.3–10.6)
CHLORIDE BLD-SCNC: 107 MMOL/L (ref 99–110)
CO2: 19 MMOL/L (ref 21–32)
CREAT SERPL-MCNC: 1.8 MG/DL (ref 0.6–1.2)
GFR AFRICAN AMERICAN: 34
GFR NON-AFRICAN AMERICAN: 28
GLUCOSE BLD-MCNC: 96 MG/DL (ref 70–99)
HCT VFR BLD CALC: 26.9 % (ref 36–48)
HCT VFR BLD CALC: 27.8 % (ref 36–48)
HCT VFR BLD CALC: 28 % (ref 36–48)
HEMOGLOBIN: 8.7 G/DL (ref 12–16)
HEMOGLOBIN: 8.9 G/DL (ref 12–16)
HEMOGLOBIN: 9 G/DL (ref 12–16)
POTASSIUM SERPL-SCNC: 4.8 MMOL/L (ref 3.5–5.1)
SODIUM BLD-SCNC: 136 MMOL/L (ref 136–145)

## 2020-07-11 PROCEDURE — 36415 COLL VENOUS BLD VENIPUNCTURE: CPT

## 2020-07-11 PROCEDURE — 94760 N-INVAS EAR/PLS OXIMETRY 1: CPT

## 2020-07-11 PROCEDURE — 80048 BASIC METABOLIC PNL TOTAL CA: CPT

## 2020-07-11 PROCEDURE — 6370000000 HC RX 637 (ALT 250 FOR IP): Performed by: INTERNAL MEDICINE

## 2020-07-11 PROCEDURE — 85014 HEMATOCRIT: CPT

## 2020-07-11 PROCEDURE — 6360000002 HC RX W HCPCS: Performed by: INTERNAL MEDICINE

## 2020-07-11 PROCEDURE — 6360000002 HC RX W HCPCS: Performed by: NURSE PRACTITIONER

## 2020-07-11 PROCEDURE — 2580000003 HC RX 258: Performed by: NURSE PRACTITIONER

## 2020-07-11 PROCEDURE — 6370000000 HC RX 637 (ALT 250 FOR IP): Performed by: NURSE PRACTITIONER

## 2020-07-11 PROCEDURE — 2580000003 HC RX 258: Performed by: INTERNAL MEDICINE

## 2020-07-11 PROCEDURE — 85018 HEMOGLOBIN: CPT

## 2020-07-11 RX ORDER — PANTOPRAZOLE SODIUM 40 MG/1
40 TABLET, DELAYED RELEASE ORAL
Qty: 60 TABLET | Refills: 0 | Status: SHIPPED | OUTPATIENT
Start: 2020-07-11 | End: 2020-08-13

## 2020-07-11 RX ORDER — GREEN TEA/HOODIA GORDONII 315-12.5MG
1 CAPSULE ORAL DAILY
Qty: 5 TABLET | Refills: 0 | Status: SHIPPED | OUTPATIENT
Start: 2020-07-11 | End: 2020-07-16

## 2020-07-11 RX ORDER — LACTOBACILLUS RHAMNOSUS GG 10B CELL
1 CAPSULE ORAL 2 TIMES DAILY WITH MEALS
Status: DISCONTINUED | OUTPATIENT
Start: 2020-07-11 | End: 2020-07-11 | Stop reason: HOSPADM

## 2020-07-11 RX ORDER — CIPROFLOXACIN 500 MG/1
500 TABLET, FILM COATED ORAL DAILY
Qty: 5 TABLET | Refills: 0 | Status: SHIPPED | OUTPATIENT
Start: 2020-07-11 | End: 2020-07-16 | Stop reason: ALTCHOICE

## 2020-07-11 RX ADMIN — PANTOPRAZOLE SODIUM 40 MG: 40 TABLET, DELAYED RELEASE ORAL at 06:36

## 2020-07-11 RX ADMIN — BUPROPION HYDROCHLORIDE 150 MG: 150 TABLET, EXTENDED RELEASE ORAL at 10:34

## 2020-07-11 RX ADMIN — FLUOXETINE 40 MG: 20 CAPSULE ORAL at 10:34

## 2020-07-11 RX ADMIN — SODIUM BICARBONATE 1300 MG: 650 TABLET ORAL at 10:34

## 2020-07-11 RX ADMIN — CEPHALEXIN 250 MG: 250 CAPSULE ORAL at 06:35

## 2020-07-11 RX ADMIN — Medication 10 ML: at 10:36

## 2020-07-11 RX ADMIN — HYDROCODONE BITARTRATE AND ACETAMINOPHEN 1 TABLET: 7.5; 325 TABLET ORAL at 00:32

## 2020-07-11 RX ADMIN — CEFTRIAXONE 1 G: 1 INJECTION, POWDER, FOR SOLUTION INTRAMUSCULAR; INTRAVENOUS at 10:34

## 2020-07-11 RX ADMIN — HYDROCODONE BITARTRATE AND ACETAMINOPHEN 1 TABLET: 7.5; 325 TABLET ORAL at 13:04

## 2020-07-11 RX ADMIN — METOPROLOL SUCCINATE 25 MG: 25 TABLET, EXTENDED RELEASE ORAL at 10:34

## 2020-07-11 RX ADMIN — Medication 1 CAPSULE: at 10:34

## 2020-07-11 RX ADMIN — HYDROCODONE BITARTRATE AND ACETAMINOPHEN 1 TABLET: 7.5; 325 TABLET ORAL at 06:36

## 2020-07-11 RX ADMIN — ENOXAPARIN SODIUM 40 MG: 40 INJECTION SUBCUTANEOUS at 10:36

## 2020-07-11 RX ADMIN — FAMOTIDINE 20 MG: 20 TABLET ORAL at 10:34

## 2020-07-11 ASSESSMENT — PAIN DESCRIPTION - LOCATION
LOCATION: BACK

## 2020-07-11 ASSESSMENT — PAIN DESCRIPTION - ORIENTATION
ORIENTATION: LOWER

## 2020-07-11 ASSESSMENT — PAIN DESCRIPTION - DESCRIPTORS
DESCRIPTORS: ACHING;DISCOMFORT
DESCRIPTORS: DISCOMFORT
DESCRIPTORS: ACHING
DESCRIPTORS: ACHING;DISCOMFORT
DESCRIPTORS: ACHING

## 2020-07-11 ASSESSMENT — PAIN DESCRIPTION - ONSET
ONSET: ON-GOING

## 2020-07-11 ASSESSMENT — PAIN DESCRIPTION - PROGRESSION
CLINICAL_PROGRESSION: NOT CHANGED
CLINICAL_PROGRESSION: NOT CHANGED
CLINICAL_PROGRESSION: GRADUALLY IMPROVING

## 2020-07-11 ASSESSMENT — PAIN SCALES - GENERAL
PAINLEVEL_OUTOF10: 4
PAINLEVEL_OUTOF10: 6
PAINLEVEL_OUTOF10: 0
PAINLEVEL_OUTOF10: 0
PAINLEVEL_OUTOF10: 4
PAINLEVEL_OUTOF10: 6
PAINLEVEL_OUTOF10: 9

## 2020-07-11 ASSESSMENT — PAIN - FUNCTIONAL ASSESSMENT
PAIN_FUNCTIONAL_ASSESSMENT: PREVENTS OR INTERFERES SOME ACTIVE ACTIVITIES AND ADLS
PAIN_FUNCTIONAL_ASSESSMENT: PREVENTS OR INTERFERES WITH MANY ACTIVE NOT PASSIVE ACTIVITIES
PAIN_FUNCTIONAL_ASSESSMENT: ACTIVITIES ARE NOT PREVENTED

## 2020-07-11 ASSESSMENT — PAIN DESCRIPTION - PAIN TYPE
TYPE: CHRONIC PAIN

## 2020-07-11 ASSESSMENT — PAIN DESCRIPTION - FREQUENCY
FREQUENCY: CONTINUOUS

## 2020-07-11 NOTE — PLAN OF CARE
Problem: Falls - Risk of:  Goal: Will remain free from falls  Description: Will remain free from falls  Outcome: Ongoing  Note: Pt free from falls this shift. Non skid socks provided, x 2 bedside rails up, pt refuses assistance and bed alarm. Pt ambulating to BR independently. Pt educated on fall and safety. Call light always within reach. Pt able and agreeable to contact for safety appropriately. Problem: Pain:  Goal: Pain level will decrease  Description: Pain level will decrease  Outcome: Ongoing  Note: Pt able to express presence and absence of pain using numerical pain scale. Pt chronic pain is managed by PRN analgesics as ordered by MD. Pain reassess after each interventions. Will continue to monitor as needed.

## 2020-07-11 NOTE — PROGRESS NOTES
GASTROENTEROLOGY INPATIENT CONSULTATION:      IDENTIFYING DATA/REASON FOR CONSULTATION   PATIENT:  Cain Leung  MRN:  [id number]  ADMIT DATE: 7/9/2020  TIME OF EVALUATION: 7/11/2020 11:47 AM  HOSPITAL STAY:   LOS: 2 days     REASON FOR CONSULTATION:  Concern for upper gastrointestinal bleeding. SUBJECTIVE   Patient seen and examined. She denies any complaints. Tolerated EGD well. Denies any melena, hematochezia or bowel movement since procedure yesterday.   Would like discharge home with outpatient follow-up    PAST MEDICAL, SURGICAL, FAMILY, and SOCIAL HISTORY     Past Medical History:   Diagnosis Date    Arthritis     ASHD (arteriosclerotic heart disease)     Chronic kidney disease     Chronic midline low back pain without sciatica 4/3/2019    COPD (chronic obstructive pulmonary disease) (HCC)     unknown     Coronary artery disease involving native coronary artery of native heart without angina pectoris 3/31/2016    Coronary artery disease involving native coronary artery of native heart without angina pectoris 3/31/2016    Depression     Full dentures     HTN     Hyperlipidemia     Iron deficiency anemia 3/31/2016    Kidney stone 2012    Morbid obesity ([address]     Restless legs syndrome     pt on cabidopa/levodopa    Stage 3 chronic kidney disease ([address] 4/3/2019    Wears glasses      Past Surgical History:   Procedure Laterality Date    ABSCESS DRAINAGE Left 04/19/2018    incision and drainage of right buttock abscess    APPENDECTOMY      CARPAL TUNNEL RELEASE Left 11/14/2017    CORONARY ARTERY BYPASS GRAFT  9/28/10    4 way bypass    HIP SURGERY Left 2016    I&D of joint    KNEE ARTHROSCOPY Right 2000    TOTAL HIP ARTHROPLASTY Left 11-4-2015    TOTAL KNEE ARTHROPLASTY Right 8-24-10    TOTAL KNEE ARTHROPLASTY Left 4/8/11    TUBAL LIGATION       Family History   Problem Relation Age of Onset    Stroke Mother     Diabetes Mother     Heart Disease Mother     High Blood Pressure Mother     High Cholesterol Mother     Cancer Neg Hx      Social History     Socioeconomic History    Marital status:      Spouse name: None    Number of children: None    Years of education: None    Highest education level: None   Occupational History    Occupation:    Social Needs    Financial resource strain: None    Food insecurity     Worry: None     Inability: None    Transportation needs     Medical: None     Non-medical: None   Tobacco Use    Smoking status: Former Smoker     Packs/day: 0.50     Years: 30.00     Pack years: 15.00     Types: Cigarettes     Last attempt to quit: 2016     Years since quittin.3    Smokeless tobacco: Never Used   Substance and Sexual Activity    Alcohol use: No     Alcohol/week: 0.0 standard drinks    Drug use: No    Sexual activity: Yes     Partners: Male   Lifestyle    Physical activity     Days per week: None     Minutes per session: None    Stress: None   Relationships    Social connections     Talks on phone: None     Gets together: None     Attends [employer] service: None     Active member of club or organization: None     Attends meetings of clubs or organizations: None     Relationship status: None    Intimate partner violence     Fear of current or ex partner: None     Emotionally abused: None     Physically abused: None     Forced sexual activity: None   Other Topics Concern    None   Social History Narrative    None       MEDICATIONS   SCHEDULED:  cefTRIAXone (ROCEPHIN) IV, 1 g, Q24H  lactobacillus, 1 capsule, BID WC  buPROPion, 150 mg, Daily  FLUoxetine, 40 mg, Daily  metoprolol succinate, 25 mg, Daily  pravastatin, 80 mg, Nightly  sodium bicarbonate, 1,300 mg, BID  sodium chloride flush, 10 mL, 2 times per day  enoxaparin, 40 mg, Daily  famotidine, 20 mg, Daily  pantoprazole, 40 mg, BID AC  carbidopa-levodopa, 1 tablet, Nightly      FLUIDS/DRIPS:    PRNs: sodium chloride flush, 10 mL, PRN  acetaminophen, 650 mg, Q6H PRN    Or  acetaminophen, 650 mg, Q6H PRN  promethazine, 12.5 mg, Q6H PRN    Or  ondansetron, 4 mg, Q6H PRN  albuterol, 2.5 mg, Q2H PRN  HYDROcodone-acetaminophen, 1 tablet, Q6H PRN      ALLERGIES:    Allergies   Allergen Reactions    Mobic [Meloxicam] Nausea Only     Stomach would get upset after taking this medication    Morphine Other (See Comments)     When taken in 2013 pt had an adverse reaction to morphine. She ended up in he hospital with kidney failure, dehydration, and in ICU. She prefers this not to be given ever.  Other      ?  Suture from CABG caused blister (10/30/2015)        REVIEW OF SYSTEMS   A full 12 pt ROS is negative other than noted in HPI    PHYSICAL EXAM     Vitals:    07/11/20 0528 07/11/20 1034 07/11/20 1046 07/11/20 1139   BP:  96/62 96/62    Pulse:  70 70    Resp:    16   Temp:   98.1 °F (36.7 °C)    TempSrc:   Oral    SpO2:   95% 99%   Weight: 266 lb 1.5 oz (120.7 kg)      Height:           Physical Exam:  Gen: Resting in bed, NAD, obese WF   CV: RRR no MRG   Pul: CTAB, normal work of breathing  Abd: Good bowel sounds throughout, soft, NT/ND, no masses, no HSM   Ext: No edema, atraumatic  Neuro: No gross deficits, follows commands, moves all 4 extremities  Skin: No jaundice, spider angiomas, ac erythema    LABS AND IMAGING     Recent Results (from the past 24 hour(s))   Hemoglobin and Hematocrit, Blood    Collection Time: 07/10/20  2:59 PM   Result Value Ref Range    Hemoglobin 9.2 (L) 12.0 - 16.0 g/dL    Hematocrit 28.1 (L) 36.0 - 48.0 %   COVID-19    Collection Time: 07/10/20  3:51 PM   Result Value Ref Range    SARS-CoV-2, NAAT Not Detected Not Detected   Hemoglobin and Hematocrit, Blood    Collection Time: 07/11/20  3:41 AM   Result Value Ref Range    Hemoglobin 9.0 (L) 12.0 - 16.0 g/dL    Hematocrit 27.8 (L) 36.0 - 48.0 %   Basic Metabolic Panel    Collection Time: 07/11/20  3:41 AM   Result Value Ref Range    Sodium 136 136 - 145 mmol/L    Potassium 4.8 3.5 - 5.1 mmol/L Chloride 107 99 - 110 mmol/L    CO2 19 (L) 21 - 32 mmol/L    Anion Gap 10 3 - 16    Glucose 96 70 - 99 mg/dL    BUN 31 (H) 7 - 20 mg/dL    CREATININE 1.8 (H) 0.6 - 1.2 mg/dL    GFR Non-African American 28 (A) >60    GFR  34 (A) >60    Calcium 8.9 8.3 - 10.6 mg/dL   Hemoglobin and Hematocrit, Blood    Collection Time: 07/11/20  8:14 AM   Result Value Ref Range    Hemoglobin 8.7 (L) 12.0 - 16.0 g/dL    Hematocrit 26.9 (L) 36.0 - 48.0 %     Other Labs      Imaging      ASSESSMENT AND RECOMMENDATIONS   Gianna Hernandez is a 60 y.o. female who has a past history notable for HTN, HLD, CKD, coronary artery disease S/P PCI in 2019, depression, RLS, COPD and chronic pain who presented [facility] AT [place] for evaluation of melena and acute blood loss anemia. IMPRESSION:    1. Melena: Patient with painless melena x2 days while taking Brilinta and aspirin, now resolved with cessation of antiplatelet therapy. EGD 7/10/20 negative for source of bleeding. Suspect small bowel angiectasia versus right-sided colonic polyp/neoplasm will need outpatient colonoscopy  2. Blood loss anemia: No evidence of active bleeding.  s/p IV iron replacement  3. CKD: Nephrology consulted and following  4. FOBT positive: Patient would benefit from colonoscopy, likely as an outpatient for colon cancer screening, and potentially for further evaluation of source of melena if EGD is unremarkable. RECOMMENDATIONS:    - Ok for discharge home  - Will arrange for outpatient colonoscopy +/- VCE.  - Ok to resume ASA. Would recommend holding Brilinta unless primary cardiologist states Brilinta is necessary and clinically indicated despite blood loss. If you have any questions or need any further information, please feel free to contact our consult team.  Thank you for allowing us to participate in the care of Gianna Hernandez. Helena Meyer.  [address] [facility]

## 2020-07-11 NOTE — PROGRESS NOTES
Office : [phone]     Fax :[phone]         Renal Progress Note  Subjective:   Admit Date: 7/9/2020     HPI   H/o CKD 3  H/o metabolic acidosis  She follows with me in office but missed her last appointment        Interval History:     Renal function improving   BP low   No SOB       DIET DIET GENERAL;  Medications:   Scheduled Meds:   cefTRIAXone (ROCEPHIN) IV  1 g Intravenous Q24H    lactobacillus  1 capsule Oral BID WC    buPROPion  150 mg Oral Daily    FLUoxetine  40 mg Oral Daily    metoprolol succinate  25 mg Oral Daily    pravastatin  80 mg Oral Nightly    sodium bicarbonate  1,300 mg Oral BID    sodium chloride flush  10 mL Intravenous 2 times per day    enoxaparin  40 mg Subcutaneous Daily    famotidine  20 mg Oral Daily    pantoprazole  40 mg Oral BID AC    carbidopa-levodopa  1 tablet Oral Nightly     Continuous Infusions:    Labs:  CBC:   Recent Labs     07/09/20  1355 07/10/20  0207  07/10/20  1459 07/11/20  0341 07/11/20  0814   WBC 8.0 8.7  --   --   --   --    HGB 8.5* 7.8*   < > 9.2* 9.0* 8.7*    259  --   --   --   --     < > = values in this interval not displayed. BMP:    Recent Labs     07/09/20  1355 07/10/20  0207 07/11/20  0341    139 136   K 4.8 4.2 4.8    111* 107   CO2 14* 16* 19*   BUN 52* 42* 31*   CREATININE 2.6* 2.2* 1.8*   GLUCOSE 101* 111* 96     Ca/Mg/Phos:   Recent Labs     07/09/20  1355 07/10/20  0207 07/11/20  0341   CALCIUM 8.7 8.4 8.9     Hepatic: No results for input(s): AST, ALT, ALB, BILITOT, ALKPHOS in the last 72 hours.   Troponin:   Recent Labs     07/09/20 1355   TROPONINI <0.01 BNP: No results for input(s): BNP in the last 72 hours. Lipids: No results for input(s): CHOL, TRIG, HDL, LDLCALC, LABVLDL in the last 72 hours. ABGs: No results for input(s): PHART, PO2ART, GOC6JEM in the last 72 hours. INR: No results for input(s): INR in the last 72 hours. UA:  Recent Labs     07/09/20 1829   COLORU YELLOW   CLARITYU CLOUDY*   GLUCOSEU Negative   BILIRUBINUR Negative   KETUA Negative   SPECGRAV 1.016   BLOODU SMALL*   PHUR 6.0   PROTEINU 30*   UROBILINOGEN 0.2   NITRU Negative   LEUKOCYTESUR LARGE*   LABMICR YES   [place] NotGiven      Urine Microscopic:   Recent Labs     07/09/20 1829   BACTERIA 2+*   HYALCAST 0   WBCUA 130*   RBCUA 8*   EPIU 0     Urine Culture:   Recent Labs     07/09/20 1829   LABURIN >100,000 CFU/ml  Sensitivity to follow       Urine Chemistry: No results for input(s): CLUR, LABCREA, PROTEINUR, NAUR in the last 72 hours. Objective:   Vitals: BP 96/62   Pulse 70   Temp 98.1 °F (36.7 °C) (Oral)   Resp 16   Ht 5' 7\" (1.702 m)   Wt 266 lb 1.5 oz (120.7 kg)   LMP 01/01/2004   SpO2 99%   BMI 41.68 kg/m²    Wt Readings from Last 3 Encounters:   07/11/20 266 lb 1.5 oz (120.7 kg)   07/06/20 260 lb 9.6 oz (118.2 kg)   05/27/20 269 lb (122 kg)      24HR INTAKE/OUTPUT:      Intake/Output Summary (Last 24 hours) at 7/11/2020 1322  Last data filed at 7/11/2020 1038  Gross per 24 hour   Intake 2161 ml   Output --   Net 2161 ml     Constitutional:  Alert, awake, no apparent distress  NECK: supple, no JVD  Cardiovascular:  S1, S2 without m/r/g  Respiratory:  CTA B without w/r/r  Abdomen: +bs, soft, nt  Ext: mild  LE edema    Assessment and Plan:       IMAGING:  XR CHEST STANDARD (2 VW)   Final Result   No radiographic evidence of acute cardiopulmonary disease. Assessment/Plan     David on CKD 3   Renal function improved     Anemia . Monitor     UTI.  On abx     Recommend to dose adjust all medications  based on renal functions  Maintain SBP> 90 mmHg   Daily weights   AVOID NSAIDs  Avoid Nephrotoxins  Monitor Intake/Output  Call if significant decrease in urine output     Nhan Sapp MD  [facility] [address] [place] [address]

## 2020-07-11 NOTE — DISCHARGE SUMMARY
Hospital Medicine Discharge Summary      Patient ID: Kay Sandoval      Patient's PCP: Ivelisse Macedo MD    Admit Date: 7/9/2020     Discharge Date: 7/11/20    Admitting Physician: Dee Fabry, MD     Discharge Provider: DELIA Peng NP     Discharge Diagnoses: Active Hospital Problems    Diagnosis Date Noted    Acute kidney injury [facility]) [N17.9] 07/09/2020       Disposition:  [x] Home  [] Home with home health [] Rehab [] Psych [] SNF  [] LTAC  [] Long term nursing home or group home [] Transfer to ICU  [] Transfer to PCU [] Other:    Discharge Instructions/Follow-up:      Please call and schedule an appointment with your Primary Care Provider Dr. Ivelisse Macedo MD at [phone] for a follow up visit within 1 week. - Please check UC - pending sensitivities at the time of discharge  - Please check BMP, Mag    F/u with GI as instructed    F/u with nephrology as instructed. Take medications as prescribed. - If you have questions about your medications and/or changes to your medications, please ask your hospital provider and/or your primary care provider. Return to the emergency room for fever, cough, chest pain or worsening symptoms.       PCP/SNF to follow up: As above    Discharge Condition: Stable      Code Status:  Full Code     Activity: activity as tolerated    Diet: DIET GENERAL;     Discharge Medications:     Current Discharge Medication List           Details   ciprofloxacin (CIPRO) 500 MG tablet Take 1 tablet by mouth daily for 5 days  Qty: 5 tablet, Refills: 0      Probiotic Acidophilus (FLORANEX) TABS Take 1 tablet by mouth daily for 5 days  Qty: 5 tablet, Refills: 0              Details   pantoprazole (PROTONIX) 40 MG tablet Take 1 tablet by mouth 2 times daily (before meals)  Qty: 60 tablet, Refills: 0              Details   famotidine (PEPCID) 20 MG tablet Take 20 mg by mouth 2 times daily      omeprazole (PRILOSEC) 40 MG delayed release capsule Take 1 capsule by mouth daily  Qty: 30 capsule, Refills: 3      metoprolol succinate (TOPROL XL) 50 MG extended release tablet TAKE 1 TABLET BY MOUTH EVERY DAY  Qty: 90 tablet, Refills: 3      HYDROcodone-acetaminophen (NORCO) 7.5-325 MG per tablet Take 1 tablet by mouth every 6 hours as needed for Pain for up to 30 days. Qty: 120 tablet, Refills: 0    Comments: Reduce doses taken as pain becomes manageable  Associated Diagnoses: Primary osteoarthritis involving multiple joints; Encounter for medication refill      carbidopa-levodopa (SINEMET)  MG per tablet TAKE 2 TABLETS NIGHTLY  Qty: 180 tablet, Refills: 1      FLUoxetine (PROZAC) 40 MG capsule TAKE 2 CAPSULES DAILY  Qty: 180 capsule, Refills: 3      buPROPion (WELLBUTRIN XL) 150 MG extended release tablet TAKE 1 TABLET BY MOUTH EVERY DAY  Qty: 90 tablet, Refills: 1      pravastatin (PRAVACHOL) 80 MG tablet TAKE 1 TABLET NIGHTLY  Qty: 90 tablet, Refills: 2    Associated Diagnoses: Screening for breast cancer      sodium bicarbonate 650 MG tablet TAKE 1 TABLET BY MOUTH FOUR TIMES A DAY  Qty: 360 tablet, Refills: 1             The patient was seen and examined on day of discharge and this discharge summary is in conjunction with any daily progress note from day of discharge. Hospital Course: The patient is a 60 yrs old female, who  has a past medical history of Arthritis, ASHD (arteriosclerotic heart disease), Chronic kidney disease, Chronic midline low back pain without sciatica, COPD (chronic obstructive pulmonary disease) ([address] Coronary artery disease involving native coronary artery of native heart without angina pectoris, Coronary artery disease involving native coronary artery of native heart without angina pectoris, Depression, Full dentures, HTN, Hyperlipidemia, Iron deficiency anemia, Kidney stone, Morbid obesity ([address] Restless legs syndrome, Stage 3 chronic kidney disease ([address] and Wears glasses.  The patient had been seen in a hospital in [place] around Non-distended. BS+. Extremities: PP+. Atraumatic. No redness/cyanosis. + 1 pitting edema BLE. Brisk cap refill. Skin: Dry and intact. No lesions noted. Neuro: Grossly intact. No focal deficits noted. Consults:     IP CONSULT TO HOSPITALIST  IP CONSULT TO GI  IP CONSULT TO NEPHROLOGY    Significant Diagnostic Studies:     XR CHEST STANDARD (2 VW)   Final Result   No radiographic evidence of acute cardiopulmonary disease. Organism Abnormal    07/09/2020  6:29 PM  [facility]    Escherichia coli    Urine Culture, Routine  07/09/2020  6:29 PM  [facility]    >100,000 CFU/ml   Sensitivity to follow        Labs: For convenience and continuity at follow-up the following most recent labs are provided:    CBC:    Lab Results   Component Value Date    WBC 8.7 07/10/2020    HGB 8.7 07/11/2020    HCT 26.9 07/11/2020     07/10/2020       Renal:    Lab Results   Component Value Date     07/11/2020    K 4.8 07/11/2020    K 4.2 07/10/2020     07/11/2020    CO2 19 07/11/2020    BUN 31 07/11/2020    CREATININE 1.8 07/11/2020    CALCIUM 8.9 07/11/2020    PHOS 3.3 09/17/2019       No future appointments. Time Spent on discharge is more than 45 minutes in the examination, evaluation, counseling and review of medications and discharge plan. RX monitoring reviewed N/A    Signed:    DELIA Pino NP   7/11/2020      Thank you Major Anderson MD for the opportunity to be involved in this patient's care. If you have any questions or concerns please feel free to contact me at [phone].

## 2020-07-11 NOTE — PROGRESS NOTES
Pt alert and oriented x4. VSS. Pt reports chronic back pain and discomfort. Pt denies any other complains at this time. Pt able to tolerate general diet. Pt scheduled meds are given to the pt as ordered by MD. Pt ambulating to BR independently. Pt refuses bed alarm . Pt educated on use of call light. Pt denies other needs at this time. Call light and item need in reach. Electronically signed by Willard Disla RN on 7/11/2020 at 11:59 AM

## 2020-07-11 NOTE — PROGRESS NOTES
Physician Progress Note      PATIENT:               Donald Perez  CSN #:                  [id number]  :                       1955  ADMIT DATE:       2020 4:29 PM  [address] DATE:        2020 2:58 PM  RESPONDING  PROVIDER #:        Oralia Anderson NP          QUERY TEXT:    Obesity [address]    Patient admitted with BMI 41.3. If possible, please document in progress notes   and discharge summary if you are evaluating and /or treating any of the   following: The medical record reflects the following:  Risk Factors: HX- -ASHD, CKD 3 , COPD, CAD, depression  Clinical Indicators: bmi  41.3   5'7\", 263#  Treatment: 2gm Na diet  Options provided:  -- Morbid obesity  -- Obesity  -- Overweight  -- BMI not clinically significant  -- Refer to Clinical Documentation Reviewer    PROVIDER RESPONSE TEXT:    This patient has morbid obesity.     Query created by: Gabriela Persaud on 7/10/2020 10:39 AM      Electronically signed by:  Oralia Anderson NP 2020 6:04 PM

## 2020-07-12 LAB
ORGANISM: ABNORMAL
URINE CULTURE, ROUTINE: ABNORMAL

## 2020-07-13 ENCOUNTER — TELEPHONE (OUTPATIENT)
Dept: FAMILY MEDICINE CLINIC | Age: 65
End: 2020-07-13

## 2020-07-13 ENCOUNTER — CARE COORDINATION (OUTPATIENT)
Dept: CASE MANAGEMENT | Age: 65
End: 2020-07-13

## 2020-07-13 RX ORDER — BUPROPION HYDROCHLORIDE 150 MG/1
TABLET ORAL
Qty: 90 TABLET | Refills: 1 | Status: SHIPPED | OUTPATIENT
Start: 2020-07-13 | End: 2021-01-07

## 2020-07-13 NOTE — CARE COORDINATION
is accurate & up to date: Chavo Rojas (), Malialyndon Horton (child), Golden Rivera (other), and Avie Osgood (child). Jarad Ritchie states she is aware that she was tested for COVID and it was negative. She states her fatigue is much improved. Jarad Ritchie denies any SOB, signs of bleeding, or abd pain. Jarad Ritchie reviewed her medication list with writer ([id number] completed). Jarad Ritchie has not started protonix - her insurance company has not yet authorized it. Discussed role of CTN. Jarad Ritchie is agreeable to follow up from CTN. She took writer's contact info.     Follow Up  Future Appointments   Date Time Provider Reece Greenfield   7/17/2020  8:45 AM Jamarcus Boss MD [facility]       Dayna Rivera RN

## 2020-07-13 NOTE — TELEPHONE ENCOUNTER
[facility] Transitions Initial Follow Up Call    Outreach made within 2 business days of discharge: Yes    Patient: Angela Sandoval Patient : 1955   MRN: [id number]  Reason for Admission: There are no discharge diagnoses documented for the most recent discharge. Discharge Date: 20       Spoke with: Naomi Bachelor    Discharge department/facility: [place] [address]. Interactive Patient Contact:  Was patient able to fill all prescriptions: Yes  Was patient instructed to bring all medications to the follow-up visit: Yes  Is patient taking all medications as directed in the discharge summary?  Yes  Does patient understand their discharge instructions: Yes  Does patient have questions or concerns that need addressed prior to 7-14 day follow up office visit: no    Scheduled appointment with PCP within 7-14 days    Follow Up  Future Appointments   Date Time Provider Reece Greenfield   2020  8:45 AM Sebastian Ventura MD [address] [place], [place]

## 2020-07-13 NOTE — TELEPHONE ENCOUNTER
Patient is schedule for TCM on Friday 7/17/20 with Dr. Liz Barreto. She was prescribed protonix at the hospital but insurance will not cover it. She is requesting an alternative medication.

## 2020-07-16 ENCOUNTER — OFFICE VISIT (OUTPATIENT)
Dept: CARDIOLOGY CLINIC | Age: 65
End: 2020-07-16
Payer: COMMERCIAL

## 2020-07-16 VITALS
HEART RATE: 67 BPM | DIASTOLIC BLOOD PRESSURE: 82 MMHG | OXYGEN SATURATION: 95 % | WEIGHT: 261.8 LBS | TEMPERATURE: 98.3 F | HEIGHT: 67 IN | SYSTOLIC BLOOD PRESSURE: 124 MMHG | BODY MASS INDEX: 41.09 KG/M2

## 2020-07-16 PROCEDURE — 99214 OFFICE O/P EST MOD 30 MIN: CPT | Performed by: INTERNAL MEDICINE

## 2020-07-16 RX ORDER — FERROUS SULFATE 325(65) MG
325 TABLET ORAL 2 TIMES DAILY
Qty: 180 TABLET | Refills: 3 | Status: SHIPPED | OUTPATIENT
Start: 2020-07-16 | End: 2021-04-16

## 2020-07-16 RX ORDER — ASPIRIN 81 MG/1
81 TABLET ORAL DAILY
Qty: 90 TABLET | Refills: 3 | COMMUNITY
Start: 2020-07-16 | End: 2020-07-16

## 2020-07-16 RX ORDER — CLOPIDOGREL BISULFATE 75 MG/1
75 TABLET ORAL DAILY
Qty: 90 TABLET | Refills: 3 | Status: SHIPPED | OUTPATIENT
Start: 2020-07-16 | End: 2021-07-20

## 2020-07-16 RX ORDER — ROSUVASTATIN CALCIUM 40 MG/1
40 TABLET, COATED ORAL NIGHTLY
Qty: 90 TABLET | Refills: 3 | Status: SHIPPED | OUTPATIENT
Start: 2020-07-16 | End: 2021-07-20

## 2020-07-16 NOTE — PROGRESS NOTES
[facility]  Cardiology Note      Marleen Crump  1955, 60 y.o.      CC: \" I was told to stop Brilinta. \"                    Dedra Jimenez MD:      HPI:   This is a 60 y.o. female with coronary artery bypass surgery > 9 years ago who presented 9/2019 with chest pain and shortness of breath. That morning the patient had a rather severe episode of chest pain. ECG showed new ST segment depressions in the high lateral leads. The pain was relieved with nitroglycerin.     The patient stated that she had been having it for quite some time at least 2 episodes a day. Patient is fairly obese and has COPD as well as chronic kidney disease. She is s/p PCI and stenting of the saphenous vein graft to the OM on 9/16/20. Today, patient comes for routine check up. She is accompanied by family. She uses a cane for ambulation assistance. She was admitted to [facility] 7/9-7/11/20 after returning from a trip to [place]. She presented with reports of black stools and melena. She underwent EGD on 7/10/20 and told to hold Brilinta and resume Aspirin. She has not taken aspirin since discharge. She has no new cardiac complaints.        Past Medical History:   Diagnosis Date    Arthritis     ASHD (arteriosclerotic heart disease)     Chronic kidney disease     Chronic midline low back pain without sciatica 4/3/2019    COPD (chronic obstructive pulmonary disease) (HCC)     unknown     Coronary artery disease involving native coronary artery of native heart without angina pectoris 3/31/2016    Coronary artery disease involving native coronary artery of native heart without angina pectoris 3/31/2016    Depression     Full dentures     HTN     Hyperlipidemia     Iron deficiency anemia 3/31/2016    Kidney stone 2012    Morbid obesity (HCC)     Restless legs syndrome     pt on cabidopa/levodopa    Stage 3 chronic kidney disease ([address] 4/3/2019    Wears glasses       Past Surgical History:   Procedure Laterality Date    ABSCESS DRAINAGE Left 2018    incision and drainage of right buttock abscess    APPENDECTOMY      CARPAL TUNNEL RELEASE Left 2017    CORONARY ARTERY BYPASS GRAFT  9/28/10    4 way bypass    HIP SURGERY Left     I&D of joint    KNEE ARTHROSCOPY Right     TOTAL HIP ARTHROPLASTY Left 2015    TOTAL KNEE ARTHROPLASTY Right 8-24-10    TOTAL KNEE ARTHROPLASTY Left 11    TUBAL LIGATION      UPPER GASTROINTESTINAL ENDOSCOPY N/A 7/10/2020    EGD DIAGNOSTIC ONLY performed by Frankie Perez MD at [address] History   Problem Relation Age of Onset    Stroke Mother     Diabetes Mother     Heart Disease Mother     High Blood Pressure Mother     High Cholesterol Mother     Cancer Neg Hx       Social History     Tobacco Use    Smoking status: Former Smoker     Packs/day: 0.50     Years: 30.00     Pack years: 15.00     Types: Cigarettes     Last attempt to quit: 2016     Years since quittin.3    Smokeless tobacco: Never Used   Substance Use Topics    Alcohol use: No     Alcohol/week: 0.0 standard drinks    Drug use: No     Allergies   Allergen Reactions    Mobic [Meloxicam] Nausea Only     Stomach would get upset after taking this medication    Morphine Other (See Comments)     When taken in  pt had an adverse reaction to morphine. She ended up in [facility] with kidney failure, dehydration, and in ICU. She prefers this not to be given ever.  Other      ?  Suture from CABG caused blister (10/30/2015)         Review of Systems -   Constitutional: Negative for weight gain/loss; malaise, fever  Respiratory: Negative for Asthma;  cough and hemoptysis  Cardiovascular: Negative for palpitations,dizziness   Gastrointestinal: Negative for abd.pain; constipation/diarrhea;    Genitourinary: Negative for stones; hematuria; frequency hesitancy  Integumentt: Negative for rash or pruritis  Hematologic/lymphatic: Negative for blood dyscrasia; leukemia/lymphoma  Musculoskeletal: Negative for Connective tissue disease  Neurological:  Negative for Seizure   Behavioral/Psych:Negative for Bipolar disorder, Schizophrenia; Dementia  Endocrine: negative for thyroid, parathyroid disease    Physical Examination:    /82   Pulse 67   Temp 98.3 °F (36.8 °C)   Ht 5' 7\" (1.702 m)   Wt 261 lb 12.8 oz (118.8 kg)   LMP 01/01/2004   SpO2 95%   BMI 41.00 kg/m²    HEENT:  Face: Atraumatic, Conjunctiva: Pink; non icteric,  Mucous Memb:  Moist, No thyromegaly or Lymphadenopathy  Respiratory:  Resp Assessment:normal, Resp Auscultation:clear  Cardiovascular: Auscultation: nl S1 & S2, Palpation:  Nl PMI; No heaves or thrills, JVP:  normal  Abdomen: Soft, non-tender, Normal bowel sounds,  No organomegaly  Extremities: No Cyanosis or Clubbing  Neurological: Oriented to time, place, and person, Non-anxious  Psychiatric: Normal mood and affect  Skin: Warm and dry,  No rash seen     Outpatient Medications Marked as Taking for the 7/16/20 encounter (Office Visit) with April Montenegro MD   Medication Sig Dispense Refill    aspirin EC 81 MG EC tablet Take 1 tablet by mouth daily 90 tablet 3    buPROPion (WELLBUTRIN XL) 150 MG extended release tablet TAKE 1 TABLET BY MOUTH EVERY DAY 90 tablet 1    pantoprazole (PROTONIX) 40 MG tablet Take 1 tablet by mouth 2 times daily (before meals) 60 tablet 0    Probiotic Acidophilus (FLORANEX) TABS Take 1 tablet by mouth daily for 5 days 5 tablet 0    famotidine (PEPCID) 20 MG tablet Take 20 mg by mouth 2 times daily      omeprazole (PRILOSEC) 40 MG delayed release capsule Take 1 capsule by mouth daily 30 capsule 3    metoprolol succinate (TOPROL XL) 50 MG extended release tablet TAKE 1 TABLET BY MOUTH EVERY DAY 90 tablet 3    HYDROcodone-acetaminophen (NORCO) 7.5-325 MG per tablet Take 1 tablet by mouth every 6 hours as needed for Pain for up to 30 days.  120 tablet 0    sodium bicarbonate 650 MG tablet TAKE 1 TABLET BY MOUTH FOUR TIMES A  tablet 1    carbidopa-levodopa (SINEMET)  MG per tablet TAKE 2 TABLETS NIGHTLY 180 tablet 1    FLUoxetine (PROZAC) 40 MG capsule TAKE 2 CAPSULES DAILY 180 capsule 3    pravastatin (PRAVACHOL) 80 MG tablet TAKE 1 TABLET NIGHTLY 90 tablet 2         Labs:   Lab Results   Component Value Date    HDL 47 02/27/2020    HDL 36 09/27/2010    LDLDIRECT 127 05/15/2017    LDLCALC 91 02/27/2020    TRIG 200 02/27/2020       EKG:     Echo: 7/30/2019  Summary   Left ventricular cavity size is upper limits of normal. There is mild conc. LVH. EF    55%. The left atrium is dilated. Trivial mitral & tricuspid regurgitation. Normal right ventricular size. Corornary angiogram  & Intervention:9/16/2019     CORONARY ANGIOGRAM:  1. There is three-vessel native coronary artery disease. 2.  The left main is free of disease. 3.  The LAD gives off an intermediate-sized first diagonal which is free  of disease. The second diagonal had 70% to 80% ostial lesion. The LAD  beyond that has another 60% lesion. It gives off several large septal  branches and then is occluded. The septals are supplying collaterals to  the right coronary artery. 4.  The right coronary artery is occluded in the mid segment. There is  right-to-right collaterals. The RV branch has an 80% to 90% ostial  lesion. It is a pretty big branch. The distal right coronary artery  has collaterals coming from septals from the left anterior descending  artery. You can also see the saphenous vein graft to the RCA fill  retrograde. 5.  Saphenous vein graft to the RCA:  I could not engage and did not  want to use too much contrast because of the patient's renal  insufficiency. I assumed it is occluded, but since selective  angiography was not performed, I am not certain of its patency. No  aortic root shot was given, again, to reduce contrast load. 6.  KSYLAR graft to the LAD is patent. The saphenous vein graft to the  diagonal is patent. The portion of the graft to the OM has a 99%  stenosis and may be the culprit vessel. CONCLUSION:  A 99% stenosis in the saphenous vein graft to OM. Saphenous vein graft to the RCA could not be located but is assumed to  be occluded. You can see it fill retrogradely through collaterals. The  right coronary artery is occluded in the mid segment. The distal right  has collaterals from the left and the right circulation. The LAD is  occluded in the mid segment. The SKYLAR graft to the LAD is widely patent. The 3 to 4+ collaterals are going from the LAD septals into the right  coronary artery.     INTERVENTIONAL PROCEDURE CATHETERS USED:  JR4 guide, Runthrough wire, a  2.5 balloon, and a 4.0 KATIE stent.     The guide engaged the ostium well and provided good support. The lesion  was crossed with some degree of difficulty. The lesion was quite tight. I did not want to use a FilterWire because of the tightness of the  lesion. I therefore had to predilate it using a 2.5 balloon. I then  proceeded with direct stenting using a 4.0 balloon at single inflation  up to 16 atmospheres which corresponded to a vessel size of 4.28. Followup angiography showed 0% residual with good antegrade flow.     CONCLUSION:  Successful PCI and stenting of the saphenous vein graft to  the OM, lesion reduced from 99% to 0%.   A 4.0     ASSESSMENT AND PLAN:    CAD  -S/p PCI and stenting of the saphenous vein graft to the OM, lesion reduced from 99% to 0% (9/16/2019)  -May continue holding Brilinta; would like for her to be on another anti-platelet due to coronary disease  -I will initiate Plavix 75 mg QD and hold aspirin    Hyperlipidemia  Goal LDL <70  Finish supply of Pravastatin and switch to Rosuvastatin 40 mg nighty     Anemia  Low H/H  Start iron supplement; ferrous sulfate 325 mg BID  Will also send for iron infusion and labs; CBC      Follow up in 1 year       Thank you very much for allowing me to participate in the care of your patient. Please do not hesitate to contact me if you have any questions. Sincerely,    Emerita Chavez M.D  [facility], [address] [place] [address] [place] [address]  Ph: [phone]  Fax: [phone]    This note was scribed in the presence of Dr. Emerita Chavez MD by Claudia Damian    Physician Attestation:  The scribes documentation has been prepared under my direction and personally reviewed by me in its entirety. I confirm that the note above accurately reflects all work, treatment, procedures, and medical decision making performed by me.

## 2020-07-16 NOTE — PATIENT INSTRUCTIONS
Patient Education        ferrous sulfate  Pronunciation:  FARE us SUL fate  Brand:  Feosol, Ananth-Gen-Sol, Ananth-In-Sol, Ananth-Iron, FeroSulEl T.D., Caps, Ferro-Bob, [place], RODEZ E. [place], MyKidz Iron 10, Slow Fe, Slow Release Iron  What is the most important information I should know about ferrous sulfate? Ask a doctor or pharmacist if it is safe for you to take this medication if you have iron overload syndrome, hemolytic anemia (a lack of red blood cells), porphyria (a genetic enzyme disorder that causes symptoms affecting the skin or nervous system), thalassemia (a genetic disorder of red blood cells), if you are an alcoholic, or if you receive regular blood transfusions. Avoid taking any other multivitamin or mineral product within 2 hours before or after you take ferrous sulfate. Taking similar mineral products together at the same time can result in a mineral overdose or serious side effects. Seek emergency medical attention if you think you have used too much of this medicine, or if anyone has accidentally swallowed it. An overdose of iron can be fatal, especially in a young child. Overdose symptoms may include nausea, severe stomach pain, bloody diarrhea, coughing up blood or vomit that looks like coffee grounds, shallow breathing, weak and rapid pulse, pale skin, blue lips, and seizure (convulsions). Take ferrous sulfate on an empty stomach, at least 1 hour before or 2 hours after a meal. Avoid taking antacids or antibiotics within 2 hours before or after taking ferrous sulfate. Ferrous sulfate is only part of a complete program of treatment that may also include a special diet. It is very important to follow the diet plan created for you by your doctor or nutrition counselor. You should become very familiar with the list of foods you should eat to make sure you get enough iron from both your diet and your medication. What is ferrous sulfate? Ferrous sulfate is a type of iron.  You normally get iron from the foods you eat. In your body, iron becomes a part of your hemoglobin (HEEM o cici bin) and myoglobin (MY o cici bin). Hemoglobin carries oxygen through your blood to tissues and organs. Myoglobin helps your muscle cells store oxygen. Ferrous sulfate is used to treat iron deficiency anemia (a lack of red blood cells caused by having too little iron in the body). Ferrous sulfate may also be used for purposes not listed in this medication guide. What should I discuss before taking ferrous sulfate? Ask a doctor or pharmacist if it is safe for you to take this medication if you have:  · iron overload syndrome;  · hemolytic anemia (a lack of red blood cells);  · porphyria (a genetic enzyme disorder that causes symptoms affecting the skin or nervous system);  · thalassemia (a genetic disorder of red blood cells);  · if you are an alcoholic; or  · if you receive regular blood transfusions. It is not known whether this medication could be harmful to an unborn baby. Tell your doctor if you become pregnant during treatment. It is not known whether ferrous sulfate passes into breast milk or if it could harm a nursing baby. Do not use this medication without telling your doctor if you are breast-feeding a baby. Do not give ferrous sulfate to a child without the advice of a doctor. How should I take ferrous sulfate? Use exactly as directed on the label, or as prescribed by your doctor. Do not use in larger or smaller amounts or for longer than recommended. Take ferrous sulfate on an empty stomach, at least 1 hour before or 2 hours after a meal. Avoid taking antacids or antibiotics within 2 hours before or after taking ferrous sulfate . Take this medication with a full glass of water. Do not crush, chew, break, or open an extended-release tablet or capsule. Swallow the pill whole. Breaking or opening the pill may cause too much of the drug to be released at one time.   Shake the oral suspension (liquid) well just before you measure a dose. Measure the liquid with a special dose-measuring spoon or medicine cup, not with a regular table spoon. If you do not have a dose-measuring device, ask your pharmacist for one. Ferrous sulfate can stain your teeth, but this effect is temporary. To prevent tooth staining, mix the liquid form of ferrous sulfate with water or fruit juice (not with milk) and drink the mixture through a straw. You may also clean your teeth with baking soda once per week to treat any tooth staining. Ferrous sulfate is only part of a complete program of treatment that may also include a special diet. It is very important to follow the diet plan created for you by your doctor or nutrition counselor. You should become very familiar with the list of foods you should eat to make sure you get enough iron from both your diet and your medication. Store at room temperature, away from moisture and heat. What happens if I miss a dose? Take the missed dose as soon as you remember. Skip the missed dose if it is almost time for your next scheduled dose. Do not  take extra medicine to make up the missed dose. What happens if I overdose? Seek emergency medical attention or call the Poison Help line at 1-420.633.9590, especially if a child has accidentally swallowed it. An overdose of ferrous sulfate can be fatal to a child. Overdose symptoms may include nausea, severe stomach pain, bloody diarrhea, coughing up blood or vomit that looks like coffee grounds, shallow breathing, weak and rapid pulse, pale skin, blue lips, and seizure (convulsions). What should I avoid while taking ferrous sulfate? Avoid taking any other multivitamin or mineral product within 2 hours before or after you take ferrous sulfate. Taking similar mineral products together at the same time can result in a mineral overdose or serious side effects.   Avoid taking an antibiotic medicine within 2 hours before or after you take ferrous sulfate. This is especially important if you are taking an antibiotic such as ciprofloxacin (Cipro), demeclocycline (Declomycin), doxycycline (Adoxa, Doryx, Oracea, Vibramycin), levofloxacin (Levaquin), minocycline (Dynacin, Minocin, Solodyn, Vectrin), norfloxacin (Noroxin), ofloxacin (Floxin), or tetracycline (Brodspec, Panmycin, Sumycin, Tetracap). Certain foods can also make it harder for your body to absorb ferrous sulfate. Avoid taking this medication within 1 hour before or 2 hours after eating fish, meat, liver, and whole grain or \"fortified\" breads or cereals. Avoid using antacids without your doctor's advice. Use only the type of antacid your doctor recommends. Some antacids can make it harder for your body to absorb ferrous sulfate. What are the possible side effects of ferrous sulfate? Get emergency medical help if you have any of these signs of an allergic reaction:  hives; difficulty breathing; swelling of your face, lips, tongue, or throat. Less serious side effects may include:  · constipation;  · upset stomach;  · black or dark-colored stools; or  · temporary staining of the teeth. This is not a complete list of side effects and others may occur. Call your doctor for medical advice about side effects. You may report side effects to FDA at 6-859-FDA-4035. What other drugs will affect ferrous sulfate? Tell your doctor about all other medicines you use, especially:  · acetohydroxamic acid (Lithostat);  · chloramphenicol;  · cimetidine (Tagamet);  · etidronate (Didronel);  · dimercaprol (an injection used to treat poisoning by arsenic, lead, or mercury);  · levodopa (Larodopa, Dopar, Sinemet);  · methyldopa (Aldomet); or  · penicillamine (Cuprimine). This list is not complete and other drugs may interact with ferrous sulfate. Tell your doctor about all medications you use. This includes prescription, over-the-counter, vitamin, and herbal products.  Do not start a new medication without telling your doctor. Where can I get more information? Your pharmacist can provide more information about ferrous sulfate. Remember, keep this and all other medicines out of the reach of children, never share your medicines with others, and use this medication only for the indication prescribed. Every effort has been made to ensure that the information provided by [address] is accurate, up-to-date, and complete, but no guarantee is made to that effect. Drug information contained herein may be time sensitive. [facility] information has been compiled for use by healthcare practitioners and consumers in the [facility] and therefore [facility] does not warrant that uses outside of the [facility] are appropriate, unless specifically indicated otherwise. [facility]'s drug information does not endorse drugs, diagnose patients or recommend therapy. [facility]'s drug information is an informational resource designed to assist licensed healthcare practitioners in caring for their patients and/or to serve consumers viewing this service as a supplement to, and not a substitute for, the expertise, skill, knowledge and judgment of healthcare practitioners. The absence of a warning for a given drug or drug combination in no way should be construed to indicate that the drug or drug combination is safe, effective or appropriate for any given patient. [facility] does not assume any responsibility for any aspect of healthcare administered with the aid of information [facility] provides. The information contained herein is not intended to cover all possible uses, directions, precautions, warnings, drug interactions, allergic reactions, or adverse effects. If you have questions about the drugs you are taking, check with your doctor, nurse or pharmacist.  Copyright 2310-9405 [address]. Version: 4.03. Revision date: 3/30/2012. Care instructions adapted under license by [facility] ([facility]).  If you have questions about a medical condition or this instruction, always ask your healthcare professional. [place] [address] any warranty or liability for your use of this information. SCHEDULE FOLLOW UP WITH GI; DR. Surinder Rojas. BE SURE TO INFORM DR. Surinder Rojas ABOUT PLAVIX, IRON SUPPLEMENT AND IRON INFUSIONS ORDERED BY DR. LUNA. SCHEDULED FOR OUTPATIENT IRON INFUSION FOR 5 WEEKLY TREATMENTS WITH REPEAT CBC POST INFUSIONS.

## 2020-07-17 ENCOUNTER — OFFICE VISIT (OUTPATIENT)
Dept: FAMILY MEDICINE CLINIC | Age: 65
End: 2020-07-17
Payer: COMMERCIAL

## 2020-07-17 VITALS
SYSTOLIC BLOOD PRESSURE: 114 MMHG | WEIGHT: 265.4 LBS | BODY MASS INDEX: 41.57 KG/M2 | HEART RATE: 60 BPM | TEMPERATURE: 97.3 F | OXYGEN SATURATION: 98 % | DIASTOLIC BLOOD PRESSURE: 78 MMHG

## 2020-07-17 PROCEDURE — 1111F DSCHRG MED/CURRENT MED MERGE: CPT | Performed by: FAMILY MEDICINE

## 2020-07-17 PROCEDURE — 99495 TRANSJ CARE MGMT MOD F2F 14D: CPT | Performed by: FAMILY MEDICINE

## 2020-07-17 NOTE — PROGRESS NOTES
Post-Discharge Transitional Care Management Services or Hospital Follow Up      Ronda Gipson   YOB: 1955    Date of Office Visit:  7/17/2020  Date of Hospital Admission: 7/9/20  Date of Hospital Discharge: 7/11/20  Risk of hospital readmission (high >=14%.  Medium >=10%) :Readmission Risk Score: 21      Care management risk score Rising risk (score 2-5) and Complex Care (Scores >=6): 10     Non face to face  following discharge, date last encounter closed (first attempt may have been earlier): 7/13/2020 10:22 AM    Call initiated 2 business days of discharge: Yes    Patient Active Problem List   Diagnosis    Primary localized osteoarthrosis, lower leg    DJD (degenerative joint disease)    Obesity    HTN (hypertension)    Hyperlipidemia    Abscess of skin of abdomen    Arthritis    STAN (acute kidney injury) ([address]    Intertrochanteric fracture of left femur ([address]    Osteopenia    Anemia    Failed total hip arthroplasty ([address]    History of total left hip arthroplasty 11/4/15    Coronary artery disease involving native coronary artery of native heart without angina pectoris    Iron deficiency anemia    Status post coronary artery bypass graft    Superficial postoperative wound infection    Arthritis of knee, right    Tobacco use disorder    Carpal tunnel syndrome of left wrist    Elevated serum creatinine    Cellulitis and abscess of buttock    Sepsis ([address]    Acute-on-chronic kidney injury ([address]    Metabolic acidosis, normal anion gap (NAG)    Morbid obesity (HCC)    Stage 3 chronic kidney disease (HCC)    Chronic midline low back pain without sciatica    SOB (shortness of breath)    COPD with exacerbation (HCC)    COPD exacerbation (HCC)    Unstable angina (HCC)    Restless legs syndrome    Acute kidney injury (HCC)       Allergies   Allergen Reactions    Mobic [Meloxicam] Nausea Only     Stomach would get upset after taking this medication    Morphine Other (See Comments)     When taken in 2013 pt had an adverse reaction to morphine. She ended up in he hospital with kidney failure, dehydration, and in ICU. She prefers this not to be given ever.  Other      ? Suture from CABG caused blister (10/30/2015)       Medications listed as ordered at the time of discharge from hospital   Rafael, [address] Medication Instructions GUERRERO:    Printed on:07/17/20 8980   Medication Information                      buPROPion (WELLBUTRIN XL) 150 MG extended release tablet  TAKE 1 TABLET BY MOUTH EVERY DAY             carbidopa-levodopa (SINEMET)  MG per tablet  TAKE 2 TABLETS NIGHTLY             clopidogrel (PLAVIX) 75 MG tablet  Take 1 tablet by mouth daily             famotidine (PEPCID) 20 MG tablet  Take 20 mg by mouth 2 times daily             ferrous sulfate (IRON 325) 325 (65 Fe) MG tablet  Take 1 tablet by mouth 2 times daily             FLUoxetine (PROZAC) 40 MG capsule  TAKE 2 CAPSULES DAILY             HYDROcodone-acetaminophen (NORCO) 7.5-325 MG per tablet  Take 1 tablet by mouth every 6 hours as needed for Pain for up to 30 days.              metoprolol succinate (TOPROL XL) 50 MG extended release tablet  TAKE 1 TABLET BY MOUTH EVERY DAY             omeprazole (PRILOSEC) 40 MG delayed release capsule  Take 1 capsule by mouth daily             pantoprazole (PROTONIX) 40 MG tablet  Take 1 tablet by mouth 2 times daily (before meals)             rosuvastatin (CRESTOR) 40 MG tablet  Take 1 tablet by mouth nightly             sodium bicarbonate 650 MG tablet  TAKE 1 TABLET BY MOUTH FOUR TIMES A DAY                   Medications marked \"taking\" at this time  Outpatient Medications Marked as Taking for the 7/17/20 encounter (Office Visit) with Prabhjot Thompson MD   Medication Sig Dispense Refill    ferrous sulfate (IRON 325) 325 (65 Fe) MG tablet Take 1 tablet by mouth 2 times daily 180 tablet 3    rosuvastatin (CRESTOR) 40 MG tablet Take 1 tablet by mouth nightly 90 tablet 3    clopidogrel (PLAVIX) 75 MG tablet Take 1 tablet by mouth daily 90 tablet 3    buPROPion (WELLBUTRIN XL) 150 MG extended release tablet TAKE 1 TABLET BY MOUTH EVERY DAY 90 tablet 1    pantoprazole (PROTONIX) 40 MG tablet Take 1 tablet by mouth 2 times daily (before meals) 60 tablet 0    famotidine (PEPCID) 20 MG tablet Take 20 mg by mouth 2 times daily      omeprazole (PRILOSEC) 40 MG delayed release capsule Take 1 capsule by mouth daily 30 capsule 3    metoprolol succinate (TOPROL XL) 50 MG extended release tablet TAKE 1 TABLET BY MOUTH EVERY DAY 90 tablet 3    HYDROcodone-acetaminophen (NORCO) 7.5-325 MG per tablet Take 1 tablet by mouth every 6 hours as needed for Pain for up to 30 days.  120 tablet 0    sodium bicarbonate 650 MG tablet TAKE 1 TABLET BY MOUTH FOUR TIMES A  tablet 1    carbidopa-levodopa (SINEMET)  MG per tablet TAKE 2 TABLETS NIGHTLY 180 tablet 1    FLUoxetine (PROZAC) 40 MG capsule TAKE 2 CAPSULES DAILY 180 capsule 3        Medications patient taking as of now reconciled against medications ordered at time of hospital discharge: Yes    Chief Complaint   Patient presents with    Follow-Up from [facility] 7/9-7/11 Acute Kidney Injury, Ulcer in stomach       History of Present illness - Follow up of Hospital diagnosis(es): Patient had been in [place] on vacation in June had black tarry stools went to the hospital for hemoglobin of 7 they treated her with PPIs and stopped her Brilinta and sent her back home when I saw her we decided to continue the PPI stay off the Brilinta and aspirin however she steadily got weaker came to the emergency room was found to have a hemoglobin of 7 creatinine of 2.6 and was admitted to [facility] she stayed 3 days while there she had an EGD that showed a healing duodenal ulcer she also had an E. coli UTI she was given an iron infusion and released to home on Protonix 40 twice daily and held the aspirin and nephrology next week    3 coronary disease  Has resumed Plavix    4 anemia  Iron infusions iron tablets  Consider colonoscopy and repeat EGD  Return to office in 6 weeks for follow-up        Medical Decision Making: moderate complexity

## 2020-07-20 NOTE — TELEPHONE ENCOUNTER
Left message for patient to return call. Received notification from [facility] regarding duplicate therapy. Please verify what statin patient is taking. Per med list patient is taking Rosuvastatin. Notification list both Pravastatin and Rosuvastatin. If patient is taking both, please advise that she should only take one.

## 2020-07-21 ENCOUNTER — TELEPHONE (OUTPATIENT)
Dept: FAMILY MEDICINE CLINIC | Age: 65
End: 2020-07-21

## 2020-07-22 ENCOUNTER — CARE COORDINATION (OUTPATIENT)
Dept: CASE MANAGEMENT | Age: 65
End: 2020-07-22

## 2020-07-22 RX ORDER — HYDROCODONE BITARTRATE AND ACETAMINOPHEN 7.5; 325 MG/1; MG/1
1 TABLET ORAL EVERY 6 HOURS PRN
Qty: 120 TABLET | Refills: 0 | Status: SHIPPED | OUTPATIENT
Start: 2020-07-22 | End: 2020-08-19 | Stop reason: SDUPTHER

## 2020-07-22 NOTE — TELEPHONE ENCOUNTER
Medication:   Requested Prescriptions     Pending Prescriptions Disp Refills    HYDROcodone-acetaminophen (NORCO) 7.5-325 MG per tablet 120 tablet 0     Sig: Take 1 tablet by mouth every 6 hours as needed for Pain for up to 30 days. Last Filled:  06/25/2020 #120 0rf    Patient Phone Number: [phone] (home)     Last appt: 7/17/2020   Next appt: 8/28/2020    Last OARRS:   RX Monitoring 3/3/2020   Attestation -   Acute Pain Prescriptions -   Periodic Controlled Substance Monitoring No signs of potential drug abuse or diversion identified.    Chronic Pain > 80 MEDD -

## 2020-07-29 ENCOUNTER — TELEPHONE (OUTPATIENT)
Dept: CARDIOLOGY CLINIC | Age: 65
End: 2020-07-29

## 2020-07-29 NOTE — TELEPHONE ENCOUNTER
Per Dr Mami Ortiz note on 7/16:    Anemia  Low H/H  Start iron supplement; ferrous sulfate 325 mg BID  Will also send for iron infusion and labs; CBC    Pt would like to know more information on infusion lab and what she needs to do. Is she supposed to contact someone or will they contact her?     Please give her a call back at [phone]

## 2020-07-29 NOTE — TELEPHONE ENCOUNTER
Called and spoke with patient, gave her the number to the infusion center to call and schedule an appointment. Patient expressed understanding.

## 2020-08-04 ENCOUNTER — TELEPHONE (OUTPATIENT)
Dept: CARDIOLOGY CLINIC | Age: 65
End: 2020-08-04

## 2020-08-04 DIAGNOSIS — D50.0 IRON DEFICIENCY ANEMIA SECONDARY TO BLOOD LOSS (CHRONIC): ICD-10-CM

## 2020-08-04 RX ORDER — SODIUM CHLORIDE 9 MG/ML
INJECTION, SOLUTION INTRAVENOUS CONTINUOUS
Status: CANCELLED | OUTPATIENT
Start: 2020-08-11

## 2020-08-04 RX ORDER — SODIUM CHLORIDE 0.9 % (FLUSH) 0.9 %
10 SYRINGE (ML) INJECTION PRN
Status: CANCELLED | OUTPATIENT
Start: 2020-08-11

## 2020-08-04 RX ORDER — MEPERIDINE HYDROCHLORIDE 25 MG/ML
12.5 INJECTION INTRAMUSCULAR; INTRAVENOUS; SUBCUTANEOUS ONCE
Status: CANCELLED | OUTPATIENT
Start: 2020-08-11

## 2020-08-04 RX ORDER — METHYLPREDNISOLONE SODIUM SUCCINATE 125 MG/2ML
125 INJECTION, POWDER, LYOPHILIZED, FOR SOLUTION INTRAMUSCULAR; INTRAVENOUS ONCE
Status: CANCELLED | OUTPATIENT
Start: 2020-08-11

## 2020-08-04 RX ORDER — DIPHENHYDRAMINE HYDROCHLORIDE 50 MG/ML
50 INJECTION INTRAMUSCULAR; INTRAVENOUS ONCE
Status: CANCELLED | OUTPATIENT
Start: 2020-08-11

## 2020-08-04 RX ORDER — EPINEPHRINE 1 MG/ML
0.3 INJECTION, SOLUTION, CONCENTRATE INTRAVENOUS PRN
Status: CANCELLED | OUTPATIENT
Start: 2020-08-11

## 2020-08-04 NOTE — TELEPHONE ENCOUNTER
Spoke with Antwon Solo. She needs the order faxed to the infusion center. Orders are scanned in under media tab.      Please print and fax to [phone]

## 2020-08-04 NOTE — TELEPHONE ENCOUNTER
Luzmaria Cisneros called and stated that the Iron order requires to have a secondary diagnosis on it. Please add this and put new order in.     You can reach Luzmaria Cisneros at [phone]

## 2020-08-06 ENCOUNTER — HOSPITAL ENCOUNTER (OUTPATIENT)
Dept: INFUSION THERAPY | Age: 65
Setting detail: INFUSION SERIES
End: 2020-08-06
Payer: COMMERCIAL

## 2020-08-07 ENCOUNTER — HOSPITAL ENCOUNTER (OUTPATIENT)
Dept: INFUSION THERAPY | Age: 65
Setting detail: INFUSION SERIES
Discharge: HOME | End: 2020-08-07
Payer: COMMERCIAL

## 2020-08-07 DIAGNOSIS — D50.0 IRON DEFICIENCY ANEMIA SECONDARY TO BLOOD LOSS (CHRONIC): Primary | ICD-10-CM

## 2020-08-07 DIAGNOSIS — N18.30 STAGE 3 CHRONIC KIDNEY DISEASE (HCC): ICD-10-CM

## 2020-08-07 RX ORDER — MEPERIDINE HYDROCHLORIDE 25 MG/ML
12.5 INJECTION INTRAMUSCULAR; INTRAVENOUS; SUBCUTANEOUS ONCE
Status: CANCELLED | OUTPATIENT
Start: 2020-08-14

## 2020-08-07 RX ORDER — SODIUM CHLORIDE 0.9 % (FLUSH) 0.9 %
10 SYRINGE (ML) INJECTION PRN
Status: CANCELLED | OUTPATIENT
Start: 2020-08-14

## 2020-08-07 RX ORDER — METHYLPREDNISOLONE SODIUM SUCCINATE 125 MG/2ML
125 INJECTION, POWDER, LYOPHILIZED, FOR SOLUTION INTRAMUSCULAR; INTRAVENOUS ONCE
Status: CANCELLED | OUTPATIENT
Start: 2020-08-14

## 2020-08-07 RX ORDER — DIPHENHYDRAMINE HYDROCHLORIDE 50 MG/ML
50 INJECTION INTRAMUSCULAR; INTRAVENOUS ONCE
Status: CANCELLED | OUTPATIENT
Start: 2020-08-14

## 2020-08-07 RX ORDER — EPINEPHRINE 1 MG/ML
0.3 INJECTION, SOLUTION, CONCENTRATE INTRAVENOUS PRN
Status: CANCELLED | OUTPATIENT
Start: 2020-08-14

## 2020-08-07 RX ORDER — SODIUM CHLORIDE 9 MG/ML
INJECTION, SOLUTION INTRAVENOUS CONTINUOUS
Status: CANCELLED | OUTPATIENT
Start: 2020-08-14

## 2020-08-07 RX ORDER — SODIUM CHLORIDE 0.9 % (FLUSH) 0.9 %
10 SYRINGE (ML) INJECTION PRN
Status: DISCONTINUED | OUTPATIENT
Start: 2020-08-07 | End: 2020-08-08 | Stop reason: HOSPADM

## 2020-08-11 ENCOUNTER — HOSPITAL ENCOUNTER (OUTPATIENT)
Dept: INFUSION THERAPY | Age: 65
Setting detail: INFUSION SERIES
Discharge: HOME | End: 2020-08-11
Payer: COMMERCIAL

## 2020-08-11 VITALS
TEMPERATURE: 98.7 F | SYSTOLIC BLOOD PRESSURE: 142 MMHG | RESPIRATION RATE: 18 BRPM | DIASTOLIC BLOOD PRESSURE: 77 MMHG | OXYGEN SATURATION: 95 % | HEART RATE: 67 BPM

## 2020-08-11 DIAGNOSIS — N18.30 STAGE 3 CHRONIC KIDNEY DISEASE (HCC): ICD-10-CM

## 2020-08-11 DIAGNOSIS — D50.0 IRON DEFICIENCY ANEMIA SECONDARY TO BLOOD LOSS (CHRONIC): Primary | ICD-10-CM

## 2020-08-11 PROCEDURE — 2580000003 HC RX 258: Performed by: INTERNAL MEDICINE

## 2020-08-11 PROCEDURE — 96374 THER/PROPH/DIAG INJ IV PUSH: CPT

## 2020-08-11 PROCEDURE — 6360000002 HC RX W HCPCS: Performed by: INTERNAL MEDICINE

## 2020-08-11 RX ORDER — EPINEPHRINE 1 MG/ML
0.3 INJECTION, SOLUTION, CONCENTRATE INTRAVENOUS PRN
Status: CANCELLED | OUTPATIENT
Start: 2020-08-18

## 2020-08-11 RX ORDER — METHYLPREDNISOLONE SODIUM SUCCINATE 125 MG/2ML
125 INJECTION, POWDER, LYOPHILIZED, FOR SOLUTION INTRAMUSCULAR; INTRAVENOUS ONCE
Status: CANCELLED | OUTPATIENT
Start: 2020-08-18

## 2020-08-11 RX ORDER — SODIUM CHLORIDE 0.9 % (FLUSH) 0.9 %
10 SYRINGE (ML) INJECTION PRN
Status: CANCELLED | OUTPATIENT
Start: 2020-08-18

## 2020-08-11 RX ORDER — SODIUM CHLORIDE 0.9 % (FLUSH) 0.9 %
10 SYRINGE (ML) INJECTION PRN
Status: DISCONTINUED | OUTPATIENT
Start: 2020-08-11 | End: 2020-08-12 | Stop reason: HOSPADM

## 2020-08-11 RX ORDER — SODIUM CHLORIDE 9 MG/ML
INJECTION, SOLUTION INTRAVENOUS CONTINUOUS
Status: CANCELLED | OUTPATIENT
Start: 2020-08-18

## 2020-08-11 RX ORDER — DIPHENHYDRAMINE HYDROCHLORIDE 50 MG/ML
50 INJECTION INTRAMUSCULAR; INTRAVENOUS ONCE
Status: CANCELLED | OUTPATIENT
Start: 2020-08-18

## 2020-08-11 RX ORDER — MEPERIDINE HYDROCHLORIDE 25 MG/ML
12.5 INJECTION INTRAMUSCULAR; INTRAVENOUS; SUBCUTANEOUS ONCE
Status: CANCELLED | OUTPATIENT
Start: 2020-08-18

## 2020-08-11 RX ADMIN — IRON SUCROSE 200 MG: 20 INJECTION, SOLUTION INTRAVENOUS at 16:40

## 2020-08-11 RX ADMIN — Medication 10 ML: at 16:18

## 2020-08-11 RX ADMIN — Medication 10 ML: at 16:40

## 2020-08-11 NOTE — PROGRESS NOTES
[address] [facility]     Venofer Visit    NAME:  Lucas Beebe OF BIRTH:  1955  MEDICAL RECORD NUMBER:  [id number]  Episode Date:  8/11/2020    Patient arrived to [facility]   [] per wheelchair   [x] ambulatory     Patient here for first dose of venofer. History of Peptic ulcer from EGD done on 7/10/2020. Patient also with CAD, 4 way CABG in 2010. Has been feeling tired lately, some memory issues and  lightheaded at times. HGB 8.9, HCT 28.0 and iron sat 10%. Dr. Martha Clements ordered 5 doses of venofer. Has the patient had a previous problem with Venofer Infusion? No  Any current infection or illness? No   Patient on antibiotics? No   History of Hypertension?  Yes      Is the patient experiencing any:  Fatigue:   []   None  []   Increase over baseline but not altering normal activities  [x]   Moderate of causing difficulty performing some activities  []   Severe or loss of ability to perform some activities  []   Bedridden or disabling     Dizziness or Lightheadedness:   []   None  [x]   No Interference  []   Interferes with functioning but not activities of daily living  []   Interferes with daily activies  []   Bedridden or disabling    Shortness of Breath:   []   None   [x]   Dyspneic on exertion   []   Dyspnea with normal activities  []   Dyspnea at rest    Edema: Trace Location of edema: left leg    Tachycardia: No    Heart Palpitations: No      Chest Pain: No          Current Lab Data:  Hemoglobin/Hematocrit:    Lab Results   Component Value Date    HGB 8.9 07/11/2020    HCT 28.0 07/11/2020     IRON:    Lab Results   Component Value Date    IRON 24 07/10/2020     Iron Saturation:    Lab Results   Component Value Date    LABIRON 10 07/10/2020     TIBC:    Lab Results   Component Value Date    TIBC 240 07/10/2020     FERRITIN:    Lab Results   Component Value Date    FERRITIN 58.7 09/15/2012       Dose Administered: 200 mg  Todays dose is number 1 out of 5 ordered for this patient. Potential side effects reviewed with  and patient and instructions written in AVS.    Response to treatment:  Well tolerated by patient. Education:    Indicates understanding     Scheduled to return for next dose of Venofer on August 3, 2020.      Electronically signed by Janice Patel RN on 8/11/2020 at 5:00 pm.

## 2020-08-13 ENCOUNTER — HOSPITAL ENCOUNTER (OUTPATIENT)
Dept: INFUSION THERAPY | Age: 65
Setting detail: INFUSION SERIES
Discharge: HOME | End: 2020-08-13
Payer: COMMERCIAL

## 2020-08-13 VITALS
OXYGEN SATURATION: 95 % | SYSTOLIC BLOOD PRESSURE: 116 MMHG | HEART RATE: 73 BPM | RESPIRATION RATE: 16 BRPM | DIASTOLIC BLOOD PRESSURE: 64 MMHG | TEMPERATURE: 98 F

## 2020-08-13 DIAGNOSIS — N18.30 STAGE 3 CHRONIC KIDNEY DISEASE (HCC): ICD-10-CM

## 2020-08-13 DIAGNOSIS — D50.0 IRON DEFICIENCY ANEMIA SECONDARY TO BLOOD LOSS (CHRONIC): Primary | ICD-10-CM

## 2020-08-13 PROCEDURE — 6360000002 HC RX W HCPCS: Performed by: INTERNAL MEDICINE

## 2020-08-13 PROCEDURE — 2580000003 HC RX 258: Performed by: INTERNAL MEDICINE

## 2020-08-13 PROCEDURE — 96374 THER/PROPH/DIAG INJ IV PUSH: CPT

## 2020-08-13 RX ORDER — SODIUM CHLORIDE 9 MG/ML
INJECTION, SOLUTION INTRAVENOUS CONTINUOUS
Status: CANCELLED | OUTPATIENT
Start: 2020-08-18

## 2020-08-13 RX ORDER — SODIUM CHLORIDE 0.9 % (FLUSH) 0.9 %
10 SYRINGE (ML) INJECTION PRN
Status: DISCONTINUED | OUTPATIENT
Start: 2020-08-13 | End: 2020-08-14 | Stop reason: HOSPADM

## 2020-08-13 RX ORDER — METHYLPREDNISOLONE SODIUM SUCCINATE 125 MG/2ML
125 INJECTION, POWDER, LYOPHILIZED, FOR SOLUTION INTRAMUSCULAR; INTRAVENOUS ONCE
Status: CANCELLED | OUTPATIENT
Start: 2020-08-18

## 2020-08-13 RX ORDER — MEPERIDINE HYDROCHLORIDE 25 MG/ML
12.5 INJECTION INTRAMUSCULAR; INTRAVENOUS; SUBCUTANEOUS ONCE
Status: CANCELLED | OUTPATIENT
Start: 2020-08-18

## 2020-08-13 RX ORDER — EPINEPHRINE 1 MG/ML
0.3 INJECTION, SOLUTION, CONCENTRATE INTRAVENOUS PRN
Status: CANCELLED | OUTPATIENT
Start: 2020-08-18

## 2020-08-13 RX ORDER — DIPHENHYDRAMINE HYDROCHLORIDE 50 MG/ML
50 INJECTION INTRAMUSCULAR; INTRAVENOUS ONCE
Status: CANCELLED | OUTPATIENT
Start: 2020-08-18

## 2020-08-13 RX ORDER — SODIUM CHLORIDE 0.9 % (FLUSH) 0.9 %
10 SYRINGE (ML) INJECTION PRN
Status: CANCELLED | OUTPATIENT
Start: 2020-08-18

## 2020-08-13 RX ADMIN — Medication 10 ML: at 15:57

## 2020-08-13 RX ADMIN — Medication 10 ML: at 16:12

## 2020-08-13 RX ADMIN — IRON SUCROSE 200 MG: 20 INJECTION, SOLUTION INTRAVENOUS at 15:58

## 2020-08-13 NOTE — PROGRESS NOTES
[facility]     Venofer Visit    NAME:  Celeste Monte  YOB: 1955  MEDICAL RECORD NUMBER:  [id number]  Episode Date:  8/13/2020    Patient arrived to [facility]   [] per wheelchair   [x] ambulatory     Has the patient had a previous problem with Venofer Infusion? No  Any current infection or illness? No   Patient on antibiotics? No   History of Hypertension?  Yes      /64   Pulse 73   Temp 98 °F (36.7 °C) (Oral)   Resp 16   LMP 01/01/2004   SpO2 95%   Patient Vitals for the past 2 hrs:   BP Temp Temp src Pulse Resp SpO2   08/13/20 1611 116/64 -- -- 73 16 --   08/13/20 1540 106/69 98 °F (36.7 °C) Oral 70 16 95 %       Is the patient experiencing any:  Fatigue:   []   None  []   Increase over baseline but not altering normal activities  [x]   Moderate of causing difficulty performing some activities  []   Severe or loss of ability to perform some activities  []   Bedridden or disabling     Dizziness or Lightheadedness:   []   None  [x]   No Interference  []   Interferes with functioning but not activities of daily living  []   Interferes with daily activies  []   Bedridden or disabling    Shortness of Breath:   [x]   None   []   Dyspneic on exertion   []   Dyspnea with normal activities  []   Dyspnea at rest    Edema: Trace Location of edema: left ankle    Tachycardia: No    Heart Palpitations: No      Chest Pain: No      /64   Pulse 73   Temp 98 °F (36.7 °C) (Oral)   Resp 16   LMP 01/01/2004   SpO2 95%   Patient Vitals for the past 2 hrs:   BP Temp Temp src Pulse Resp SpO2   08/13/20 1611 116/64 -- -- 73 16 --   08/13/20 1540 106/69 98 °F (36.7 °C) Oral 70 16 95 %       Current Lab Data:  Hemoglobin/Hematocrit:    Lab Results   Component Value Date    HGB 8.9 07/11/2020    HCT 28.0 07/11/2020     IRON:    Lab Results   Component Value Date    IRON 24 07/10/2020     Iron Saturation:    Lab Results   Component Value Date LABIRON 10 07/10/2020     TIBC:    Lab Results   Component Value Date    TIBC 240 07/10/2020     FERRITIN:    Lab Results   Component Value Date    FERRITIN 58.7 09/15/2012       Dose Administered: 200 mg  Todays dose is number 2 out of 5 ordered for this patient. Response to treatment:  Well tolerated by patient. Education:    Verbalized understanding and printed AVS given to patient    Scheduled to return for next dose of Venofer on August 18, 2020.      Electronically signed by Chesley Fabry, RN on 8/13/2020 at 4:14 PM

## 2020-08-18 ENCOUNTER — HOSPITAL ENCOUNTER (OUTPATIENT)
Dept: INFUSION THERAPY | Age: 65
Setting detail: INFUSION SERIES
Discharge: HOME | End: 2020-08-18
Payer: COMMERCIAL

## 2020-08-18 VITALS
RESPIRATION RATE: 17 BRPM | DIASTOLIC BLOOD PRESSURE: 68 MMHG | TEMPERATURE: 98.4 F | SYSTOLIC BLOOD PRESSURE: 106 MMHG | OXYGEN SATURATION: 95 % | HEART RATE: 74 BPM

## 2020-08-18 DIAGNOSIS — D50.0 IRON DEFICIENCY ANEMIA SECONDARY TO BLOOD LOSS (CHRONIC): Primary | ICD-10-CM

## 2020-08-18 DIAGNOSIS — N18.30 STAGE 3 CHRONIC KIDNEY DISEASE (HCC): ICD-10-CM

## 2020-08-18 PROCEDURE — 6360000002 HC RX W HCPCS: Performed by: INTERNAL MEDICINE

## 2020-08-18 PROCEDURE — 96374 THER/PROPH/DIAG INJ IV PUSH: CPT

## 2020-08-18 PROCEDURE — 2580000003 HC RX 258: Performed by: INTERNAL MEDICINE

## 2020-08-18 RX ORDER — SODIUM CHLORIDE 9 MG/ML
INJECTION, SOLUTION INTRAVENOUS CONTINUOUS
Status: CANCELLED | OUTPATIENT
Start: 2020-08-25

## 2020-08-18 RX ORDER — DIPHENHYDRAMINE HYDROCHLORIDE 50 MG/ML
50 INJECTION INTRAMUSCULAR; INTRAVENOUS ONCE
Status: CANCELLED | OUTPATIENT
Start: 2020-08-25

## 2020-08-18 RX ORDER — SODIUM CHLORIDE 0.9 % (FLUSH) 0.9 %
10 SYRINGE (ML) INJECTION PRN
Status: CANCELLED | OUTPATIENT
Start: 2020-08-25

## 2020-08-18 RX ORDER — METHYLPREDNISOLONE SODIUM SUCCINATE 125 MG/2ML
125 INJECTION, POWDER, LYOPHILIZED, FOR SOLUTION INTRAMUSCULAR; INTRAVENOUS ONCE
Status: CANCELLED | OUTPATIENT
Start: 2020-08-25

## 2020-08-18 RX ORDER — SODIUM CHLORIDE 0.9 % (FLUSH) 0.9 %
10 SYRINGE (ML) INJECTION PRN
Status: DISCONTINUED | OUTPATIENT
Start: 2020-08-18 | End: 2020-08-19 | Stop reason: HOSPADM

## 2020-08-18 RX ORDER — EPINEPHRINE 1 MG/ML
0.3 INJECTION, SOLUTION, CONCENTRATE INTRAVENOUS PRN
Status: CANCELLED | OUTPATIENT
Start: 2020-08-25

## 2020-08-18 RX ORDER — MEPERIDINE HYDROCHLORIDE 25 MG/ML
12.5 INJECTION INTRAMUSCULAR; INTRAVENOUS; SUBCUTANEOUS ONCE
Status: CANCELLED | OUTPATIENT
Start: 2020-08-25

## 2020-08-18 RX ADMIN — Medication 10 ML: at 16:25

## 2020-08-18 RX ADMIN — Medication 10 ML: at 16:05

## 2020-08-18 RX ADMIN — IRON SUCROSE 200 MG: 20 INJECTION, SOLUTION INTRAVENOUS at 16:07

## 2020-08-18 ASSESSMENT — PAIN DESCRIPTION - LOCATION: LOCATION: RIB CAGE

## 2020-08-18 ASSESSMENT — PAIN DESCRIPTION - PROGRESSION: CLINICAL_PROGRESSION: NOT CHANGED

## 2020-08-18 ASSESSMENT — PAIN DESCRIPTION - FREQUENCY: FREQUENCY: INTERMITTENT

## 2020-08-18 ASSESSMENT — PAIN SCALES - GENERAL: PAINLEVEL_OUTOF10: 6

## 2020-08-18 ASSESSMENT — PAIN DESCRIPTION - ONSET: ONSET: ON-GOING

## 2020-08-18 ASSESSMENT — PAIN DESCRIPTION - PAIN TYPE: TYPE: ACUTE PAIN

## 2020-08-18 ASSESSMENT — PAIN DESCRIPTION - ORIENTATION: ORIENTATION: LEFT

## 2020-08-18 ASSESSMENT — PAIN DESCRIPTION - DESCRIPTORS: DESCRIPTORS: CONSTANT;STABBING

## 2020-08-18 ASSESSMENT — PAIN - FUNCTIONAL ASSESSMENT: PAIN_FUNCTIONAL_ASSESSMENT: ACTIVITIES ARE NOT PREVENTED

## 2020-08-18 NOTE — PROGRESS NOTES
[facility]     Venofer Visit    NAME:  Lucy Kunz  YOB: 1955  MEDICAL RECORD NUMBER:  [id number]  Episode Date:  8/18/2020    Patient arrived to [facility]   [] per wheelchair   [x] ambulatory     Has the patient had a previous problem with Venofer Infusion? No  Any current infection or illness? No   Patient on antibiotics? No   History of Hypertension?  Yes      /68   Pulse 74   Temp 98.4 °F (36.9 °C) (Oral)   Resp 17   LMP 01/01/2004   SpO2 95%   Patient Vitals for the past 2 hrs:   BP Temp Temp src Pulse Resp SpO2   08/18/20 1625 106/68 -- -- 74 17 --   08/18/20 1602 99/64 98.4 °F (36.9 °C) Oral 65 17 95 %       Is the patient experiencing any:  Fatigue:   []   None  [x]   Increase over baseline but not altering normal activities  []   Moderate of causing difficulty performing some activities  []   Severe or loss of ability to perform some activities  []   Bedridden or disabling     Dizziness or Lightheadedness:   [x]   None  []   No Interference  []   Interferes with functioning but not activities of daily living  []   Interferes with daily activies  []   Bedridden or disabling    Shortness of Breath:   [x]   None   []   Dyspneic on exertion   []   Dyspnea with normal activities  []   Dyspnea at rest    Edema: none     Tachycardia: No    Heart Palpitations: No      Chest Pain: No      /68   Pulse 74   Temp 98.4 °F (36.9 °C) (Oral)   Resp 17   LMP 01/01/2004   SpO2 95%   Patient Vitals for the past 2 hrs:   BP Temp Temp src Pulse Resp SpO2   08/18/20 1625 106/68 -- -- 74 17 --   08/18/20 1602 99/64 98.4 °F (36.9 °C) Oral 65 17 95 %       Current Lab Data:  Hemoglobin/Hematocrit:    Lab Results   Component Value Date    HGB 8.9 07/11/2020    HCT 28.0 07/11/2020     IRON:    Lab Results   Component Value Date    IRON 24 07/10/2020     Iron Saturation:    Lab Results   Component Value Date    LABIRON 10 07/10/2020 TIBC:    Lab Results   Component Value Date    TIBC 240 07/10/2020     FERRITIN:    Lab Results   Component Value Date    FERRITIN 58.7 09/15/2012       Dose Administered: 200 mg  Todays dose is number 3 out of 5 ordered for this patient. Response to treatment:  Well tolerated by patient. Education:    Verbalized understanding     Scheduled to return for next dose of Venofer on August 20, 2020.      Electronically signed by Louis Lopez RN on 8/18/2020 at 4:34 PM

## 2020-08-19 RX ORDER — HYDROCODONE BITARTRATE AND ACETAMINOPHEN 7.5; 325 MG/1; MG/1
1 TABLET ORAL EVERY 6 HOURS PRN
Qty: 120 TABLET | Refills: 0 | Status: SHIPPED | OUTPATIENT
Start: 2020-08-19 | End: 2020-09-15 | Stop reason: SDUPTHER

## 2020-08-20 ENCOUNTER — HOSPITAL ENCOUNTER (OUTPATIENT)
Dept: INFUSION THERAPY | Age: 65
Setting detail: INFUSION SERIES
Discharge: HOME | End: 2020-08-20
Payer: COMMERCIAL

## 2020-08-20 VITALS
OXYGEN SATURATION: 96 % | TEMPERATURE: 98 F | RESPIRATION RATE: 17 BRPM | HEART RATE: 62 BPM | DIASTOLIC BLOOD PRESSURE: 67 MMHG | SYSTOLIC BLOOD PRESSURE: 99 MMHG

## 2020-08-20 DIAGNOSIS — D50.0 IRON DEFICIENCY ANEMIA SECONDARY TO BLOOD LOSS (CHRONIC): Primary | ICD-10-CM

## 2020-08-20 DIAGNOSIS — N18.30 STAGE 3 CHRONIC KIDNEY DISEASE (HCC): ICD-10-CM

## 2020-08-20 PROCEDURE — 96374 THER/PROPH/DIAG INJ IV PUSH: CPT

## 2020-08-20 PROCEDURE — 6360000002 HC RX W HCPCS: Performed by: INTERNAL MEDICINE

## 2020-08-20 PROCEDURE — 2580000003 HC RX 258: Performed by: INTERNAL MEDICINE

## 2020-08-20 RX ORDER — EPINEPHRINE 1 MG/ML
0.3 INJECTION, SOLUTION, CONCENTRATE INTRAVENOUS PRN
Status: CANCELLED | OUTPATIENT
Start: 2020-08-25

## 2020-08-20 RX ORDER — MEPERIDINE HYDROCHLORIDE 25 MG/ML
12.5 INJECTION INTRAMUSCULAR; INTRAVENOUS; SUBCUTANEOUS ONCE
Status: CANCELLED | OUTPATIENT
Start: 2020-08-25

## 2020-08-20 RX ORDER — SODIUM CHLORIDE 9 MG/ML
INJECTION, SOLUTION INTRAVENOUS CONTINUOUS
Status: CANCELLED | OUTPATIENT
Start: 2020-08-25

## 2020-08-20 RX ORDER — METHYLPREDNISOLONE SODIUM SUCCINATE 125 MG/2ML
125 INJECTION, POWDER, LYOPHILIZED, FOR SOLUTION INTRAMUSCULAR; INTRAVENOUS ONCE
Status: CANCELLED | OUTPATIENT
Start: 2020-08-25

## 2020-08-20 RX ORDER — SODIUM CHLORIDE 0.9 % (FLUSH) 0.9 %
10 SYRINGE (ML) INJECTION PRN
Status: DISCONTINUED | OUTPATIENT
Start: 2020-08-20 | End: 2020-08-21 | Stop reason: HOSPADM

## 2020-08-20 RX ORDER — DIPHENHYDRAMINE HYDROCHLORIDE 50 MG/ML
50 INJECTION INTRAMUSCULAR; INTRAVENOUS ONCE
Status: CANCELLED | OUTPATIENT
Start: 2020-08-25

## 2020-08-20 RX ORDER — SODIUM CHLORIDE 0.9 % (FLUSH) 0.9 %
10 SYRINGE (ML) INJECTION PRN
Status: CANCELLED | OUTPATIENT
Start: 2020-08-25

## 2020-08-20 RX ADMIN — Medication 10 ML: at 16:59

## 2020-08-20 RX ADMIN — Medication 10 ML: at 16:26

## 2020-08-20 RX ADMIN — Medication 10 ML: at 16:36

## 2020-08-20 RX ADMIN — IRON SUCROSE 200 MG: 20 INJECTION, SOLUTION INTRAVENOUS at 16:26

## 2020-08-20 RX ADMIN — Medication 10 ML: at 16:17

## 2020-08-20 ASSESSMENT — PAIN DESCRIPTION - PROGRESSION
CLINICAL_PROGRESSION: NOT CHANGED
CLINICAL_PROGRESSION: NOT CHANGED

## 2020-08-20 ASSESSMENT — PAIN DESCRIPTION - DESCRIPTORS: DESCRIPTORS: DULL;DISCOMFORT

## 2020-08-20 ASSESSMENT — PAIN DESCRIPTION - ONSET
ONSET: ON-GOING
ONSET: ON-GOING

## 2020-08-20 ASSESSMENT — PAIN SCALES - GENERAL
PAINLEVEL_OUTOF10: 3
PAINLEVEL_OUTOF10: 4

## 2020-08-20 ASSESSMENT — PAIN DESCRIPTION - FREQUENCY
FREQUENCY: CONTINUOUS
FREQUENCY: CONTINUOUS

## 2020-08-20 ASSESSMENT — PAIN DESCRIPTION - LOCATION
LOCATION: ABDOMEN
LOCATION: ABDOMEN

## 2020-08-20 ASSESSMENT — PAIN DESCRIPTION - PAIN TYPE
TYPE: ACUTE PAIN
TYPE: ACUTE PAIN

## 2020-08-20 NOTE — PROGRESS NOTES
[facility]     Ven[facility] Visit    NAME:  Selena Hyatt OF BIRTH:  1955  MEDICAL RECORD NUMBER:  [id number]  Episode Date:  8/20/2020    Patient arrived to [facility]   [] per wheelchair   [x] ambulatory     Patient here for 4th of 5 Venofer treatments ordered. Patient reports that she has been having trouble with constipation and has not had a bowel movement in about 3 days. Patient reports that she has tried prune juice today and has not had any results. Suggested that she try Miralax since she has tried that in the past. Also suggested that after she gets her bowels to move, she should increase her dietary fiber and fluid intake. Patient's  states that he will get the Miralax at the store and encourage his wife to eat more fiber and drink more fluids. Bowel sounds active x 4 quadrants of abdomen and patient passing lion throughout visit today. Educational material re: constipation and fiber given to patient via AVS    Has the patient had a previous problem with Venofer Infusion? No    Any current infection or illness? No     Patient on antibiotics? No     History of Hypertension? Yes - takes antihypertensives as prescribed     Is the patient experiencing any:  Fatigue:   []   None  [x]   Increase over baseline but not altering normal activities - patient states that she is able to perform her ADL's with rest periods.   []   Moderate of causing difficulty performing some activities  []   Severe or loss of ability to perform some activities  []   Bedridden or disabling     Dizziness or Lightheadedness:   []   None  [x]   No Interference - only with quick position changes  []   Interferes with functioning but not activities of daily living  []   Interferes with daily activies  []   Bedridden or disabling    Shortness of Breath:   []   None   [x]   Dyspneic on exertion - only when walking long distances   []   Dyspnea with normal activities  []   Dyspnea at rest    Edema: slight amount of non-pitting edema bilateral ankles    Tachycardia: No    Heart Palpitations: No      Chest Pain: No      Current Lab Data:  Hemoglobin/Hematocrit:    Lab Results   Component Value Date    HGB 8.9 07/11/2020    HCT 28.0 07/11/2020     IRON:    Lab Results   Component Value Date    IRON 24 07/10/2020     Iron Saturation:    Lab Results   Component Value Date    LABIRON 10 07/10/2020     TIBC:    Lab Results   Component Value Date    TIBC 240 07/10/2020     FERRITIN:    Lab Results   Component Value Date    FERRITIN 58.7 09/15/2012       Dose Administered: 200 mg  Todays dose is number 4 out of 5 ordered for this patient. Response to treatment:  Well tolerated by patient. Education:    Verbal and written discharge instructions reviewed with patient and . Both verbalized understanding. Written copy given via AVS     Scheduled to return for next dose of Venofer on August 25, 2020.      Electronically signed by Luther Simon RN on 8/20/2020 at 5:53 PM

## 2020-08-25 ENCOUNTER — HOSPITAL ENCOUNTER (EMERGENCY)
Age: 65
Discharge: HOME | End: 2020-08-25
Payer: COMMERCIAL

## 2020-08-25 ENCOUNTER — HOSPITAL ENCOUNTER (OUTPATIENT)
Dept: INFUSION THERAPY | Age: 65
Setting detail: INFUSION SERIES
Discharge: HOME | End: 2020-08-25
Payer: COMMERCIAL

## 2020-08-25 ENCOUNTER — APPOINTMENT (OUTPATIENT)
Dept: CT IMAGING | Age: 65
End: 2020-08-25
Payer: COMMERCIAL

## 2020-08-25 ENCOUNTER — NURSE TRIAGE (OUTPATIENT)
Dept: OTHER | Facility: CLINIC | Age: 65
End: 2020-08-25

## 2020-08-25 VITALS
OXYGEN SATURATION: 97 % | HEART RATE: 71 BPM | DIASTOLIC BLOOD PRESSURE: 74 MMHG | SYSTOLIC BLOOD PRESSURE: 134 MMHG | HEIGHT: 67 IN | RESPIRATION RATE: 18 BRPM | BODY MASS INDEX: 42.26 KG/M2 | TEMPERATURE: 99.5 F

## 2020-08-25 DIAGNOSIS — N18.30 STAGE 3 CHRONIC KIDNEY DISEASE (HCC): ICD-10-CM

## 2020-08-25 DIAGNOSIS — D50.0 IRON DEFICIENCY ANEMIA SECONDARY TO BLOOD LOSS (CHRONIC): Primary | ICD-10-CM

## 2020-08-25 LAB
A/G RATIO: 1.3 (ref 1.1–2.2)
ALBUMIN SERPL-MCNC: 4 G/DL (ref 3.4–5)
ALP BLD-CCNC: 128 U/L (ref 40–129)
ALT SERPL-CCNC: 13 U/L (ref 10–40)
ANION GAP SERPL CALCULATED.3IONS-SCNC: 8 MMOL/L (ref 3–16)
AST SERPL-CCNC: 15 U/L (ref 15–37)
BASOPHILS ABSOLUTE: 0.1 K/UL (ref 0–0.2)
BASOPHILS RELATIVE PERCENT: 0.7 %
BILIRUB SERPL-MCNC: <0.2 MG/DL (ref 0–1)
BILIRUBIN URINE: NEGATIVE
BLOOD, URINE: NEGATIVE
BUN BLDV-MCNC: 28 MG/DL (ref 7–20)
CALCIUM SERPL-MCNC: 9.2 MG/DL (ref 8.3–10.6)
CHLORIDE BLD-SCNC: 111 MMOL/L (ref 99–110)
CLARITY: ABNORMAL
CO2: 20 MMOL/L (ref 21–32)
COLOR: YELLOW
CREAT SERPL-MCNC: 1.9 MG/DL (ref 0.6–1.2)
EOSINOPHILS ABSOLUTE: 0.2 K/UL (ref 0–0.6)
EOSINOPHILS RELATIVE PERCENT: 2.4 %
EPITHELIAL CELLS, UA: 8 /HPF (ref 0–5)
GFR AFRICAN AMERICAN: 32
GFR NON-AFRICAN AMERICAN: 27
GLOBULIN: 3.2 G/DL
GLUCOSE BLD-MCNC: 113 MG/DL (ref 70–99)
GLUCOSE URINE: NEGATIVE MG/DL
HCG QUALITATIVE: NEGATIVE
HCT VFR BLD CALC: 34.7 % (ref 36–48)
HEMOGLOBIN: 11.1 G/DL (ref 12–16)
HYALINE CASTS: 2 /LPF (ref 0–8)
KETONES, URINE: NEGATIVE MG/DL
LEUKOCYTE ESTERASE, URINE: NEGATIVE
LIPASE: 65 U/L (ref 13–60)
LYMPHOCYTES ABSOLUTE: 1.2 K/UL (ref 1–5.1)
LYMPHOCYTES RELATIVE PERCENT: 17.2 %
MCH RBC QN AUTO: 29.2 PG (ref 26–34)
MCHC RBC AUTO-ENTMCNC: 31.9 G/DL (ref 31–36)
MCV RBC AUTO: 91.5 FL (ref 80–100)
MICROSCOPIC EXAMINATION: YES
MONOCYTES ABSOLUTE: 0.5 K/UL (ref 0–1.3)
MONOCYTES RELATIVE PERCENT: 6.6 %
NEUTROPHILS ABSOLUTE: 5.2 K/UL (ref 1.7–7.7)
NEUTROPHILS RELATIVE PERCENT: 73.1 %
NITRITE, URINE: NEGATIVE
PDW BLD-RTO: 15.8 % (ref 12.4–15.4)
PH UA: 6 (ref 5–8)
PLATELET # BLD: 244 K/UL (ref 135–450)
PMV BLD AUTO: 6.8 FL (ref 5–10.5)
POTASSIUM REFLEX MAGNESIUM: 4.7 MMOL/L (ref 3.5–5.1)
PROTEIN UA: 30 MG/DL
RBC # BLD: 3.79 M/UL (ref 4–5.2)
RBC UA: 1 /HPF (ref 0–4)
SODIUM BLD-SCNC: 139 MMOL/L (ref 136–145)
SPECIFIC GRAVITY UA: 1.02 (ref 1–1.03)
TOTAL PROTEIN: 7.2 G/DL (ref 6.4–8.2)
URINE REFLEX TO CULTURE: ABNORMAL
URINE TYPE: ABNORMAL
UROBILINOGEN, URINE: 0.2 E.U./DL
WBC # BLD: 7.1 K/UL (ref 4–11)
WBC UA: 3 /HPF (ref 0–5)

## 2020-08-25 PROCEDURE — 84703 CHORIONIC GONADOTROPIN ASSAY: CPT

## 2020-08-25 PROCEDURE — 74176 CT ABD & PELVIS W/O CONTRAST: CPT

## 2020-08-25 PROCEDURE — 99284 EMERGENCY DEPT VISIT MOD MDM: CPT

## 2020-08-25 PROCEDURE — 2580000003 HC RX 258: Performed by: PHYSICIAN ASSISTANT

## 2020-08-25 PROCEDURE — 36415 COLL VENOUS BLD VENIPUNCTURE: CPT

## 2020-08-25 PROCEDURE — 96374 THER/PROPH/DIAG INJ IV PUSH: CPT

## 2020-08-25 PROCEDURE — 6360000002 HC RX W HCPCS: Performed by: PHYSICIAN ASSISTANT

## 2020-08-25 PROCEDURE — 85025 COMPLETE CBC W/AUTO DIFF WBC: CPT

## 2020-08-25 PROCEDURE — 80053 COMPREHEN METABOLIC PANEL: CPT

## 2020-08-25 PROCEDURE — 81001 URINALYSIS AUTO W/SCOPE: CPT

## 2020-08-25 PROCEDURE — 83690 ASSAY OF LIPASE: CPT

## 2020-08-25 RX ORDER — DICYCLOMINE HYDROCHLORIDE 10 MG/1
10 CAPSULE ORAL
Qty: 32 CAPSULE | Refills: 0 | Status: SHIPPED | OUTPATIENT
Start: 2020-08-25 | End: 2021-06-29

## 2020-08-25 RX ORDER — SODIUM CHLORIDE 9 MG/ML
INJECTION, SOLUTION INTRAVENOUS CONTINUOUS
Status: CANCELLED | OUTPATIENT
Start: 2020-09-01

## 2020-08-25 RX ORDER — METHYLPREDNISOLONE SODIUM SUCCINATE 125 MG/2ML
125 INJECTION, POWDER, LYOPHILIZED, FOR SOLUTION INTRAMUSCULAR; INTRAVENOUS ONCE
Status: CANCELLED | OUTPATIENT
Start: 2020-09-01

## 2020-08-25 RX ORDER — SODIUM CHLORIDE 0.9 % (FLUSH) 0.9 %
10 SYRINGE (ML) INJECTION PRN
Status: CANCELLED | OUTPATIENT
Start: 2020-09-01

## 2020-08-25 RX ORDER — ONDANSETRON 2 MG/ML
4 INJECTION INTRAMUSCULAR; INTRAVENOUS EVERY 30 MIN PRN
Status: DISCONTINUED | OUTPATIENT
Start: 2020-08-25 | End: 2020-08-25 | Stop reason: HOSPADM

## 2020-08-25 RX ORDER — SODIUM CHLORIDE 0.9 % (FLUSH) 0.9 %
10 SYRINGE (ML) INJECTION PRN
Status: DISCONTINUED | OUTPATIENT
Start: 2020-08-25 | End: 2020-08-25

## 2020-08-25 RX ORDER — EPINEPHRINE 1 MG/ML
0.3 INJECTION, SOLUTION, CONCENTRATE INTRAVENOUS PRN
Status: CANCELLED | OUTPATIENT
Start: 2020-09-01

## 2020-08-25 RX ORDER — 0.9 % SODIUM CHLORIDE 0.9 %
1000 INTRAVENOUS SOLUTION INTRAVENOUS ONCE
Status: COMPLETED | OUTPATIENT
Start: 2020-08-25 | End: 2020-08-25

## 2020-08-25 RX ORDER — DIPHENHYDRAMINE HYDROCHLORIDE 50 MG/ML
50 INJECTION INTRAMUSCULAR; INTRAVENOUS ONCE
Status: CANCELLED | OUTPATIENT
Start: 2020-09-01

## 2020-08-25 RX ORDER — MEPERIDINE HYDROCHLORIDE 25 MG/ML
12.5 INJECTION INTRAMUSCULAR; INTRAVENOUS; SUBCUTANEOUS ONCE
Status: CANCELLED | OUTPATIENT
Start: 2020-09-01

## 2020-08-25 RX ORDER — KETOROLAC TROMETHAMINE 30 MG/ML
15 INJECTION, SOLUTION INTRAMUSCULAR; INTRAVENOUS ONCE
Status: COMPLETED | OUTPATIENT
Start: 2020-08-25 | End: 2020-08-25

## 2020-08-25 RX ADMIN — SODIUM CHLORIDE 1000 ML: 9 INJECTION, SOLUTION INTRAVENOUS at 18:03

## 2020-08-25 RX ADMIN — ONDANSETRON 4 MG: 2 INJECTION INTRAMUSCULAR; INTRAVENOUS at 18:03

## 2020-08-25 RX ADMIN — KETOROLAC TROMETHAMINE 15 MG: 30 INJECTION, SOLUTION INTRAMUSCULAR at 18:03

## 2020-08-25 ASSESSMENT — PAIN - FUNCTIONAL ASSESSMENT: PAIN_FUNCTIONAL_ASSESSMENT: PREVENTS OR INTERFERES SOME ACTIVE ACTIVITIES AND ADLS

## 2020-08-25 ASSESSMENT — ENCOUNTER SYMPTOMS
ABDOMINAL PAIN: 1
COUGH: 0
NAUSEA: 1
RHINORRHEA: 0
DIARRHEA: 1
BACK PAIN: 0
SHORTNESS OF BREATH: 0
CONSTIPATION: 1
SORE THROAT: 0
EYE PAIN: 0
VOMITING: 1

## 2020-08-25 ASSESSMENT — PAIN DESCRIPTION - PROGRESSION: CLINICAL_PROGRESSION: NOT CHANGED

## 2020-08-25 ASSESSMENT — PAIN DESCRIPTION - ORIENTATION: ORIENTATION: LEFT

## 2020-08-25 ASSESSMENT — PAIN DESCRIPTION - FREQUENCY: FREQUENCY: CONTINUOUS

## 2020-08-25 ASSESSMENT — PAIN DESCRIPTION - PAIN TYPE: TYPE: ACUTE PAIN

## 2020-08-25 ASSESSMENT — PAIN SCALES - GENERAL
PAINLEVEL_OUTOF10: 8
PAINLEVEL_OUTOF10: 7

## 2020-08-25 ASSESSMENT — PAIN DESCRIPTION - ONSET: ONSET: ON-GOING

## 2020-08-25 ASSESSMENT — PAIN DESCRIPTION - LOCATION: LOCATION: ABDOMEN

## 2020-08-25 NOTE — ED PROVIDER NOTES
[facility]        Pt Name: Aleksander Landrum  MRN: [id number]  [place] 1955  Date of evaluation: 8/25/2020  Provider: Abbie Clark PA-C  PCP: Elysia Franco MD    Evaluation by DANIEL. My supervising physician was available for consultation. CHIEF COMPLAINT       Chief Complaint   Patient presents with    Abdominal Pain     upper left quadrant abdominal pain, pt stated that she drank Miralax and now has diareaha        HISTORY OF PRESENT ILLNESS   (Location/Symptom, Timing/Onset, Context/Setting, Quality, Duration, Modifying Factors, Severity)  Note limiting factors. Aleksander Landrum is a 60 y.o. female who presents here to the emergency department, the patient states that she developed a sharp stabbing left-sided abdominal pain that is constant, and severe, pain level 7/10. She states that she tried some MiraLAX, caused her to have diarrhea, initially she was constipated which is why she tried the MiraLAX. She states that she had one episode of nausea and vomiting but none other since then. She denies any increased frequency or urgency of urination or burning with urination. She has a history of chronic kidney disease. Nothing seems to make her feel completely better. Nursing Notes were all reviewed and agreed with or any disagreements were addressed  in the HPI. REVIEW OF SYSTEMS    (2-9 systems for level 4, 10 or more for level 5)     Review of Systems   Constitutional: Negative for chills, diaphoresis and fever. HENT: Negative for congestion, ear pain, rhinorrhea and sore throat. Eyes: Negative for pain and visual disturbance. Respiratory: Negative for cough and shortness of breath. Cardiovascular: Negative for chest pain, palpitations and leg swelling. Gastrointestinal: Positive for abdominal pain, constipation, diarrhea, nausea and vomiting.    Genitourinary: Negative for decreased urine Years: 30.00     Pack years: 15.00     Types: Cigarettes     Last attempt to quit: 2016     Years since quittin.4    Smokeless tobacco: Never Used   Substance and Sexual Activity    Alcohol use: No     Alcohol/week: 0.0 standard drinks    Drug use: No    Sexual activity: Yes     Partners: Male   Lifestyle    Physical activity     Days per week: Not on file     Minutes per session: Not on file    Stress: Not on file   Relationships    Social connections     Talks on phone: Not on file     Gets together: Not on file     Attends [employer] service: Not on file     Active member of club or organization: Not on file     Attends meetings of clubs or organizations: Not on file     Relationship status: Not on file    Intimate partner violence     Fear of current or ex partner: Not on file     Emotionally abused: Not on file     Physically abused: Not on file     Forced sexual activity: Not on file   Other Topics Concern    Not on file   Social History Narrative    Not on file       SCREENINGS             PHYSICAL EXAM    (up to 7 for level 4, 8 or more for level 5)     ED Triage Vitals [20 1558]   BP Temp Temp Source Pulse Resp SpO2 Height Weight   139/78 99.5 °F (37.5 °C) Oral 71 18 97 % 5' 7\" (1.702 m) --       Physical Exam  Vitals signs and nursing note reviewed. Constitutional:       Appearance: Normal appearance. She is well-developed. She is not diaphoretic. HENT:      Head: Normocephalic and atraumatic. Right Ear: External ear normal.      Left Ear: External ear normal.      Nose: Nose normal.   Eyes:      General:         Right eye: No discharge. Left eye: No discharge. Neck:      Musculoskeletal: Normal range of motion and neck supple. Cardiovascular:      Rate and Rhythm: Normal rate and regular rhythm. Heart sounds: Normal heart sounds. No murmur. No friction rub. No gallop. Pulmonary:      Effort: Pulmonary effort is normal. No respiratory distress. Breath sounds: Normal breath sounds. No stridor. No wheezing or rales. Chest:      Chest wall: No tenderness. Abdominal:      General: Bowel sounds are normal. There is no distension or abdominal bruit. Palpations: Abdomen is soft. Abdomen is not rigid. There is no mass or pulsatile mass. Tenderness: There is abdominal tenderness in the left upper quadrant. There is no right CVA tenderness, left CVA tenderness, guarding or rebound. Negative signs include Bravo's sign, Rovsing's sign and McBurney's sign. Musculoskeletal: Normal range of motion. Skin:     General: Skin is warm and dry. Coloration: Skin is not pale. Neurological:      Mental Status: She is alert and oriented to person, place, and time.    Psychiatric:         Behavior: Behavior normal.         DIAGNOSTIC RESULTS   LABS:    Labs Reviewed   CBC WITH AUTO DIFFERENTIAL - Abnormal; Notable for the following components:       Result Value    RBC 3.79 (*)     Hemoglobin 11.1 (*)     Hematocrit 34.7 (*)     RDW 15.8 (*)     All other components within normal limits    Narrative:     Performed at:  [address] [place] [address]   Phone [phone]   COMPREHENSIVE METABOLIC PANEL W/ REFLEX TO MG FOR LOW K - Abnormal; Notable for the following components:    Chloride 111 (*)     CO2 20 (*)     Glucose 113 (*)     BUN 28 (*)     CREATININE 1.9 (*)     GFR Non- 27 (*)     GFR  32 (*)     All other components within normal limits    Narrative:     Performed at:  [facility]  [address] [facility] [place] [address]   Phone [phone]   LIPASE - Abnormal; Notable for the following components:    Lipase 65.0 (*)     All other components within normal limits    Narrative:     Performed at:  [address] [employer] [place] [address]   Phone [phone]   URINE RT REFLEX TO CULTURE - Abnormal; Notable for the following components:    Clarity, UA CLOUDY (*)     Protein, UA 30 (*)     All other components within normal limits    Narrative:     Performed at:  [facility]  [address] [place], [place] [address]   Phone [phone]   MICROSCOPIC URINALYSIS - Abnormal; Notable for the following components:    Epithelial Cells, UA 8 (*)     All other components within normal limits    Narrative:     Performed at:  [facility]  [address] [place], [place] [address]   Phone [phone]   HCG, SERUM, QUALITATIVE    Narrative:     Performed at:  [facility]  [address] [place], [place] [address]   Phone [phone]       All other labs were within normal range or not returned as of this dictation. EKG: All EKG's are interpreted by the Emergency Department Physician who either signs orCo-signs this chart in the absence of a cardiologist.  Please see their note for interpretation of EKG. RADIOLOGY:   Non-plain film images such as CT, Ultrasound and MRI are read by the radiologist. Plain radiographic images are visualized andpreliminarily interpreted by the  ED Provider with the below findings:        Interpretation perthe Radiologist below, if available at the time of this note:    CT ABDOMEN PELVIS WO CONTRAST Additional Contrast? None   Final Result   Limited evaluation due to lack of IV contrast.      However given the limitations there is no CT evidence of acute intraabdominal   pathology. Sigmoid diverticulosis without evidence of diverticulitis.            Ct Abdomen Pelvis Wo Contrast Additional Contrast? None    Result Date: 8/25/2020  EXAMINATION: CT OF THE ABDOMEN AND PELVIS WITHOUT CONTRAST 8/25/2020 5:37 pm TECHNIQUE: CT of the abdomen and pelvis was performed without the administration of intravenous contrast. Multiplanar reformatted images are provided for review. Dose modulation, iterative reconstruction, and/or weight based adjustment of the mA/kV was utilized to reduce the radiation dose to as low as reasonably achievable. COMPARISON: CT abdomen pelvis dated 06/28/2018 HISTORY: ORDERING SYSTEM PROVIDED HISTORY: LUQ abd pain TECHNOLOGIST PROVIDED HISTORY: If patient is on cardiac monitor and/or pulse ox, they may be taken off cardiac monitor and pulse ox, left on O2 if currently on. All monitors reattached when patient returns to room. Reason for exam:->LUQ abd pain Additional Contrast?->None Reason for Exam: llq pain no cancer hx surg hx appy years ago Acuity: Acute Type of Exam: Initial FINDINGS: Evaluation of the solid organs is limited due to lack of IV contrast. CARDIOVASCULAR: The heart is normal in size. There is no pericardial effusion. The abdominal aorta is normal in course and caliber. There are atherosclerotic calcifications of the aorta and bilateral iliac arteries. LUNG BASES: There are no basilar consolidations or pleural effusions. HEPATOBILIARY: The liver is normal in size. There are no focal hepatic lesions. There is no biliary ductal dilatation. The gallbladder is unremarkable. SPLEEN: Unremarkable. PANCREAS: Unremarkable. ADRENAL GLANDS: Unremarkable. KIDNEYS: The right kidney is again atrophic and has cortical scarring. The left kidney is normal in size and contour. There is nonspecific left perinephric stranding. There is no hydronephrosis. Tiny nonobstructing left renal calculi are noted. ABDOMINAL NODES: No adenopathy is appreciated. PELVIC ORGANS: The urinary bladder is unremarkable. The uterus is within normal limits for age. PERITONEUM/MESENTERY/BOWEL: The stomach is unremarkable. There is no bowel obstruction. There is no bowel wall thickening. The appendix is surgically absent. There is diverticulosis without evidence of diverticulitis. BONES/SOFT TISSUES: There is no acute osseous or soft tissue abnormality.  There is a left hip arthroplasty. Old fractures are noted of the inferior pubic rami bilaterally. Limited evaluation due to lack of IV contrast. However given the limitations there is no CT evidence of acute intraabdominal pathology. Sigmoid diverticulosis without evidence of diverticulitis. PROCEDURES   Unless otherwise noted below, none     Procedures    CRITICAL CARE TIME   N/A    CONSULTS:  None      EMERGENCY DEPARTMENT COURSE and DIFFERENTIAL DIAGNOSIS/MDM:   Vitals:    Vitals:    08/25/20 1558 08/25/20 1625   BP: 139/78 134/74   Pulse: 71    Resp: 18    Temp: 99.5 °F (37.5 °C)    TempSrc: Oral    SpO2: 97% 97%   Height: 5' 7\" (1.702 m)        Patient was given thefollowing medications:  Medications   ketorolac (TORADOL) injection 15 mg (15 mg Intravenous Given 8/25/20 1803)   0.9 % sodium chloride bolus (0 mLs Intravenous Stopped 8/25/20 1906)           The differential diagnosis of this patient's left upper quadrant pain includes pancreatitis, constipation, colitis, gastroparesis, diverticulitis. This patient does have chronic disease of her kidney, and I have encouraged her to follow-up with her nephrologist, lipase is slightly elevated I suspect that this is related to hollow viscus irritation as opposed to pancreatic irritation however I have encouraged her to stick with clear liquids for the next several days. No evidence of kidney stones or infection at this time     FINAL IMPRESSION      1. Left upper quadrant pain    2. Elevated lipase    3. Kidney disease          DISPOSITION/PLAN   DISPOSITION Decision To Discharge 08/25/2020 07:33:42 PM      PATIENT REFERREDTO:  see your nephrologist    Call in 1 day  For a recheck in 2-7 days    Trupti Cervantes MD  [address] [place] [address] RD.   [address] [place], [place] [phone]    Call in 2 days  For a recheck in 2-7 days      DISCHARGE MEDICATIONS:  Discharge Medication List as of 8/25/2020  7:38 PM      START taking these medications    Details   dicyclomine (BENTYL) 10 MG capsule Take 1 capsule by mouth 4 times daily (before meals and nightly) for 8 days, Disp-32 capsule,R-0Print             DISCONTINUED MEDICATIONS:  Discharge Medication List as of 8/25/2020  7:38 PM                 (Please note that portions ofthis note were completed with a voice recognition program.  Efforts were made to edit the dictations but occasionally words are mis-transcribed.)    Anastasia Guido PA-C (electronically signed)             Anastasia Guido PA-C  08/28/20 1913

## 2020-08-25 NOTE — PROGRESS NOTES
[facility]     Venofer Visit    NAME:  Amy Rodriguez OF BIRTH:  1955  MEDICAL RECORD NUMBER:  [id number]  Episode Date:  8/25/2020    Patient arrived to [facility] 58   [] per wheelchair   [x] ambulatory     Patient walked in with her  holding her left abdomen. States she's been C/O abdominal pain since we started her venofer. We have given her 4 treatments already. She said she was going to the ER after this. We suggested she go to the ER now and we can give this last venofer later this week. She also said she had some abdominal pain in July while on vacation and went to ER then and had a CT SCAN. Patient said she was told it was a \"healed ulcer\". Last week she told me she had rib pain. Not it's upper left abdomen.           Electronically signed by Mary Brown RN on 8/25/2020 at 3:46 PM

## 2020-08-25 NOTE — ED NOTES
Pt arrived to the ED via private car, pt states that she has left sided upper stomach pain.  Pt states that she took miralax, and now has diarrhea, pt is alert and orient, vss afebrile      Emmy Gavin, RN  08/25/20 [facility], RN  08/25/20 [address] [facility]  08/25/20 7401

## 2020-08-25 NOTE — TELEPHONE ENCOUNTER
Reason for Disposition   SEVERE abdominal pain (e.g., excruciating)    Answer Assessment - Initial Assessment Questions  1. LOCATION: \"Where does it hurt? \"       Left upper under rib   2. RADIATION: \"Does the pain shoot anywhere else? \" (e.g., chest, back)      no  3. ONSET: \"When did the pain begin? \" (e.g., minutes, hours or days ago)       Chronic stomach pain this new pain began one week ago  4. SUDDEN: \"Gradual or sudden onset? \"      sudden  5. PATTERN \"Does the pain come and go, or is it constant? \"     - If constant: \"Is it getting better, staying the same, or worsening? \"       (Note: Constant means the pain never goes away completely; most serious pain is constant and it progresses)      - If intermittent: \"How long does it last?\" \"Do you have pain now? \"      (Note: Intermittent means the pain goes away completely between bouts)      constant  6. SEVERITY: \"How bad is the pain? \"  (e.g., Scale 1-10; mild, moderate, or severe)     - MILD (1-3): doesn't interfere with normal activities, abdomen soft and not tender to touch      - MODERATE (4-7): interferes with normal activities or awakens from sleep, tender to touch      - SEVERE (8-10): excruciating pain, doubled over, unable to do any normal activities        8/10 now only subsides when lay on right side  7. RECURRENT SYMPTOM: \"Have you ever had this type of abdominal pain before? \" If so, ask: \"When was the last time? \" and \"What happened that time? \"       no  8. AGGRAVATING FACTORS: \"Does anything seem to cause this pain? \" (e.g., foods, stress, alcohol)      Bending over or walking   9. CARDIAC SYMPTOMS: \"Do you have any of the following symptoms: chest pain, difficulty breathing, sweating, nausea? \"      no  10. OTHER SYMPTOMS: \"Do you have any other symptoms? \" (e.g., fever, vomiting, diarrhea)        Constipated but not any longer now have diarrhea  11. PREGNANCY: \"Is there any chance you are pregnant? \" \"When was your last menstrual period? \" n/a    Protocols used: ABDOMINAL PAIN - UPPER-ADULT-[place]    Pt states pain started after iron infusions      Caller provided care advice and instructed to call back with worsening symptoms. Please do not respond to the triage nurse through this encounter. Any subsequent communication should be directly with the patient.

## 2020-08-28 ENCOUNTER — TELEPHONE (OUTPATIENT)
Dept: FAMILY MEDICINE CLINIC | Age: 65
End: 2020-08-28

## 2020-08-28 NOTE — TELEPHONE ENCOUNTER
----- Message from [employer] sent at 8/28/2020  7:39 AM EDT -----  Subject: Message to Provider    QUESTIONS  Information for Provider? Pt cancelled appt for today  due to having a fever. She is requesting to reschedule an OV for a future   date. Can you please contact pt ?  ---------------------------------------------------------------------------  --------------  CALL BACK INFO  What is the best way for the office to contact you? OK to leave message on   voicemail  Preferred Call Back Phone Number? [phone]  ---------------------------------------------------------------------------  --------------  SCRIPT ANSWERS  Relationship to Patient?  Self

## 2020-08-28 NOTE — TELEPHONE ENCOUNTER
Spoke with pt she states started with a fever last night of 100.0, body aches and this morning temp still at 100.0 with the body aches.  Pt has cancelled her appt for today and would like to R/S in the future

## 2020-08-31 ENCOUNTER — HOSPITAL ENCOUNTER (OUTPATIENT)
Dept: INFUSION THERAPY | Age: 65
Setting detail: INFUSION SERIES
Discharge: HOME | End: 2020-08-31
Payer: COMMERCIAL

## 2020-08-31 VITALS
DIASTOLIC BLOOD PRESSURE: 76 MMHG | SYSTOLIC BLOOD PRESSURE: 125 MMHG | TEMPERATURE: 98.2 F | OXYGEN SATURATION: 97 % | RESPIRATION RATE: 18 BRPM | HEART RATE: 64 BPM

## 2020-08-31 DIAGNOSIS — D50.0 IRON DEFICIENCY ANEMIA SECONDARY TO BLOOD LOSS (CHRONIC): Primary | ICD-10-CM

## 2020-08-31 DIAGNOSIS — N18.30 STAGE 3 CHRONIC KIDNEY DISEASE (HCC): ICD-10-CM

## 2020-08-31 PROCEDURE — 2580000003 HC RX 258: Performed by: INTERNAL MEDICINE

## 2020-08-31 PROCEDURE — 96374 THER/PROPH/DIAG INJ IV PUSH: CPT

## 2020-08-31 PROCEDURE — 6360000002 HC RX W HCPCS: Performed by: INTERNAL MEDICINE

## 2020-08-31 RX ORDER — DIPHENHYDRAMINE HYDROCHLORIDE 50 MG/ML
50 INJECTION INTRAMUSCULAR; INTRAVENOUS ONCE
Status: CANCELLED | OUTPATIENT
Start: 2020-09-01

## 2020-08-31 RX ORDER — EPINEPHRINE 1 MG/ML
0.3 INJECTION, SOLUTION, CONCENTRATE INTRAVENOUS PRN
Status: CANCELLED | OUTPATIENT
Start: 2020-09-01

## 2020-08-31 RX ORDER — MEPERIDINE HYDROCHLORIDE 25 MG/ML
12.5 INJECTION INTRAMUSCULAR; INTRAVENOUS; SUBCUTANEOUS ONCE
Status: CANCELLED | OUTPATIENT
Start: 2020-09-01

## 2020-08-31 RX ORDER — SODIUM CHLORIDE 0.9 % (FLUSH) 0.9 %
10 SYRINGE (ML) INJECTION PRN
Status: CANCELLED | OUTPATIENT
Start: 2020-09-01

## 2020-08-31 RX ORDER — METHYLPREDNISOLONE SODIUM SUCCINATE 125 MG/2ML
125 INJECTION, POWDER, LYOPHILIZED, FOR SOLUTION INTRAMUSCULAR; INTRAVENOUS ONCE
Status: CANCELLED | OUTPATIENT
Start: 2020-09-01

## 2020-08-31 RX ORDER — SODIUM CHLORIDE 0.9 % (FLUSH) 0.9 %
10 SYRINGE (ML) INJECTION PRN
Status: DISCONTINUED | OUTPATIENT
Start: 2020-08-31 | End: 2020-09-01 | Stop reason: HOSPADM

## 2020-08-31 RX ORDER — SODIUM CHLORIDE 9 MG/ML
INJECTION, SOLUTION INTRAVENOUS CONTINUOUS
Status: CANCELLED | OUTPATIENT
Start: 2020-09-01

## 2020-08-31 RX ADMIN — Medication 10 ML: at 16:46

## 2020-08-31 RX ADMIN — Medication 10 ML: at 16:24

## 2020-08-31 RX ADMIN — Medication 10 ML: at 16:16

## 2020-08-31 RX ADMIN — IRON SUCROSE 200 MG: 20 INJECTION, SOLUTION INTRAVENOUS at 16:24

## 2020-08-31 RX ADMIN — Medication 10 ML: at 16:32

## 2020-08-31 ASSESSMENT — PAIN SCALES - GENERAL
PAINLEVEL_OUTOF10: 0
PAINLEVEL_OUTOF10: 0

## 2020-09-15 RX ORDER — HYDROCODONE BITARTRATE AND ACETAMINOPHEN 7.5; 325 MG/1; MG/1
1 TABLET ORAL EVERY 6 HOURS PRN
Qty: 120 TABLET | Refills: 0 | Status: SHIPPED | OUTPATIENT
Start: 2020-09-15 | End: 2020-10-13 | Stop reason: SDUPTHER

## 2020-09-15 NOTE — TELEPHONE ENCOUNTER
Medication:   Requested Prescriptions     Pending Prescriptions Disp Refills    HYDROcodone-acetaminophen (NORCO) 7.5-325 MG per tablet 120 tablet 0     Sig: Take 1 tablet by mouth every 6 hours as needed for Pain for up to 30 days. Last Filled:  8/19/2020 #120 0rf    Patient Phone Number: [phone] (home)     Last appt: 7/17/2020   Next appt: Visit date not found    Last OARRS:   RX Monitoring 7/22/2020   Attestation -   Acute Pain Prescriptions -   Periodic Controlled Substance Monitoring No signs of potential drug abuse or diversion identified.    Chronic Pain > 80 MEDD -

## 2020-09-15 NOTE — TELEPHONE ENCOUNTER
Medication and Quantity requested: norco 7.5/325 #120    Last Visit  07.17.20    Pharmacy and phone number updated in [facility]:  yes

## 2020-09-16 RX ORDER — TICAGRELOR 90 MG/1
TABLET ORAL
Qty: 180 TABLET | Refills: 2 | Status: SHIPPED | OUTPATIENT
Start: 2020-09-16 | End: 2021-02-17 | Stop reason: CLARIF

## 2020-09-16 RX ORDER — LISINOPRIL 5 MG/1
TABLET ORAL
Qty: 90 TABLET | Refills: 1 | Status: SHIPPED | OUTPATIENT
Start: 2020-09-16 | End: 2021-01-07

## 2020-09-16 NOTE — TELEPHONE ENCOUNTER
Medication:   Requested Prescriptions     Pending Prescriptions Disp Refills    BRILINTA 90 MG TABS tablet [Pharmacy Med Name: Ashley Wiggins 90 MG TABLET] 180 tablet 3     Sig: TAKE 1 TABLET BY MOUTH TWICE A DAY        Last Filled:  Not on medication list - d/c 7/9/20  Last rx was 11/23/19 - #90, 3 RF     Patient Phone Number: [phone] (home)     Last appt: 7/17/2020 TCM   Next appt: Visit date not found    Last OARRS:   RX Monitoring 7/22/2020   Attestation -   Acute Pain Prescriptions -   Periodic Controlled Substance Monitoring No signs of potential drug abuse or diversion identified.    Chronic Pain > 80 MEDD -

## 2020-09-16 NOTE — TELEPHONE ENCOUNTER
Medication:   Requested Prescriptions     Pending Prescriptions Disp Refills    lisinopril (PRINIVIL;ZESTRIL) 5 MG tablet [Pharmacy Med Name: LISINOPRIL 5 MG TABLET] 90 tablet 1     Sig: TAKE 1 TABLET BY MOUTH EVERY DAY       Last Filled:  9/9/13 #90 0rf( lisinopril 2.5 mg)    Patient Phone Number: [phone] (home)     Last appt: 7/17/2020   Next appt: 9/16/2020    Lab Results   Component Value Date     08/25/2020    K 4.7 08/25/2020     (H) 08/25/2020    CO2 20 (L) 08/25/2020    BUN 28 (H) 08/25/2020    CREATININE 1.9 (H) 08/25/2020    GLUCOSE 113 (H) 08/25/2020    CALCIUM 9.2 08/25/2020    PROT 7.2 08/25/2020    LABALBU 4.0 08/25/2020    BILITOT <0.2 08/25/2020    ALKPHOS 128 08/25/2020    AST 15 08/25/2020    ALT 13 08/25/2020    LABGLOM 27 (A) 08/25/2020    GFRAA 32 (A) 08/25/2020    AGRATIO 1.3 08/25/2020    GLOB 3.2 08/25/2020

## 2020-10-13 RX ORDER — HYDROCODONE BITARTRATE AND ACETAMINOPHEN 7.5; 325 MG/1; MG/1
1 TABLET ORAL EVERY 6 HOURS PRN
Qty: 120 TABLET | Refills: 0 | Status: SHIPPED | OUTPATIENT
Start: 2020-10-13 | End: 2020-11-10 | Stop reason: SDUPTHER

## 2020-10-13 NOTE — TELEPHONE ENCOUNTER
Medication and Quantity requested: norco 7.5/325 #120    Last Visit  07.17.20    Pharmacy and phone number updated in [facility]:  yes

## 2020-10-13 NOTE — TELEPHONE ENCOUNTER
Medication:   Requested Prescriptions     Pending Prescriptions Disp Refills    HYDROcodone-acetaminophen (NORCO) 7.5-325 MG per tablet 120 tablet 0     Sig: Take 1 tablet by mouth every 6 hours as needed for Pain for up to 30 days. Last Filled:  09/15/2020 #120 0rf     Patient Phone Number: [phone] (home)     Last appt: 7/17/2020   Next appt: Visit date not found    Last OARRS:   RX Monitoring 7/22/2020   Attestation -   Acute Pain Prescriptions -   Periodic Controlled Substance Monitoring No signs of potential drug abuse or diversion identified.    Chronic Pain > 80 MEDD -

## 2020-10-20 ENCOUNTER — TELEPHONE (OUTPATIENT)
Dept: CARDIOLOGY CLINIC | Age: 65
End: 2020-10-20

## 2020-10-20 ENCOUNTER — TELEPHONE (OUTPATIENT)
Dept: FAMILY MEDICINE CLINIC | Age: 65
End: 2020-10-20

## 2020-10-20 NOTE — TELEPHONE ENCOUNTER
Please advise if there is an interaction between carbidopa and ferrous sulfate> Per pharmacy there is.

## 2020-10-20 NOTE — TELEPHONE ENCOUNTER
Per up to date guidelines. Consider alternative therapy. Iron Preparations may decrease the serum concentration of Levodopa. Only applies to oral iron preparations. Recommends  doses of the agents by 2 or more hours to minimize the effects of this interaction and continue to monitor for decreased therapeutic effects of levodopa. Please call the patient and the pharmacy and recommend to space out the medications and she has received IV iron. Please verify with the patient if following with her PCP with management of the anemia. Dr. Roro Gregg ordered the iron and IV infusion but verify who is currently managing and completing routine lab work.

## 2020-10-20 NOTE — TELEPHONE ENCOUNTER
Called and spoke with pharmacy let them no what Dr. Char Miller said about patient may stop taking her iron tablets.   Pharmacy is going to call the patient and let her know

## 2020-10-20 NOTE — TELEPHONE ENCOUNTER
[facility] pharmacy states there is an indicated interaction between Iron and carbidopa-levodopa (SINEMET)  MG per tablet.  Pharmacy is asking if Dr. Char Miller is aware and approves pt continuing both medications

## 2020-10-20 NOTE — TELEPHONE ENCOUNTER
Cathy Mathur from [facility] called in stating that there is an drug interaction between Twila's Iron and carbidopa-levodopa.        You can reach Cathy Mathur at [phone]

## 2020-10-20 NOTE — TELEPHONE ENCOUNTER
Patient advised. Discussed fax received from [address] [place] [address] regarding patient's plavix and prozac medications. Patient was advised that efficacy of plavix may be lowered while taking prozac. Patient advised that Dr. Kalli Trent recommends changing prozac to different medication. Patient is aware of risk and would like to speak with Dr. Kalli Trent before making any changes. Per Dr. Kalli Trent patient scheduled for telephone visit. 10/27/20. She will call office sooner with any questions or concerns.

## 2020-10-21 NOTE — TELEPHONE ENCOUNTER
Tried calling [facility], [address] [place] [address] Pharmacist... no answer at this time. Spoke w/ pt, advised her to separate doses of the agents by 2 or more hours to minimize the effects if the interaction and continue to monitor for decreased therapeutic effects of Levodopa. Pt verbalized understanding. Also, pt states she sees Dr. Bonny Echavarria every 3 months and the last IV infusion w/ blood work was 1 month ago.

## 2020-10-27 ENCOUNTER — VIRTUAL VISIT (OUTPATIENT)
Dept: FAMILY MEDICINE CLINIC | Age: 65
End: 2020-10-27
Payer: COMMERCIAL

## 2020-10-27 PROCEDURE — 99442 PR PHYS/QHP TELEPHONE EVALUATION 11-20 MIN: CPT | Performed by: FAMILY MEDICINE

## 2020-10-27 NOTE — PROGRESS NOTES
Javier Navarro is a 63 y.o. female. Due to the current coronavirus pandemic, this telephone visit was insisted, with patient's consent, to reduce the patient's risk of exposure to COVID-19 and provide continuity of care for an established patient. The patient was at home while the provider was either at home or at the clinic. Services were provided through a synchronous discussion over the telephone and/or video chat to substitute for in person clinic visit, and coded as such. HPI:  Has lots of aches and pains due to OA  Takes vicoden es qid  Worst spot is left foot and her shoulder  Was anemic and took iron infusions and is still taking iron tablets  Cardiology saw her in switch her Brilinta to Plavix  She does not take aspirin  She is now having chest pain  Her breathing is stable  She did try to stop her Prozac for a week and felt terrible so she is back on it and not feeling back to where she was before just yet feeling frustrated and grouchy  Wt Readings from Last 3 Encounters:   07/23/20 269 lb 12.8 oz (122.4 kg)   07/17/20 265 lb 6.4 oz (120.4 kg)   07/16/20 261 lb 12.8 oz (118.8 kg)     Meds, vitamins and allergies reviewed with Patient    ROS:  Gen: no fever  HEENT: No cold symptoms no, sore throat. CV:  Denies chest pain or palpitations. Pulm:  Denies shortness of breath, cough. Abd:  Denies abdominal pain, nausea and vomiting. Skin: no rash    Allergies   Allergen Reactions    Mobic [Meloxicam] Nausea Only     Stomach would get upset after taking this medication    Morphine Other (See Comments)     When taken in 2013 pt had an adverse reaction to morphine. She ended up in  hospital with kidney failure, dehydration, and in ICU. She prefers this not to be given ever.  Other      ? Suture from CABG caused blister (10/30/2015)       Prior to Visit Medications    Medication Sig Taking?  Authorizing Provider   HYDROcodone-acetaminophen (NORCO) 7.5-325 MG per tablet Take 1 tablet by mouth every 6 hours as needed for Pain for up to 30 days. Yes Cyndi Correa MD   lisinopril (PRINIVIL;ZESTRIL) 5 MG tablet TAKE 1 TABLET BY MOUTH EVERY DAY Yes Cyndi Correa MD   BRILINTA 90 MG TABS tablet TAKE 1 TABLET BY MOUTH TWICE A DAY Yes Cyndi Correa MD   ferrous sulfate (IRON 325) 325 (65 Fe) MG tablet Take 1 tablet by mouth 2 times daily  Patient taking differently: Take 325 mg by mouth 2 times daily HOLD WHILE GIVING IV IRON.  Yes Malini Harris MD   rosuvastatin (CRESTOR) 40 MG tablet Take 1 tablet by mouth nightly Yes Malini Harris MD   clopidogrel (PLAVIX) 75 MG tablet Take 1 tablet by mouth daily Yes Malini Harris MD   buPROPion (WELLBUTRIN XL) 150 MG extended release tablet TAKE 1 TABLET BY MOUTH EVERY DAY Yes Cyndi Correa MD   famotidine (PEPCID) 20 MG tablet Take 20 mg by mouth 2 times daily Yes Lashonda Worthington MD   omeprazole (PRILOSEC) 40 MG delayed release capsule Take 1 capsule by mouth daily Yes Cyndi Correa MD   metoprolol succinate (TOPROL XL) 50 MG extended release tablet TAKE 1 TABLET BY MOUTH EVERY DAY Yes Cyndi Correa MD   sodium bicarbonate 650 MG tablet TAKE 1 TABLET BY MOUTH FOUR TIMES A DAY Yes Marii Suarez MD   carbidopa-levodopa (SINEMET)  MG per tablet TAKE 2 TABLETS NIGHTLY Yes Cyndi Correa MD   FLUoxetine (PROZAC) 40 MG capsule TAKE 2 CAPSULES DAILY  Patient taking differently: TAKE 2 CAPSULES DAILY Patient not taking because they are too big to swallow Yes Cyndi Correa MD   dicyclomine (BENTYL) 10 MG capsule Take 1 capsule by mouth 4 times daily (before meals and nightly) for 8 days  Albert Abbasi PA-C       OBJECTIVE:  LMP 01/01/2004 No vital signs available  GEN:  in NAD over the phone  Lungs: No audible wheezes over the phone affect seem appropriate insight was goodNEURO: Alert and oriented ×3  Lab Results   Component Value Date     08/25/2020    K 4.7 08/25/2020     (H) 08/25/2020    CO2 20 (L) 08/25/2020    BUN 28 (H) 08/25/2020    CREATININE 1.9 (H) 08/25/2020    GLUCOSE 113 (H) 08/25/2020    CALCIUM 9.2 08/25/2020    PROT 7.2 08/25/2020    LABALBU 4.0 08/25/2020    BILITOT <0.2 08/25/2020    ALKPHOS 128 08/25/2020    AST 15 08/25/2020    ALT 13 08/25/2020    LABGLOM 27 (A) 08/25/2020    GFRAA 32 (A) 08/25/2020    AGRATIO 1.3 08/25/2020    GLOB 3.2 08/25/2020     Lab Results   Component Value Date    WBC 7.1 08/25/2020    HGB 11.1 (L) 08/25/2020    HCT 34.7 (L) 08/25/2020    MCV 91.5 08/25/2020     08/25/2020   OARRS report reviewed and assessed. Consistent. ASSESSMENT/PLAN:  I chronic pain syndrome  Discussed use of narcotic pain meds, benefits and risks.   Patient aware he/she will need to be seen every 3 months, will be subject to random urine drug screens, will be asked to sign a medication agreement, and may be required to see a pain specialist, especially if his/her morphine equivalent is over 80    2ckd - stage 3  Sees nephrologist  Avoid nsaids    3copd    4ascd - no chest pain  Sees cardio  Switched from brelinta to plavix    5 iron defic anemia/anemia of ckd  Anemia better after infusions\    6 htn - stable  readings at home okay    7 depression- stable  When stopped prozac - felt terrible    8IMM/HM  Need flu shot/shingrix - will schedule  mammogram    Spent 15 minutes with patient greater than 50% of time reviewing her meds looking her OARRS report trying to get her to see specialist for her pain in hopes that we can reduce her pain medicine in some time, and coordinating her care

## 2020-10-28 ENCOUNTER — TELEPHONE (OUTPATIENT)
Dept: FAMILY MEDICINE CLINIC | Age: 65
End: 2020-10-28

## 2020-10-28 NOTE — TELEPHONE ENCOUNTER
Patient advised of notification from [facility] advising of decreased effectiveness of Plavix when taking Fluoxetine increasing risk of stroke. Patient advised per Dr. Aleyda Candelario that she should decrease her prozac from 40 mg to 20 mg (1/2 tablet daily).  Patient states that she needs a phone call from Dr. Aleyda Candelario

## 2020-11-06 RX ORDER — HYDROCODONE BITARTRATE AND ACETAMINOPHEN 7.5; 325 MG/1; MG/1
1 TABLET ORAL EVERY 6 HOURS PRN
Qty: 120 TABLET | Refills: 0 | OUTPATIENT
Start: 2020-11-06 | End: 2020-12-06

## 2020-11-06 NOTE — TELEPHONE ENCOUNTER
Medication:   Requested Prescriptions      No prescriptions requested or ordered in this encounter        Last Filled:  10/13/2020 #120 0 rf     Patient Phone Number: [phone] (home)     Last appt: 10/27/2020   Next appt: Visit date not found    Last OARRS:   RX Monitoring 7/22/2020   Attestation -   Acute Pain Prescriptions -   Periodic Controlled Substance Monitoring No signs of potential drug abuse or diversion identified.    Chronic Pain > 80 MEDD -

## 2020-11-06 NOTE — TELEPHONE ENCOUNTER
Medication and Quantity requested: HYDROcodone-acetaminophen (NORCO) 7.5-325 MG per tablet          Last Visit  10/27/20    Pharmacy and phone number updated in EPIC:  [facility]

## 2020-11-10 RX ORDER — HYDROCODONE BITARTRATE AND ACETAMINOPHEN 7.5; 325 MG/1; MG/1
1 TABLET ORAL EVERY 6 HOURS PRN
Qty: 120 TABLET | Refills: 0 | Status: SHIPPED | OUTPATIENT
Start: 2020-11-10 | End: 2020-12-07 | Stop reason: SDUPTHER

## 2020-11-10 NOTE — TELEPHONE ENCOUNTER
Medication:   Requested Prescriptions     Pending Prescriptions Disp Refills    HYDROcodone-acetaminophen (NORCO) 7.5-325 MG per tablet 120 tablet 0     Sig: Take 1 tablet by mouth every 6 hours as needed for Pain for up to 30 days. Last Filled:  10/13/2020 #120 0rf    Patient Phone Number: [phone] (home)     Last appt: 10/27/2020   Next appt: Visit date not found    Last OARRS:   RX Monitoring 7/22/2020   Attestation -   Acute Pain Prescriptions -   Periodic Controlled Substance Monitoring No signs of potential drug abuse or diversion identified.    Chronic Pain > 80 MEDD -

## 2020-11-10 NOTE — TELEPHONE ENCOUNTER
Medication and Quantity requested: HYDROcodone-acetaminophen (NORCO) 7.5-325 MG per tablet   #120     Last Visit  10/27/20    Pharmacy and phone number updated in EPIC:  [facility]

## 2020-11-25 ENCOUNTER — TELEPHONE (OUTPATIENT)
Dept: FAMILY MEDICINE CLINIC | Age: 65
End: 2020-11-25

## 2020-12-07 RX ORDER — HYDROCODONE BITARTRATE AND ACETAMINOPHEN 7.5; 325 MG/1; MG/1
1 TABLET ORAL EVERY 6 HOURS PRN
Qty: 120 TABLET | Refills: 0 | Status: SHIPPED | OUTPATIENT
Start: 2020-12-07 | End: 2021-01-04 | Stop reason: SDUPTHER

## 2020-12-21 RX ORDER — SODIUM BICARBONATE 650 MG/1
TABLET ORAL
Qty: 360 TABLET | Refills: 1 | Status: SHIPPED | OUTPATIENT
Start: 2020-12-21 | End: 2021-05-09

## 2021-01-04 DIAGNOSIS — M15.9 PRIMARY OSTEOARTHRITIS INVOLVING MULTIPLE JOINTS: ICD-10-CM

## 2021-01-04 DIAGNOSIS — Z76.0 ENCOUNTER FOR MEDICATION REFILL: ICD-10-CM

## 2021-01-04 RX ORDER — HYDROCODONE BITARTRATE AND ACETAMINOPHEN 7.5; 325 MG/1; MG/1
1 TABLET ORAL EVERY 6 HOURS PRN
Qty: 120 TABLET | Refills: 0 | Status: SHIPPED | OUTPATIENT
Start: 2021-01-04 | End: 2021-02-01 | Stop reason: SDUPTHER

## 2021-01-04 NOTE — TELEPHONE ENCOUNTER
Medication:   Requested Prescriptions      No prescriptions requested or ordered in this encounter        Last Filled:  12/07/2020 #120 0 rf     Patient Phone Number: [phone] (home)     Last appt: 10/27/2020   Next appt: Visit date not found    Last OARRS:   RX Monitoring 11/10/2020   Attestation -   Acute Pain Prescriptions -   Periodic Controlled Substance Monitoring No signs of potential drug abuse or diversion identified.    Chronic Pain > 80 MEDD -

## 2021-01-07 DIAGNOSIS — I10 ESSENTIAL HYPERTENSION: ICD-10-CM

## 2021-01-07 RX ORDER — LISINOPRIL 5 MG/1
TABLET ORAL
Qty: 90 TABLET | Refills: 2 | Status: SHIPPED | OUTPATIENT
Start: 2021-01-07 | End: 2021-04-16

## 2021-01-07 RX ORDER — BUPROPION HYDROCHLORIDE 150 MG/1
TABLET ORAL
Qty: 90 TABLET | Refills: 2 | Status: SHIPPED | OUTPATIENT
Start: 2021-01-07 | End: 2021-08-30

## 2021-02-01 ENCOUNTER — TELEPHONE (OUTPATIENT)
Dept: FAMILY MEDICINE CLINIC | Age: 66
End: 2021-02-01

## 2021-02-01 DIAGNOSIS — M15.9 PRIMARY OSTEOARTHRITIS INVOLVING MULTIPLE JOINTS: ICD-10-CM

## 2021-02-01 DIAGNOSIS — Z76.0 ENCOUNTER FOR MEDICATION REFILL: ICD-10-CM

## 2021-02-01 RX ORDER — HYDROCODONE BITARTRATE AND ACETAMINOPHEN 7.5; 325 MG/1; MG/1
1 TABLET ORAL EVERY 6 HOURS PRN
Qty: 60 TABLET | Refills: 0 | Status: SHIPPED | OUTPATIENT
Start: 2021-02-01 | End: 2021-02-17 | Stop reason: SDUPTHER

## 2021-02-01 RX ORDER — HYDROCODONE BITARTRATE AND ACETAMINOPHEN 7.5; 325 MG/1; MG/1
1 TABLET ORAL EVERY 6 HOURS PRN
Qty: 120 TABLET | Refills: 0 | OUTPATIENT
Start: 2021-02-01 | End: 2021-03-03

## 2021-02-01 NOTE — TELEPHONE ENCOUNTER
Pt has an appt on 2/17/21 and would like to know if she can be prescribed HYDROcodone-acetaminophen (Pete Kraus) 7.5-325 MG per tablet to get her through until that date.     Please call pt back and advise

## 2021-02-01 NOTE — TELEPHONE ENCOUNTER
Medication:   Requested Prescriptions      No prescriptions requested or ordered in this encounter        Last Filled:  01/04/2021    Patient Phone Number: [phone] (home)     Last appt: 10/27/2020   Next appt: Visit date not found    Last OARRS:   RX Monitoring 11/10/2020   Attestation -   Acute Pain Prescriptions -   Periodic Controlled Substance Monitoring No signs of potential drug abuse or diversion identified.    Chronic Pain > 80 MEDD -

## 2021-02-04 RX ORDER — TRAZODONE HYDROCHLORIDE 50 MG/1
TABLET ORAL
Qty: 90 TABLET | Refills: 1 | Status: SHIPPED | OUTPATIENT
Start: 2021-02-04 | End: 2021-02-17

## 2021-02-04 NOTE — TELEPHONE ENCOUNTER
Medication:   Requested Prescriptions     Pending Prescriptions Disp Refills    traZODone (DESYREL) 50 MG tablet [Pharmacy Med Name: TRAZODONE 50 MG TABLET] 90 tablet 1     Sig: TAKE 1 TABLET BY MOUTH AT BEDTIME AS NEEDED FOR SLEEP        Last Filled:  6/29/20 #90, 1 RF     Patient Phone Number: [phone] (home)     Last appt: 10/27/2020 med check   Next appt: 2/17/2021    Last OARRS:   RX Monitoring 11/10/2020   Attestation -   Acute Pain Prescriptions -   Periodic Controlled Substance Monitoring No signs of potential drug abuse or diversion identified.    Chronic Pain > 80 MEDD -

## 2021-02-15 DIAGNOSIS — M15.9 PRIMARY OSTEOARTHRITIS INVOLVING MULTIPLE JOINTS: ICD-10-CM

## 2021-02-15 DIAGNOSIS — Z76.0 ENCOUNTER FOR MEDICATION REFILL: ICD-10-CM

## 2021-02-15 RX ORDER — HYDROCODONE BITARTRATE AND ACETAMINOPHEN 7.5; 325 MG/1; MG/1
1 TABLET ORAL EVERY 6 HOURS PRN
Qty: 60 TABLET | Refills: 0 | OUTPATIENT
Start: 2021-02-15 | End: 2021-03-02

## 2021-02-15 RX ORDER — METOPROLOL SUCCINATE 50 MG/1
TABLET, EXTENDED RELEASE ORAL
Qty: 90 TABLET | Refills: 3 | Status: SHIPPED | OUTPATIENT
Start: 2021-02-15 | End: 2021-08-04 | Stop reason: SDUPTHER

## 2021-02-15 NOTE — TELEPHONE ENCOUNTER
Medication:   Requested Prescriptions     Pending Prescriptions Disp Refills    metoprolol succinate (TOPROL XL) 50 MG extended release tablet 90 tablet 3     Sig: TAKE 1 TABLET BY MOUTH EVERY DAY     Refused Prescriptions Disp Refills    HYDROcodone-acetaminophen (NORCO) 7.5-325 MG per tablet 60 tablet 0     Sig: Take 1 tablet by mouth every 6 hours as needed for Pain for up to 15 days.      Refused By: Marie Logan     Reason for Refusal: Patient has requested refill too soon       Last Filled:  06/29/2020 #90 3rf    Patient Phone Number: [phone] (home)     Last appt: 10/27/2020   Next appt: 2/17/2021    Lab Results   Component Value Date     08/25/2020    K 4.7 08/25/2020     (H) 08/25/2020    CO2 20 (L) 08/25/2020    BUN 28 (H) 08/25/2020    CREATININE 1.9 (H) 08/25/2020    GLUCOSE 113 (H) 08/25/2020    CALCIUM 9.2 08/25/2020    PROT 7.2 08/25/2020    LABALBU 4.0 08/25/2020    BILITOT <0.2 08/25/2020    ALKPHOS 128 08/25/2020    AST 15 08/25/2020    ALT 13 08/25/2020    LABGLOM 27 (A) 08/25/2020    GFRAA 32 (A) 08/25/2020    AGRATIO 1.3 08/25/2020    GLOB 3.2 08/25/2020

## 2021-02-15 NOTE — TELEPHONE ENCOUNTER
----- Message from Nicki Aragon sent at 2/15/2021  8:15 AM EST -----  Subject: Appointment Request    Reason for Call: Routine Existing Condition Follow Up    QUESTIONS  Type of Appointment? Established Patient  Reason for appointment request? No appointments available during search  Additional Information for Provider? pt is out of medication   and her VV apt isnt until 2/17. Is there away to write her a script   before the weather gets bad or if  wants to move her VV ahead to today.   ---------------------------------------------------------------------------  --------------  [address]  What is the best way for the office to contact you? OK to leave message on   voicemail  Preferred Call Back Phone Number? [phone]  ---------------------------------------------------------------------------  --------------  SCRIPT ANSWERS  Relationship to Patient? Self  Appointment reason? Well Care/Follow Ups  Select a Well Care/Follow Ups appointment reason? Adult Existing Condition   Follow Up [Diabetes   CHF   COPD   Hypertension/Blood Pressure Check]  (Is the patient requesting to be seen urgently for their symptoms?)? No  Is this follow up request related to routine Diabetes Management? No  Are you having any new concerns about your existing condition? No  Have you been diagnosed with   tested for   or told that you are suspected of having COVID-19 (Coronavirus)? No  Have you had a fever or taken medication to treat a fever within the past   3 days? No  Have you had a cough   shortness of breath or flu-like symptoms within the past 3 days? No  Do you currently have flu-like symptoms including fever or chills   cough   shortness of breath   or difficulty breathing   or new loss of taste or smell? No  (Service Expert  click yes below to proceed with [employer] As Usual   Scheduling)?  Yes

## 2021-02-15 NOTE — TELEPHONE ENCOUNTER
Medication:   Requested Prescriptions     Pending Prescriptions Disp Refills    HYDROcodone-acetaminophen (NORCO) 7.5-325 MG per tablet 60 tablet 0     Sig: Take 1 tablet by mouth every 6 hours as needed for Pain for up to 15 days. Last Filled:  02/01/2021    Patient Phone Number: [phone] (home)     Last appt: 10/27/2020   Next appt: 2/17/2021    Last OARRS:   RX Monitoring 11/10/2020   Attestation -   Acute Pain Prescriptions -   Periodic Controlled Substance Monitoring No signs of potential drug abuse or diversion identified.    Chronic Pain > 80 MEDD -

## 2021-02-15 NOTE — TELEPHONE ENCOUNTER
Medication and Quantity requested: norco 7.5/325 # 60   metoprolol    Last Visit  10.27.20    Pharmacy and phone number updated in EPIC:  Yes    Pt has vv Wednesday, will be out of  Meds, asking if you will this early

## 2021-02-15 NOTE — TELEPHONE ENCOUNTER
----- Message from [facility] sent at 2/15/2021  3:09 PM EST -----  Subject: Refill Request    QUESTIONS  Name of Medication? metoprolol succinate (TOPROL XL) 50 MG extended   release tablet  Patient-reported dosage and instructions? TAKE 1 TABLET BY MOUTH EVERY DAY  How many days do you have left? 3  [id number]  Pharmacy phone number (if available)? [phone]  ---------------------------------------------------------------------------  --------------  Ritika QUEVEDO  What is the best way for the office to contact you? OK to leave message on   voicemail  Preferred Call Back Phone Number?  [phone]

## 2021-02-17 ENCOUNTER — VIRTUAL VISIT (OUTPATIENT)
Dept: FAMILY MEDICINE CLINIC | Age: 66
End: 2021-02-17
Payer: COMMERCIAL

## 2021-02-17 DIAGNOSIS — F17.200 TOBACCO USE DISORDER: ICD-10-CM

## 2021-02-17 DIAGNOSIS — M15.9 PRIMARY OSTEOARTHRITIS INVOLVING MULTIPLE JOINTS: ICD-10-CM

## 2021-02-17 DIAGNOSIS — Z13.31 POSITIVE DEPRESSION SCREENING: ICD-10-CM

## 2021-02-17 DIAGNOSIS — E78.2 MIXED HYPERLIPIDEMIA: ICD-10-CM

## 2021-02-17 DIAGNOSIS — E66.01 MORBID OBESITY (HCC): ICD-10-CM

## 2021-02-17 DIAGNOSIS — I25.10 CORONARY ARTERY DISEASE INVOLVING NATIVE CORONARY ARTERY OF NATIVE HEART WITHOUT ANGINA PECTORIS: Primary | ICD-10-CM

## 2021-02-17 DIAGNOSIS — Z76.0 ENCOUNTER FOR MEDICATION REFILL: ICD-10-CM

## 2021-02-17 DIAGNOSIS — I10 ESSENTIAL HYPERTENSION: ICD-10-CM

## 2021-02-17 PROCEDURE — G8431 POS CLIN DEPRES SCRN F/U DOC: HCPCS | Performed by: FAMILY MEDICINE

## 2021-02-17 PROCEDURE — 99442 PR PHYS/QHP TELEPHONE EVALUATION 11-20 MIN: CPT | Performed by: FAMILY MEDICINE

## 2021-02-17 RX ORDER — HYDROCODONE BITARTRATE AND ACETAMINOPHEN 7.5; 325 MG/1; MG/1
1 TABLET ORAL EVERY 6 HOURS PRN
Qty: 120 TABLET | Refills: 0 | Status: SHIPPED | OUTPATIENT
Start: 2021-02-17 | End: 2021-03-15 | Stop reason: SDUPTHER

## 2021-02-17 RX ORDER — LISINOPRIL 10 MG/1
10 TABLET ORAL DAILY
Qty: 30 TABLET | Refills: 5 | Status: ON HOLD | OUTPATIENT
Start: 2021-02-17 | End: 2021-04-17 | Stop reason: HOSPADM

## 2021-02-17 ASSESSMENT — COLUMBIA-SUICIDE SEVERITY RATING SCALE - C-SSRS: 6. HAVE YOU EVER DONE ANYTHING, STARTED TO DO ANYTHING, OR PREPARED TO DO ANYTHING TO END YOUR LIFE?: NO

## 2021-02-17 ASSESSMENT — PATIENT HEALTH QUESTIONNAIRE - PHQ9
9. THOUGHTS THAT YOU WOULD BE BETTER OFF DEAD, OR OF HURTING YOURSELF: 0
SUM OF ALL RESPONSES TO PHQ QUESTIONS 1-9: 11
5. POOR APPETITE OR OVEREATING: 1
7. TROUBLE CONCENTRATING ON THINGS, SUCH AS READING THE NEWSPAPER OR WATCHING TELEVISION: 1

## 2021-02-17 NOTE — PROGRESS NOTES
Ion Silva is a 63 y.o. female. Due to the current coronavirus pandemic, this telephone/video visit was insisted, with patient's consent, to reduce the patient's risk of exposure to COVID-19 and provide continuity of care for an established patient. The patient was at home while the provider was either at home or at the clinic. Services were provided through a synchronous discussion over the telephone and/or video chat to substitute for in person clinic visit, and coded as such. HPI:  Patient has been under a lot of stress lately with her son and her  both at home  Blood pressure was 170/97 at home today  Still takes metoprolol 50 mg daily but stopped her lisinopril because her blood pressure is actually running low  Aches all over and uses her Vicodin 4 times a day  Not sleeping very well    Wt Readings from Last 3 Encounters:   07/23/20 269 lb 12.8 oz (122.4 kg)   07/17/20 265 lb 6.4 oz (120.4 kg)   07/16/20 261 lb 12.8 oz (118.8 kg)     Meds, vitamins and allergies reviewed with Patient    ROS:  Gen: no fever  HEENT: no cold symptoms, no sore throat. CV:  Denies chest pain or palpitations. Pulm:  Denies shortness of breath, cough. Abd:  Denies abdominal pain, nausea and vomiting. Skin: no rash    Allergies   Allergen Reactions    Mobic [Meloxicam] Nausea Only     Stomach would get upset after taking this medication    Morphine Other (See Comments)     When taken in 2013 pt had an adverse reaction to morphine. She ended up in  hospital with kidney failure, dehydration, and in ICU. She prefers this not to be given ever.  Other      ? Suture from CABG caused blister (10/30/2015)       Prior to Visit Medications    Medication Sig Taking?  Authorizing Provider   metoprolol succinate (TOPROL XL) 50 MG extended release tablet TAKE 1 TABLET BY MOUTH EVERY DAY Yes Eli Tapia MD buPROPion (WELLBUTRIN XL) 150 MG extended release tablet TAKE 1 TABLET BY MOUTH EVERY DAY Yes Lyndsay Aguilar MD   lisinopril (PRINIVIL;ZESTRIL) 5 MG tablet TAKE 1 TABLET BY MOUTH EVERY DAY Yes Lyndsay Aguilar MD   sodium bicarbonate 650 MG tablet TAKE 1 TABLET BY MOUTH FOUR TIMES A DAY Yes Lyndsay Aguilar MD   BRILINTA 90 MG TABS tablet TAKE 1 TABLET BY MOUTH TWICE A DAY Yes Lyndsay Aguilar MD   ferrous sulfate (IRON 325) 325 (65 Fe) MG tablet Take 1 tablet by mouth 2 times daily  Patient taking differently: Take 325 mg by mouth 2 times daily HOLD WHILE GIVING IV IRON. Yes Vaishnavi Ram MD   rosuvastatin (CRESTOR) 40 MG tablet Take 1 tablet by mouth nightly Yes Vaishnavi Ram MD   clopidogrel (PLAVIX) 75 MG tablet Take 1 tablet by mouth daily Yes Vaishnavi Ram MD   famotidine (PEPCID) 20 MG tablet Take 20 mg by mouth 2 times daily Yes Lashonda Worthington MD   omeprazole (PRILOSEC) 40 MG delayed release capsule Take 1 capsule by mouth daily Yes Lyndsay Aguilar MD   carbidopa-levodopa (SINEMET)  MG per tablet TAKE 2 TABLETS NIGHTLY Yes Lyndsay Aguilar MD   FLUoxetine (PROZAC) 40 MG capsule TAKE 2 CAPSULES DAILY  Patient taking differently: TAKE 2 CAPSULES DAILY Patient not taking because they are too big to swallow Yes Lyndsay Aguilar MD   dicyclomine (BENTYL) 10 MG capsule Take 1 capsule by mouth 4 times daily (before meals and nightly) for 8 days  Prem Lizarraga PA-C       OBJECTIVE:  LMP 01/01/2004   GEN:  in NAD  PULM:  Chest is clear, no wheezing ,  symmetric air entry throughout both lung fields. NEURO: Alert and oriented ×3  OARRS report reviewed and assessed. Consistent. ASSESSMENT/PLAN:  1. Primary osteoarthritis involving multiple joints  - HYDROcodone-acetaminophen (NORCO) 7.5-325 MG per tablet; Take 1 tablet by mouth every 6 hours as needed for Pain for up to 30 days. Dispense: 120 tablet; Refill: 0  Encounter Diagnoses   Name Primary?  Primary osteoarthritis involving multiple joints     Encounter for medication refill     Coronary artery disease involving native coronary artery of native heart without angina pectoris Yes    Essential hypertension not at goal, add lisinopril return to office in 4 weeks check blood pressure twice a day at home     Mixed hyperlipidemia     Morbid obesity ([facility] [address]     Tobacco use disorder    I told pt it is very important to quit smoking! When ready, I would like to schedule an appointment to discuss the various tools we can offer, such as classes, hypnosis, accupuncture, or/and medications. Suggest pt. get to a class, pick a quit date, and RTO to discuss. Multiple approaches toward motivating the patient were explored.     Spent 15 minutes over the phone addressing blood pressure approaches with patient, will ask her to return to office in 4 weeks at which time labs vaccinations and health maintenance issues will be discussed as well

## 2021-03-15 DIAGNOSIS — M15.9 PRIMARY OSTEOARTHRITIS INVOLVING MULTIPLE JOINTS: ICD-10-CM

## 2021-03-15 DIAGNOSIS — Z76.0 ENCOUNTER FOR MEDICATION REFILL: ICD-10-CM

## 2021-03-15 RX ORDER — HYDROCODONE BITARTRATE AND ACETAMINOPHEN 7.5; 325 MG/1; MG/1
1 TABLET ORAL EVERY 6 HOURS PRN
Qty: 120 TABLET | Refills: 0 | Status: SHIPPED | OUTPATIENT
Start: 2021-03-15 | End: 2021-04-13 | Stop reason: SDUPTHER

## 2021-03-15 NOTE — TELEPHONE ENCOUNTER
Medication and Quantity requested: HYDROcodone-acetaminophen (NORCO) 7.5-325 MG per tablet          Last Visit  2/17/21    [facility]armacy and phone number updated in EPIC[facility]

## 2021-03-17 RX ORDER — FLUOXETINE HYDROCHLORIDE 40 MG/1
CAPSULE ORAL
Qty: 180 CAPSULE | Refills: 3 | Status: SHIPPED | OUTPATIENT
Start: 2021-03-17 | End: 2022-03-28

## 2021-03-22 ENCOUNTER — VIRTUAL VISIT (OUTPATIENT)
Dept: FAMILY MEDICINE CLINIC | Age: 66
End: 2021-03-22
Payer: COMMERCIAL

## 2021-03-22 DIAGNOSIS — J40 BRONCHITIS: Primary | ICD-10-CM

## 2021-03-22 PROCEDURE — 99442 PR PHYS/QHP TELEPHONE EVALUATION 11-20 MIN: CPT | Performed by: FAMILY MEDICINE

## 2021-03-22 RX ORDER — AZITHROMYCIN 250 MG/1
250 TABLET, FILM COATED ORAL SEE ADMIN INSTRUCTIONS
Qty: 6 TABLET | Refills: 0 | Status: SHIPPED | OUTPATIENT
Start: 2021-03-22 | End: 2021-03-27

## 2021-03-22 ASSESSMENT — PATIENT HEALTH QUESTIONNAIRE - PHQ9
2. FEELING DOWN, DEPRESSED OR HOPELESS: 0
1. LITTLE INTEREST OR PLEASURE IN DOING THINGS: 0
SUM OF ALL RESPONSES TO PHQ QUESTIONS 1-9: 0

## 2021-03-22 NOTE — PROGRESS NOTES
Paola De Luna is a 63 y.o. female. Due to the current coronavirus pandemic, this telephone visit was insisted, with patient's consent, to reduce the patient's risk of exposure to COVID-19 and provide continuity of care for an established patient. The patient was at home while the provider was either at home or at the clinic. Services were provided through a synchronous discussion over the telephone and/or video chat to substitute for in person clinic visit, and coded as such. HPI:  4 to 5 days of chest congestion temperature 99.5 dry cough headache  No known Covid exposure but she has been leaving the house  She has not had her vaccines yet  She denies sore throat earache nausea vomiting diarrhea  No loss of sense of taste or smell  Did have a grandson near her over the weekend and he was coughing  Wt Readings from Last 3 Encounters:   07/23/20 269 lb 12.8 oz (122.4 kg)   07/17/20 265 lb 6.4 oz (120.4 kg)   07/16/20 261 lb 12.8 oz (118.8 kg)     Meds, vitamins and allergies reviewed with Patient    ROS:  Gen: Low-grade fever  HEENT: Mild cold symptoms, sore throat. CV:  Denies chest pain or palpitations. .  Abd:  Denies abdominal pain, nausea and vomiting. Skin: no rash    Allergies   Allergen Reactions    Mobic [Meloxicam] Nausea Only     Stomach would get upset after taking this medication    Morphine Other (See Comments)     When taken in 2013 pt had an adverse reaction to morphine. She ended up in  hospital with kidney failure, dehydration, and in ICU. She prefers this not to be given ever.  Other      ? Suture from CABG caused blister (10/30/2015)       Prior to Visit Medications    Medication Sig Taking? Authorizing Provider   FLUoxetine (PROZAC) 40 MG capsule TAKE 2 CAPSULES DAILY Yes Lorelei Mosqueda MD   HYDROcodone-acetaminophen (NORCO) 7.5-325 MG per tablet Take 1 tablet by mouth every 6 hours as needed for Pain for up to 30 days.  Yes Lorelei Mosqueda MD   lisinopril (PRINIVIL;ZESTRIL) 10 MG tablet Take 1 tablet by mouth daily Yes Raven Jha MD   metoprolol succinate (TOPROL XL) 50 MG extended release tablet TAKE 1 TABLET BY MOUTH EVERY DAY Yes Raven Jha MD   buPROPion (WELLBUTRIN XL) 150 MG extended release tablet TAKE 1 TABLET BY MOUTH EVERY DAY Yes Raven Jha MD   lisinopril (PRINIVIL;ZESTRIL) 5 MG tablet TAKE 1 TABLET BY MOUTH EVERY DAY Yes Raven Jha MD   sodium bicarbonate 650 MG tablet TAKE 1 TABLET BY MOUTH FOUR TIMES A DAY Yes Raven Jha MD   ferrous sulfate (IRON 325) 325 (65 Fe) MG tablet Take 1 tablet by mouth 2 times daily  Patient taking differently: Take 325 mg by mouth 2 times daily HOLD WHILE GIVING IV IRON.  Yes Carito Pollack MD   rosuvastatin (CRESTOR) 40 MG tablet Take 1 tablet by mouth nightly Yes Carito Pollack MD   clopidogrel (PLAVIX) 75 MG tablet Take 1 tablet by mouth daily Yes Carito Pollack MD   famotidine (PEPCID) 20 MG tablet Take 20 mg by mouth 2 times daily Yes Historical Provider, MD   carbidopa-levodopa (SINEMET)  MG per tablet TAKE 2 TABLETS NIGHTLY Yes Raven Jha MD   dicyclomine (BENTYL) 10 MG capsule Take 1 capsule by mouth 4 times daily (before meals and nightly) for 8 days  Nenita Paez PA-C       OBJECTIVE:  LMP 01/01/2004   GEN:  in NAD  Patient did not cough during our 12-minute phone interview  PULM: Respiratory rate sounded normal no respiratory distress no audible wheezes  NEURO: Alert and oriented ×3    ASSESSMENT/PLAN:  URI/early bronchitis    Supportive treatment with fluids rest Tylenol cough drops throat lozenges  Recommended Covid test  Start Z-Marcello if no better   warning signs given   return to office in 4 to 6 weeks    Spent 15 minutes for via telephone visit with patient

## 2021-03-31 ENCOUNTER — IMMUNIZATION (OUTPATIENT)
Dept: FAMILY MEDICINE CLINIC | Age: 66
End: 2021-03-31
Payer: COMMERCIAL

## 2021-03-31 PROCEDURE — 91300 COVID-19, PFIZER VACCINE 30MCG/0.3ML DOSE: CPT | Performed by: NURSE PRACTITIONER

## 2021-03-31 PROCEDURE — 0001A COVID-19, PFIZER VACCINE 30MCG/0.3ML DOSE: CPT | Performed by: NURSE PRACTITIONER

## 2021-04-13 DIAGNOSIS — M15.9 PRIMARY OSTEOARTHRITIS INVOLVING MULTIPLE JOINTS: ICD-10-CM

## 2021-04-13 DIAGNOSIS — Z76.0 ENCOUNTER FOR MEDICATION REFILL: ICD-10-CM

## 2021-04-13 RX ORDER — HYDROCODONE BITARTRATE AND ACETAMINOPHEN 7.5; 325 MG/1; MG/1
1 TABLET ORAL EVERY 6 HOURS PRN
Qty: 120 TABLET | Refills: 0 | Status: SHIPPED | OUTPATIENT
Start: 2021-04-13 | End: 2021-05-07 | Stop reason: SDUPTHER

## 2021-04-13 NOTE — TELEPHONE ENCOUNTER
Medication and Quantity requested: HYDROcodone-acetaminophen (NORCO) 7.5-325 MG per tablet-QTY.  120 tablets         Last Visit  3/22/2021-VV    Pharmacy and phone number updated in [facility]:  yes  [facility]

## 2021-04-13 NOTE — TELEPHONE ENCOUNTER
Medication:   Requested Prescriptions     Pending Prescriptions Disp Refills    HYDROcodone-acetaminophen (NORCO) 7.5-325 MG per tablet 120 tablet 0     Sig: Take 1 tablet by mouth every 6 hours as needed for Pain for up to 30 days. Last Filled:  03/15/2021 #120 0rf    Patient Phone Number: [phone] (home)     Last appt: 3/22/2021 VV  Next appt: Visit date not found    Last OARRS:   RX Monitoring 3/15/2021   Attestation -   Acute Pain Prescriptions -   Periodic Controlled Substance Monitoring No signs of potential drug abuse or diversion identified.    Chronic Pain > 80 MEDD -

## 2021-04-16 ENCOUNTER — APPOINTMENT (OUTPATIENT)
Dept: GENERAL RADIOLOGY | Age: 66
DRG: 683 | End: 2021-04-16
Payer: COMMERCIAL

## 2021-04-16 ENCOUNTER — HOSPITAL ENCOUNTER (INPATIENT)
Age: 66
LOS: 1 days | Discharge: HOME | DRG: 683 | End: 2021-04-17
Attending: STUDENT IN AN ORGANIZED HEALTH CARE EDUCATION/TRAINING PROGRAM | Admitting: INTERNAL MEDICINE
Payer: COMMERCIAL

## 2021-04-16 DIAGNOSIS — N18.9 ACUTE KIDNEY INJURY SUPERIMPOSED ON CHRONIC KIDNEY DISEASE (HCC): ICD-10-CM

## 2021-04-16 DIAGNOSIS — E87.5 ACUTE HYPERKALEMIA: Primary | ICD-10-CM

## 2021-04-16 DIAGNOSIS — N17.9 ACUTE KIDNEY INJURY SUPERIMPOSED ON CHRONIC KIDNEY DISEASE (HCC): ICD-10-CM

## 2021-04-16 LAB
ANION GAP SERPL CALCULATED.3IONS-SCNC: 13 MMOL/L (ref 3–16)
ANION GAP SERPL CALCULATED.3IONS-SCNC: 13 MMOL/L (ref 3–16)
BASOPHILS ABSOLUTE: 0 K/UL (ref 0–0.2)
BASOPHILS RELATIVE PERCENT: 0.4 %
BILIRUBIN URINE: NEGATIVE
BLOOD, URINE: NEGATIVE
BUN BLDV-MCNC: 64 MG/DL (ref 7–20)
BUN BLDV-MCNC: 64 MG/DL (ref 7–20)
CALCIUM SERPL-MCNC: 8.7 MG/DL (ref 8.3–10.6)
CALCIUM SERPL-MCNC: 8.8 MG/DL (ref 8.3–10.6)
CHLORIDE BLD-SCNC: 107 MMOL/L (ref 99–110)
CHLORIDE BLD-SCNC: 107 MMOL/L (ref 99–110)
CLARITY: CLEAR
CO2: 15 MMOL/L (ref 21–32)
CO2: 17 MMOL/L (ref 21–32)
COLOR: YELLOW
CREAT SERPL-MCNC: 2.8 MG/DL (ref 0.6–1.2)
CREAT SERPL-MCNC: 2.9 MG/DL (ref 0.6–1.2)
EOSINOPHILS ABSOLUTE: 0.1 K/UL (ref 0–0.6)
EOSINOPHILS RELATIVE PERCENT: 2.2 %
EPITHELIAL CELLS, UA: 2 /HPF (ref 0–5)
GFR AFRICAN AMERICAN: 20
GFR AFRICAN AMERICAN: 21
GFR NON-AFRICAN AMERICAN: 16
GFR NON-AFRICAN AMERICAN: 17
GLUCOSE BLD-MCNC: 74 MG/DL (ref 70–99)
GLUCOSE BLD-MCNC: 96 MG/DL (ref 70–99)
GLUCOSE URINE: NEGATIVE MG/DL
HCT VFR BLD CALC: 33.8 % (ref 36–48)
HEMOGLOBIN: 10.8 G/DL (ref 12–16)
HYALINE CASTS: 0 /LPF (ref 0–8)
KETONES, URINE: NEGATIVE MG/DL
LACTIC ACID, SEPSIS: 0.4 MMOL/L (ref 0.4–1.9)
LEUKOCYTE ESTERASE, URINE: NEGATIVE
LYMPHOCYTES ABSOLUTE: 0.9 K/UL (ref 1–5.1)
LYMPHOCYTES RELATIVE PERCENT: 13.2 %
MCH RBC QN AUTO: 30.7 PG (ref 26–34)
MCHC RBC AUTO-ENTMCNC: 32 G/DL (ref 31–36)
MCV RBC AUTO: 96.1 FL (ref 80–100)
MICROSCOPIC EXAMINATION: YES
MONOCYTES ABSOLUTE: 0.5 K/UL (ref 0–1.3)
MONOCYTES RELATIVE PERCENT: 7.3 %
NEUTROPHILS ABSOLUTE: 5.1 K/UL (ref 1.7–7.7)
NEUTROPHILS RELATIVE PERCENT: 76.9 %
NITRITE, URINE: NEGATIVE
PDW BLD-RTO: 14.5 % (ref 12.4–15.4)
PH UA: 5.5 (ref 5–8)
PLATELET # BLD: 189 K/UL (ref 135–450)
PMV BLD AUTO: 7.1 FL (ref 5–10.5)
POTASSIUM REFLEX MAGNESIUM: 6.2 MMOL/L (ref 3.5–5.1)
POTASSIUM SERPL-SCNC: 4.7 MMOL/L (ref 3.5–5.1)
PRO-BNP: 1208 PG/ML (ref 0–124)
PROTEIN UA: ABNORMAL MG/DL
RAPID INFLUENZA  B AGN: NEGATIVE
RAPID INFLUENZA A AGN: NEGATIVE
RBC # BLD: 3.52 M/UL (ref 4–5.2)
RBC UA: 1 /HPF (ref 0–4)
SARS-COV-2, NAAT: NOT DETECTED
SODIUM BLD-SCNC: 135 MMOL/L (ref 136–145)
SODIUM BLD-SCNC: 137 MMOL/L (ref 136–145)
SPECIFIC GRAVITY UA: 1.02 (ref 1–1.03)
URINE REFLEX TO CULTURE: ABNORMAL
URINE TYPE: ABNORMAL
UROBILINOGEN, URINE: 0.2 E.U./DL
WBC # BLD: 6.6 K/UL (ref 4–11)
WBC UA: 1 /HPF (ref 0–5)

## 2021-04-16 PROCEDURE — 1200000000 HC SEMI PRIVATE

## 2021-04-16 PROCEDURE — 6370000000 HC RX 637 (ALT 250 FOR IP): Performed by: PHYSICIAN ASSISTANT

## 2021-04-16 PROCEDURE — 2580000003 HC RX 258: Performed by: PHYSICIAN ASSISTANT

## 2021-04-16 PROCEDURE — 93005 ELECTROCARDIOGRAM TRACING: CPT | Performed by: STUDENT IN AN ORGANIZED HEALTH CARE EDUCATION/TRAINING PROGRAM

## 2021-04-16 PROCEDURE — 96365 THER/PROPH/DIAG IV INF INIT: CPT

## 2021-04-16 PROCEDURE — 83880 ASSAY OF NATRIURETIC PEPTIDE: CPT

## 2021-04-16 PROCEDURE — 71045 X-RAY EXAM CHEST 1 VIEW: CPT

## 2021-04-16 PROCEDURE — 6360000002 HC RX W HCPCS: Performed by: PHYSICIAN ASSISTANT

## 2021-04-16 PROCEDURE — 99284 EMERGENCY DEPT VISIT MOD MDM: CPT

## 2021-04-16 PROCEDURE — 87804 INFLUENZA ASSAY W/OPTIC: CPT

## 2021-04-16 PROCEDURE — 80048 BASIC METABOLIC PNL TOTAL CA: CPT

## 2021-04-16 PROCEDURE — 2500000003 HC RX 250 WO HCPCS: Performed by: STUDENT IN AN ORGANIZED HEALTH CARE EDUCATION/TRAINING PROGRAM

## 2021-04-16 PROCEDURE — 81001 URINALYSIS AUTO W/SCOPE: CPT

## 2021-04-16 PROCEDURE — 87635 SARS-COV-2 COVID-19 AMP PRB: CPT

## 2021-04-16 PROCEDURE — 83605 ASSAY OF LACTIC ACID: CPT

## 2021-04-16 PROCEDURE — 96375 TX/PRO/DX INJ NEW DRUG ADDON: CPT

## 2021-04-16 PROCEDURE — 85025 COMPLETE CBC W/AUTO DIFF WBC: CPT

## 2021-04-16 RX ORDER — OXYCODONE HYDROCHLORIDE AND ACETAMINOPHEN 5; 325 MG/1; MG/1
1 TABLET ORAL ONCE
Status: COMPLETED | OUTPATIENT
Start: 2021-04-16 | End: 2021-04-16

## 2021-04-16 RX ORDER — CALCIUM GLUCONATE 20 MG/ML
1000 INJECTION, SOLUTION INTRAVENOUS ONCE
Status: COMPLETED | OUTPATIENT
Start: 2021-04-16 | End: 2021-04-16

## 2021-04-16 RX ORDER — DEXTROSE MONOHYDRATE 25 G/50ML
50 INJECTION, SOLUTION INTRAVENOUS ONCE
Status: COMPLETED | OUTPATIENT
Start: 2021-04-16 | End: 2021-04-16

## 2021-04-16 RX ORDER — KETOROLAC TROMETHAMINE 30 MG/ML
15 INJECTION, SOLUTION INTRAMUSCULAR; INTRAVENOUS ONCE
Status: COMPLETED | OUTPATIENT
Start: 2021-04-16 | End: 2021-04-16

## 2021-04-16 RX ADMIN — SODIUM ZIRCONIUM CYCLOSILICATE 10 G: 10 POWDER, FOR SUSPENSION ORAL at 21:11

## 2021-04-16 RX ADMIN — DEXTROSE MONOHYDRATE 50 ML: 25 INJECTION, SOLUTION INTRAVENOUS at 21:04

## 2021-04-16 RX ADMIN — INSULIN HUMAN 10 UNITS: 100 INJECTION, SOLUTION PARENTERAL at 21:04

## 2021-04-16 RX ADMIN — Medication 50 MEQ: at 19:32

## 2021-04-16 RX ADMIN — CALCIUM GLUCONATE 1000 MG: 20 INJECTION, SOLUTION INTRAVENOUS at 19:58

## 2021-04-16 RX ADMIN — OXYCODONE HYDROCHLORIDE AND ACETAMINOPHEN 1 TABLET: 5; 325 TABLET ORAL at 18:15

## 2021-04-16 RX ADMIN — KETOROLAC TROMETHAMINE 15 MG: 30 INJECTION, SOLUTION INTRAMUSCULAR at 18:27

## 2021-04-16 ASSESSMENT — PAIN SCALES - GENERAL
PAINLEVEL_OUTOF10: 8
PAINLEVEL_OUTOF10: 8

## 2021-04-16 ASSESSMENT — VISUAL ACUITY: OU: 1

## 2021-04-16 NOTE — ED PROVIDER NOTES
[address] [facility]        Pt Name: Teresa Keller  MRN: [id number]  [place] 1955  Date of evaluation: 4/16/2021  Provider: Rajiv Marie PA-C  PCP: Jessica Paz MD     I have seen and evaluated this patient with my supervising physician Rhianna Woods MD.    [address] [place] [address]       Chief Complaint   Patient presents with    Dizziness     started a few days ago    Generalized Body Aches       HISTORY OF PRESENT ILLNESS   (Location, Timing/Onset, Context/Setting, Quality, Duration, Modifying Factors, Severity, Associated Signs and Symptoms)  Note limiting factors. Teresa Keller is a 63 y.o. female who complains of not feeling well the last 2 to 3 days. Reportedly on Wednesday patient states that she started to feel worse with joints achy in her hands and arms as well as knees and all over. She is also coughing a bit more than usual with nonproductive cough to this point. No measured fevers at home, however patient states that she is feeling somewhat ill. She has had one Covid shot but not a second 1 to this point. No known infectious exposures. Patient denies any recent fall or injury at all. Patient has felt a bit off balance a couple of times but no confusion or disorientation or acute loss of coordination. No extremity one-sided weakness or facial droop difficulty speaking or understanding speech. No difficulty taking fluids or eating. Wiliam Couch Appetite has been down a little bit. Nursing Notes were all reviewed and agreed with or any disagreements were addressed in the HPI. REVIEW OF SYSTEMS    (2-9 systems for level 4, 10 or more for level 5)     Review of Systems  Positive history as above with no fevers or chills, positive for cough nonproductive, no headache vision change neck pain or stiffness or nasal congestion or facial congestion or clearing throat frequently.   No shortness of breath or heart palpitations. No abdominal pain or distention. No difficulty passing urine or increased urinary frequency. No diarrhea or other acute stool change. No extremity acute loss of sensation or movement or rash. Positives and Pertinent negatives as per HPI. Except as noted above in the ROS, all other systems were reviewed and negative.        PAST MEDICAL HISTORY     Past Medical History:   Diagnosis Date    Arthritis     ASHD (arteriosclerotic heart disease)     Chronic kidney disease     Chronic midline low back pain without sciatica 4/3/2019    COPD (chronic obstructive pulmonary disease) ([facility] [address]     unknown     Coronary artery disease involving native coronary artery of native heart without angina pectoris 3/31/2016    Coronary artery disease involving native coronary artery of native heart without angina pectoris 3/31/2016    Depression     Full dentures     HTN     Hyperlipidemia     Iron deficiency anemia 3/31/2016    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/2020    Kidney stone 2012    Morbid obesity (HCC)     Restless legs syndrome     pt on cabidopa/levodopa    Stage 3 chronic kidney disease 4/3/2019    Wears glasses          SURGICAL HISTORY     Past Surgical History:   Procedure Laterality Date    ABSCESS DRAINAGE Left 04/19/2018    incision and drainage of right buttock abscess    APPENDECTOMY      CARPAL TUNNEL RELEASE Left 11/14/2017    CORONARY ARTERY BYPASS GRAFT  9/28/10    4 way bypass    HIP SURGERY Left 2016    I&D of joint    KNEE ARTHROSCOPY Right 2000    TOTAL HIP ARTHROPLASTY Left 11-4-2015    TOTAL KNEE ARTHROPLASTY Right 8-24-10    TOTAL KNEE ARTHROPLASTY Left 4/8/11    TUBAL LIGATION      UPPER GASTROINTESTINAL ENDOSCOPY N/A 7/10/2020    EGD DIAGNOSTIC ONLY performed by Steffi Hamman, MD at \[facility]\""       Previous Medications    BUPROPION (WELLBUTRIN XL) 150 MG EXTENDED RELEASE TABLET    TAKE 1 TABLET BY MOUTH EVERY DAY    CARBIDOPA-LEVODOPA (SINEMET)  MG PER TABLET    TAKE 2 TABLETS NIGHTLY    CLOPIDOGREL (PLAVIX) 75 MG TABLET    Take 1 tablet by mouth daily    DICYCLOMINE (BENTYL) 10 MG CAPSULE    Take 1 capsule by mouth 4 times daily (before meals and nightly) for 8 days    FAMOTIDINE (PEPCID) 20 MG TABLET    Take 20 mg by mouth 2 times daily    FERROUS SULFATE (IRON 325) 325 (65 FE) MG TABLET    Take 1 tablet by mouth 2 times daily    FLUOXETINE (PROZAC) 40 MG CAPSULE    TAKE 2 CAPSULES DAILY    HYDROCODONE-ACETAMINOPHEN (NORCO) 7.5-325 MG PER TABLET    Take 1 tablet by mouth every 6 hours as needed for Pain for up to 30 days.     LISINOPRIL (PRINIVIL;ZESTRIL) 10 MG TABLET    Take 1 tablet by mouth daily    LISINOPRIL (PRINIVIL;ZESTRIL) 5 MG TABLET    TAKE 1 TABLET BY MOUTH EVERY DAY    METOPROLOL SUCCINATE (TOPROL XL) 50 MG EXTENDED RELEASE TABLET    TAKE 1 TABLET BY MOUTH EVERY DAY    ROSUVASTATIN (CRESTOR) 40 MG TABLET    Take 1 tablet by mouth nightly    SODIUM BICARBONATE 650 MG TABLET    TAKE 1 TABLET BY MOUTH FOUR TIMES A DAY         ALLERGIES     Mobic [meloxicam], Morphine, and Other    FAMILYHISTORY       Family History   Problem Relation Age of Onset    Stroke Mother     Diabetes Mother     Heart Disease Mother     High Blood Pressure Mother     High Cholesterol Mother     Cancer Neg Hx           SOCIAL HISTORY       Social History     Tobacco Use    Smoking status: Former Smoker     Packs/day: 0.50     Years: 30.00     Pack years: 15.00     Types: Cigarettes     Quit date: 2016     Years since quittin.1    Smokeless tobacco: Never Used   Substance Use Topics    Alcohol use: No     Alcohol/week: 0.0 standard drinks    Drug use: No       SCREENINGS             PHYSICAL EXAM    (up to 7 for level 4, 8 or more for level 5)     ED Triage Vitals [21 1614]   BP Temp Temp Source Pulse Resp SpO2 Height Weight   (!) 142/67 98.6 °F (37 °C) Temporal 81 18 95 % 5' 7\" (1.702 m) 261 lb 3.9 refill takes less than 2 seconds. Neurological:      General: No focal deficit present. Mental Status: She is alert and oriented to person, place, and time. Mental status is at baseline.    Psychiatric:         Mood and Affect: Mood normal.         Behavior: Behavior normal.         DIAGNOSTIC RESULTS   LABS:    Labs Reviewed   CBC WITH AUTO DIFFERENTIAL - Abnormal; Notable for the following components:       Result Value    RBC 3.52 (*)     Hemoglobin 10.8 (*)     Hematocrit 33.8 (*)     Lymphocytes Absolute 0.9 (*)     All other components within normal limits    Narrative:     Performed at:  [address] [address]   Phone [phone]   BASIC METABOLIC PANEL W/ REFLEX TO MG FOR LOW K - Abnormal; Notable for the following components:    Sodium 135 (*)     Potassium reflex Magnesium 6.2 (*)     CO2 15 (*)     BUN 64 (*)     CREATININE 2.9 (*)     GFR Non- 16 (*)     GFR  20 (*)     All other components within normal limits    Narrative:     Herman Burroughs tel. [phone],  Chemistry results called to and read back by vasyl lang rn, 04/16/2021  18:19, by [facility]  Performed at:  [address] [address]   Phone [phone]   URINE RT REFLEX TO CULTURE - Abnormal; Notable for the following components:    Protein, UA TRACE (*)     All other components within normal limits    Narrative:     Performed at:  [address] [address]   Phone [phone]   BRAIN NATRIURETIC PEPTIDE - Abnormal; Notable for the following components:    Pro-BNP 1,208 (*)     All other components within normal limits    Narrative:     Herman Burroughs tel. [phone],  Chemistry results called to and read back by vasyl lang rn, 04/16/2021  18:19, by [facility]  Performed at:  [facility]  [address]  [place] [address]   Phone [phone]   RAPID INFLUENZA A/B ANTIGENS    Narrative:     Performed at:  [facility]  [address]  [place] [address]   Phone [phone], RAPID    Narrative:     Performed at:  [facility]  [address] [facility] [address]   Phone [phone]   LACTATE, SEPSIS    Narrative:     Performed at:  [facility]  [address] [place] [facility] [place] [address]   Phone [phone]   MICROSCOPIC URINALYSIS    Narrative:     Performed at:  [facility]  [address] [facility] [place] [address]   Phone [phone]   LACTATE, SEPSIS   POCT GLUCOSE       All other labs were within normal range or not returned as of this dictation. EKG: All EKG's are interpreted by the Emergency Department Physician in the absence of a cardiologist.  Please see their note for interpretation of EKG. RADIOLOGY:   Non-plain film images such as CT, Ultrasound and MRI are read by the radiologist. Plain radiographic images are visualized and preliminarily interpreted by the ED Provider with the below findings:        Interpretation per the Radiologist below, if available at the time of this note:    XR CHEST PORTABLE   Final Result   Stable mild cardiomegaly with minimal central pulmonary congestion or   pulmonary artery hypertension which is less prominent with no acute   infiltrate or effusion. Xr Chest Portable    Result Date: 4/16/2021  EXAMINATION: ONE XRAY VIEW OF THE CHEST 4/16/2021 5:26 pm COMPARISON: 07/09/2020 HISTORY: ORDERING SYSTEM PROVIDED HISTORY: cough TECHNOLOGIST PROVIDED HISTORY: Reason for exam:->cough Reason for Exam: Dizziness; Generalized Body Aches Acuity: Acute Type of Exam: Initial FINDINGS: The heart is mildly enlarged but unchanged.   There are postop changes along the mediastinum and sternum. The pulmonary vessels are less prominent centrally. No consolidation or effusion is seen. The bones are intact. Stable mild cardiomegaly with minimal central pulmonary congestion or pulmonary artery hypertension which is less prominent with no acute infiltrate or effusion. PROCEDURES   Unless otherwise noted below, none     Procedures    CRITICAL CARE TIME   N/A    CONSULTS:  IP CONSULT TO HOSPITALIST  IP CONSULT TO NEPHROLOGY      EMERGENCY DEPARTMENT COURSE and DIFFERENTIAL DIAGNOSIS/MDM:   Vitals:    Vitals:    04/16/21 1732 04/16/21 1746 04/16/21 1758 04/16/21 1802   BP: 115/61 112/60 133/72 135/67   Pulse: 72 74 78 75   Resp: 18 17 24 20   Temp:       TempSrc:       SpO2: 100%  100% 100%   Weight:       Height:           Patient was given the following medications:  Medications   calcium gluconate 1000 mg in sodium chloride 50 mL (has no administration in time range)   oxyCODONE-acetaminophen (PERCOCET) 5-325 MG per tablet 1 tablet (1 tablet Oral Given 4/16/21 1815)   ketorolac (TORADOL) injection 15 mg (15 mg Intravenous Given 4/16/21 1827)   sodium bicarbonate 8.4 % injection 50 mEq (50 mEq Intravenous Given 4/16/21 1932)           This patient presents as above and evaluation and treatment is begun right away. EKG and chest x-ray obtained. EKG interpreted by ED physician. Please see that note for details. Urinalysis obtained and returns as above. Chest x-ray without acute infiltrate. Please see radiologist note for details. CBC shows no acute leukocytosis or thrombocytopenia. BNP is elevated at 1200 however when compared with previous numbers for this patient that is not significantly elevated. BMP for the patient does come back with significant abnormalities, especially potassium quite elevated at 6.2, sodium down at 135, CO2 15 and acute on chronic renal disease with BUN 64, creatinine 2.9, and GFR around 16 for the patient.   Sodium bicarb and calcium gluconate ordered by Dr. Vani Jones. At this time and in particular with the hyperkalemia with acute on chronic kidney disease, consultation is placed to nephrology as well as to the hospitalist to bring the patient into the hospital for further care and treatment. Nephrology recommends adding regular insulin 10 mg IV with dextrose 50, Lokelma 10 g p.o., and repeating BMP in 2 hours. These orders were placed. She is in fair condition at this time for admission. FINAL IMPRESSION      1. Acute hyperkalemia    2. Acute kidney injury superimposed on chronic kidney disease [facility])          DISPOSITION/PLAN   DISPOSITION Decision To Admit 04/16/2021 07:00:19 PM      PATIENT REFERREDTO:  No follow-up provider specified.     DISCHARGE MEDICATIONS:  New Prescriptions    No medications on file       DISCONTINUED MEDICATIONS:  Discontinued Medications    No medications on file              (Please note that portions of this note were completed with a voice recognition program.  Efforts were made to edit the dictations but occasionally words are mis-transcribed.)    Esme Shahid PA-C (electronically signed)           Esme Shahid PA-C  04/16/21 1950

## 2021-04-16 NOTE — ED PROVIDER NOTES
MidLevel Attestation   I havepersonally performed and/or participated in the history, exam and medical decision making and agree with all pertinent clinical information. I have also reviewed and agree with the past medical, family and social historyunless otherwise noted. I have personally performed a face to face diagnostic evaluation onthis patient. I have reviewed the mid-levels findings and agree. In brief, Jose Fierro is a 63 y.o. female that presented to the emergency department with   Chief Complaint   Patient presents with    Dizziness     started a few days ago    Generalized Body Aches   . CONSTITUTIONAL: Well appearing, in no acute distress   EYES: PERRL, No injection, discharge or scleral icterus. NECK: Normal ROM, NO LAD and Moist mucous membranes. CARDIOVASCULAR: Regular rate and rhythm. No murmurs or gallop. PULMONARY/CHEST: Airway patent. No retractions. Breath sounds clear with good air entry bilaterally. ABDOMEN: Soft, Non-distended and non-tender, without guarding or rebound. SKIN: Acyanotic, warm, dry   MUSCULOSKELETAL: No swelling, tenderness or deformity   NEUROLOGICAL: Awake. Pulses intact. Grossly nonfocal     63 y.o. female who complains of not feeling well the last 2 to 3 days. Reportedly on Wednesday patient states that she started to feel worse with joints achy in her hands and arms as well as knees and all over. Patient was wheezing on exam but hemodynamic stable and afebrile. Labs were obtained and showed a potassium of 6.2 and elevated creatinine which is chronic. Given IV fluids, calcium gluconate and bicarb and repeat BMP showed significant drop in potassium to 4. X-ray with no acute findings. Evaluation of patient she is still not feeling well still complains of dizziness. At this point I am recommending the patient be admitted. I am concerned that her elevated potassium could be secondary to her kidney disease.   Despite the fact that it improved with management I believe this was just temporal.  She will need follow-up evaluation by nephrology. Nephrology was consulted and will follow. Hospitalist consulted patient admitted to the service for evaluation and treatment. EKG by my preliminary interpretation shows sinus rhythm with rate of 74, normal axis, normal intervals, with no ST changes indicative of ischemia at this time.     I have reviewed and interpreted all of the currently available lab results and diagnostics from this visit:  Results for orders placed or performed during the hospital encounter of 04/16/21   Rapid influenza A/B antigens    Specimen: Nasopharyngeal   Result Value Ref Range    Rapid Influenza A Ag Negative Negative    Rapid Influenza B Ag Negative Negative   COVID-19, Rapid    Specimen: Nasopharyngeal Swab   Result Value Ref Range    SARS-CoV-2, NAAT Not Detected Not Detected   CBC Auto Differential   Result Value Ref Range    WBC 6.6 4.0 - 11.0 K/uL    RBC 3.52 (L) 4.00 - 5.20 M/uL    Hemoglobin 10.8 (L) 12.0 - 16.0 g/dL    Hematocrit 33.8 (L) 36.0 - 48.0 %    MCV 96.1 80.0 - 100.0 fL    MCH 30.7 26.0 - 34.0 pg    MCHC 32.0 31.0 - 36.0 g/dL    RDW 14.5 12.4 - 15.4 %    Platelets 275 482 - 485 K/uL    MPV 7.1 5.0 - 10.5 fL    Neutrophils % 76.9 %    Lymphocytes % 13.2 %    Monocytes % 7.3 %    Eosinophils % 2.2 %    Basophils % 0.4 %    Neutrophils Absolute 5.1 1.7 - 7.7 K/uL    Lymphocytes Absolute 0.9 (L) 1.0 - 5.1 K/uL    Monocytes Absolute 0.5 0.0 - 1.3 K/uL    Eosinophils Absolute 0.1 0.0 - 0.6 K/uL    Basophils Absolute 0.0 0.0 - 0.2 K/uL   Basic Metabolic Panel w/ Reflex to MG   Result Value Ref Range    Sodium 135 (L) 136 - 145 mmol/L    Potassium reflex Magnesium 6.2 (HH) 3.5 - 5.1 mmol/L    Chloride 107 99 - 110 mmol/L    CO2 15 (L) 21 - 32 mmol/L    Anion Gap 13 3 - 16    Glucose 96 70 - 99 mg/dL    BUN 64 (H) 7 - 20 mg/dL    CREATININE 2.9 (H) 0.6 - 1.2 mg/dL    GFR Non- 16 (A) >60    GFR  20 (A) >60    Calcium 8.7 8.3 - 10.6 mg/dL   Lactate, Sepsis   Result Value Ref Range    Lactic Acid, Sepsis 0.4 0.4 - 1.9 mmol/L   Lactate, Sepsis   Result Value Ref Range    Lactic Acid, Sepsis 0.5 0.4 - 1.9 mmol/L   Urinalysis Reflex to Culture    Specimen: Urine, clean catch   Result Value Ref Range    Color, UA YELLOW Straw/Yellow    Clarity, UA Clear Clear    Glucose, Ur Negative Negative mg/dL    Bilirubin Urine Negative Negative    Ketones, Urine Negative Negative mg/dL    Specific Gravity, UA 1.016 1.005 - 1.030    Blood, Urine Negative Negative    pH, UA 5.5 5.0 - 8.0    Protein, UA TRACE (A) Negative mg/dL    Urobilinogen, Urine 0.2 <2.0 E.U./dL    Nitrite, Urine Negative Negative    Leukocyte Esterase, Urine Negative Negative    Microscopic Examination YES     Urine Type NotGiven     Urine Reflex to Culture Not Indicated    Brain Natriuretic Peptide   Result Value Ref Range    Pro-BNP 1,208 (H) 0 - 124 pg/mL   Microscopic Urinalysis   Result Value Ref Range    Hyaline Casts, UA 0 0 - 8 /LPF    WBC, UA 1 0 - 5 /HPF    RBC, UA 1 0 - 4 /HPF    Epithelial Cells, UA 2 0 - 5 /HPF   Basic Metabolic Panel   Result Value Ref Range    Sodium 137 136 - 145 mmol/L    Potassium 4.7 3.5 - 5.1 mmol/L    Chloride 107 99 - 110 mmol/L    CO2 17 (L) 21 - 32 mmol/L    Anion Gap 13 3 - 16    Glucose 74 70 - 99 mg/dL    BUN 64 (H) 7 - 20 mg/dL    CREATININE 2.8 (H) 0.6 - 1.2 mg/dL    GFR Non-African American 17 (A) >60    GFR  21 (A) >60    Calcium 8.8 8.3 - 10.6 mg/dL   Renal Function Panel   Result Value Ref Range    Sodium 136 136 - 145 mmol/L    Potassium 5.2 (H) 3.5 - 5.1 mmol/L    Chloride 109 99 - 110 mmol/L    CO2 15 (L) 21 - 32 mmol/L    Anion Gap 12 3 - 16    Glucose 83 70 - 99 mg/dL    BUN 66 (H) 7 - 20 mg/dL    CREATININE 2.5 (H) 0.6 - 1.2 mg/dL    GFR Non- 19 (A) >60    GFR  24 (A) >60    Calcium 8.3 8.3 - 10.6 mg/dL    Phosphorus 5.0 (H) 2.5 - 4.9 mg/dL    Albumin 3.6 3.4 - 5.0 g/dL   Magnesium   Result Value Ref Range    Magnesium 3.00 (H) 1.80 - 2.40 mg/dL   EKG 12 Lead   Result Value Ref Range    Ventricular Rate 74 BPM    Atrial Rate 74 BPM    P-R Interval 138 ms    QRS Duration 84 ms    Q-T Interval 388 ms    QTc Calculation (Bazett) 430 ms    P Axis 80 degrees    R Axis -3 degrees    T Axis 92 degrees    Diagnosis       Normal sinus rhythmabnormal R wave progressionNonspecific T wave abnormalityAbnormal ECGWhen compared with ECG of 09-JUL-2020 18:34,Nonspecific T wave abnormality, worse in Lateral leadsQuestionable change in initial forces of Anterior leadsConfirmed by [facility] ([id number]) on 4/17/2021 3:48:47 PM     No results found.     ED Medication Orders (From admission, onward)    Start Ordered     Status Ordering Provider    04/17/21 1600 04/17/21 1351  sodium zirconium cyclosilicate (LOKELMA) oral suspension 10 g  ONCE      Last MAR action: Given - by Abi Sanchez on 04/17/21 at [address] [facility]    04/17/21 0143 04/17/21 0143  ALPRAZolam (XANAX) 0.25 MG tablet     Note to Pharmacy: Curiel Savers: cabinet override    Last MAR action: Given - by Jonnie Cobb on 04/17/21 at [address] [facility]     04/16/21 2030 04/16/21 1945  sodium zirconium cyclosilicate (LOKELMA) oral suspension 10 g  ONCE      Last MAR action: Given - by Ramón Salt on 04/16/21 at [address] [facility]    04/16/21 2000 04/16/21 1946  insulin regular (HUMULIN R;NOVOLIN R) injection 10 Units  ONCE      Last MAR action: Given - by Ramón Salt on 04/16/21 at [address]    04/16/21 2000 04/16/21 1946  dextrose 50 % IV solution  ONCE      Last MAR action: Given - by Ramón Salt on 04/16/21 at [address]    04/16/21 1930 04/16/21 1927  calcium gluconate 1000 mg in sodium chloride 50 mL  ONCE      Last MAR action: Stopped - by Ramón Salt on 04/16/21 at [facility] [facility]    04/16/21 1915 04/16/21 1901  sodium bicarbonate 8.4 % injection 50 mEq ONCE      Last MAR action: Given - by Colton Ibrahim on 04/16/21 at [address]    04/16/21 1700 04/16/21 1654  oxyCODONE-acetaminophen (PERCOCET) 5-325 MG per tablet 1 tablet  ONCE      Last MAR action: Given - by Neris NORRIS on 04/16/21 at [address], [facility] C    04/16/21 1700 04/16/21 1654  ketorolac (TORADOL) injection 15 mg  ONCE      Last MAR action: Given - by Neris NORRIS on 04/16/21 at [address]          Final Impression      1. Acute hyperkalemia    2. Acute kidney injury superimposed on chronic kidney disease [facility])        DISPOSITION Admitted 04/16/2021 11:26:12 PM       This record is transcribed utilizing voice recognition technology. There are inherent limitations in this technology. In addition, there may be limitations in editing of this report. If there are any discrepancies, please contact me directly.     Cathi Patel MD   4/18/2021         Nsehniitooh Mago Abad MD  04/18/21 1720

## 2021-04-17 VITALS
HEIGHT: 67 IN | WEIGHT: 259.48 LBS | OXYGEN SATURATION: 95 % | BODY MASS INDEX: 40.73 KG/M2 | SYSTOLIC BLOOD PRESSURE: 99 MMHG | RESPIRATION RATE: 16 BRPM | HEART RATE: 70 BPM | DIASTOLIC BLOOD PRESSURE: 57 MMHG | TEMPERATURE: 98.3 F

## 2021-04-17 LAB
ALBUMIN SERPL-MCNC: 3.6 G/DL (ref 3.4–5)
ANION GAP SERPL CALCULATED.3IONS-SCNC: 12 MMOL/L (ref 3–16)
BUN BLDV-MCNC: 66 MG/DL (ref 7–20)
CALCIUM SERPL-MCNC: 8.3 MG/DL (ref 8.3–10.6)
CHLORIDE BLD-SCNC: 109 MMOL/L (ref 99–110)
CO2: 15 MMOL/L (ref 21–32)
CREAT SERPL-MCNC: 2.5 MG/DL (ref 0.6–1.2)
EKG ATRIAL RATE: 74 BPM
EKG DIAGNOSIS: NORMAL
EKG P AXIS: 80 DEGREES
EKG P-R INTERVAL: 138 MS
EKG Q-T INTERVAL: 388 MS
EKG QRS DURATION: 84 MS
EKG QTC CALCULATION (BAZETT): 430 MS
EKG R AXIS: -3 DEGREES
EKG T AXIS: 92 DEGREES
EKG VENTRICULAR RATE: 74 BPM
GFR AFRICAN AMERICAN: 24
GFR NON-AFRICAN AMERICAN: 19
GLUCOSE BLD-MCNC: 83 MG/DL (ref 70–99)
LACTIC ACID, SEPSIS: 0.5 MMOL/L (ref 0.4–1.9)
MAGNESIUM: 3 MG/DL (ref 1.8–2.4)
PHOSPHORUS: 5 MG/DL (ref 2.5–4.9)
POTASSIUM SERPL-SCNC: 5.2 MMOL/L (ref 3.5–5.1)
SODIUM BLD-SCNC: 136 MMOL/L (ref 136–145)

## 2021-04-17 PROCEDURE — 93010 ELECTROCARDIOGRAM REPORT: CPT | Performed by: INTERNAL MEDICINE

## 2021-04-17 PROCEDURE — 80069 RENAL FUNCTION PANEL: CPT

## 2021-04-17 PROCEDURE — 6370000000 HC RX 637 (ALT 250 FOR IP): Performed by: INTERNAL MEDICINE

## 2021-04-17 PROCEDURE — 6370000000 HC RX 637 (ALT 250 FOR IP)

## 2021-04-17 PROCEDURE — 36415 COLL VENOUS BLD VENIPUNCTURE: CPT

## 2021-04-17 PROCEDURE — 99223 1ST HOSP IP/OBS HIGH 75: CPT | Performed by: HOSPITALIST

## 2021-04-17 PROCEDURE — 83735 ASSAY OF MAGNESIUM: CPT

## 2021-04-17 PROCEDURE — 6370000000 HC RX 637 (ALT 250 FOR IP): Performed by: HOSPITALIST

## 2021-04-17 PROCEDURE — 94760 N-INVAS EAR/PLS OXIMETRY 1: CPT

## 2021-04-17 PROCEDURE — 83605 ASSAY OF LACTIC ACID: CPT

## 2021-04-17 PROCEDURE — 6360000002 HC RX W HCPCS: Performed by: INTERNAL MEDICINE

## 2021-04-17 PROCEDURE — 2580000003 HC RX 258: Performed by: INTERNAL MEDICINE

## 2021-04-17 RX ORDER — SODIUM CHLORIDE, SODIUM LACTATE, POTASSIUM CHLORIDE, CALCIUM CHLORIDE 600; 310; 30; 20 MG/100ML; MG/100ML; MG/100ML; MG/100ML
INJECTION, SOLUTION INTRAVENOUS CONTINUOUS
Status: DISCONTINUED | OUTPATIENT
Start: 2021-04-17 | End: 2021-04-17

## 2021-04-17 RX ORDER — ALPRAZOLAM 0.25 MG/1
0.25 TABLET ORAL ONCE
Status: DISCONTINUED | OUTPATIENT
Start: 2021-04-17 | End: 2021-04-17 | Stop reason: HOSPADM

## 2021-04-17 RX ORDER — ACETAMINOPHEN 325 MG/1
650 TABLET ORAL EVERY 6 HOURS PRN
Status: DISCONTINUED | OUTPATIENT
Start: 2021-04-17 | End: 2021-04-17 | Stop reason: HOSPADM

## 2021-04-17 RX ORDER — PROMETHAZINE HYDROCHLORIDE 25 MG/1
12.5 TABLET ORAL EVERY 6 HOURS PRN
Status: DISCONTINUED | OUTPATIENT
Start: 2021-04-17 | End: 2021-04-17 | Stop reason: HOSPADM

## 2021-04-17 RX ORDER — ALPRAZOLAM 0.25 MG/1
TABLET ORAL
Status: COMPLETED
Start: 2021-04-17 | End: 2021-04-17

## 2021-04-17 RX ORDER — METOPROLOL SUCCINATE 50 MG/1
50 TABLET, EXTENDED RELEASE ORAL DAILY
Status: DISCONTINUED | OUTPATIENT
Start: 2021-04-17 | End: 2021-04-17 | Stop reason: HOSPADM

## 2021-04-17 RX ORDER — SODIUM CHLORIDE 0.9 % (FLUSH) 0.9 %
5-40 SYRINGE (ML) INJECTION PRN
Status: DISCONTINUED | OUTPATIENT
Start: 2021-04-17 | End: 2021-04-17 | Stop reason: HOSPADM

## 2021-04-17 RX ORDER — ROSUVASTATIN CALCIUM 40 MG/1
40 TABLET, COATED ORAL NIGHTLY
Status: DISCONTINUED | OUTPATIENT
Start: 2021-04-17 | End: 2021-04-17 | Stop reason: HOSPADM

## 2021-04-17 RX ORDER — ACETAMINOPHEN 650 MG/1
650 SUPPOSITORY RECTAL EVERY 6 HOURS PRN
Status: DISCONTINUED | OUTPATIENT
Start: 2021-04-17 | End: 2021-04-17 | Stop reason: HOSPADM

## 2021-04-17 RX ORDER — SODIUM BICARBONATE 650 MG/1
1300 TABLET ORAL 2 TIMES DAILY
Status: DISCONTINUED | OUTPATIENT
Start: 2021-04-17 | End: 2021-04-17 | Stop reason: HOSPADM

## 2021-04-17 RX ORDER — HYDROCODONE BITARTRATE AND ACETAMINOPHEN 7.5; 325 MG/1; MG/1
1 TABLET ORAL EVERY 6 HOURS PRN
Status: DISCONTINUED | OUTPATIENT
Start: 2021-04-17 | End: 2021-04-17 | Stop reason: HOSPADM

## 2021-04-17 RX ORDER — SODIUM CHLORIDE 9 MG/ML
25 INJECTION, SOLUTION INTRAVENOUS PRN
Status: DISCONTINUED | OUTPATIENT
Start: 2021-04-17 | End: 2021-04-17 | Stop reason: HOSPADM

## 2021-04-17 RX ORDER — SODIUM BICARBONATE 650 MG/1
650 TABLET ORAL 2 TIMES DAILY
Status: DISCONTINUED | OUTPATIENT
Start: 2021-04-17 | End: 2021-04-17

## 2021-04-17 RX ORDER — SODIUM CHLORIDE 0.9 % (FLUSH) 0.9 %
5-40 SYRINGE (ML) INJECTION EVERY 12 HOURS SCHEDULED
Status: DISCONTINUED | OUTPATIENT
Start: 2021-04-17 | End: 2021-04-17 | Stop reason: HOSPADM

## 2021-04-17 RX ORDER — BUPROPION HYDROCHLORIDE 150 MG/1
150 TABLET ORAL DAILY
Status: DISCONTINUED | OUTPATIENT
Start: 2021-04-17 | End: 2021-04-17 | Stop reason: HOSPADM

## 2021-04-17 RX ORDER — FLUOXETINE HYDROCHLORIDE 20 MG/1
40 CAPSULE ORAL DAILY
Status: DISCONTINUED | OUTPATIENT
Start: 2021-04-17 | End: 2021-04-17 | Stop reason: HOSPADM

## 2021-04-17 RX ORDER — CLOPIDOGREL BISULFATE 75 MG/1
75 TABLET ORAL DAILY
Status: DISCONTINUED | OUTPATIENT
Start: 2021-04-17 | End: 2021-04-17 | Stop reason: HOSPADM

## 2021-04-17 RX ORDER — ONDANSETRON 2 MG/ML
4 INJECTION INTRAMUSCULAR; INTRAVENOUS EVERY 6 HOURS PRN
Status: DISCONTINUED | OUTPATIENT
Start: 2021-04-17 | End: 2021-04-17 | Stop reason: HOSPADM

## 2021-04-17 RX ADMIN — SODIUM CHLORIDE, POTASSIUM CHLORIDE, SODIUM LACTATE AND CALCIUM CHLORIDE: 600; 310; 30; 20 INJECTION, SOLUTION INTRAVENOUS at 06:03

## 2021-04-17 RX ADMIN — FLUOXETINE 40 MG: 20 CAPSULE ORAL at 08:27

## 2021-04-17 RX ADMIN — ENOXAPARIN SODIUM 30 MG: 30 INJECTION SUBCUTANEOUS at 08:27

## 2021-04-17 RX ADMIN — SODIUM BICARBONATE 650 MG: 650 TABLET ORAL at 08:27

## 2021-04-17 RX ADMIN — BUPROPION HYDROCHLORIDE 150 MG: 150 TABLET, EXTENDED RELEASE ORAL at 08:27

## 2021-04-17 RX ADMIN — METOPROLOL SUCCINATE 50 MG: 50 TABLET, EXTENDED RELEASE ORAL at 08:27

## 2021-04-17 RX ADMIN — CARBIDOPA AND LEVODOPA 2 TABLET: 10; 100 TABLET ORAL at 01:48

## 2021-04-17 RX ADMIN — HYDROCODONE BITARTRATE AND ACETAMINOPHEN 1 TABLET: 7.5; 325 TABLET ORAL at 08:27

## 2021-04-17 RX ADMIN — CLOPIDOGREL BISULFATE 75 MG: 75 TABLET ORAL at 08:27

## 2021-04-17 RX ADMIN — HYDROCODONE BITARTRATE AND ACETAMINOPHEN 1 TABLET: 7.5; 325 TABLET ORAL at 01:48

## 2021-04-17 RX ADMIN — SODIUM ZIRCONIUM CYCLOSILICATE 10 G: 10 POWDER, FOR SUSPENSION ORAL at 16:07

## 2021-04-17 RX ADMIN — ALPRAZOLAM: 0.25 TABLET ORAL at 01:48

## 2021-04-17 RX ADMIN — Medication 10 ML: at 08:28

## 2021-04-17 ASSESSMENT — PAIN SCALES - GENERAL
PAINLEVEL_OUTOF10: 7

## 2021-04-17 ASSESSMENT — PAIN - FUNCTIONAL ASSESSMENT
PAIN_FUNCTIONAL_ASSESSMENT: PREVENTS OR INTERFERES SOME ACTIVE ACTIVITIES AND ADLS
PAIN_FUNCTIONAL_ASSESSMENT: PREVENTS OR INTERFERES SOME ACTIVE ACTIVITIES AND ADLS

## 2021-04-17 ASSESSMENT — PAIN DESCRIPTION - PAIN TYPE
TYPE: CHRONIC PAIN
TYPE: CHRONIC PAIN

## 2021-04-17 ASSESSMENT — PAIN DESCRIPTION - DESCRIPTORS
DESCRIPTORS: ACHING;CONSTANT
DESCRIPTORS: ACHING

## 2021-04-17 ASSESSMENT — PAIN DESCRIPTION - PROGRESSION: CLINICAL_PROGRESSION: NOT CHANGED

## 2021-04-17 ASSESSMENT — PAIN DESCRIPTION - ONSET: ONSET: ON-GOING

## 2021-04-17 ASSESSMENT — PAIN DESCRIPTION - FREQUENCY: FREQUENCY: CONTINUOUS

## 2021-04-17 ASSESSMENT — PAIN DESCRIPTION - LOCATION: LOCATION: GENERALIZED

## 2021-04-17 NOTE — H&P
[address] [address]                              HISTORY AND PHYSICAL    PATIENT NAME: Chikis Heath                      :        1955  MED REC NO:   [id number]                          ROOM:       3101  ACCOUNT NO:   [de-identified]                           ADMIT DATE: 2021  PROVIDER:     Samara Caldera MD    I obtained the history and performed the physical exam on the patient on  the Medical floor on 2021. CHIEF COMPLAINT:  Weakness. HISTORY OF PRESENT ILLNESS:  The patient is a 61-year-old   female presenting to the hospital with chief complaint of three days of  subacute onset of gradually progressive and increasing weakness, feeling  of fatigue, body aches, and cramps in the bilateral upper and lower  extremities, mainly her legs, without any fevers, chills, nausea, or  vomiting, but just feeling overall fatigued. At the time of my exam,  the patient notes after hydration in the ER, she is feeling  significantly better. PAST MEDICAL/PAST SURGICAL HISTORY:  1. Chronic kidney disease. 2.  Hypertension. 3.  Dyslipidemia. 4.  Coronary artery disease, status post coronary artery bypass  grafting. 5.  COPD. 6.  Morbid obesity with a BMI of 40.92 kg/m2. PAST SURGICAL HISTORY:  Coronary artery bypass grafting. ALLERGIES:  MELOXICAM, MORPHINE. FAMILY HISTORY:  Reviewed by me and is currently noncontributory. SOCIAL HISTORY:  Lives at home. No illicit substance use. Quit smoking  in 2016. MEDICATIONS:  The patient's home medication list has been reviewed. The  patient is noted to be on lisinopril, has nephrotoxic medication  potentially. REVIEW OF SYSTEMS:  Significant for the weakness and the cramping and  per the history of present illness. All other systems have been  reviewed and are negative except for the history of present illness.     PHYSICAL 2:45:21       T: 04/17/2021 3:58:56     [email or web address]  Job#: [id number]     Doc#: [id number]    CC:

## 2021-04-17 NOTE — ED NOTES
ED SBAR report provider to RN. Patient to be transported to Room 3101 via stretcher by transport tech. Patient transported with bedside cardiac monitor and with IV medications infusing. IV site clean, dry, and intact. MEWS score and pain assessed as 8 and documented. Updated patient and family on plan of care.      Patti Jamison RN  04/16/21 0691

## 2021-04-17 NOTE — PLAN OF CARE
Problem: Falls - Risk of:  Goal: Will remain free from falls  Description: Will remain free from falls  Outcome: Ongoing  Note: Patient educated on fall prevention. Call light is within reach, bed locked in lowest position, personal items within reach, and bed alarm is on. Will round on patient per unit guidelines. Goal: Absence of physical injury  Description: Absence of physical injury  Outcome: Ongoing  Note: Pt assessed for fall risk and fall precautions put into place. Bed in lowest position and wheels locked, call light within reach. Nonskid footwear in place. Patient educated on appropriate method of transfer and to call for assistance. Problem: Pain:  Goal: Pain level will decrease  Description: Pain level will decrease  Outcome: Ongoing  Note: Educated patient on pain management. Will assess patients pain level per unit protocol, and provide pain management measures as needed. Goal: Control of acute pain  Description: Control of acute pain  Outcome: Ongoing  Note: Patient educated on acute pain. Taught patient to use call light to ask for pain medication. PRN pain medication given for acute pain. Will continue to monitor pain per unit protocol. Goal: Control of chronic pain  Description: Control of chronic pain  Outcome: Ongoing  Note: Patient educated on chronic pain. Taught patient to use call light to ask for pain medication. Will continue to monitor pain per unit protocol.

## 2021-04-17 NOTE — DISCHARGE SUMMARY
Hospital Medicine Discharge Summary      Patient ID: Marylu Casillas , [id number]     Patient's PCP: Eliezer Rodriges MD    Admit Date: 4/16/2021     Discharge Date:       Admitting Physician: Marito Mckeon MD    Discharge Physician: Maxime Foss MD     Discharge Diagnoses: Active Hospital Problems    Diagnosis Date Noted    Acute kidney injury superimposed on CKD ([address] [N17.9, N18.9] 04/16/2021         The patient was seen and examined on the day of discharge and this discharge summary is in conjunction with any daily progress note from day of discharge.     HOSPITAL COURSE    Patient demographics:  The patient  Marylu Casillas is a 63 y.o. female     Significant past medical history:   Patient Active Problem List   Diagnosis    Primary localized osteoarthrosis, lower leg    Obesity    HTN (hypertension)    Hyperlipidemia    Abscess of skin of abdomen    Arthritis    Intertrochanteric fracture of left femur ([address]    Osteopenia    Anemia    Failed total hip arthroplasty ([address]    History of total left hip arthroplasty 11/4/15    Coronary artery disease involving native coronary artery of native heart without angina pectoris    Iron deficiency anemia    Status post coronary artery bypass graft    Superficial postoperative wound infection    Arthritis of knee, right    Tobacco use disorder    Carpal tunnel syndrome of left wrist    Elevated serum creatinine    Cellulitis and abscess of buttock    Sepsis ([address]    Acute-on-chronic kidney injury ([address]    Metabolic acidosis, normal anion gap (NAG)    Morbid obesity (HCC)    Stage 3 chronic kidney disease    Chronic midline low back pain without sciatica    SOB (shortness of breath)    COPD with exacerbation (HCC)    COPD exacerbation (HCC)    Unstable angina (HCC)    Restless legs syndrome    Acute kidney injury ([address]    Iron deficiency anemia secondary to blood loss (chronic)    Acute kidney injury superimposed on CKD ([facility]         Presenting symptoms:  Gradually worsening generalized weakness    Diagnostic workup:      CONSULTS DURING ADMISSION :   IP CONSULT TO HOSPITALIST  IP CONSULT TO NEPHROLOGY  IP CONSULT TO NEPHROLOGY      Patient was diagnosed with:    Acute on chronic renal failure    Treatment while inpatient:  63years old female with medical history significant for chronic kidney disease hypertension hyperlipidemia coronary artery disease COPD and patient is status post coronary artery bypass grafting. Patient presented to the emergency room with generalized aches and pains and weakness  Patient also reported muscle cramps. Patient was started on IV fluids. Nephrology was consulted and they held patient's lisinopril  Patient is started on sodium bicarb and patient was given Jesus Redland      Discharge Condition:  stable     Discharged to:  Home     Activity:   as tolerated: Follow Up:  Patient is advised to follow-up with her primary nephrology next week                        Labs:  For convenience and continuity at follow-up the following most recent labs are provided:      CBC:   Lab Results   Component Value Date    WBC 6.6 04/16/2021    HGB 10.8 04/16/2021    HCT 33.8 04/16/2021     04/16/2021       RENAL:   Lab Results   Component Value Date     04/17/2021    K 5.2 04/17/2021    K 6.2 04/16/2021     04/17/2021    CO2 15 04/17/2021    BUN 66 04/17/2021    CREATININE 2.5 04/17/2021           Discharge Medications:    Lizbeth Durand   Home Medication Instructions [id number]    Printed on:04/17/21 6819   Medication Information                      buPROPion (WELLBUTRIN XL) 150 MG extended release tablet  TAKE 1 TABLET BY MOUTH EVERY DAY             carbidopa-levodopa (SINEMET)  MG per tablet  TAKE 2 TABLETS NIGHTLY             clopidogrel (PLAVIX) 75 MG tablet  Take 1 tablet by mouth daily             dicyclomine (BENTYL) 10 MG capsule  Take 1 capsule by mouth 4 times daily (before meals and nightly) for 8 days             FLUoxetine (PROZAC) 40 MG capsule  TAKE 2 CAPSULES DAILY             HYDROcodone-acetaminophen (NORCO) 7.5-325 MG per tablet  Take 1 tablet by mouth every 6 hours as needed for Pain for up to 30 days. lisinopril (PRINIVIL;ZESTRIL) 10 MG tablet  Take 1 tablet by mouth daily             metoprolol succinate (TOPROL XL) 50 MG extended release tablet  TAKE 1 TABLET BY MOUTH EVERY DAY             rosuvastatin (CRESTOR) 40 MG tablet  Take 1 tablet by mouth nightly             sodium bicarbonate 650 MG tablet  TAKE 1 TABLET BY MOUTH FOUR TIMES A DAY                    Time Spent on discharge is more than 30 min in the examination, evaluation, counseling and review of medications and discharge plan. Signed:  Perry Ruiz MD   4/17/2021      Thank you Naya Milligan MD for the opportunity to be involved in this patient's care. If you have any questions or concerns please feel free to contact me at [phone]. This note was transcribed using [id number] [employer]. Please disregard any translational errors.

## 2021-04-17 NOTE — CONSULTS
Office: [phone]       Fax: [phone]      Nephrology Initial Consult Note        Patient's Name: Farida Li Date: 4/16/2021  Date of Visit: 4/17/2021    Reason for Consult:   STAN  Requesting Physician:  Leisa Hammer MD  PCP: Carol Tapia MD    Chief Complaint:  Weakness      History of Present Illness:      Susana Horner is a 63 y.o. female with PMHx of hypertension, coronary artery disease, CKD, COPD who was hospitalized on 4/16/2021 with complaints of weakness  No diarrhea  No nausea or vomiting   K high, Given IVF, lokelma  Received Toradol 4/16  No shortness of breath   NSAID use: Denies   IV contrast: None recent   Home meds reviewed     Past Medical History:   Diagnosis Date    Arthritis     ASHD (arteriosclerotic heart disease)     Chronic kidney disease     Chronic midline low back pain without sciatica 4/3/2019    COPD (chronic obstructive pulmonary disease) ([address]     unknown     Coronary artery disease involving native coronary artery of native heart without angina pectoris 3/31/2016    Coronary artery disease involving native coronary artery of native heart without angina pectoris 3/31/2016    Depression     Full dentures     HTN     Hyperlipidemia     Iron deficiency anemia 3/31/2016    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/2020    Kidney stone 2012    Morbid obesity ([address]     Restless legs syndrome     pt on cabidopa/levodopa    Stage 3 chronic kidney disease 4/3/2019    Wears glasses        Past Surgical History:   Procedure Laterality Date    ABSCESS DRAINAGE Left 04/19/2018    incision and drainage of right buttock abscess    APPENDECTOMY      CARPAL TUNNEL RELEASE Left 11/14/2017    CORONARY ARTERY BYPASS GRAFT  9/28/10    4 way bypass    HIP SURGERY Left 2016    I&D of joint    KNEE ARTHROSCOPY Right 2000    TOTAL HIP ARTHROPLASTY Left 11-4-2015    TOTAL KNEE ARTHROPLASTY Right 8-24-10    TOTAL KNEE ARTHROPLASTY Left 4/8/11    TUBAL LIGATION      UPPER GASTROINTESTINAL ENDOSCOPY N/A 7/10/2020    EGD DIAGNOSTIC ONLY performed by Caleb Solano MD at [address] History   Problem Relation Age of Onset    Stroke Mother     Diabetes Mother     Heart Disease Mother     High Blood Pressure Mother     High Cholesterol Mother     Cancer Neg Hx         reports that she quit smoking about 5 years ago. Her smoking use included cigarettes. She has a 15.00 pack-year smoking history. She has never used smokeless tobacco. She reports that she does not drink alcohol or use drugs. Medications:    Allergies:  Mobic [meloxicam], Morphine, and Other    Scheduled Meds:   buPROPion  150 mg Oral Daily    carbidopa-levodopa  2 tablet Oral Nightly    clopidogrel  75 mg Oral Daily    FLUoxetine  40 mg Oral Daily    metoprolol succinate  50 mg Oral Daily    rosuvastatin  40 mg Oral Nightly    sodium bicarbonate  650 mg Oral BID    sodium chloride flush  5-40 mL Intravenous 2 times per day    enoxaparin  30 mg Subcutaneous Daily    ALPRAZolam  0.25 mg Oral Once     Continuous Infusions:   sodium chloride      lactated ringers 100 mL/hr at 04/17/21 0603       Labs:  CBC:   Recent Labs     04/16/21  1726   WBC 6.6   HGB 10.8*        Ca/Mg/Phos:   Recent Labs     04/16/21  1726 04/16/21  2155 04/17/21  0431   CALCIUM 8.7 8.8 8.3   MG  --   --  3.00*   PHOS  --   --  5.0*     UA:  Recent Labs     04/16/21  1758   COLORU YELLOW   CLARITYU Clear   GLUCOSEU Negative   BILIRUBINUR Negative   KETUA Negative   SPECGRAV 1.016   BLOODU Negative   PHUR 5.5   PROTEINU TRACE*   UROBILINOGEN 0.2   NITRU Negative   LEUKOCYTESUR Negative   LABMICR YES   URINETYPE NotGiven      Urine Microscopic:   Recent Labs     04/16/21  1758   HYALCAST 0   WBCUA 1   RBCUA 1   EPIU 2 Urine Chemistry: No results for input(s): Alexey Pritchett, SONIA ALVA in the last 72 hours. ROS:     All systems reviewed and negative except as in HPI    Objective:     Vitals: /69   Pulse 72   Temp 98 °F (36.7 °C)   Resp 16   Ht 5' 7\" (1.702 m)   Wt 259 lb 7.7 oz (117.7 kg)   LMP 01/01/2004   SpO2 97%   BMI 40.64 kg/m²    Wt Readings from Last 3 Encounters:   04/17/21 259 lb 7.7 oz (117.7 kg)   07/23/20 269 lb 12.8 oz (122.4 kg)   07/17/20 265 lb 6.4 oz (120.4 kg)      24HR INTAKE/OUTPUT:      Intake/Output Summary (Last 24 hours) at 4/17/2021 0919  Last data filed at 4/17/2021 0045  Gross per 24 hour   Intake 240 ml   Output --   Net 240 ml     Constitutional:  awake, NAD  HEENT:  MMM, No icterus  Neck: no bruits, No JVD  Cardiovascular:  reg rhythm  Respiratory: CTA, no crackles  Abdomen:  +BS, soft, NT, ND  Ext: no lower extremity edema  Psychiatric: mood and affect appropriate  Skin: dry/intact  CNS: alert, no agitation    IMAGING:  XR CHEST PORTABLE   Final Result   Stable mild cardiomegaly with minimal central pulmonary congestion or   pulmonary artery hypertension which is less prominent with no acute   infiltrate or effusion. Assessment :     1. STAN on CKD 3 b  -Non-Oliguric  -Baseline creat: 1.6-1.9 Now 2.9-->2.5  -right renal atophy  -Volume: Euvolemic  -Electrolytes: Hyperkalemia  -Acid-Base: NAGMA    Recent Labs     04/16/21  1726 04/16/21 2155 04/17/21  0431   BUN 64* 64* 66*   CREATININE 2.9* 2.8* 2.5*     Recent Labs     04/16/21  1726 04/16/21  2155 04/17/21  0431   * 137 136   K 6.2* 4.7 5.2*   CO2 15* 17* 15*   MG  --   --  3.00*         2. HTN  -Blood pressure at goal     BP Readings from Last 1 Encounters:   04/17/21 120/69       3. Anemia    4. CAD  - status post coronary artery bypass grafting   - TTE (2019): LVEF  55%. ,     Plan:     - Lokelma  - sod bicarb  - dc LR  - Hold Lisinopril  - no NSAID   - Monitor BMP    -Monitor I/O, UOP  -Maintain

## 2021-04-17 NOTE — CONSULTS
Consult received  Full note to follow    Caleb Age  4/17/2021    [facility] [address]  Office : [phone]  Fax :[phone]

## 2021-04-17 NOTE — PLAN OF CARE
Problem: Falls - Risk of:  Goal: Will remain free from falls  Description: Will remain free from falls  4/17/2021 1016 by Milli Juarez RN  Outcome: Ongoing  Note: Fall risk assessment completed. Fall precautions in place. Call light within reach. Pt educated on calling for assistance. Walkway free of clutter. Will continue to monitor. 4/17/2021 0431 by Pasquale Guerra RN  Outcome: Ongoing  Note: Patient educated on fall prevention. Call light is within reach, bed locked in lowest position, personal items within reach, and bed alarm is on. Will round on patient per unit guidelines. Goal: Absence of physical injury  Description: Absence of physical injury  4/17/2021 1016 by Milli Juarez RN  Outcome: Ongoing  Note: Pt is free of injury. No injury noted. Fall precautions in place. Call light within reach. Will monitor. 4/17/2021 0431 by Pasquale Guerra RN  Outcome: Ongoing  Note: Pt assessed for fall risk and fall precautions put into place. Bed in lowest position and wheels locked, call light within reach. Nonskid footwear in place. Patient educated on appropriate method of transfer and to call for assistance. Problem: Pain:  Goal: Pain level will decrease  Description: Pain level will decrease  4/17/2021 1016 by Milli Juarez RN  Outcome: Ongoing  Note: Pt assessed for pain. Pt in pain and assessed with 0-10 pain rating scale. Pt given prescribed analgesic for pain. (See eMar) Pt satisfied with pain relief thus far. Will reassess and continue to monitor. 4/17/2021 0431 by Pasquale Guerra RN  Outcome: Ongoing  Note: Educated patient on pain management. Will assess patients pain level per unit protocol, and provide pain management measures as needed. Goal: Control of acute pain  Description: Control of acute pain  4/17/2021 1016 by Milli Juarez RN  Outcome: Ongoing  Note: Pt assessed for pain. Pt in pain and assessed with 0-10 pain rating scale.  Pt given prescribed analgesic for pain. (See eMar) Pt satisfied with pain relief thus far. Will reassess and continue to monitor. 4/17/2021 0431 by Kaci Small RN  Outcome: Ongoing  Note: Patient educated on acute pain. Taught patient to use call light to ask for pain medication. PRN pain medication given for acute pain. Will continue to monitor pain per unit protocol. Goal: Control of chronic pain  Description: Control of chronic pain  4/17/2021 1016 by Herbert Severin, RN  Outcome: Ongoing  Note: Pt assessed for pain. Pt in pain and assessed with 0-10 pain rating scale. Pt given prescribed analgesic for pain. (See eMar) Pt satisfied with pain relief thus far. Will reassess and continue to monitor. 4/17/2021 0431 by Kaci Small RN  Outcome: Ongoing  Note: Patient educated on chronic pain. Taught patient to use call light to ask for pain medication. Will continue to monitor pain per unit protocol.

## 2021-04-17 NOTE — PROGRESS NOTES
Consult received  D/ w ER provider   Give insulin and d 50  Calcium gluconate  Give lokelma  Recheck bmp    Full consult to follow.

## 2021-04-17 NOTE — PROGRESS NOTES
Patient arrived to room 3101 from [facility] ED. Patient is A/Ox4. Oriented patient to room and call light. Fall assessment completed, patient denies any fall history. Instructed patient to call for help out of bed, patient verbalized understanding. Patient denies any further needs at this time, will continue to monitor and assess for needs and comfort.

## 2021-04-21 ENCOUNTER — IMMUNIZATION (OUTPATIENT)
Dept: FAMILY MEDICINE CLINIC | Age: 66
End: 2021-04-21
Payer: COMMERCIAL

## 2021-04-21 PROCEDURE — 0002A COVID-19, PFIZER VACCINE 30MCG/0.3ML DOSE: CPT | Performed by: FAMILY MEDICINE

## 2021-04-21 PROCEDURE — 91300 COVID-19, PFIZER VACCINE 30MCG/0.3ML DOSE: CPT | Performed by: FAMILY MEDICINE

## 2021-04-30 NOTE — TELEPHONE ENCOUNTER
Medication:   Requested Prescriptions     Pending Prescriptions Disp Refills    carbidopa-levodopa (SINEMET)  MG per tablet [Pharmacy Med Name: CARB/LEVO TAB 10-100MG] 60 tablet 5     Sig: TAKE 2 TABLETS NIGHTLY        Last Filled:  05/27/2020 #180 1rf    Patient Phone Number: [phone] (home)     Last appt: 03/22/2021 VV   Next appt: Visit date not found    Last OARRS:   RX Monitoring 3/15/2021   Attestation -   Acute Pain Prescriptions -   Periodic Controlled Substance Monitoring No signs of potential drug abuse or diversion identified.    Chronic Pain > 80 MEDD -

## 2021-05-07 DIAGNOSIS — Z76.0 ENCOUNTER FOR MEDICATION REFILL: ICD-10-CM

## 2021-05-07 DIAGNOSIS — M15.9 PRIMARY OSTEOARTHRITIS INVOLVING MULTIPLE JOINTS: ICD-10-CM

## 2021-05-07 RX ORDER — HYDROCODONE BITARTRATE AND ACETAMINOPHEN 7.5; 325 MG/1; MG/1
1 TABLET ORAL EVERY 6 HOURS PRN
Qty: 120 TABLET | Refills: 0 | Status: SHIPPED | OUTPATIENT
Start: 2021-05-07 | End: 2021-06-10 | Stop reason: SDUPTHER

## 2021-05-07 NOTE — TELEPHONE ENCOUNTER
Medication and Quantity requested: carbidopa-levodopa (SINEMET)  MG per tablet        Last Visit  3/22/21    Pharmacy and phone number [facility]:  Yes  [facility]

## 2021-05-07 NOTE — TELEPHONE ENCOUNTER
Medication:   Requested Prescriptions     Pending Prescriptions Disp Refills    HYDROcodone-acetaminophen (NORCO) 7.5-325 MG per tablet 120 tablet 0     Sig: Take 1 tablet by mouth every 6 hours as needed for Pain for up to 30 days. Last Filled:  #120 x 0 RF 4/13/21    Patient Phone Number: [phone] (home)     Last appt: 3/22/2021   Next appt: Visit date not found    Last OARRS:   RX Monitoring 3/15/2021   Attestation -   Acute Pain Prescriptions -   Periodic Controlled Substance Monitoring No signs of potential drug abuse or diversion identified.    Chronic Pain > 80 MEDD -

## 2021-05-10 ENCOUNTER — TELEPHONE (OUTPATIENT)
Dept: FAMILY MEDICINE CLINIC | Age: 66
End: 2021-05-10

## 2021-05-10 NOTE — TELEPHONE ENCOUNTER
Cherie from [employer] wants to know if she can talk to a nurse regarding pt follow up from 4/16/21 wants to know info regarding labs and if pt is complient with appointments.

## 2021-06-10 DIAGNOSIS — Z76.0 ENCOUNTER FOR MEDICATION REFILL: ICD-10-CM

## 2021-06-10 DIAGNOSIS — M15.9 PRIMARY OSTEOARTHRITIS INVOLVING MULTIPLE JOINTS: ICD-10-CM

## 2021-06-10 RX ORDER — HYDROCODONE BITARTRATE AND ACETAMINOPHEN 7.5; 325 MG/1; MG/1
1 TABLET ORAL EVERY 6 HOURS PRN
Qty: 120 TABLET | Refills: 0 | Status: SHIPPED | OUTPATIENT
Start: 2021-06-10 | End: 2021-07-08 | Stop reason: SDUPTHER

## 2021-06-10 NOTE — TELEPHONE ENCOUNTER
Medication:   Requested Prescriptions      No prescriptions requested or ordered in this encounter        Last Filled:  05/07/2021    Patient Phone Number: [phone] (home)     Last appt: 3/22/2021   Next appt: Visit date not found    Last OARRS:   RX Monitoring 3/15/2021   Attestation -   Acute Pain Prescriptions -   Periodic Controlled Substance Monitoring No signs of potential drug abuse or diversion identified.    Chronic Pain > 80 MEDD -

## 2021-06-10 NOTE — TELEPHONE ENCOUNTER
Medication and Quantity requested:  HYDROcodone-acetaminophen (NORCO) 7.5-325 MG per tablet - qty 120        Last Visit  03/22/21 - Dr Shanda Aponte and phone number updated in EPIC:  Yes    [facility]

## 2021-06-29 ENCOUNTER — HOSPITAL ENCOUNTER (INPATIENT)
Age: 66
LOS: 1 days | Discharge: HOME | DRG: 313 | End: 2021-06-30
Attending: EMERGENCY MEDICINE | Admitting: HOSPITALIST
Payer: COMMERCIAL

## 2021-06-29 ENCOUNTER — NURSE TRIAGE (OUTPATIENT)
Dept: OTHER | Facility: CLINIC | Age: 66
End: 2021-06-29

## 2021-06-29 ENCOUNTER — APPOINTMENT (OUTPATIENT)
Dept: GENERAL RADIOLOGY | Age: 66
DRG: 313 | End: 2021-06-29
Payer: COMMERCIAL

## 2021-06-29 DIAGNOSIS — R07.9 CHEST PAIN, UNSPECIFIED TYPE: Primary | ICD-10-CM

## 2021-06-29 LAB
A/G RATIO: 1.3 (ref 1.1–2.2)
ALBUMIN SERPL-MCNC: 4.3 G/DL (ref 3.4–5)
ALP BLD-CCNC: 127 U/L (ref 40–129)
ALT SERPL-CCNC: 22 U/L (ref 10–40)
ANION GAP SERPL CALCULATED.3IONS-SCNC: 13 MMOL/L (ref 3–16)
AST SERPL-CCNC: 24 U/L (ref 15–37)
BASOPHILS ABSOLUTE: 0.1 K/UL (ref 0–0.2)
BASOPHILS RELATIVE PERCENT: 1 %
BILIRUB SERPL-MCNC: <0.2 MG/DL (ref 0–1)
BUN BLDV-MCNC: 40 MG/DL (ref 7–20)
CALCIUM SERPL-MCNC: 9.3 MG/DL (ref 8.3–10.6)
CHLORIDE BLD-SCNC: 108 MMOL/L (ref 99–110)
CO2: 15 MMOL/L (ref 21–32)
CREAT SERPL-MCNC: 2 MG/DL (ref 0.6–1.2)
EKG ATRIAL RATE: 90 BPM
EKG DIAGNOSIS: NORMAL
EKG P AXIS: 78 DEGREES
EKG P-R INTERVAL: 136 MS
EKG Q-T INTERVAL: 356 MS
EKG QRS DURATION: 80 MS
EKG QTC CALCULATION (BAZETT): 435 MS
EKG R AXIS: -30 DEGREES
EKG T AXIS: 86 DEGREES
EKG VENTRICULAR RATE: 90 BPM
EOSINOPHILS ABSOLUTE: 0.3 K/UL (ref 0–0.6)
EOSINOPHILS RELATIVE PERCENT: 3 %
GFR AFRICAN AMERICAN: 30
GFR NON-AFRICAN AMERICAN: 25
GLOBULIN: 3.3 G/DL
GLUCOSE BLD-MCNC: 103 MG/DL (ref 70–99)
HCT VFR BLD CALC: 36.4 % (ref 36–48)
HEMOGLOBIN: 12.2 G/DL (ref 12–16)
LYMPHOCYTES ABSOLUTE: 1.7 K/UL (ref 1–5.1)
LYMPHOCYTES RELATIVE PERCENT: 17.9 %
MCH RBC QN AUTO: 31.5 PG (ref 26–34)
MCHC RBC AUTO-ENTMCNC: 33.6 G/DL (ref 31–36)
MCV RBC AUTO: 93.8 FL (ref 80–100)
MONOCYTES ABSOLUTE: 0.6 K/UL (ref 0–1.3)
MONOCYTES RELATIVE PERCENT: 6.2 %
NEUTROPHILS ABSOLUTE: 6.9 K/UL (ref 1.7–7.7)
NEUTROPHILS RELATIVE PERCENT: 71.9 %
PDW BLD-RTO: 14.7 % (ref 12.4–15.4)
PLATELET # BLD: 217 K/UL (ref 135–450)
PMV BLD AUTO: 6.9 FL (ref 5–10.5)
POTASSIUM REFLEX MAGNESIUM: 5.8 MMOL/L (ref 3.5–5.1)
PRO-BNP: 1807 PG/ML (ref 0–124)
RBC # BLD: 3.88 M/UL (ref 4–5.2)
SODIUM BLD-SCNC: 136 MMOL/L (ref 136–145)
TOTAL PROTEIN: 7.6 G/DL (ref 6.4–8.2)
TROPONIN: <0.01 NG/ML
WBC # BLD: 9.6 K/UL (ref 4–11)

## 2021-06-29 PROCEDURE — 2060000000 HC ICU INTERMEDIATE R&B

## 2021-06-29 PROCEDURE — 6370000000 HC RX 637 (ALT 250 FOR IP): Performed by: PHYSICIAN ASSISTANT

## 2021-06-29 PROCEDURE — 85025 COMPLETE CBC W/AUTO DIFF WBC: CPT

## 2021-06-29 PROCEDURE — G0378 HOSPITAL OBSERVATION PER HR: HCPCS

## 2021-06-29 PROCEDURE — 2580000003 HC RX 258: Performed by: HOSPITALIST

## 2021-06-29 PROCEDURE — 36415 COLL VENOUS BLD VENIPUNCTURE: CPT

## 2021-06-29 PROCEDURE — 6360000002 HC RX W HCPCS: Performed by: HOSPITALIST

## 2021-06-29 PROCEDURE — 71046 X-RAY EXAM CHEST 2 VIEWS: CPT

## 2021-06-29 PROCEDURE — 94761 N-INVAS EAR/PLS OXIMETRY MLT: CPT

## 2021-06-29 PROCEDURE — 6370000000 HC RX 637 (ALT 250 FOR IP): Performed by: HOSPITALIST

## 2021-06-29 PROCEDURE — 80053 COMPREHEN METABOLIC PANEL: CPT

## 2021-06-29 PROCEDURE — 96372 THER/PROPH/DIAG INJ SC/IM: CPT

## 2021-06-29 PROCEDURE — 93010 ELECTROCARDIOGRAM REPORT: CPT | Performed by: INTERNAL MEDICINE

## 2021-06-29 PROCEDURE — 93005 ELECTROCARDIOGRAM TRACING: CPT | Performed by: EMERGENCY MEDICINE

## 2021-06-29 PROCEDURE — 83880 ASSAY OF NATRIURETIC PEPTIDE: CPT

## 2021-06-29 PROCEDURE — 99284 EMERGENCY DEPT VISIT MOD MDM: CPT

## 2021-06-29 PROCEDURE — 84484 ASSAY OF TROPONIN QUANT: CPT

## 2021-06-29 RX ORDER — NITROGLYCERIN 0.4 MG/1
0.4 TABLET SUBLINGUAL EVERY 5 MIN PRN
Status: DISCONTINUED | OUTPATIENT
Start: 2021-06-29 | End: 2021-06-30 | Stop reason: HOSPADM

## 2021-06-29 RX ORDER — SODIUM CHLORIDE 0.9 % (FLUSH) 0.9 %
10 SYRINGE (ML) INJECTION EVERY 12 HOURS SCHEDULED
Status: DISCONTINUED | OUTPATIENT
Start: 2021-06-29 | End: 2021-06-30 | Stop reason: HOSPADM

## 2021-06-29 RX ORDER — ATORVASTATIN CALCIUM 20 MG/1
20 TABLET, FILM COATED ORAL NIGHTLY
Status: CANCELLED | OUTPATIENT
Start: 2021-06-29

## 2021-06-29 RX ORDER — FLUOXETINE HYDROCHLORIDE 20 MG/1
80 CAPSULE ORAL DAILY
Status: DISCONTINUED | OUTPATIENT
Start: 2021-06-30 | End: 2021-06-30 | Stop reason: HOSPADM

## 2021-06-29 RX ORDER — SODIUM CHLORIDE 9 MG/ML
25 INJECTION, SOLUTION INTRAVENOUS PRN
Status: DISCONTINUED | OUTPATIENT
Start: 2021-06-29 | End: 2021-06-30 | Stop reason: HOSPADM

## 2021-06-29 RX ORDER — ASPIRIN 81 MG/1
324 TABLET, CHEWABLE ORAL ONCE
Status: COMPLETED | OUTPATIENT
Start: 2021-06-29 | End: 2021-06-29

## 2021-06-29 RX ORDER — BUPROPION HYDROCHLORIDE 150 MG/1
150 TABLET ORAL DAILY
Status: DISCONTINUED | OUTPATIENT
Start: 2021-06-30 | End: 2021-06-30 | Stop reason: HOSPADM

## 2021-06-29 RX ORDER — ONDANSETRON 4 MG/1
4 TABLET, ORALLY DISINTEGRATING ORAL EVERY 8 HOURS PRN
Status: DISCONTINUED | OUTPATIENT
Start: 2021-06-29 | End: 2021-06-30 | Stop reason: HOSPADM

## 2021-06-29 RX ORDER — POTASSIUM CHLORIDE 20 MEQ/1
40 TABLET, EXTENDED RELEASE ORAL PRN
Status: DISCONTINUED | OUTPATIENT
Start: 2021-06-29 | End: 2021-06-30 | Stop reason: HOSPADM

## 2021-06-29 RX ORDER — POTASSIUM CHLORIDE 7.45 MG/ML
10 INJECTION INTRAVENOUS PRN
Status: DISCONTINUED | OUTPATIENT
Start: 2021-06-29 | End: 2021-06-30 | Stop reason: HOSPADM

## 2021-06-29 RX ORDER — ACETAMINOPHEN 325 MG/1
650 TABLET ORAL EVERY 6 HOURS PRN
Status: DISCONTINUED | OUTPATIENT
Start: 2021-06-29 | End: 2021-06-30 | Stop reason: HOSPADM

## 2021-06-29 RX ORDER — HYDROCODONE BITARTRATE AND ACETAMINOPHEN 7.5; 325 MG/1; MG/1
1 TABLET ORAL EVERY 6 HOURS PRN
Status: DISCONTINUED | OUTPATIENT
Start: 2021-06-29 | End: 2021-06-30 | Stop reason: HOSPADM

## 2021-06-29 RX ORDER — ASPIRIN 81 MG/1
324 TABLET, CHEWABLE ORAL ONCE
Status: DISCONTINUED | OUTPATIENT
Start: 2021-06-29 | End: 2021-06-29

## 2021-06-29 RX ORDER — CLOPIDOGREL BISULFATE 75 MG/1
75 TABLET ORAL DAILY
Status: DISCONTINUED | OUTPATIENT
Start: 2021-06-30 | End: 2021-06-30 | Stop reason: HOSPADM

## 2021-06-29 RX ORDER — SODIUM BICARBONATE 650 MG/1
650 TABLET ORAL 4 TIMES DAILY
Status: DISCONTINUED | OUTPATIENT
Start: 2021-06-29 | End: 2021-06-30 | Stop reason: HOSPADM

## 2021-06-29 RX ORDER — SODIUM CHLORIDE 0.9 % (FLUSH) 0.9 %
10 SYRINGE (ML) INJECTION PRN
Status: DISCONTINUED | OUTPATIENT
Start: 2021-06-29 | End: 2021-06-30 | Stop reason: HOSPADM

## 2021-06-29 RX ORDER — ROSUVASTATIN CALCIUM 40 MG/1
40 TABLET, COATED ORAL NIGHTLY
Status: DISCONTINUED | OUTPATIENT
Start: 2021-06-29 | End: 2021-06-30 | Stop reason: HOSPADM

## 2021-06-29 RX ORDER — ACETAMINOPHEN 650 MG/1
650 SUPPOSITORY RECTAL EVERY 6 HOURS PRN
Status: DISCONTINUED | OUTPATIENT
Start: 2021-06-29 | End: 2021-06-30 | Stop reason: HOSPADM

## 2021-06-29 RX ORDER — SODIUM CHLORIDE 9 MG/ML
INJECTION, SOLUTION INTRAVENOUS CONTINUOUS
Status: DISCONTINUED | OUTPATIENT
Start: 2021-06-29 | End: 2021-06-29

## 2021-06-29 RX ORDER — ASPIRIN 81 MG/1
81 TABLET, CHEWABLE ORAL DAILY
Status: DISCONTINUED | OUTPATIENT
Start: 2021-06-30 | End: 2021-06-30 | Stop reason: HOSPADM

## 2021-06-29 RX ORDER — ONDANSETRON 2 MG/ML
4 INJECTION INTRAMUSCULAR; INTRAVENOUS EVERY 6 HOURS PRN
Status: DISCONTINUED | OUTPATIENT
Start: 2021-06-29 | End: 2021-06-30 | Stop reason: HOSPADM

## 2021-06-29 RX ORDER — METOPROLOL SUCCINATE 50 MG/1
50 TABLET, EXTENDED RELEASE ORAL DAILY
Status: DISCONTINUED | OUTPATIENT
Start: 2021-06-30 | End: 2021-06-30 | Stop reason: HOSPADM

## 2021-06-29 RX ADMIN — CARBIDOPA AND LEVODOPA 1 TABLET: 10; 100 TABLET ORAL at 20:25

## 2021-06-29 RX ADMIN — ENOXAPARIN SODIUM 40 MG: 40 INJECTION SUBCUTANEOUS at 20:25

## 2021-06-29 RX ADMIN — CARBIDOPA AND LEVODOPA 1 TABLET: 10; 100 TABLET ORAL at 12:19

## 2021-06-29 RX ADMIN — HYDROCODONE BITARTRATE AND ACETAMINOPHEN 1 TABLET: 7.5; 325 TABLET ORAL at 17:01

## 2021-06-29 RX ADMIN — SODIUM BICARBONATE 650 MG: 650 TABLET ORAL at 20:25

## 2021-06-29 RX ADMIN — HYDROCODONE BITARTRATE AND ACETAMINOPHEN 1 TABLET: 7.5; 325 TABLET ORAL at 23:03

## 2021-06-29 RX ADMIN — SODIUM BICARBONATE 650 MG: 650 TABLET ORAL at 17:01

## 2021-06-29 RX ADMIN — ASPIRIN 324 MG: 81 TABLET, CHEWABLE ORAL at 11:40

## 2021-06-29 RX ADMIN — ROSUVASTATIN CALCIUM 40 MG: 40 TABLET, FILM COATED ORAL at 20:25

## 2021-06-29 RX ADMIN — SODIUM CHLORIDE: 9 INJECTION, SOLUTION INTRAVENOUS at 17:01

## 2021-06-29 RX ADMIN — Medication 10 ML: at 20:26

## 2021-06-29 ASSESSMENT — PAIN SCALES - GENERAL
PAINLEVEL_OUTOF10: 3
PAINLEVEL_OUTOF10: 0
PAINLEVEL_OUTOF10: 7
PAINLEVEL_OUTOF10: 0
PAINLEVEL_OUTOF10: 0
PAINLEVEL_OUTOF10: 5
PAINLEVEL_OUTOF10: 6

## 2021-06-29 ASSESSMENT — PAIN DESCRIPTION - FREQUENCY
FREQUENCY: CONTINUOUS
FREQUENCY: CONTINUOUS

## 2021-06-29 ASSESSMENT — ENCOUNTER SYMPTOMS
SHORTNESS OF BREATH: 1
CHEST TIGHTNESS: 1
COUGH: 0
NAUSEA: 1
VOMITING: 0

## 2021-06-29 ASSESSMENT — PAIN DESCRIPTION - DESCRIPTORS
DESCRIPTORS: ACHING;DISCOMFORT;CONSTANT
DESCRIPTORS: ACHING
DESCRIPTORS: OTHER (COMMENT)

## 2021-06-29 ASSESSMENT — PAIN DESCRIPTION - ORIENTATION
ORIENTATION: LOWER
ORIENTATION: LOWER

## 2021-06-29 ASSESSMENT — PAIN DESCRIPTION - LOCATION
LOCATION: CHEST
LOCATION: BACK
LOCATION: BACK

## 2021-06-29 ASSESSMENT — PAIN DESCRIPTION - PAIN TYPE
TYPE: CHRONIC PAIN
TYPE: CHRONIC PAIN

## 2021-06-29 ASSESSMENT — HEART SCORE: ECG: 1

## 2021-06-29 NOTE — ED PROVIDER NOTES
EKG: Normal sinus rhythm, rate of 90, voltage criteria for LVH. Rhythm strip shows sinus rhythm with a rate of 90, MO interval of 136 ms, QRS of 80 ms with no other ectopy as interpreted by me. This compared to an EKG dated 7/9/2020, no significant change noted.       Jaky Deshpande MD  06/29/21 9180

## 2021-06-29 NOTE — TELEPHONE ENCOUNTER
into neck, jaw, arms, back)      denies    3. ONSET: \"When did the chest pain begin? \" (Minutes, hours or days)       Yesterday    4. PATTERN \"Does the pain come and go, or has it been constant since it started? \"  \"Does it get worse with exertion? \"       Comes and goes    5. DURATION: \"How long does it last\" (e.g., seconds, minutes, hours)        15 minutes and is not currently experiencing    6. SEVERITY: \"How bad is the pain? \"  (e.g., Scale 1-10; mild, moderate, or severe)     - MILD (1-3): doesn't interfere with normal activities      - MODERATE (4-7): interferes with normal activities or awakens from sleep     - SEVERE (8-10): excruciating pain, unable to do any normal activities          Moderate when having    7. CARDIAC RISK FACTORS: \"Do you have any history of heart problems or risk factors for heart disease? \" (e.g., angina, prior heart attack; diabetes, high blood pressure, high cholesterol, smoker, or strong family history of heart disease)        /100 yesterday,while on the phone with this writer her blood pressure was 159/113. She is on medication for her blood pressure. 8. PULMONARY RISK FACTORS: \"Do you have any history of lung disease? \"  (e.g., blood clots in lung, asthma, emphysema, birth control pills)        Denies    9. CAUSE: \"What do you think is causing the chest pain? \"        Unknown she states she is so restless and cannot sit still currently    10. OTHER SYMPTOMS: \"Do you have any other symptoms? \" (e.g., dizziness, nausea, vomiting, sweating, fever, difficulty breathing, cough)  Occasional dizziness since yesterday  Vomited this morning X1  Sweating  Cough- non productive          11. PREGNANCY: \"Is there any chance you are pregnant? \" \"When was your last menstrual period? \"          N/a age    Protocols used: CHEST PAIN-ADULT-[place], URINARY SYMPTOMS-ADULT-[place]  see above documentation

## 2021-06-29 NOTE — PROGRESS NOTES
Medication Reconciliation    List of medications patient is currently taking is complete. Source of information: 1.  Conversation with patient                                       2. EPIC records      Allergies  Mobic [meloxicam], Morphine, and Other     Nile Te, [facility], PharmD, BCPS  6/29/2021 2:11 PM

## 2021-06-29 NOTE — PROGRESS NOTES
4 Eyes Skin Assessment     NAME:  Henny Vasquez  YOB: 1955  MEDICAL RECORD NUMBER:  [id number]    The patient is being assess for  Admission    I agree that 2 RN's have performed a thorough Head to Toe Skin Assessment on the patient. ALL assessment sites listed below have been assessed. Areas assessed by both nurses:    Head, Face, Ears, Shoulders, Back, Chest, Arms, Elbows, Hands, Sacrum. Buttock, Coccyx, Ischium and Legs. Feet and Heels        Does the Patient have a Wound? No noted wound(s)       [place] Prevention initiated:  Yes   Wound Care Orders initiated:  No    Pressure Injury (Stage 3,4, Unstageable, DTI, NWPT, and Complex wounds) if present place consult order under [de-identified] No    New and Established Ostomies if present place consult order under : No      Nurse 1 eSignature: Electronically signed by Sue Bragg RN on 6/29/21 at 6:54 PM EDT    **SHARE this note so that the co-signing nurse is able to place an eSignature**    Nurse 2 eSignature: Electronically signed by Tommy Gann RN on 6/29/21 at 6:56 PM EDT       .

## 2021-06-29 NOTE — ED NOTES
ED SBAR report provider to [facility]. Patient to be transported to Room 5103 via stretcher by transport tech. Patient transported with bedside cardiac monitor. IV site clean, dry, and intact. MEWS score and pain assessed as 0/10 and documented. Updated patient on plan of care.  Awaiting transport       Serafin Victoria RN  06/29/21 [id number]

## 2021-06-29 NOTE — ED PROVIDER NOTES
eMERGENCY dEPARTMENT eNCOUnter               I independently evaluated and obtained a history and physical on Caleb Cutler. All diagnostic, treatment, and disposition assistants were made to myself in conjunction the advanced practice provider. For further details of this patient's emergency department encounter, please see the advanced practice provider's documentation. History: Patient is a 61-year-old female with chest pain on and off for about 1 week. Patient has hypertension and renal insufficiency. Patient had some shortness of breath. Denies any chest pain at this time. Patient was seen in conjunction with DANIEL. Patient is morbidly obese. There has been no fever. No cough. Physician Exam: Patient is hypertensive with a blood pressure 192/111 pulse is 97. No respiratory distress. HEENT normocephalic atraumatic lungs were clear to auscultation heart regular rate rhythm chest wall is not tender abdomen is soft no peripheral edema. Labs were as follows.       Labs Reviewed   CBC WITH AUTO DIFFERENTIAL - Abnormal; Notable for the following components:       Result Value    RBC 3.88 (*)     All other components within normal limits    Narrative:     Performed at:  [facility]  [address] [facility] [address]   Phone [phone]   COMPREHENSIVE METABOLIC PANEL W/ REFLEX TO MG FOR LOW K - Abnormal; Notable for the following components:    Potassium reflex Magnesium 5.8 (*)     CO2 15 (*)     Glucose 103 (*)     BUN 40 (*)     CREATININE 2.0 (*)     GFR Non- 25 (*)     GFR  30 (*)     All other components within normal limits    Narrative:     Performed at:  [facility]  [address] [place], [place] [address]   Phone [phone]   BRAIN NATRIURETIC PEPTIDE - Abnormal; Notable for the following components:    Pro-BNP 1,807 (*)     All other components within normal limits Narrative:     Performed at:  [facility]  [address] [place] [address]   Phone [phone]   TROPONIN    Narrative:     Performed at:  [facility]  [address] [place] [address]   Phone [phone]     XR CHEST (2 VW)   Final Result   1. No acute abnormality demonstrated   2. Prominent central pulmonary arteries suggests pulmonary arterial   hypertension           EKG interpretation    Normal sinus rhythm ventricular rate of 90 minimal voltage criteria for LVH otherwise normal EKG    Patient is obese 63year-old with intermittent chest pain. Patient has some fluid retention but no evidence of CHF. Her troponin is negative. She has appoint with cardiology in about 1 month. All that would be little longer than needed. Patient should be admitted for evaluation and possible tuneup. Patient needs serial enzymes.        Leah Lopez MD  06/29/21 [address] [place] [address], MD  07/01/21 2231

## 2021-06-29 NOTE — ED PROVIDER NOTES
[id number] [address]        Pt Name: Bayron Moon  MRN: [id number]  [place] 1955  Date of evaluation: 6/29/2021  Provider: HARRISON Ziegler  PCP: Gregory Godinez MD  Note Started: 11:43 AM EDT        I have seen and evaluated this patient with my supervising physician Vivien Figueroa MD.    [address] [place] [address]       Chief Complaint   Patient presents with    Chest Pain     x1 week, stayed the same    Shortness of Breath     x1 week, smoker    Other     restless, feels like she is going to jump out of her skin       HISTORY OF PRESENT ILLNESS   (Location, Timing/Onset, Context/Setting, Quality, Duration, Modifying Factors, Severity, Associated Signs and Symptoms)  Note limiting factors. Chief Complaint: chest pain, SOB, restless legs     Bayron Moon is a 63 y.o. female who presents to the ER today with complaints of chest pain, shortness of breath, intermittent dizziness. Nursing note states this has been present for a week, but she tells me it has only been present since yesterday. She states that it started while she was outside watching kids play in her pool. She reports that with the CP and SOB she gets a sensation that she is going to pass out. She states the pain is not as severe as when she has had heart attacks in the past, but she is concerned because the symptoms are not normal for her. She is also concerned because she has restless legs. She is out of her Sinemet. She has no further concerns at this time. Nursing Notes were all reviewed and agreed with or any disagreements were addressed in the HPI. REVIEW OF SYSTEMS    (2-9 systems for level 4, 10 or more for level 5)     Review of Systems   Constitutional: Negative for chills and fever. Respiratory: Positive for chest tightness and shortness of breath. Negative for cough. Cardiovascular: Positive for chest pain. Negative for palpitations. Gastrointestinal: Positive for nausea. Negative for vomiting. Neurological: Positive for light-headedness. Negative for tremors, weakness and numbness. Restless legs       Positives and Pertinent negatives as per HPI. Except as noted above in the ROS, all other systems were reviewed and negative.        PAST MEDICAL HISTORY     Past Medical History:   Diagnosis Date    Arthritis     ASHD (arteriosclerotic heart disease)     Chronic kidney disease     Chronic midline low back pain without sciatica 4/3/2019    COPD (chronic obstructive pulmonary disease) ([facility] [address]     unknown     Coronary artery disease involving native coronary artery of native heart without angina pectoris 3/31/2016    Coronary artery disease involving native coronary artery of native heart without angina pectoris 3/31/2016    Depression     Full dentures     HTN     Hyperlipidemia     Iron deficiency anemia 3/31/2016    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/2020    Kidney stone 2012    Morbid obesity (HCC)     Restless legs syndrome     pt on cabidopa/levodopa    Stage 3 chronic kidney disease ([facility] [address] 4/3/2019    Wears glasses          SURGICAL HISTORY     Past Surgical History:   Procedure Laterality Date    ABSCESS DRAINAGE Left 04/19/2018    incision and drainage of right buttock abscess    APPENDECTOMY      CARPAL TUNNEL RELEASE Left 11/14/2017    CORONARY ARTERY BYPASS GRAFT  9/28/10    4 way bypass    HIP SURGERY Left 2016    I&D of joint    KNEE ARTHROSCOPY Right 2000    TOTAL HIP ARTHROPLASTY Left 11-4-2015    TOTAL KNEE ARTHROPLASTY Right 8-24-10    TOTAL KNEE ARTHROPLASTY Left 4/8/11    TUBAL LIGATION      UPPER GASTROINTESTINAL ENDOSCOPY N/A 7/10/2020    EGD DIAGNOSTIC ONLY performed by Dave Rosales MD at [facility]       Current Discharge Medication List      CONTINUE these medications which have NOT CHANGED    Details   HYDROcodone-acetaminophen (NORCO) 7.5-325 MG per tablet Take 1 tablet by mouth every 6 hours as needed for Pain for up to 30 days.   Qty: 120 tablet, Refills: 0    Comments: Reduce doses taken as pain becomes manageable  Associated Diagnoses: Primary osteoarthritis involving multiple joints; Encounter for medication refill      sodium bicarbonate 650 MG tablet Take 1 tablet by mouth 4 times daily  Qty: 360 tablet, Refills: 1      carbidopa-levodopa (SINEMET)  MG per tablet Use as directed  Qty: 90 tablet, Refills: 2      FLUoxetine (PROZAC) 40 MG capsule TAKE 2 CAPSULES DAILY  Qty: 180 capsule, Refills: 3      metoprolol succinate (TOPROL XL) 50 MG extended release tablet TAKE 1 TABLET BY MOUTH EVERY DAY  Qty: 90 tablet, Refills: 3      buPROPion (WELLBUTRIN XL) 150 MG extended release tablet TAKE 1 TABLET BY MOUTH EVERY DAY  Qty: 90 tablet, Refills: 2      rosuvastatin (CRESTOR) 40 MG tablet Take 1 tablet by mouth nightly  Qty: 90 tablet, Refills: 3      clopidogrel (PLAVIX) 75 MG tablet Take 1 tablet by mouth daily  Qty: 90 tablet, Refills: 3               ALLERGIES     Mobic [meloxicam], Morphine, and Other    FAMILYHISTORY       Family History   Problem Relation Age of Onset    Stroke Mother     Diabetes Mother     Heart Disease Mother     High Blood Pressure Mother     High Cholesterol Mother     Cancer Neg Hx           SOCIAL HISTORY       Social History     Tobacco Use    Smoking status: Current Every Day Smoker     Packs/day: 0.50     Years: 30.00     Pack years: 15.00     Types: Cigarettes     Last attempt to quit: 2016     Years since quittin.3    Smokeless tobacco: Never Used   Vaping Use    Vaping Use: Never used   Substance Use Topics    Alcohol use: No     Alcohol/week: 0.0 standard drinks    Drug use: No       SCREENINGS      Heart Score for chest pain patients  History: Highly Suspicious  ECG: Non-Specifc repolarization disturbance/LBTB/PM  Patient Age: > 39 and < 65 years  *Risk factors for Atherosclerotic disease: Hypertension, Obesity, Cocaine abuse  Risk Factors: > 3 Risk factors or history of atherosclerotic disease*  Troponin: < 1X normal limit  Heart Score Total: 6      PHYSICAL EXAM    (up to 7 for level 4, 8 or more for level 5)     ED Triage Vitals [06/29/21 1119]   BP Temp Temp Source Pulse Resp SpO2 Height Weight   (!) 192/111 98.7 °F (37.1 °C) Oral 94 24 94 % 5' 7\" (1.702 m) 264 lb 15.9 oz (120.2 kg)       Physical Exam  Vitals and nursing note reviewed. Constitutional:       General: She is not in acute distress. Appearance: She is well-developed. She is not ill-appearing, toxic-appearing or diaphoretic. HENT:      Head: Normocephalic and atraumatic. Eyes:      Conjunctiva/sclera: Conjunctivae normal.      Pupils: Pupils are equal, round, and reactive to light. Cardiovascular:      Rate and Rhythm: Normal rate and regular rhythm. Pulses: Normal pulses. Pulmonary:      Effort: Pulmonary effort is normal. No tachypnea, accessory muscle usage or respiratory distress. Breath sounds: No decreased breath sounds, wheezing, rhonchi or rales. Chest:      Chest wall: No tenderness. Musculoskeletal:      Right lower leg: No edema. Left lower leg: No edema. Skin:     General: Skin is warm and dry. Neurological:      General: No focal deficit present. Mental Status: She is alert and oriented to person, place, and time. Psychiatric:         Behavior: Behavior normal. Behavior is cooperative. Thought Content:  Thought content normal.         DIAGNOSTIC RESULTS   LABS:    Labs Reviewed   CBC WITH AUTO DIFFERENTIAL - Abnormal; Notable for the following components:       Result Value    RBC 3.88 (*)     All other components within normal limits    Narrative:     Performed at:  [address] [address]   Phone [phone]   COMPREHENSIVE METABOLIC PANEL W/ REFLEX TO MG FOR LOW K - Abnormal; Notable for the following components:    Potassium reflex Magnesium 5.8 (*)     CO2 15 (*)     Glucose 103 (*)     BUN 40 (*)     CREATININE 2.0 (*)     GFR Non- 25 (*)     GFR  30 (*)     All other components within normal limits    Narrative:     Performed at:  [facility]  [address] [facility] [place] [address]   Phone [phone]   BRAIN NATRIURETIC PEPTIDE - Abnormal; Notable for the following components:    Pro-BNP 1,807 (*)     All other components within normal limits    Narrative:     Performed at:  [facility]  [address]  [facility] [place] [address]   Phone [phone]   TROPONIN    Narrative:     Performed at:  [facility]  [address]  [place] [facility] [address]   Phone [phone]       When ordered only abnormal lab results are displayed. All other labs were within normal range or not returned as of this dictation. EKG: When ordered, EKG's are interpreted by the Emergency Department Physician in the absence of a cardiologist.  Please see their note for interpretation of EKG. RADIOLOGY:   Non-plain film images such as CT, Ultrasound and MRI are read by the radiologist. Plain radiographic images are visualized and preliminarily interpreted by the ED Provider with the below findings:        Interpretation per the Radiologist below, if available at the time of this note:    XR CHEST (2 VW)   Final Result   1. No acute abnormality demonstrated   2. Prominent central pulmonary arteries suggests pulmonary arterial   hypertension           No results found.         PROCEDURES   Unless otherwise noted below, none     Procedures    CRITICAL CARE TIME   N/A    CONSULTS:  IP CONSULT TO CARDIOLOGY      EMERGENCY DEPARTMENT COURSE and DIFFERENTIAL DIAGNOSIS/MDM:   Vitals:    Vitals:    06/29/21 1501 06/29/21 1550 06/29/21 1621 06/29/21 1622   BP: (!) 146/86 128/87 (!) 149/87    Pulse: 69 81 68    Resp: 20 18 20 Temp:   98.2 °F (36.8 °C)    TempSrc:   Oral    SpO2: 94% 99% 95% 96%   Weight:    262 lb 12.6 oz (119.2 kg)   Height:    5' 7\" (1.702 m)       Patient was given the following medications:  Medications   buPROPion (WELLBUTRIN XL) extended release tablet 150 mg (has no administration in time range)   carbidopa-levodopa (SINEMET)  MG per tablet 1 tablet (has no administration in time range)   clopidogrel (PLAVIX) tablet 75 mg (has no administration in time range)   FLUoxetine (PROZAC) capsule 80 mg (has no administration in time range)   HYDROcodone-acetaminophen (NORCO) 7.5-325 MG per tablet 1 tablet (has no administration in time range)   metoprolol succinate (TOPROL XL) extended release tablet 50 mg (has no administration in time range)   rosuvastatin (CRESTOR) tablet 40 mg (has no administration in time range)   sodium bicarbonate tablet 650 mg (has no administration in time range)   0.9 % sodium chloride infusion (has no administration in time range)   sodium chloride flush 0.9 % injection 10 mL (has no administration in time range)   sodium chloride flush 0.9 % injection 10 mL (has no administration in time range)   0.9 % sodium chloride infusion (has no administration in time range)   ondansetron (ZOFRAN-ODT) disintegrating tablet 4 mg (has no administration in time range)     Or   ondansetron (ZOFRAN) injection 4 mg (has no administration in time range)   acetaminophen (TYLENOL) tablet 650 mg (has no administration in time range)     Or   acetaminophen (TYLENOL) suppository 650 mg (has no administration in time range)   enoxaparin (LOVENOX) injection 40 mg (has no administration in time range)   potassium chloride (KLOR-CON M) extended release tablet 40 mEq (has no administration in time range)     Or   potassium bicarb-citric acid (EFFER-K) effervescent tablet 40 mEq (has no administration in time range)     Or   potassium chloride 10 mEq/100 mL IVPB (Peripheral Line) (has no administration in time range)   nitroGLYCERIN (NITROSTAT) SL tablet 0.4 mg (has no administration in time range)   bisacodyl (DULCOLAX) EC tablet 5 mg (has no administration in time range)   aspirin chewable tablet 81 mg (has no administration in time range)   carbidopa-levodopa (SINEMET)  MG per tablet 1 tablet (1 tablet Oral Given 6/29/21 1219)   aspirin chewable tablet 324 mg (324 mg Oral Given 6/29/21 1140)           ED COURSE & MEDICAL DECISION MAKING    - The patient presented to the ER with complaints of CP, SOB, dizziness. Vital signs were reviewed. Exam with WDWN female in no apparent distress. Peripheral IV placed. Labs, Imaging ordered. - Pertinent Labs & Imaging studies reviewed. (See chart for details)   -  Patient seen and evaluated in the emergency department. -  Triage and nursing notes reviewed and incorporated. -  Old chart records reviewed and incorporated. -   I have seen and evaluated this patient with my supervising physician Deb Landa MD.  -  Differential diagnosis includes: acute coronary syndrome, pulmonary embolism, COPD/asthma, pneumonia, musculoskeletal, reflux/PUD/gastritis, pneumothorax, CHF, thoracic aortic dissection, anxiety  -  Work-up included:  See above  -  ED treatment included:  aspirin, home dose sinemet  - Consults: Cardiology - I spoke with Dr Chago Dominguez who was not familiar with the patient. He has low suspicion for ACS but deferred decision for admission to us since we have her in front of us. We do feel she would benefit from admission, given her elevated heart score and PMH, therefor we consult the hospitalist service for admission.  -  Results discussed with patient and/or family. Labs show no leukocytosis or concerning anemia, metabolic panel with evidence of chronic kidney disease, she appears to be at her baseline. Troponin is less than 0.0. BNP is elevated at 1807. Imaging studies show no acute abnormality.   She does have findings of prominent central pulmonary arteries that is suggestive of pulmonary arterial hypertension. She has a heart score of 6. Patient feels overall well. She has no chest pain at the time of my evaluation. .  At this time, we recommend admission, as the patient has an elevated heart score and significant cardiac history. The patient and/or family is agreeable with plan of care and disposition.  -  Disposition:  Admission  - Critical Care: Because of high probability of sudden clinical deterioration of the patient's condition or  further deterioration, critical care time included my full attention to the patient's condition, including chart data review, documentation, medication ordering, reviewing the patient's old records, reevaluation patient's cardiac, pulmonary and neurological status. Reassessment of vital signs. Consultations with ED attending and admitting physician. Ordering, interpreting reviewing diagnostic testing. Therefore, my critical care time was 12 minutes of direct attention to the patient's condition did not include time spent on separately billable procedures. FINAL IMPRESSION      1. Chest pain, unspecified type          DISPOSITION/PLAN   DISPOSITION Admitted 06/29/2021 02:44:51 PM      PATIENT REFERRED TO:  No follow-up provider specified.     DISCHARGE MEDICATIONS:  Current Discharge Medication List          DISCONTINUED MEDICATIONS:  Current Discharge Medication List      STOP taking these medications       Budeson-Glycopyrrol-Formoterol 160-9-4.8 MCG/ACT AERO Comments:   Reason for Stopping:         dicyclomine (BENTYL) 10 MG capsule Comments:   Reason for Stopping:                      (Please note that portions of this note were completed with a voice recognition program.  Efforts were made to edit the dictations but occasionally words are mis-transcribed.)    HARRISON Butler (electronically signed)            Nisa Butler  06/29/21 7544

## 2021-06-30 VITALS
TEMPERATURE: 97.4 F | SYSTOLIC BLOOD PRESSURE: 120 MMHG | DIASTOLIC BLOOD PRESSURE: 75 MMHG | WEIGHT: 263.45 LBS | OXYGEN SATURATION: 92 % | HEIGHT: 67 IN | HEART RATE: 71 BPM | RESPIRATION RATE: 19 BRPM | BODY MASS INDEX: 41.35 KG/M2

## 2021-06-30 LAB
ANION GAP SERPL CALCULATED.3IONS-SCNC: 11 MMOL/L (ref 3–16)
BUN BLDV-MCNC: 36 MG/DL (ref 7–20)
CALCIUM SERPL-MCNC: 8.8 MG/DL (ref 8.3–10.6)
CHLORIDE BLD-SCNC: 108 MMOL/L (ref 99–110)
CHOLESTEROL, TOTAL: 144 MG/DL (ref 0–199)
CO2: 18 MMOL/L (ref 21–32)
CREAT SERPL-MCNC: 2 MG/DL (ref 0.6–1.2)
ESTIMATED AVERAGE GLUCOSE: 108.3 MG/DL
GFR AFRICAN AMERICAN: 30
GFR NON-AFRICAN AMERICAN: 25
GLUCOSE BLD-MCNC: 91 MG/DL (ref 70–99)
HBA1C MFR BLD: 5.4 %
HCT VFR BLD CALC: 34.6 % (ref 36–48)
HDLC SERPL-MCNC: 40 MG/DL (ref 40–60)
HEMOGLOBIN: 11.6 G/DL (ref 12–16)
LDL CHOLESTEROL CALCULATED: ABNORMAL MG/DL
LDL CHOLESTEROL DIRECT: 57 MG/DL
MCH RBC QN AUTO: 31.7 PG (ref 26–34)
MCHC RBC AUTO-ENTMCNC: 33.6 G/DL (ref 31–36)
MCV RBC AUTO: 94.5 FL (ref 80–100)
PDW BLD-RTO: 14.1 % (ref 12.4–15.4)
PLATELET # BLD: 194 K/UL (ref 135–450)
PMV BLD AUTO: 6.9 FL (ref 5–10.5)
POTASSIUM REFLEX MAGNESIUM: 5 MMOL/L (ref 3.5–5.1)
RBC # BLD: 3.66 M/UL (ref 4–5.2)
SODIUM BLD-SCNC: 137 MMOL/L (ref 136–145)
TRIGL SERPL-MCNC: 321 MG/DL (ref 0–150)
TROPONIN: 0.02 NG/ML
TROPONIN: <0.01 NG/ML
VLDLC SERPL CALC-MCNC: ABNORMAL MG/DL
WBC # BLD: 6.9 K/UL (ref 4–11)

## 2021-06-30 PROCEDURE — 84484 ASSAY OF TROPONIN QUANT: CPT

## 2021-06-30 PROCEDURE — 94760 N-INVAS EAR/PLS OXIMETRY 1: CPT

## 2021-06-30 PROCEDURE — 80048 BASIC METABOLIC PNL TOTAL CA: CPT

## 2021-06-30 PROCEDURE — 83036 HEMOGLOBIN GLYCOSYLATED A1C: CPT

## 2021-06-30 PROCEDURE — 6370000000 HC RX 637 (ALT 250 FOR IP): Performed by: HOSPITALIST

## 2021-06-30 PROCEDURE — 80061 LIPID PANEL: CPT

## 2021-06-30 PROCEDURE — 85027 COMPLETE CBC AUTOMATED: CPT

## 2021-06-30 PROCEDURE — 36415 COLL VENOUS BLD VENIPUNCTURE: CPT

## 2021-06-30 PROCEDURE — G0378 HOSPITAL OBSERVATION PER HR: HCPCS

## 2021-06-30 PROCEDURE — 2580000003 HC RX 258: Performed by: HOSPITALIST

## 2021-06-30 RX ORDER — ISOSORBIDE MONONITRATE 60 MG/1
60 TABLET, EXTENDED RELEASE ORAL DAILY
Qty: 30 TABLET | Refills: 0 | Status: SHIPPED | OUTPATIENT
Start: 2021-06-30 | End: 2021-10-28 | Stop reason: CLARIF

## 2021-06-30 RX ADMIN — FLUOXETINE 80 MG: 20 CAPSULE ORAL at 08:54

## 2021-06-30 RX ADMIN — ACETAMINOPHEN 650 MG: 325 TABLET ORAL at 08:55

## 2021-06-30 RX ADMIN — BUPROPION HYDROCHLORIDE 150 MG: 150 TABLET, EXTENDED RELEASE ORAL at 08:55

## 2021-06-30 RX ADMIN — METOPROLOL SUCCINATE 50 MG: 50 TABLET, FILM COATED, EXTENDED RELEASE ORAL at 08:55

## 2021-06-30 RX ADMIN — HYDROCODONE BITARTRATE AND ACETAMINOPHEN 1 TABLET: 7.5; 325 TABLET ORAL at 05:03

## 2021-06-30 RX ADMIN — Medication 10 ML: at 08:58

## 2021-06-30 RX ADMIN — ASPIRIN 81 MG: 81 TABLET, CHEWABLE ORAL at 08:55

## 2021-06-30 RX ADMIN — HYDROCODONE BITARTRATE AND ACETAMINOPHEN 1 TABLET: 7.5; 325 TABLET ORAL at 11:18

## 2021-06-30 RX ADMIN — CLOPIDOGREL BISULFATE 75 MG: 75 TABLET ORAL at 08:55

## 2021-06-30 RX ADMIN — SODIUM BICARBONATE 650 MG: 650 TABLET ORAL at 14:47

## 2021-06-30 RX ADMIN — SODIUM BICARBONATE 650 MG: 650 TABLET ORAL at 08:55

## 2021-06-30 ASSESSMENT — PAIN DESCRIPTION - PROGRESSION
CLINICAL_PROGRESSION: NOT CHANGED
CLINICAL_PROGRESSION: NOT CHANGED

## 2021-06-30 ASSESSMENT — PAIN DESCRIPTION - DESCRIPTORS
DESCRIPTORS: ACHING;CONSTANT;DISCOMFORT

## 2021-06-30 ASSESSMENT — PAIN DESCRIPTION - LOCATION
LOCATION: BACK

## 2021-06-30 ASSESSMENT — PAIN SCALES - GENERAL
PAINLEVEL_OUTOF10: 6
PAINLEVEL_OUTOF10: 8
PAINLEVEL_OUTOF10: 6
PAINLEVEL_OUTOF10: 3
PAINLEVEL_OUTOF10: 7
PAINLEVEL_OUTOF10: 4

## 2021-06-30 ASSESSMENT — PAIN DESCRIPTION - PAIN TYPE
TYPE: CHRONIC PAIN

## 2021-06-30 ASSESSMENT — PAIN DESCRIPTION - ORIENTATION
ORIENTATION: LOWER
ORIENTATION: LOWER
ORIENTATION: LOWER;MID

## 2021-06-30 ASSESSMENT — PAIN - FUNCTIONAL ASSESSMENT
PAIN_FUNCTIONAL_ASSESSMENT: PREVENTS OR INTERFERES SOME ACTIVE ACTIVITIES AND ADLS
PAIN_FUNCTIONAL_ASSESSMENT: ACTIVITIES ARE NOT PREVENTED

## 2021-06-30 ASSESSMENT — PAIN DESCRIPTION - FREQUENCY
FREQUENCY: CONTINUOUS

## 2021-06-30 ASSESSMENT — PAIN DESCRIPTION - ONSET
ONSET: ON-GOING
ONSET: ON-GOING

## 2021-06-30 NOTE — PLAN OF CARE
Problem: Pain:  Goal: Pain level will decrease  Description: Pain level will decrease  6/30/2021 1125 by Albino Goldmann, RN  Outcome: Ongoing  Note: Pain/discomfort being managed with PRN analgesics per MD orders. Pt able to express presence and absence of pain and rate pain appropriately using numerical scale. Problem: SAFETY  Goal: Free from accidental physical injury  Outcome: Ongoing  Note: Patient assessed for fall risk; fall precautions initiated. Patient instructed about safety devices. Environment kept free of clutter and adequate lighting provided. Bed locked and in lowest position. Call light within reach. Will continue to monitor.       Problem: DISCHARGE BARRIERS  Goal: Patient's continuum of care needs are met  Outcome: Ongoing  Note:

## 2021-06-30 NOTE — CONSULTS
[facility]  Cardiology Consult Note        CC:     Chest pain            HPI:   This is a 63 y.o. female with known history of coronary artery disease status post angioplasty, bypass surgery who came to the hospital for multiple reasons including shortness of breath chest pain and restless leg    According to  and the patient she has been without her carbidopa for the last several days which has caused severe restlessness causing anxiety. He states that she gets pain in her chest whenever she is stressed.   She is very inactive but ordinary activities of daily living do not cause any chest pain      Past Medical History:   Diagnosis Date    Arthritis     ASHD (arteriosclerotic heart disease)     Chronic kidney disease     Chronic midline low back pain without sciatica 4/3/2019    COPD (chronic obstructive pulmonary disease) (HCC)     unknown     Coronary artery disease involving native coronary artery of native heart without angina pectoris 3/31/2016    Coronary artery disease involving native coronary artery of native heart without angina pectoris 3/31/2016    Depression     Full dentures     HTN     Hyperlipidemia     Iron deficiency anemia 3/31/2016    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/2020    Kidney stone 2012    Morbid obesity (HCC)     Restless legs syndrome     pt on cabidopa/levodopa    Stage 3 chronic kidney disease ([address] 4/3/2019    Wears glasses       Past Surgical History:   Procedure Laterality Date    ABSCESS DRAINAGE Left 04/19/2018    incision and drainage of right buttock abscess    APPENDECTOMY      CARPAL TUNNEL RELEASE Left 11/14/2017    CORONARY ARTERY BYPASS GRAFT  9/28/10    4 way bypass    HIP SURGERY Left 2016    I&D of joint    KNEE ARTHROSCOPY Right 2000    TOTAL HIP ARTHROPLASTY Left 11-4-2015    TOTAL KNEE ARTHROPLASTY Right 8-24-10    TOTAL KNEE ARTHROPLASTY Left 4/8/11    TUBAL LIGATION      UPPER GASTROINTESTINAL ENDOSCOPY N/A 7/10/2020    EGD DIAGNOSTIC ONLY performed by Marguerite Angulo MD at [address] History   Problem Relation Age of Onset    Stroke Mother     Diabetes Mother     Heart Disease Mother     High Blood Pressure Mother     High Cholesterol Mother     Cancer Neg Hx       Social History     Tobacco Use    Smoking status: Current Every Day Smoker     Packs/day: 0.50     Years: 30.00     Pack years: 15.00     Types: Cigarettes     Last attempt to quit: 2016     Years since quittin.3    Smokeless tobacco: Never Used   Vaping Use    Vaping Use: Never used   Substance Use Topics    Alcohol use: No     Alcohol/week: 0.0 standard drinks    Drug use: No     Allergies   Allergen Reactions    Mobic [Meloxicam] Nausea Only     Stomach would get upset after taking this medication    Morphine Other (See Comments)     When taken in  pt had an adverse reaction to morphine. She ended up in  hospital with kidney failure, dehydration, and in ICU. She prefers this not to be given ever.  Other      ?  Suture from CABG caused blister (10/30/2015)      buPROPion  150 mg Oral Daily    carbidopa-levodopa  1 tablet Oral BID    clopidogrel  75 mg Oral Daily    FLUoxetine  80 mg Oral Daily    metoprolol succinate  50 mg Oral Daily    rosuvastatin  40 mg Oral Nightly    sodium bicarbonate  650 mg Oral 4x Daily    sodium chloride flush  10 mL Intravenous 2 times per day    enoxaparin  40 mg Subcutaneous Nightly    aspirin  81 mg Oral Daily       Review of Systems -   Constitutional: Negative for weight gain/loss; malaise, fever  Respiratory: Negative for Asthma;  cough and hemoptysis  Cardiovascular: Negative for palpitations,dizziness   Gastrointestinal: Negative for abd.pain; constipation/diarrhea;    Genitourinary: Negative for stones; hematuria; frequency hesitancy  Integumentt: Negative for rash or pruritis  Hematologic/lymphatic: Negative for blood dyscrasia; leukemia/lymphoma  Musculoskeletal: Negative for Connective tissue disease  Neurological:  Negative for Seizure   Behavioral/Psych:Negative for Bipolar disorder, Schizophrenia; Dementia  Endocrine: negative for thyroid, parathyroid disease      Intake/Output Summary (Last 24 hours) at 6/30/2021 1357  Last data filed at 6/30/2021 1311  Gross per 24 hour   Intake 1370 ml   Output 1200 ml   Net 170 ml       Physical Examination:    BP (!) 140/80   Pulse 69   Temp 97.4 °F (36.3 °C) (Oral)   Resp 18   Ht 5' 7\" (1.702 m)   Wt 263 lb 7.2 oz (119.5 kg)   LMP 01/01/2004   SpO2 94%   BMI 41.26 kg/m²    HEENT:  Face: Atraumatic, Conjunctiva: Pink; non icteric,  Mucous Memb:  Moist, No thyromegaly or Lymphadenopathy  Respiratory:  Resp Assessment: normal, Resp Auscultation: clear   Cardiovascular: Auscultation: nl S1 & S2, Palpation:  Nl PMI;  No heaves or thrills, JVP:  normal  Abdomen: Soft, non-tender, Normal bowel sounds,  No organomegaly  Extremities: No Cyanosis or Clubbing; Edema none  Neurological: Oriented to time, place, and person, Non-anxious  Psychiatric: Normal mood and affect  Skin: Warm and dry,  No rash seen      Current Facility-Administered Medications: buPROPion (WELLBUTRIN XL) extended release tablet 150 mg, 150 mg, Oral, Daily  carbidopa-levodopa (SINEMET)  MG per tablet 1 tablet, 1 tablet, Oral, BID  clopidogrel (PLAVIX) tablet 75 mg, 75 mg, Oral, Daily  FLUoxetine (PROZAC) capsule 80 mg, 80 mg, Oral, Daily  HYDROcodone-acetaminophen (NORCO) 7.5-325 MG per tablet 1 tablet, 1 tablet, Oral, Q6H PRN  metoprolol succinate (TOPROL XL) extended release tablet 50 mg, 50 mg, Oral, Daily  rosuvastatin (CRESTOR) tablet 40 mg, 40 mg, Oral, Nightly  sodium bicarbonate tablet 650 mg, 650 mg, Oral, 4x Daily  sodium chloride flush 0.9 % injection 10 mL, 10 mL, Intravenous, 2 times per day  sodium chloride flush 0.9 % injection 10 mL, 10 mL, Intravenous, PRN  0.9 % sodium chloride infusion, 25 mL, Intravenous, PRN  ondansetron (ZOFRAN-ODT) disintegrating tablet 4 mg, 4 mg, Oral, Q8H PRN **OR** ondansetron (ZOFRAN) injection 4 mg, 4 mg, Intravenous, Q6H PRN  acetaminophen (TYLENOL) tablet 650 mg, 650 mg, Oral, Q6H PRN **OR** acetaminophen (TYLENOL) suppository 650 mg, 650 mg, Rectal, Q6H PRN  enoxaparin (LOVENOX) injection 40 mg, 40 mg, Subcutaneous, Nightly  potassium chloride (KLOR-CON M) extended release tablet 40 mEq, 40 mEq, Oral, PRN **OR** potassium bicarb-citric acid (EFFER-K) effervescent tablet 40 mEq, 40 mEq, Oral, PRN **OR** potassium chloride 10 mEq/100 mL IVPB (Peripheral Line), 10 mEq, Intravenous, PRN  nitroGLYCERIN (NITROSTAT) SL tablet 0.4 mg, 0.4 mg, Sublingual, Q5 Min PRN  bisacodyl (DULCOLAX) EC tablet 5 mg, 5 mg, Oral, Daily PRN  aspirin chewable tablet 81 mg, 81 mg, Oral, Daily      Labs:   Recent Labs     06/29/21  1138 06/30/21  0350   WBC 9.6 6.9   HGB 12.2 11.6*   HCT 36.4 34.6*    194     Recent Labs     06/29/21  1138 06/29/21  1138 06/30/21  0350     --  137   K 5.8*  --  5.0   CO2 15*  --  18*   BUN 40*  --  36*   CREATININE 2.0*  --  2.0*   GLUCOSE 103*   < > 91    < > = values in this interval not displayed. No results for input(s): BNP in the last 72 hours. No results for input(s): PROTIME, INR in the last 72 hours. No results for input(s): APTT in the last 72 hours.   Recent Labs     06/29/21  1138 06/30/21  0350 06/30/21  1047   TROPONINI <0.01 0.02* <0.01     Lab Results   Component Value Date    HDL 40 06/30/2021    HDL 36 09/27/2010    LDLDIRECT 57 06/30/2021    LDLCALC see below 06/30/2021    TRIG 321 06/30/2021     Recent Labs     06/29/21  1138   AST 24   ALT 22   LABALBU 4.3         EKG:   Sinus rhythm with LVH and nonspecific ST changes    Chest X-Ray:      ECHO:      Stress Nuclear:      Corornary angiogram  & Intervention:        ASSESSMENT AND PLAN:      Patient with known coronary disease having chest pain brought on with stress  Not sure if this is true angina but given known CAD we will treat empirically with Imdur 60 mg daily    She needs her medicines for restless leg  Not having these medicines is causing her stress which is leading to chest pain    Troponin 0.01, 0.02, 0.01  Creatinine is 2    Do not want to do any stress test or coronary angiography      Tish Freeman M.D  6/30/2021

## 2021-06-30 NOTE — CARE COORDINATION
Case Management:  Brief discussion with patient and  present in room re discharge needs or concerns. Patient denied need for any assistance/follow up.  will transport home. No issues getting , taking or affording medication.   Electronically signed by Samreen Morton on 6/30/2021 at 3:58 PM

## 2021-06-30 NOTE — H&P
Hospitalist  History and Physical    Patient:  Kristin Solano  MRN: [id number]  PCP: Cyndi Correa MD    CHIEF COMPLAINT:  Chest Pain, SOB,       HISTORY OF PRESENT ILLNESS:   The patient Kristin Solano is a 63 y. o.female with medical history significant for chronic kidney disease COPD coronary artery disease anxiety depression hypertension hyperlipidemia and iron deficiency anemia. Patient also has history of for Parkinson's disease and restless leg syndrome. Patient presented to the emergency room with chest pain since yesterday  Patient reports chest pain is associated with shortness of breath and dizziness. Patient reports chest is pressure-like but not as severe as her pain when she had heart attack. Patient denies any fever chills no history of for hematemesis or melena no history of urinary symptoms.       Past Medical History:        Diagnosis Date    Arthritis     ASHD (arteriosclerotic heart disease)     Chronic kidney disease     Chronic midline low back pain without sciatica 4/3/2019    COPD (chronic obstructive pulmonary disease) (HCC)     unknown     Coronary artery disease involving native coronary artery of native heart without angina pectoris 3/31/2016    Coronary artery disease involving native coronary artery of native heart without angina pectoris 3/31/2016    Depression     Full dentures     HTN     Hyperlipidemia     Iron deficiency anemia 3/31/2016    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/2020    Kidney stone 2012    Morbid obesity (HCC)     Restless legs syndrome     pt on cabidopa/levodopa    Stage 3 chronic kidney disease ([address] 4/3/2019    Wears glasses        Past Surgical History:        Procedure Laterality Date    ABSCESS DRAINAGE Left 04/19/2018    incision and drainage of right buttock abscess    APPENDECTOMY      CARPAL TUNNEL RELEASE Left 11/14/2017    CORONARY ARTERY BYPASS GRAFT  9/28/10    4 way bypass    HIP SURGERY Left 2016 I&D of joint    KNEE ARTHROSCOPY Right 2000    TOTAL HIP ARTHROPLASTY Left 11-4-2015    TOTAL KNEE ARTHROPLASTY Right 8-24-10    TOTAL KNEE ARTHROPLASTY Left 4/8/11    TUBAL LIGATION      UPPER GASTROINTESTINAL ENDOSCOPY N/A 7/10/2020    EGD DIAGNOSTIC ONLY performed by Rosemary Butler MD at [facility]       Medications Prior to Admission:    Prior to Admission medications    Medication Sig Start Date End Date Taking? Authorizing Provider   HYDROcodone-acetaminophen (NORCO) 7.5-325 MG per tablet Take 1 tablet by mouth every 6 hours as needed for Pain for up to 30 days. 6/10/21 7/10/21 Yes Robert House MD   sodium bicarbonate 650 MG tablet Take 1 tablet by mouth 4 times daily 5/9/21 8/7/21 Yes Jorge A Laird MD   carbidopa-levodopa (SINEMET)  MG per tablet Use as directed  Patient taking differently: Take 1 tablet by mouth 2 times daily Use as directed 5/7/21  Yes Robert House MD   FLUoxetine (PROZAC) 40 MG capsule TAKE 2 CAPSULES DAILY  Patient taking differently: Take 80 mg by mouth daily TAKE 2 CAPSULES DAILY 3/17/21  Yes Robert House MD   metoprolol succinate (TOPROL XL) 50 MG extended release tablet TAKE 1 TABLET BY MOUTH EVERY DAY 2/15/21  Yes Robert House MD   buPROPion (WELLBUTRIN XL) 150 MG extended release tablet TAKE 1 TABLET BY MOUTH EVERY DAY 1/7/21  Yes Robert House MD   rosuvastatin (CRESTOR) 40 MG tablet Take 1 tablet by mouth nightly 7/16/20  Yes Tati Flores MD   clopidogrel (PLAVIX) 75 MG tablet Take 1 tablet by mouth daily 7/16/20  Yes Tati Flores MD       Allergies:  Mobic [meloxicam], Morphine, and Other      Social History:   TOBACCO:   reports that she has been smoking cigarettes. She has a 15.00 pack-year smoking history. She has never used smokeless tobacco.  ETOH:   reports no history of alcohol use.         Family History:       Problem Relation Age of Onset    Stroke Mother     Diabetes Mother     Heart Disease Mother     High Blood Pressure Mother     High Cholesterol Mother     Cancer Neg Hx            REVIEW OF SYSTEMS:     patients reported symptoms are in BOLD all other symptoms are negative. CONSTITUTIONAL:      fatigue, fever, chills or night sweats, recent weight gain, recent wt loss, insomnia,  General weakness, poor appetite, muscle aches and pains    HEAD: headache, dizziness    EYES:      blurriness,  double vision, dryness,  discharge, irritation,diplopia    EARS:      hearing loss, vertigo, ear discharge,  Earache. Ringing in the ears. NOSE:      Rhinorrhea, sneezing, epistaxis. Discharge, sinusitis,     MOUTH/THROAT:         sore throat, mouth ulcers, Hoarseness    RESPIRATORY:        Shortness of breath, wheezing,  cough, sputum, hemoptysis, obstructive sleep apnea,    Chest tightness,     CARDIOVASCULAR :      chest pain, palpitations, dyspnea on exercise, Lower extrimity edema (swelling),     GASTROINTESTINAL:       Dysphagia, Poor appetite,  Nausea, Vomiting, diarrhea, heartburn, abdominal pain. Blood in the stools, hematemesis. Pain with swallowing, constipation    GENITOURINARY:       Urinary frequency, hesitancy,  urgency, Dysuria, hematuria,  Urinary Incontinence. Urinary Retention. GYNECOLOGICAL: vaginal bleeding , vaginal discharge, menopause    MUSCULOSKELETAL:       joint swelling or stiffness, joint pain, muscle pain, balance problems, low back pain. NEUROLOGICAL:      Gait problems. Tremor. Dizziness. Pain and paresthesias, weakness in extremities. Seizures, memory loss    PSYCHLOGICAL:        Anxiety, depression    SKIN :      Rashes ulcers, skin color changes, easy bruisability, lymphadenopathy      Physical Exam:      Vitals: /84   Pulse 74   Temp 98 °F (36.7 °C) (Oral)   Resp 18   Ht 5' 7\" (1.702 m)   Wt 262 lb 12.6 oz (119.2 kg)   LMP 01/01/2004   SpO2 96%   BMI 41.16 kg/m²     Gen:          Alert and oriented x 3  Eyes: PERRL. No sclera icterus. No conjunctival injection. ENT: No discharge. Pharynx clear. External appearance of ears and nose normal.  Neck: Trachea midline. No obvious mass. Resp: No accessory muscle use. No crackles. No wheezes. No rhonchi. CV: Regular rate. Regular rhythm. No murmur or rub. No edema. GI: Non-tender. Non-distended. No hernia. Skin: Warm, dry, normal texture and turgor. Lymph: No cervical LAD. No supraclavicular LAD. M/S: / Ext. No cyanosis. No clubbing. No joint deformity. Neuro: Moves all four extremities. CN 2-12 tested, no deficits noted. Peripheral pulses and capillary refill is intact. CBC:   Recent Labs     06/29/21  1138   WBC 9.6   HGB 12.2        BMP:    Recent Labs     06/29/21  1138      K 5.8*      CO2 15*   BUN 40*   CREATININE 2.0*   GLUCOSE 103*     Hepatic:   Recent Labs     06/29/21  1138   AST 24   ALT 22   BILITOT <0.2   ALKPHOS 127     Troponin:   Recent Labs     06/29/21  1138   TROPONINI <0.01     BNP: No results for input(s): BNP in the last 72 hours. INR: No results for input(s): INR in the last 72 hours. Lab Results   Component Value Date    LABA1C 5.2 02/27/2020           No results for input(s): CKTOTAL in the last 72 hours. -----------------------------------------------------------------    XR CHEST (2 VW)  1. No acute abnormality demonstrated   2. Prominent central pulmonary arteries suggests pulmonary arterial   hypertension       EKG  Normal sinus rhythm  Left axis deviation  Minimal voltage criteria for LVH, may be normal variant    ECHO  Left ventricular cavity size is upper limits of normal.   There is mild conc. LVH. EF 55%.  (7/28/2019)    Assessment / Plan     Chest pain, unspecified R07.9  Atypical chest pain  Admit patient to telemetry  Trend cardiac enzymes  Consult to cardiology  Start patient on PPI  Continue patient on aspirin, beta blocker and statin    Pain management  R52  Continue with the IV and oral pain medication      Parkinson's disease  Continue on Sinemet      Hypertension  Continue on home medication    Anxiety depression F 41.9  Continue on home medication      Chronic kidney disease  Creatinine at baseline              DVT and GI prophylaxis      Full Code      MD MIGEL [email or web address]    This note was transcribed using [id number]. Please disregard any translational errors.

## 2021-06-30 NOTE — PROGRESS NOTES
Progress Note  Admit Date: 6/29/2021      PCP: Claudia Acevedo MD     CC: F/U for chest pain     Days in hospital:  1    SUBJECTIVE / Interval History:  Pt feels ok, NO CP         Allergies  Mobic [meloxicam], Morphine, and Other    Medications    Scheduled Meds:   buPROPion  150 mg Oral Daily    carbidopa-levodopa  1 tablet Oral BID    clopidogrel  75 mg Oral Daily    FLUoxetine  80 mg Oral Daily    metoprolol succinate  50 mg Oral Daily    rosuvastatin  40 mg Oral Nightly    sodium bicarbonate  650 mg Oral 4x Daily    sodium chloride flush  10 mL Intravenous 2 times per day    enoxaparin  40 mg Subcutaneous Nightly    aspirin  81 mg Oral Daily     Continuous Infusions:   sodium chloride         PRN Meds:  HYDROcodone-acetaminophen, sodium chloride flush, sodium chloride, ondansetron **OR** ondansetron, acetaminophen **OR** acetaminophen, potassium chloride **OR** potassium alternative oral replacement **OR** potassium chloride, nitroGLYCERIN, bisacodyl    Vitals    /71   Pulse 70   Temp 98.4 °F (36.9 °C) (Oral)   Resp 18   Ht 5' 7\" (1.702 m)   Wt 263 lb 7.2 oz (119.5 kg)   LMP 01/01/2004   SpO2 95%   BMI 41.26 kg/m²     Exam:    Gen: No distress. Eyes: PERRL. No sclera icterus. No conjunctival injection. ENT: No discharge. Pharynx clear. External appearance of ears and nose normal.  Neck: Trachea midline. No obvious mass. Resp: No accessory muscle use. No crackles. No wheezes. No rhonchi. No dullness on percussion. CV: Regular rate. Regular rhythm. No murmur or rub. No edema. GI: Non-tender. Non-distended. No hernia. Skin: Warm, dry, normal texture and turgor. No nodule on exposed extremities. Lymph: No cervical LAD. No supraclavicular LAD. M/S: No cyanosis. No clubbing. No joint deformity. Neuro: Moves all four extremities. CN 2-12 tested, no defect noted. Psych: Oriented x 3. No anxiety. Awake. Alert. Intact judgement and insight.     Data    LABS  CBC:   Recent Labs     06/29/21  1138 06/30/21  0350   WBC 9.6 6.9   HGB 12.2 11.6*   HCT 36.4 34.6*   MCV 93.8 94.5    194     BMP:   Recent Labs     06/29/21  1138 06/30/21  0350    137   K 5.8* 5.0    108   CO2 15* 18*   BUN 40* 36*   CREATININE 2.0* 2.0*   GLUCOSE 103* 91     POC GLUCOSE:  No results for input(s): POCGLU in the last 72 hours. LIVER PROFILE:   Recent Labs     06/29/21  1138   AST 24   ALT 22   LABALBU 4.3   BILITOT <0.2   ALKPHOS 127     PT/INR: No results for input(s): PROTIME, INR in the last 72 hours. APTT: No results for input(s): APTT in the last 72 hours. UA:No results for input(s): NITRITE, COLORU, PHUR, LABCAST, WBCUA, RBCUA, MUCUS, TRICHOMONAS, YEAST, BACTERIA, CLARITYU, SPECGRAV, LEUKOCYTESUR, UROBILINOGEN, BILIRUBINUR, BLOODU, GLUCOSEU, KETUA, AMORPHOUS in the last 72 hours. Microbiology:  Wound Culture: No results for input(s): WNDABS, ORG in the last 72 hours. Invalid input(s):  LABGRAM  Nasal Culture: No results for input(s): ORG, MRSAPCR in the last 72 hours. Blood Culture: No results for input(s): BC, BLOODCULT2 in the last 72 hours. Fungal Culture:   No results for input(s): FUNGSM in the last 72 hours. No results for input(s): FUNCXBLD in the last 72 hours. CSF Culture:  No results for input(s): COLORCSF, APPEARCSF, CFTUBE, CLOTCSF, WBCCSF, RBCCSF, NEUTCSF, NUMCELLSCSF, LYMPHSCSF, MONOCSF, GLUCCSF, VOLCSF in the last 72 hours. Respiratory Culture:  No results for input(s): Rosiland Divers in the last 72 hours. AFB:No results for input(s): AFBSMEAR in the last 72 hours. Urine Culture  No results for input(s): LABURIN in the last 72 hours. RADIOLOGY:    XR CHEST (2 VW)   Final Result   1. No acute abnormality demonstrated   2.  Prominent central pulmonary arteries suggests pulmonary arterial   hypertension             CONSULTS:    IP CONSULT TO CARDIOLOGY    ASSESSMENT AND PLAN:      Active Problems:    Chest pain  Resolved Problems:    * No resolved hospital problems. *    Patient is a 26-year-old female with a past medical history of chronic kidney disease, COPD, coronary artery disease status post bypass, hypertension, hyperlipidemia, restless legs who presented with some chest tightness shortness of breath nausea and lightheadedness since the last few days. Chest pain with a history of coronary artery disease  -Continue cardiac meds, on Plavix statin and beta-blocker  -Patient sees [facility] cardiology and hence is consulted  -Patient's troponin normal on admission. Mildly elevated at 0.02. Repeat troponin ordered.  -Elevated troponin could be due to chronic kidney disease  . Chronic kidney disease stage III  -Patient's creatinine is about 2 at baseline.  -Monitor creatinine    History of restless legs  -Continue Sinemet    Chronic pain  -Continue home meds    Anxiety  -Continue home meds      Diet: ADULT DIET; Regular; Low Fat/Low Chol/High Fiber/NAYAN; No Caffeine  Code Status: Full Code        Discharge plan - today if ok with cardiology    The patient and / or the family were informed of the results of any tests, a time was given to answer questions, a plan was proposed and they agreed with plan. Discussed with consulting physicians, nursing and social work     The note was completed using EMR. Every effort was made to ensure accuracy; however, inadvertent computerized transcription errors may be present.        Felipe Rain MD

## 2021-06-30 NOTE — PLAN OF CARE
Problem: Pain:  Goal: Pain level will decrease  Description: Pain level will decrease  Outcome: Ongoing  Note: Pain/discomfort being managed with PRN analgesics per MD orders. Pt able to express presence and absence of pain and rate pain appropriately using numerical scale. Problem: Falls - Risk of:  Goal: Will remain free from falls  Description: Will remain free from falls  Outcome: Ongoing  Note: Fall risk assessment completed every shift. All precautions in place. Pt has call light within reach at all times. Room clear of clutter. Pt aware to call for assistance when getting up. Patient assessed for fall risk; fall precautions initiated. Patient and family instructed about safety devices. Environment kept free of clutter and adequate lighting provided. Bed locked and in lowest position. Call light within reach. Will continue to monitor. Problem: DAILY CARE  Goal: Daily care needs are met  Outcome: Ongoing  Note: Patient's ability assessed to perform self care and independent activity encouraged according to that ability. Assisted with ADL's as needed. Risk for skin breakdown assessed. Potential discharge needs assessed. Patient and family included in daily care decisions.       Problem: Cardiac Output - Decreased:  Goal: Hemodynamic stability will improve  Description: Hemodynamic stability will improve  Outcome: Ongoing

## 2021-06-30 NOTE — PROGRESS NOTES
IV and tele d/cd. Discharge instructions reviewed with pt and , verbalized understanding. meds delivered per retail pharmacy. Belongings gathered per pt.  Pt taken out via wheelchair and  to drive pt home  Electronically signed by Gio Polanco RN on 6/30/2021 at 4:17 PM

## 2021-06-30 NOTE — PROGRESS NOTES
Physician Progress Note      PATIENT:               Jim Rai  CSN #:                  [id number]  :                       1955  ADMIT DATE:       2021 11:14 AM  DISCH DATE:  RESPONDING  PROVIDER #:        Beth Vaughn MD          QUERY TEXT:    Dear Dr. Rosalina Mullins,  Patient admitted with BMI 41.26. If possible, please document in progress   notes and discharge summary if you are evaluating and /or treating any of the   following: The medical record reflects the following:  Risk Factors: Hx HTN, HLD, Arthritis, COPD  Clinical Indicators: BMI=41.26, 5'7\", 263 lb  Treatment:  when appropriate  Thank you,  Pratik Crenshaw RN, [email or web address]  Options provided:  -- Morbid obesity  -- Obesity  -- Overweight  -- Other - I will add my own diagnosis  -- Disagree - Not applicable / Not valid  -- Disagree - Clinically unable to determine / Unknown  -- Refer to Clinical Documentation Reviewer    PROVIDER RESPONSE TEXT:    This patient has morbid obesity. Query created by: [facility] on 2021 9:36 AM      QUERY TEXT:    Dear Dr. Rosalina Mullins,  Patient admitted with Atypical CP, noted to have CKD If possible, please   document in progress notes and discharge summary if you are evaluating and/or   treating any of the following: The medical record reflects the following:  Risk Factors: Hx HTN, CKD  Clinical Indicators:  ED for CP, SOB, intermittent dizziness, BUN=40,   Creat=2.0, GFR=25, H and P notes\"Chronic kidney disease,Creatinine at   baseline\" BUN=36, Creat=2.0, GFR=25  Treatment: monitor labs  Thank you,  Pratik Crenshaw RN, [email or web address]  Options provided:  -- CKD Stage 1 GFR>90  -- CKD Stage 2 GFR 60-90  -- CKD Stage 3a GFR 45-59  -- CKD Stage 3b GFR 30-44  -- CKD Stage 4 GFR 15-29  -- CKD Stage 5 GFR<15  -- ESRD  -- Other - I will add my own diagnosis  -- Disagree - Not applicable / Not valid  -- Disagree - Clinically unable to determine / Unknown  -- Refer to Clinical Documentation Reviewer    PROVIDER RESPONSE TEXT:    This patient has CKD Stage 4. Query created by:  Zeenat Booth on 6/30/2021 9:42 AM      Electronically signed by:  Lidia Phillips MD 6/30/2021 1:14 PM

## 2021-06-30 NOTE — ACP (ADVANCE CARE PLANNING)
Advance Care Planning     Advance Care Planning Activator (Inpatient)  Conversation Note      Date of ACP Conversation: 6/30/2021     [employer] with:  Patient with decision making capacity. ACP Activator: [address] Decision Maker:     Current Designated Health Care Decision Maker:    Primary Decision Maker:   - Mayra Valadez - h) [phone] or c) [phone]      Care Preferences    Ventilation: \"If you were in your present state of health and suddenly became very ill and were unable to breathe on your own, what would your preference be about the use of a ventilator (breathing machine) if it were available to you? \"      Would the patient desire the use of ventilator (breathing machine)?: yes    \"If your health worsens and it becomes clear that your chance of recovery is unlikely, what would your preference be about the use of a ventilator (breathing machine) if it were available to you? \"     Would the patient desire the use of ventilator (breathing machine)?: No      Resuscitation  \"CPR works best to restart the heart when there is a sudden event, like a heart attack, in someone who is otherwise healthy. Unfortunately, CPR does not typically restart the heart for people who have serious health conditions or who are very sick. \"    \"In the event your heart stopped as a result of an underlying serious health condition, would you want attempts to be made to restart your heart (answer \"yes\" for attempt to resuscitate) or would you prefer a natural death (answer \"no\" for do not attempt to resuscitate)? \" yes       [] Yes   [x] No   Educated Patient / [facility] regarding differences between Advance Directives and portable DNR orders.     Length of ACP Conversation in minutes:      Conversation Outcomes:  [x] ACP discussion completed  [] Existing advance directive reviewed with patient; no changes to patient's previously recorded wishes  [] New Advance Directive completed  [] Portable Do Not Rescitate prepared for Provider review and signature  [] POLST/POST/MOLST/MOST prepared for Provider review and signature      Follow-up plan:    [] Schedule follow-up conversation to continue planning  [] Referred individual to Provider for additional questions/concerns   [] Advised patient/agent/surrogate to review completed ACP document and update if needed with changes in condition, patient preferences or care setting    [x] This note routed to one or more involved healthcare providers  Electronically signed by Silver Genao on 6/30/2021 at 4:15 PM

## 2021-06-30 NOTE — DISCHARGE SUMMARY
Hospital Medicine Discharge Summary      Patient ID: Zena Asia      Patient's PCP: Yossi Ng MD    Admit Date: 6/29/2021     Discharge Date: 6/30/2021  The patient was seen and examined on day of discharge and this discharge summary is in conjunction with any daily progress note from day of discharge. Admitting Physician: Sherine Gonsalez MD    Discharge Physician: Farooq Card MD     Admitted for   Chief Complaint   Patient presents with    Chest Pain     x1 week, stayed the same    Shortness of Breath     x1 week, smoker    Other     restless, feels like she is going to jump out of her skin       Admitting Diagnosis Chest pain [R07.9]    Discharge Diagnoses: Active Hospital Problems    Diagnosis Date Noted    Chest pain [R07.9] 06/29/2021       Follow Up: Primary Care Physician in one week    PCP to Follow up on   Post dc FU          Hospital Course:     Patient is a 72-year-old female with a past medical history of chronic kidney disease, COPD, coronary artery disease status post bypass, hypertension, hyperlipidemia, restless legs who presented with some chest tightness shortness of breath nausea and lightheadedness since the last few day. Patient is out of the Sinemet and has been restless and not been sleeping well. She also states she has a lot of stress at home. He was admitted with chest pain. Her troponin peaked at 0.02 but EKG was unremarkable. And her elevated troponin could be due to her chronic kidney disease. Patient had angiogram in 2019 and has some nonobstructive coronary artery disease. Cardiology was consulted. They recommended that patient be discharged on Imdur. Prescription has been given for her Sinemet also. Patient has been counseled that if she has worsening symptoms she will need to come back to the ER. Patient also smokes and has been counseled to stop smoking.     Consults:     IP CONSULT TO CARDIOLOGY        Disposition: home    Discharged Condition: Stable    Code Status: Full Code    Activity: activity as tolerated    Diet: cardiac diet        Labs: For convenience and continuity at follow-up the following most recent labs are provided:    CBC:   Lab Results   Component Value Date    WBC 6.9 06/30/2021    HGB 11.6 06/30/2021    HCT 34.6 06/30/2021     06/30/2021       RENAL:   Lab Results   Component Value Date     06/30/2021    K 5.0 06/30/2021     06/30/2021    CO2 18 06/30/2021    BUN 36 06/30/2021    CREATININE 2.0 06/30/2021           Discharge Medications:    Cynthia Gibson   Home Medication Instructions [id number]    Printed on:06/30/21 7239   Medication Information                      buPROPion (WELLBUTRIN XL) 150 MG extended release tablet  TAKE 1 TABLET BY MOUTH EVERY DAY             carbidopa-levodopa (SINEMET)  MG per tablet  Take 1 tablet by mouth 2 times daily Use as directed             clopidogrel (PLAVIX) 75 MG tablet  Take 1 tablet by mouth daily             FLUoxetine (PROZAC) 40 MG capsule  TAKE 2 CAPSULES DAILY             HYDROcodone-acetaminophen (NORCO) 7.5-325 MG per tablet  Take 1 tablet by mouth every 6 hours as needed for Pain for up to 30 days. isosorbide mononitrate (IMDUR) 60 MG extended release tablet  Take 1 tablet by mouth daily             metoprolol succinate (TOPROL XL) 50 MG extended release tablet  TAKE 1 TABLET BY MOUTH EVERY DAY             rosuvastatin (CRESTOR) 40 MG tablet  Take 1 tablet by mouth nightly             sodium bicarbonate 650 MG tablet  Take 1 tablet by mouth 4 times daily                 Future Appointments   Date Time Provider Reece Greenfield   7/8/2021  2:15 PM Ave Manjarrez MD [facility]   8/4/2021 11:45 AM Sean Aponte MD [facility]       Time Spent on discharge is more than 45 minutes in the examination, evaluation, counseling and review of medications and discharge plan.       Signed:  Lizzeth Garcia MD   6/30/2021    The note was completed using EMR. Every effort was made to ensure accuracy; however, inadvertent computerized transcription errors may be present. Thank you Sandoval Monreal MD for the opportunity to be involved in this patient's care. If you have any questions or concerns please feel free to contact me at [phone].

## 2021-07-01 ENCOUNTER — CARE COORDINATION (OUTPATIENT)
Dept: CASE MANAGEMENT | Age: 66
End: 2021-07-01

## 2021-07-01 ENCOUNTER — TELEPHONE (OUTPATIENT)
Dept: FAMILY MEDICINE CLINIC | Age: 66
End: 2021-07-01

## 2021-07-01 NOTE — CARE COORDINATION
Sue [facility] Transitions Initial Follow Up Call    Call within 2 business days of discharge: Yes    Patient: Jerrod Villa Patient : 1955   MRN: [id number]  Reason for Admission: Chest Pain  Discharge Date: 21 RARS: Readmission Risk Score: 19      Last Discharge [facility]       Complaint Diagnosis Description Type Department Provider    21 Chest Pain; Shortness of Breath; Other Chest pain, unspecified type ED to Hosp-Admission (Discharged) (ADMITTED) Elyse Lopez MD; Melissa Silva. .. Spoke with: Yosvany Youssef - patient    Facility: [facility]    Initial attempt at [facility] discharge phone call. Spoke briefly with Patricia Clayton. She states she is doing \"fine\". Discussed role of CTN. Patricia Clayton declines completing discharge phone call or any additional CTN calls. She did take writer's contact info.         Follow Up  Future Appointments   Date Time Provider Reece Greenfield   2021  2:15 PM Christin Reyna MD [facility]   2021 11:45 AM Anisha Sheldon MD [facility]       John Mack RN

## 2021-07-01 NOTE — TELEPHONE ENCOUNTER
S[facility] Transitions Initial Follow Up Call    Outreach made within 2 business days of discharge: Yes    Patient: Aiden Sidhu Patient : 1955   MRN: [id number]  Reason for Admission: There are no discharge diagnoses documented for the most recent discharge. Discharge Date: 21       Spoke with: Darshan Givens    Discharge department/facility: [facility] Patient Contact:  Was patient able to fill all prescriptions: Yes  Was patient instructed to bring all medications to the follow-up visit: Yes  Is patient taking all medications as directed in the discharge summary?  Yes  Does patient understand their discharge instructions: Yes  Does patient have questions or concerns that need addressed prior to 7-14 day follow up office visit: no    Scheduled appointment with PCP within 7-14 days    Follow Up  Future Appointments   Date Time Provider Reece Greenfield   2021 10:45 AM MD [facility] [place] [facility]   2021  2:15 PM Nat Hayden MD [facility]   2021 11:45 AM Felipe Bonilla MD [facility]       Alcira Gipson MA

## 2021-07-01 NOTE — TELEPHONE ENCOUNTER
Patient ws in [facility] for 1 day  Sched. appt. as TCM  Not sure if TCM for 1 day in hospital  review

## 2021-07-06 NOTE — PROGRESS NOTES
Physician Progress Note      PATIENT:               Alisha Pond  CSN #:                  [id number]  :                       1955  ADMIT DATE:       2021 11:14 AM  DISCH DATE:        2021 4:26 PM  RESPONDING  PROVIDER #:        Estrada Knott MD          QUERY TEXT:    Dear Dr. Caryle Gaskin,  [address] admitted with atypical chest pain. Pt noted to have CAD and anxiety. If   possible, please document in progress notes and discharge summary if you are   evaluating and/or treating any of the following: The medical record reflects the following:  Risk Factors:Hx HTN, CAD with CABG, HLD, COPD, CKD, anxiety and current   smoker, morbid obesity  Clinical Indicators:  ED for CP, SOB, intermittent dizziness, trop mildly   elevated=0.02, and repeat=<0.01,  Cardiology consult notes  states   Charlotte Jeffery gets pain in her chest whenever she is stressed\" and Charlotte Jeffery has been   without her carbidopa for the last several days which has caused severe   restlessness causing anxiety\", \"known coronary disease having chest pain. Benedetta Ou Benedetta Ou Not   sure if this is true angina but given known CAD we will treat\" PCP notes in   Discharge summary \"EKG was unremarkable. And her elevated troponin could be   due to her chronic kidney disease\"  Treatment: continue Plavix, statin, beta blocker, Imdur started, counseled to   stop smoking  Thank you,  Cristofer Reynolds RN, [email or web address]  Options provided:  -- Chest pain due to CAD with unstable angina  -- Chest pain due to anxiety  -- Other - I will add my own diagnosis  -- Disagree - Not applicable / Not valid  -- Disagree - Clinically unable to determine / Unknown  -- Refer to Clinical Documentation Reviewer    PROVIDER RESPONSE TEXT:    Provider is clinically unable to determine a response to this query. Query created by:  Alondra Booth on 2021 7:12 AM      Electronically signed by:  Estrada Knott MD 2021 3:26 PM

## 2021-07-08 ENCOUNTER — OFFICE VISIT (OUTPATIENT)
Dept: FAMILY MEDICINE CLINIC | Age: 66
End: 2021-07-08
Payer: COMMERCIAL

## 2021-07-08 VITALS
SYSTOLIC BLOOD PRESSURE: 126 MMHG | DIASTOLIC BLOOD PRESSURE: 82 MMHG | BODY MASS INDEX: 42.69 KG/M2 | HEIGHT: 67 IN | OXYGEN SATURATION: 95 % | WEIGHT: 272 LBS | HEART RATE: 82 BPM

## 2021-07-08 DIAGNOSIS — Z12.31 ENCOUNTER FOR SCREENING MAMMOGRAM FOR MALIGNANT NEOPLASM OF BREAST: ICD-10-CM

## 2021-07-08 DIAGNOSIS — M21.41 PES PLANUS OF BOTH FEET: ICD-10-CM

## 2021-07-08 DIAGNOSIS — Z76.0 ENCOUNTER FOR MEDICATION REFILL: ICD-10-CM

## 2021-07-08 DIAGNOSIS — M21.42 PES PLANUS OF BOTH FEET: ICD-10-CM

## 2021-07-08 DIAGNOSIS — M15.9 PRIMARY OSTEOARTHRITIS INVOLVING MULTIPLE JOINTS: ICD-10-CM

## 2021-07-08 DIAGNOSIS — R07.9 CHEST PAIN, UNSPECIFIED TYPE: Primary | ICD-10-CM

## 2021-07-08 DIAGNOSIS — Z23 NEED FOR SHINGLES VACCINE: ICD-10-CM

## 2021-07-08 PROCEDURE — 90750 HZV VACC RECOMBINANT IM: CPT | Performed by: FAMILY MEDICINE

## 2021-07-08 PROCEDURE — 90471 IMMUNIZATION ADMIN: CPT | Performed by: FAMILY MEDICINE

## 2021-07-08 PROCEDURE — 99495 TRANSJ CARE MGMT MOD F2F 14D: CPT | Performed by: FAMILY MEDICINE

## 2021-07-08 PROCEDURE — 1111F DSCHRG MED/CURRENT MED MERGE: CPT | Performed by: FAMILY MEDICINE

## 2021-07-08 RX ORDER — HYDROCODONE BITARTRATE AND ACETAMINOPHEN 7.5; 325 MG/1; MG/1
1 TABLET ORAL EVERY 6 HOURS PRN
Qty: 120 TABLET | Refills: 0 | Status: SHIPPED | OUTPATIENT
Start: 2021-07-08 | End: 2021-08-05 | Stop reason: SDUPTHER

## 2021-07-08 SDOH — ECONOMIC STABILITY: FOOD INSECURITY: WITHIN THE PAST 12 MONTHS, THE FOOD YOU BOUGHT JUST DIDN'T LAST AND YOU DIDN'T HAVE MONEY TO GET MORE.: NEVER TRUE

## 2021-07-08 SDOH — ECONOMIC STABILITY: FOOD INSECURITY: WITHIN THE PAST 12 MONTHS, YOU WORRIED THAT YOUR FOOD WOULD RUN OUT BEFORE YOU GOT MONEY TO BUY MORE.: NEVER TRUE

## 2021-07-08 ASSESSMENT — SOCIAL DETERMINANTS OF HEALTH (SDOH): HOW HARD IS IT FOR YOU TO PAY FOR THE VERY BASICS LIKE FOOD, HOUSING, MEDICAL CARE, AND HEATING?: NOT HARD AT ALL

## 2021-07-08 NOTE — PROGRESS NOTES
Post-Discharge Transitional Care Management Services or Hospital Follow Up      Zahra Ruiz   YOB: 1955    Date of Office Visit:  7/8/2021  Date of Hospital Admission: 6/29/21  Date of Hospital Discharge: 6/30/21  Risk of hospital readmission (high >=14%.  Medium >=10%) :Readmission Risk Score: 19      Care management risk score Rising risk (score 2-5) and Complex Care (Scores >=6): 10     Non face to face  following discharge, date last encounter closed (first attempt may have been earlier): 7/1/2021  3:07 PM    Call initiated 2 business days of discharge: Yes    Patient Active Problem List   Diagnosis    Primary localized osteoarthrosis, lower leg    Obesity    HTN (hypertension)    Hyperlipidemia    Abscess of skin of abdomen    Arthritis    Intertrochanteric fracture of left femur ([address]    Osteopenia    Anemia    Failed total hip arthroplasty ([address]    History of total left hip arthroplasty 11/4/15    Coronary artery disease involving native coronary artery of native heart without angina pectoris    Iron deficiency anemia    Status post coronary artery bypass graft    Superficial postoperative wound infection    Arthritis of knee, right    Tobacco use disorder    Carpal tunnel syndrome of left wrist    Elevated serum creatinine    Cellulitis and abscess of buttock    Sepsis ([address]    Acute-on-chronic kidney injury ([address]    Metabolic acidosis, normal anion gap (NAG)    Morbid obesity (HCC)    Stage 3 chronic kidney disease (HCC)    Chronic midline low back pain without sciatica    SOB (shortness of breath)    COPD with exacerbation (HCC)    COPD exacerbation (HCC)    Unstable angina (HCC)    Restless legs syndrome    Acute kidney injury ([address]    Iron deficiency anemia secondary to blood loss (chronic)    Acute kidney injury superimposed on CKD (HCC)    Chest pain       Allergies   Allergen Reactions    Mobic [Meloxicam] Nausea Only     Stomach would get upset after taking this medication    Morphine Other (See Comments)     When taken in 2013 pt had an adverse reaction to morphine. She ended up in he hospital with kidney failure, dehydration, and in ICU. She prefers this not to be given ever.  Other      ? Suture from CABG caused blister (10/30/2015)       Medications listed as ordered at the time of discharge from hospital   [address] Medication Instructions GUERRERO:    Printed on:07/08/21 4195   Medication Information                      buPROPion (WELLBUTRIN XL) 150 MG extended release tablet  TAKE 1 TABLET BY MOUTH EVERY DAY             carbidopa-levodopa (SINEMET)  MG per tablet  Take 1 tablet by mouth 2 times daily Use as directed             clopidogrel (PLAVIX) 75 MG tablet  Take 1 tablet by mouth daily             FLUoxetine (PROZAC) 40 MG capsule  TAKE 2 CAPSULES DAILY             HYDROcodone-acetaminophen (NORCO) 7.5-325 MG per tablet  Take 1 tablet by mouth every 6 hours as needed for Pain for up to 30 days. isosorbide mononitrate (IMDUR) 60 MG extended release tablet  Take 1 tablet by mouth daily             metoprolol succinate (TOPROL XL) 50 MG extended release tablet  TAKE 1 TABLET BY MOUTH EVERY DAY             rosuvastatin (CRESTOR) 40 MG tablet  Take 1 tablet by mouth nightly             sodium bicarbonate 650 MG tablet  Take 1 tablet by mouth 4 times daily                   Medications marked \"taking\" at this time  Outpatient Medications Marked as Taking for the 7/8/21 encounter (Office Visit) with Cyndi Correa MD   Medication Sig Dispense Refill    carbidopa-levodopa (SINEMET)  MG per tablet Take 1 tablet by mouth 2 times daily Use as directed 60 tablet 0    isosorbide mononitrate (IMDUR) 60 MG extended release tablet Take 1 tablet by mouth daily 30 tablet 0    HYDROcodone-acetaminophen (NORCO) 7.5-325 MG per tablet Take 1 tablet by mouth every 6 hours as needed for Pain for up to 30 days.  120 tablet 0  sodium bicarbonate 650 MG tablet Take 1 tablet by mouth 4 times daily 360 tablet 1    FLUoxetine (PROZAC) 40 MG capsule TAKE 2 CAPSULES DAILY (Patient taking differently: Take 80 mg by mouth daily TAKE 2 CAPSULES DAILY) 180 capsule 3    metoprolol succinate (TOPROL XL) 50 MG extended release tablet TAKE 1 TABLET BY MOUTH EVERY DAY 90 tablet 3    buPROPion (WELLBUTRIN XL) 150 MG extended release tablet TAKE 1 TABLET BY MOUTH EVERY DAY 90 tablet 2    rosuvastatin (CRESTOR) 40 MG tablet Take 1 tablet by mouth nightly 90 tablet 3    clopidogrel (PLAVIX) 75 MG tablet Take 1 tablet by mouth daily 90 tablet 3        Medications patient taking as of now reconciled against medications ordered at time of hospital discharge: Yes    Chief Complaint   Patient presents with    Follow-Up from Hospital       History of Present illness - Follow up of Hospital diagnosis(es): chest pain, sob, restless    Inpatient course: Discharge summary reviewed- cardiac w/u neg  Added imdur    Interval history/Current status: no more chest pain  Smoking less    A comprehensive review of systems was negative except for what was noted in the HPI. Vitals:    07/08/21 1046   BP: 126/82   Pulse: 82   SpO2: 95%   Weight: 272 lb (123.4 kg)   Height: 5' 7\" (1.702 m)     Body mass index is 42.6 kg/m².    Wt Readings from Last 3 Encounters:   07/08/21 272 lb (123.4 kg)   06/30/21 263 lb 7.2 oz (119.5 kg)   04/29/21 260 lb 12.8 oz (118.3 kg)     BP Readings from Last 3 Encounters:   07/08/21 126/82   06/30/21 120/75   04/29/21 (!) 138/94        Physical Exam:  General Appearance: alert and oriented to person, place and time, well-developed and well-nourished, in no acute distress  Neck: neck supple and non tender without mass, no thyromegaly or thyroid nodules, no cervical lymphadenopathy   Pulmonary/Chest: clear to auscultation bilaterally- no wheezes, rales or rhonchi, normal air movement, no respiratory distress  Cardiovascular: normal rate, normal S1 and S2, no gallops, intact distal pulses and no carotid bruits  Genitourinary: genitals normal without hernia or inguinal adenopathy    Assessment/Plan:  1. Chest pain, unspecified type  stable, stay on Imdur  - [place] DISCHARGE MEDS RECONCILED W/ CURRENT OUTPATIENT MED LIST      2 obesity  Try to gradually increase exercise until she gets 150 minutes a week of brisk walking or the equivalent    3 tob  I told pt it is very important to quit smoking! When ready, I would like to schedule an appointment to discuss the various tools we can offer, such as classes, hypnosis, accupuncture, or/and medications. Suggest pt. get to a class, pick a quit date, and RTO to discuss. Multiple approaches toward motivating the patient were explored.   Just nicotine gum since she is down to 5 cigarettes a day    Medical Decision Making: moderate complexity    4 imm/hm  shingrix #1 today  Mammogram  Colon    5pes planus/podagra  Refer pod

## 2021-07-08 NOTE — TELEPHONE ENCOUNTER
Medication:   Requested Prescriptions     Pending Prescriptions Disp Refills    HYDROcodone-acetaminophen (NORCO) 7.5-325 MG per tablet 120 tablet 0     Sig: Take 1 tablet by mouth every 6 hours as needed for Pain for up to 30 days. Last Filled:  120 x 0 RF 6/10/21    Patient Phone Number: [phone] (home)     Last appt: 3/22/2021   Next appt: 7/8/2021    Last OARRS:   RX Monitoring 3/15/2021   Attestation -   Acute Pain Prescriptions -   Periodic Controlled Substance Monitoring No signs of potential drug abuse or diversion identified.    Chronic Pain > 80 MEDD -

## 2021-07-08 NOTE — TELEPHONE ENCOUNTER
Medication and Quantity requested: HYDROcodone-acetaminophen (NORCO) 7.5-325 MG per tablet      Last Visit  3/22/21    Pharmacy and phone number updated in EPIC:  yes    [facility]

## 2021-07-20 RX ORDER — ROSUVASTATIN CALCIUM 40 MG/1
TABLET, COATED ORAL
Qty: 90 TABLET | Refills: 3 | Status: SHIPPED | OUTPATIENT
Start: 2021-07-20 | End: 2022-06-02

## 2021-07-20 RX ORDER — CLOPIDOGREL BISULFATE 75 MG/1
TABLET ORAL
Qty: 90 TABLET | Refills: 3 | Status: SHIPPED | OUTPATIENT
Start: 2021-07-20 | End: 2022-08-01

## 2021-08-02 NOTE — PROGRESS NOTES
[facility]  Cardiology Note      Hermelindo Mccoy  1955, 63 y.o.      CC: \" I feel better. \"                  Faith Chaparro MD:      HPI:   This is a 63 y.o. female with PMH of COPD, CKD, obesity and coronary artery bypass surgery > 9 years ago who presented 9/2019 with chest pain and shortness of breath. That morning the patient had a rather severe episode of chest pain; at least 2 episodes. ECG showed new ST segment depressions in the high lateral leads. The pain was relieved with nitroglycerin. Coronary angiogram revealed 3-V disease and patient is now s/p PCI and stenting of saphenous vein graft to the OM with [address] [place] [address] on 9/16/2019. Hospitalized at [facility] 7/9-7/11/20 after returning from [place] with c/o black stools. Underwent EGD on 7/10/20. Brilinta and Aspirin discontinued and Plavix started. Today, she returns in follow up. She has no new cardiac complaints. Says she feels well. Taking medication and tolerating. Denies any bleeding issues. Smokes 5 cigarettes daily and is trying to quit. Patient denies any chest pain, pressure, tightness, nausea, vomiting, diaphoresis, SOB/GONSALES, palpitations, heart racing, dizziness/lightheadedness, orthopnea, PND, LE edema or syncope.         Past Medical History:   Diagnosis Date    Arthritis     ASHD (arteriosclerotic heart disease)     Chronic kidney disease     Chronic midline low back pain without sciatica 4/3/2019    COPD (chronic obstructive pulmonary disease) (HCC)     unknown     Coronary artery disease involving native coronary artery of native heart without angina pectoris 3/31/2016    Coronary artery disease involving native coronary artery of native heart without angina pectoris 3/31/2016    Depression     Full dentures     HTN     Hyperlipidemia     Iron deficiency anemia 3/31/2016    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/2020    Kidney stone 2012    Morbid obesity (HCC)     Restless legs syndrome pt on cabidopa/levodopa    Stage 3 chronic kidney disease ([facility] [address] 4/3/2019    Wears glasses       Past Surgical History:   Procedure Laterality Date    ABSCESS DRAINAGE Left 2018    incision and drainage of right buttock abscess    APPENDECTOMY      CARPAL TUNNEL RELEASE Left 2017    CORONARY ARTERY BYPASS GRAFT  9/28/10    4 way bypass    HIP SURGERY Left     I&D of joint    KNEE ARTHROSCOPY Right     TOTAL HIP ARTHROPLASTY Left 2015    TOTAL KNEE ARTHROPLASTY Right 8-24-10    TOTAL KNEE ARTHROPLASTY Left 11    TUBAL LIGATION      UPPER GASTROINTESTINAL ENDOSCOPY N/A 7/10/2020    EGD DIAGNOSTIC ONLY performed by Thalia Moran MD at [address] History   Problem Relation Age of Onset    Stroke Mother     Diabetes Mother     Heart Disease Mother     High Blood Pressure Mother     High Cholesterol Mother     Cancer Neg Hx       Social History     Tobacco Use    Smoking status: Current Every Day Smoker     Packs/day: 0.50     Years: 30.00     Pack years: 15.00     Types: Cigarettes     Last attempt to quit: 2016     Years since quittin.4    Smokeless tobacco: Never Used   Vaping Use    Vaping Use: Never used   Substance Use Topics    Alcohol use: No     Alcohol/week: 0.0 standard drinks    Drug use: No     Allergies   Allergen Reactions    Mobic [Meloxicam] Nausea Only     Stomach would get upset after taking this medication    Morphine Other (See Comments)     When taken in  pt had an adverse reaction to morphine. She ended up in  hospital with kidney failure, dehydration, and in ICU. She prefers this not to be given ever.  Other      ?  Suture from CABG caused blister (10/30/2015)         Review of Systems -   Constitutional: Negative for weight gain/loss; malaise, fever  Respiratory: Negative for Asthma;  cough and hemoptysis  Cardiovascular: Negative for palpitations,dizziness   Gastrointestinal: Negative for abd.pain; metoprolol succinate (TOPROL XL) 50 MG extended release tablet TAKE 1 TABLET BY MOUTH EVERY DAY 90 tablet 3    buPROPion (WELLBUTRIN XL) 150 MG extended release tablet TAKE 1 TABLET BY MOUTH EVERY DAY 90 tablet 2         Labs:   Lab Results   Component Value Date    HDL 40 2021    HDL 36 2010    LDLDIRECT 57 2021    LDLCALC see below 2021    TRIG 321 2021       EK21, sinus rhythm     Echo: 2019  Summary   Left ventricular cavity size is upper limits of normal. There is mild conc. LVH. EF    55%. The left atrium is dilated. Trivial mitral & tricuspid regurgitation. Normal right ventricular size. Corornary angiogram  & Intervention:2019     CORONARY ANGIOGRAM:  1. There is three-vessel native coronary artery disease. 2.  The left main is free of disease. 3.  The LAD gives off an intermediate-sized first diagonal which is free  of disease. The second diagonal had 70% to 80% ostial lesion. The LAD  beyond that has another 60% lesion. It gives off several large septal  branches and then is occluded. The septals are supplying collaterals to  the right coronary artery. 4.  The right coronary artery is occluded in the mid segment. There is  right-to-right collaterals. The RV branch has an 80% to 90% ostial  lesion. It is a pretty big branch. The distal right coronary artery  has collaterals coming from septals from the left anterior descending  artery. You can also see the saphenous vein graft to the RCA fill  retrograde. 5.  Saphenous vein graft to the RCA:  I could not engage and did not  want to use too much contrast because of the patient's renal  insufficiency. I assumed it is occluded, but since selective  angiography was not performed, I am not certain of its patency. No  aortic root shot was given, again, to reduce contrast load. 6.  SKYLAR graft to the LAD is patent. The saphenous vein graft to the  diagonal is patent.   The portion of the graft to the OM has a 99%  stenosis and may be the culprit vessel. CONCLUSION:  A 99% stenosis in the saphenous vein graft to OM. Saphenous vein graft to the RCA could not be located but is assumed to  be occluded. You can see it fill retrogradely through collaterals. The  right coronary artery is occluded in the mid segment. The distal right  has collaterals from the left and the right circulation. The LAD is  occluded in the mid segment. The SKYLAR graft to the LAD is widely patent. The 3 to 4+ collaterals are going from the LAD septals into the right  coronary artery.     INTERVENTIONAL PROCEDURE CATHETERS USED:  JR4 guide, Runthrough wire, a  2.5 balloon, and a 4.0 KATIE stent.     The guide engaged the ostium well and provided good support. The lesion  was crossed with some degree of difficulty. The lesion was quite tight. I did not want to use a FilterWire because of the tightness of the  lesion. I therefore had to predilate it using a 2.5 balloon. I then  proceeded with direct stenting using a 4.0 balloon at single inflation  up to 16 atmospheres which corresponded to a vessel size of 4.28. Followup angiography showed 0% residual with good antegrade flow.     CONCLUSION:  Successful PCI and stenting of the saphenous vein graft to  the OM, lesion reduced from 99% to 0%. A 4.0     ASSESSMENT AND PLAN:    CAD  S/p PCI and stenting of the saphenous vein graft to the OM, lesion reduced from 99% to 0% (9/16/2019)  ASA and Brilinta discontinued due to GI bleed   On Plavix alone and tolerating     Hyperlipidemia  Goal LDL <70  Reviewed LDL from 6/30/21; LDL 57  Continue statin therapy    Anemia  EGD 7/2020  Reviewed most recent CBC (6/30/21)  H/H 11.6/34.6      Follow up in 1 year       Thank you very much for allowing me to participate in the care of your patient. Please do not hesitate to contact me if you have any questions.     Sincerely,    Jm Elias M.D  [facility]  [phone]Sohan ramirezTonya V[place] [address]  Ph: [phone]  Fax: [phone]    This note was scribed in the presence of Dr. Mulu Villagran MD by Twila Guerrero    Physician Attestation:  The scribes documentation has been prepared under my direction and personally reviewed by me in its entirety. I confirm that the note above accurately reflects all work, treatment, procedures, and medical decision making performed by me.

## 2021-08-04 ENCOUNTER — OFFICE VISIT (OUTPATIENT)
Dept: CARDIOLOGY CLINIC | Age: 66
End: 2021-08-04
Payer: COMMERCIAL

## 2021-08-04 VITALS
BODY MASS INDEX: 42.69 KG/M2 | SYSTOLIC BLOOD PRESSURE: 138 MMHG | DIASTOLIC BLOOD PRESSURE: 80 MMHG | OXYGEN SATURATION: 97 % | WEIGHT: 272 LBS | HEART RATE: 64 BPM | HEIGHT: 67 IN

## 2021-08-04 DIAGNOSIS — E78.5 HYPERLIPIDEMIA LDL GOAL <70: ICD-10-CM

## 2021-08-04 DIAGNOSIS — I25.10 CAD IN NATIVE ARTERY: Primary | ICD-10-CM

## 2021-08-04 DIAGNOSIS — Z86.2 HISTORY OF ANEMIA: ICD-10-CM

## 2021-08-04 PROCEDURE — 93000 ELECTROCARDIOGRAM COMPLETE: CPT | Performed by: INTERNAL MEDICINE

## 2021-08-04 PROCEDURE — 99214 OFFICE O/P EST MOD 30 MIN: CPT | Performed by: INTERNAL MEDICINE

## 2021-08-04 RX ORDER — METOPROLOL SUCCINATE 50 MG/1
TABLET, EXTENDED RELEASE ORAL
Qty: 90 TABLET | Refills: 3 | Status: SHIPPED | OUTPATIENT
Start: 2021-08-04 | End: 2022-05-27 | Stop reason: CLARIF

## 2021-08-04 NOTE — PATIENT INSTRUCTIONS
Patient Education        Learning About Benefits From Quitting Smoking  How does quitting smoking make you healthier? If you're thinking about quitting smoking, you may have a few reasons to be smoke-free. Your health may be one of them. · When you quit smoking, you lower your risks for cancer, lung disease, heart attack, stroke, blood vessel disease, and blindness from macular degeneration. · When you're smoke-free, you get sick less often, and you heal faster. You are less likely to get colds, flu, bronchitis, and pneumonia. · As a nonsmoker, you may find that your mood is better and you are less stressed. When and how will you feel healthier? Quitting has real health benefits that start from day 1 of being smoke-free. And the longer you stay smoke-free, the healthier you get and the better you feel. The first hours  · After just 20 minutes, your blood pressure and heart rate go down. That means there's less stress on your heart and blood vessels. · Within 12 hours, the level of carbon monoxide in your blood drops back to normal. That makes room for more oxygen. With more oxygen in your body, you may notice that you have more energy than when you smoked. After 2 weeks  · Your lungs start to work better. · Your risk of heart attack starts to drop. After 1 month  · When your lungs are clear, you cough less and breathe deeper, so it's easier to be active. · Your sense of taste and smell return. That means you can enjoy food more than you have since you started smoking. Over the years  · Over the years, your risks of heart disease, heart attack, and stroke are lower. · After 10 years, your risk of dying from lung cancer is cut by about half. And your risk for many other types of cancer is lower too. How would quitting help others in your life? When you quit smoking, you improve the health of everyone who now breathes in your smoke.   · Their heart, lung, and cancer risks drop, much like yours.  · They are sick less. For babies and small children, living smoke-free means they're less likely to have ear infections, pneumonia, and bronchitis. · If you're a woman who is or will be pregnant someday, quitting smoking means a healthier . · Children who are close to you are less likely to become adult smokers. Where can you learn more? Go to https://[employer]. org and sign in to your [employer] account. Enter [phone] in the [employer] box to learn more about \"Learning About Benefits From Quitting Smoking. \"     If you do not have an account, please click on the \"Sign Up Now\" link. Current as of: 2021               Content Version: 12.9  © 8038-8105 Healthwise, Incorporated. Care instructions adapted under license by [facility] ([facility]). If you have questions about a medical condition or this instruction, always ask your healthcare professional. [place] [address] any warranty or liability for your use of this information.

## 2021-08-05 ENCOUNTER — TELEPHONE (OUTPATIENT)
Dept: FAMILY MEDICINE CLINIC | Age: 66
End: 2021-08-05

## 2021-08-05 DIAGNOSIS — Z76.0 ENCOUNTER FOR MEDICATION REFILL: ICD-10-CM

## 2021-08-05 DIAGNOSIS — M15.9 PRIMARY OSTEOARTHRITIS INVOLVING MULTIPLE JOINTS: ICD-10-CM

## 2021-08-05 RX ORDER — HYDROCODONE BITARTRATE AND ACETAMINOPHEN 7.5; 325 MG/1; MG/1
1 TABLET ORAL EVERY 6 HOURS PRN
Qty: 120 TABLET | Refills: 0 | Status: SHIPPED | OUTPATIENT
Start: 2021-08-05 | End: 2021-08-06 | Stop reason: SDUPTHER

## 2021-08-05 NOTE — TELEPHONE ENCOUNTER
Medication and Quantity requested:      HYDROcodone-acetaminophen (NORCO) 7.5-325 MG per tablet - qty 120        Last Visit  07/08/21 - Dr Deysi Gillette and phone number updated in EPIC:  Yes    [facility]

## 2021-08-06 RX ORDER — HYDROCODONE BITARTRATE AND ACETAMINOPHEN 7.5; 325 MG/1; MG/1
1 TABLET ORAL EVERY 6 HOURS PRN
Qty: 120 TABLET | Refills: 0 | Status: SHIPPED | OUTPATIENT
Start: 2021-08-06 | End: 2021-09-02 | Stop reason: SDUPTHER

## 2021-08-06 NOTE — TELEPHONE ENCOUNTER
[facility] is out of this medication and will not have it until Monday, 8-9-21. Patient is requesting Prescription be sent to [address]  Phone [phone]    Patient states all the pharmacy needs is to hear from office.

## 2021-08-16 RX ORDER — TRAZODONE HYDROCHLORIDE 50 MG/1
TABLET ORAL
Qty: 90 TABLET | Refills: 1 | Status: SHIPPED | OUTPATIENT
Start: 2021-08-16 | End: 2022-02-21

## 2021-08-16 NOTE — TELEPHONE ENCOUNTER
Medication:   Requested Prescriptions     Pending Prescriptions Disp Refills    traZODone (DESYREL) 50 MG tablet [Pharmacy Med Name: TRAZODONE 50 MG TABLET] 90 tablet 1     Sig: TAKE 1 TABLET BY MOUTH AT BEDTIME AS NEEDED FOR SLEEP        Last Filled:  2/4/21 #90, 1 RF     Patient Phone Number: [phone] (home)     Last appt: 7/8/2021  Follow up from hospital   Next appt: Visit date not found    Last OARRS:   RX Monitoring 7/8/2021   Attestation -   Acute Pain Prescriptions -   Periodic Controlled Substance Monitoring No signs of potential drug abuse or diversion identified.    Chronic Pain > 80 MEDD -

## 2021-08-23 ENCOUNTER — APPOINTMENT (OUTPATIENT)
Dept: GENERAL RADIOLOGY | Age: 66
End: 2021-08-23
Payer: COMMERCIAL

## 2021-08-23 ENCOUNTER — NURSE TRIAGE (OUTPATIENT)
Dept: OTHER | Facility: CLINIC | Age: 66
End: 2021-08-23

## 2021-08-23 ENCOUNTER — HOSPITAL ENCOUNTER (EMERGENCY)
Age: 66
Discharge: HOME | End: 2021-08-23
Attending: STUDENT IN AN ORGANIZED HEALTH CARE EDUCATION/TRAINING PROGRAM
Payer: COMMERCIAL

## 2021-08-23 VITALS
SYSTOLIC BLOOD PRESSURE: 121 MMHG | RESPIRATION RATE: 17 BRPM | TEMPERATURE: 97.7 F | BODY MASS INDEX: 42.6 KG/M2 | OXYGEN SATURATION: 98 % | DIASTOLIC BLOOD PRESSURE: 54 MMHG | HEIGHT: 67 IN | HEART RATE: 68 BPM

## 2021-08-23 DIAGNOSIS — J44.1 COPD EXACERBATION (HCC): ICD-10-CM

## 2021-08-23 DIAGNOSIS — J96.02 ACUTE HYPERCAPNIC RESPIRATORY FAILURE (HCC): Primary | ICD-10-CM

## 2021-08-23 DIAGNOSIS — E87.29 RESPIRATORY ACIDOSIS: ICD-10-CM

## 2021-08-23 LAB
ANION GAP SERPL CALCULATED.3IONS-SCNC: 11 MMOL/L (ref 3–16)
BASE EXCESS VENOUS: -5.5 MMOL/L
BASOPHILS ABSOLUTE: 0.1 K/UL (ref 0–0.2)
BASOPHILS RELATIVE PERCENT: 0.8 %
BUN BLDV-MCNC: 35 MG/DL (ref 7–20)
CALCIUM SERPL-MCNC: 9.1 MG/DL (ref 8.3–10.6)
CARBOXYHEMOGLOBIN: 1.7 %
CHLORIDE BLD-SCNC: 108 MMOL/L (ref 99–110)
CO2: 21 MMOL/L (ref 21–32)
CREAT SERPL-MCNC: 1.9 MG/DL (ref 0.6–1.2)
EOSINOPHILS ABSOLUTE: 0.3 K/UL (ref 0–0.6)
EOSINOPHILS RELATIVE PERCENT: 3.7 %
GFR AFRICAN AMERICAN: 32
GFR NON-AFRICAN AMERICAN: 27
GLUCOSE BLD-MCNC: 130 MG/DL (ref 70–99)
HCO3 VENOUS: 22 MMOL/L (ref 23–29)
HCT VFR BLD CALC: 31.2 % (ref 36–48)
HEMOGLOBIN: 10.1 G/DL (ref 12–16)
LYMPHOCYTES ABSOLUTE: 1 K/UL (ref 1–5.1)
LYMPHOCYTES RELATIVE PERCENT: 14.6 %
MCH RBC QN AUTO: 31.3 PG (ref 26–34)
MCHC RBC AUTO-ENTMCNC: 32.3 G/DL (ref 31–36)
MCV RBC AUTO: 97 FL (ref 80–100)
METHEMOGLOBIN VENOUS: 0.9 %
MONOCYTES ABSOLUTE: 0.5 K/UL (ref 0–1.3)
MONOCYTES RELATIVE PERCENT: 6.6 %
NEUTROPHILS ABSOLUTE: 5.3 K/UL (ref 1.7–7.7)
NEUTROPHILS RELATIVE PERCENT: 74.3 %
O2 SAT, VEN: 50 %
O2 THERAPY: ABNORMAL
PCO2, VEN: 54.6 MMHG (ref 40–50)
PDW BLD-RTO: 14.3 % (ref 12.4–15.4)
PH VENOUS: 7.22 (ref 7.35–7.45)
PLATELET # BLD: 184 K/UL (ref 135–450)
PMV BLD AUTO: 7.1 FL (ref 5–10.5)
PO2, VEN: <30 MMHG
POTASSIUM REFLEX MAGNESIUM: 5 MMOL/L (ref 3.5–5.1)
PRO-BNP: 1805 PG/ML (ref 0–124)
RBC # BLD: 3.22 M/UL (ref 4–5.2)
SODIUM BLD-SCNC: 140 MMOL/L (ref 136–145)
TCO2 CALC VENOUS: 24 MMOL/L
TROPONIN: <0.01 NG/ML
WBC # BLD: 7.1 K/UL (ref 4–11)

## 2021-08-23 PROCEDURE — 96374 THER/PROPH/DIAG INJ IV PUSH: CPT

## 2021-08-23 PROCEDURE — 6370000000 HC RX 637 (ALT 250 FOR IP): Performed by: STUDENT IN AN ORGANIZED HEALTH CARE EDUCATION/TRAINING PROGRAM

## 2021-08-23 PROCEDURE — 84484 ASSAY OF TROPONIN QUANT: CPT

## 2021-08-23 PROCEDURE — 6360000002 HC RX W HCPCS: Performed by: STUDENT IN AN ORGANIZED HEALTH CARE EDUCATION/TRAINING PROGRAM

## 2021-08-23 PROCEDURE — 36415 COLL VENOUS BLD VENIPUNCTURE: CPT

## 2021-08-23 PROCEDURE — 80048 BASIC METABOLIC PNL TOTAL CA: CPT

## 2021-08-23 PROCEDURE — 93005 ELECTROCARDIOGRAM TRACING: CPT | Performed by: STUDENT IN AN ORGANIZED HEALTH CARE EDUCATION/TRAINING PROGRAM

## 2021-08-23 PROCEDURE — 83880 ASSAY OF NATRIURETIC PEPTIDE: CPT

## 2021-08-23 PROCEDURE — 85025 COMPLETE CBC W/AUTO DIFF WBC: CPT

## 2021-08-23 PROCEDURE — 94761 N-INVAS EAR/PLS OXIMETRY MLT: CPT

## 2021-08-23 PROCEDURE — 71046 X-RAY EXAM CHEST 2 VIEWS: CPT

## 2021-08-23 PROCEDURE — 82803 BLOOD GASES ANY COMBINATION: CPT

## 2021-08-23 PROCEDURE — 94640 AIRWAY INHALATION TREATMENT: CPT

## 2021-08-23 PROCEDURE — 99283 EMERGENCY DEPT VISIT LOW MDM: CPT

## 2021-08-23 RX ORDER — METHYLPREDNISOLONE SODIUM SUCCINATE 125 MG/2ML
125 INJECTION, POWDER, LYOPHILIZED, FOR SOLUTION INTRAMUSCULAR; INTRAVENOUS ONCE
Status: COMPLETED | OUTPATIENT
Start: 2021-08-23 | End: 2021-08-23

## 2021-08-23 RX ORDER — ALBUTEROL SULFATE 90 UG/1
2 AEROSOL, METERED RESPIRATORY (INHALATION) EVERY 6 HOURS PRN
Qty: 1 INHALER | Refills: 0 | Status: SHIPPED | OUTPATIENT
Start: 2021-08-23 | End: 2021-09-20 | Stop reason: SDUPTHER

## 2021-08-23 RX ORDER — PREDNISONE 50 MG/1
50 TABLET ORAL DAILY
Qty: 5 TABLET | Refills: 0 | Status: SHIPPED | OUTPATIENT
Start: 2021-08-23 | End: 2021-08-28

## 2021-08-23 RX ORDER — AZITHROMYCIN 250 MG/1
250 TABLET, FILM COATED ORAL SEE ADMIN INSTRUCTIONS
Qty: 6 TABLET | Refills: 0 | Status: SHIPPED | OUTPATIENT
Start: 2021-08-23 | End: 2021-08-28

## 2021-08-23 RX ORDER — IPRATROPIUM BROMIDE AND ALBUTEROL SULFATE 2.5; .5 MG/3ML; MG/3ML
3 SOLUTION RESPIRATORY (INHALATION) ONCE
Status: COMPLETED | OUTPATIENT
Start: 2021-08-23 | End: 2021-08-23

## 2021-08-23 RX ADMIN — METHYLPREDNISOLONE SODIUM SUCCINATE 125 MG: 125 INJECTION, POWDER, FOR SOLUTION INTRAMUSCULAR; INTRAVENOUS at 18:18

## 2021-08-23 RX ADMIN — IPRATROPIUM BROMIDE AND ALBUTEROL SULFATE 3 AMPULE: .5; 3 SOLUTION RESPIRATORY (INHALATION) at 18:21

## 2021-08-23 NOTE — TELEPHONE ENCOUNTER
Received call from [place] at [facility]- YASMANI with [employer] Complaint. Brief description of triage: congestion and cold symptoms x 1 week, now feels short of breath at rest. Trying to quit smoking. Triage indicates for patient to go to ED now. She agrees to plan. Care advice provided, patient verbalizes understanding; denies any other questions or concerns; instructed to call back for any new or worsening symptoms. Attention Provider: Thank you for allowing me to participate in the care of your patient. The patient was connected to triage in response to information provided to the [facility]. Please do not respond through this encounter as the response is not directed to a shared pool. Reason for Disposition   MODERATE difficulty breathing (e.g., speaks in phrases, SOB even at rest, pulse 100-120) of new onset or worse than normal    Answer Assessment - Initial Assessment Questions  1. RESPIRATORY STATUS: \"Describe your breathing? \" (e.g., wheezing, shortness of breath, unable to speak, severe coughing)       Shortness of breath, like asthma, seems tighter with chest congestion    2. ONSET: \"When did this breathing problem begin? \"       Wheeze a lot    3. PATTERN \"Does the difficult breathing come and go, or has it been constant since it started? \"       Constant, worse with activity    4. SEVERITY: \"How bad is your breathing? \" (e.g., mild, moderate, severe)     - MILD: No SOB at rest, mild SOB with walking, speaks normally in sentences, can lay down, no retractions, pulse < 100.     - MODERATE: SOB at rest, SOB with minimal exertion and prefers to sit, cannot lie down flat, speaks in phrases, mild retractions, audible wheezing, pulse 100-120.     - SEVERE: Very SOB at rest, speaks in single words, struggling to breathe, sitting hunched forward, retractions, pulse > 120       Moderate    5. RECURRENT SYMPTOM: \"Have you had difficulty breathing before? \" If so, ask: \"When was the last time? \" and \"What happened that time? \"       States she wheezes a lot    6. CARDIAC HISTORY: \"Do you have any history of heart disease? \" (e.g., heart attack, angina, bypass surgery, angioplasty)       Heart surgery, sees cardiologist    7. LUNG HISTORY: \"Do you have any history of lung disease? \"  (e.g., pulmonary embolus, asthma, emphysema)      No    8. CAUSE: \"What do you think is causing the breathing problem? \"       Maybe cold    9. OTHER SYMPTOMS: \"Do you have any other symptoms? (e.g., dizziness, runny nose, cough, chest pain, fever)      Cough    10. PREGNANCY: \"Is there any chance you are pregnant? \" \"When was your last menstrual period? \"        N/A    11. TRAVEL: \"Have you traveled out of the country in the last month? \" (e.g., travel history, exposures)        No    Protocols used: BREATHING DIFFICULTY-ADULT-[place]

## 2021-08-23 NOTE — ED NOTES
[facility]       Ha Randolph RN  08/23/21 1924 Subjective:      Hazel Palmer 

## 2021-08-24 LAB
EKG ATRIAL RATE: 62 BPM
EKG DIAGNOSIS: NORMAL
EKG P AXIS: 48 DEGREES
EKG P-R INTERVAL: 142 MS
EKG Q-T INTERVAL: 420 MS
EKG QRS DURATION: 86 MS
EKG QTC CALCULATION (BAZETT): 426 MS
EKG R AXIS: -16 DEGREES
EKG T AXIS: 9 DEGREES
EKG VENTRICULAR RATE: 62 BPM

## 2021-08-24 PROCEDURE — 93010 ELECTROCARDIOGRAM REPORT: CPT | Performed by: INTERNAL MEDICINE

## 2021-08-24 NOTE — ED PROVIDER NOTES
[facility]      Pt Name: Kiana Geronimo  MRN: [id number]  [place] 1955  Date of evaluation: 8/23/2021  Provider: Jose Alfredo Vasquez MD    CHIEF COMPLAINT       Chief Complaint   Patient presents with    Shortness of Breath     onset one week ago. nonproductive cough. denies fever. denies chest pain      Shortness of breath. HISTORY OF PRESENT ILLNESS   (Location/Symptom, Timing/Onset,Context/Setting, Quality, Duration, Modifying Factors, Severity)  Note limiting factors. Kiana Geronimo is a 63 y.o. female who presents to the emergency department complaining of shortness of breath for the past week. Onset gradual, progressively worse, persistent, associated with wheezing, nonproductive cough. Symptom severity described as moderate, worse with exertion. Patient smokes half a pack of cigarettes a day, has prior diagnosis of COPD though this diagnosis is lung known to her. She denies fevers, chest pain, nausea, vomiting, diarrhea, recent sick contacts. She is vaccinated against Covid. Symptoms not otherwise alleviated or exacerbated by other factors. NursingNotes were reviewed. REVIEW OF SYSTEMS    (2-9 systems for level 4, 10 or more for level 5)       Constitutional: No fever or chills. Eye: No visual disturbances. No eye pain. Ear/Nose/Mouth/Throat: No nasal congestion. No sore throat. Respiratory: + cough, + shortness of breath, + sputum production. Cardiovascular: No chest pain. No palpitations. Gastrointestinal: No abdominal pain. No nausea or vomiting  Genitourinary: No dysuria. No hematuria. Hematology/Lymphatics: No bleeding or bruising tendency. Immunologic: No malaise. No swollen glands. Musculoskeletal: No back pain. No joint pain. Integumentary: No rash. No abrasions. Neurologic: No headache. No focal numbness or weakness.       PAST MEDICAL HISTORY     Past Medical History:   Diagnosis Date    Arthritis  ASHD (arteriosclerotic heart disease)     Chronic kidney disease     Chronic midline low back pain without sciatica 4/3/2019    COPD (chronic obstructive pulmonary disease) (HCC)     unknown     Coronary artery disease involving native coronary artery of native heart without angina pectoris 3/31/2016    Coronary artery disease involving native coronary artery of native heart without angina pectoris 3/31/2016    Depression     Full dentures     HTN     Hyperlipidemia     Iron deficiency anemia 3/31/2016    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/2020    Kidney stone 2012    Morbid obesity (HCC)     Restless legs syndrome     pt on cabidopa/levodopa    Stage 3 chronic kidney disease ([facility] [address] 4/3/2019    Wears glasses          SURGICALHISTORY       Past Surgical History:   Procedure Laterality Date    ABSCESS DRAINAGE Left 04/19/2018    incision and drainage of right buttock abscess    APPENDECTOMY      CARPAL TUNNEL RELEASE Left 11/14/2017    CORONARY ARTERY BYPASS GRAFT  9/28/10    4 way bypass    HIP SURGERY Left 2016    I&D of joint    KNEE ARTHROSCOPY Right 2000    TOTAL HIP ARTHROPLASTY Left 11-4-2015    TOTAL KNEE ARTHROPLASTY Right 8-24-10    TOTAL KNEE ARTHROPLASTY Left 4/8/11    TUBAL LIGATION      UPPER GASTROINTESTINAL ENDOSCOPY N/A 7/10/2020    EGD DIAGNOSTIC ONLY performed by Heriberto Galicia MD at [address] [facility] Medication List as of 8/23/2021  8:09 PM      CONTINUE these medications which have NOT CHANGED    Details   traZODone (DESYREL) 50 MG tablet TAKE 1 TABLET BY MOUTH AT BEDTIME AS NEEDED FOR SLEEP, Disp-90 tablet, R-1Normal      HYDROcodone-acetaminophen (NORCO) 7.5-325 MG per tablet Take 1 tablet by mouth every 6 hours as needed for Pain for up to 30 days. , Disp-120 tablet, R-0Normal      metoprolol succinate (TOPROL XL) 50 MG extended release tablet TAKE 1 TABLET BY MOUTH EVERY DAY, Disp-90 tablet, R-3Normal      rosuvastatin (CRESTOR) 40 MG tablet TAKE 1 TABLET BY MOUTH EVERY DAY AT NIGHT, Disp-90 tablet, R-3Normal      clopidogrel (PLAVIX) 75 MG tablet TAKE 1 TABLET BY MOUTH EVERY DAY, Disp-90 tablet, R-3Normal      carbidopa-levodopa (SINEMET)  MG per tablet Take 1 tablet by mouth 2 times daily Use as directed, Disp-60 tablet, R-0Normal      isosorbide mononitrate (IMDUR) 60 MG extended release tablet Take 1 tablet by mouth daily, Disp-30 tablet, R-0Normal      FLUoxetine (PROZAC) 40 MG capsule TAKE 2 CAPSULES DAILY, Disp-180 capsule, R-3Normal      buPROPion (WELLBUTRIN XL) 150 MG extended release tablet TAKE 1 TABLET BY MOUTH EVERY DAY, Disp-90 tablet, R-2Normal             ALLERGIES     Mobic [meloxicam], Morphine, and Other    FAMILY HISTORY       Family History   Problem Relation Age of Onset    Stroke Mother     Diabetes Mother     Heart Disease Mother     High Blood Pressure Mother     High Cholesterol Mother     Cancer Neg Hx           SOCIAL HISTORY       Social History     Socioeconomic History    Marital status:      Spouse name: Not on file    Number of children: Not on file    Years of education: Not on file    Highest education level: Not on file   Occupational History    Occupation:    Tobacco Use    Smoking status: Current Every Day Smoker     Packs/day: 0.50     Years: 30.00     Pack years: 15.00     Types: Cigarettes     Last attempt to quit: 2016     Years since quittin.4    Smokeless tobacco: Never Used   Vaping Use    Vaping Use: Never used   Substance and Sexual Activity    Alcohol use: No     Alcohol/week: 0.0 standard drinks    Drug use: No    Sexual activity: Yes     Partners: Male   Other Topics Concern    Not on file   Social History Narrative    Not on file     Social Determinants of Health     Financial Resource Strain: Low Risk     Difficulty of Paying Living Expenses: Not hard at all   Food Insecurity: No Food Insecurity    Worried About [address] in the Last Year: Never true    Lois of Food in the Last Year: Never true   Transportation Needs:     Lack of Transportation (Medical):  Lack of Transportation (Non-Medical):    Physical Activity:     Days of Exercise per Week:     Minutes of Exercise per Session:    Stress:     Feeling of Stress :    Social Connections:     Frequency of Communication with Friends and Family:     Frequency of Social Gatherings with Friends and Family:     Attends Hinduism Services:     Active Member of Clubs or Organizations:     Attends Club or Organization Meetings:     Marital Status:    Intimate Partner Violence:     Fear of Current or Ex-Partner:     Emotionally Abused:     Physically Abused:     Sexually Abused:        SCREENINGS             PHYSICAL EXAM    (up to 7 for level 4, 8 or more for level 5)     ED Triage Vitals   BP Temp Temp Source Pulse Resp SpO2 Height Weight   08/23/21 1755 08/23/21 1659 08/23/21 1659 08/23/21 1659 08/23/21 1755 08/23/21 1755 08/23/21 1659 --   (!) 156/99 97.7 °F (36.5 °C) Tympanic 63 20 98 % 5' 7\" (1.702 m)        General: Alert and oriented appropriately for age, No acute distress. Eye: Normal conjunctiva. Pupils equal and reactive. HENT: Oral mucosa is moist.  Respiratory: Respirations even and tachypneic, audible wheezing without auscultation, on auscultation, wheezing in all lung fields, mostly in the expiratory phase. No stridor. No rhonchi. Cardiovascular: Normal rate, Regular rhythm. Intact peripheral pulses. 1+ pitting edema bilaterally. Gastrointestinal: Soft, Non-tender, Non-distended. Musculoskeletal: No swelling. Integumentary: Warm, Dry. Neurologic: Alert and appropriate for age. No focal deficits. Psychiatric: Cooperative.     DIAGNOSTIC RESULTS     EKG: All EKG's are interpreted by the Emergency Department Physician who either signs or Co-signsthis chart in the absence of a cardiologist.    The Ekg interpreted by me shows  normal sinus rhythm with a rate of 62 bpm  Axis is   Normal  QTc is  normal  Intervals and Durations are unremarkable. ST Segments: nonspecific changes as nonspecific T wave inversions in the lateral precordial leads are no longer present on this EKG compared to prior EKG dated 8/4/2021.             RADIOLOGY:   Non-plain filmimages such as CT, Ultrasound and MRI are read by the radiologist. Plain radiographic images are visualized and preliminarily interpreted by the emergency physician with the below findings:      Interpretation per the Radiologist below, if available at the time ofthis note:    XR CHEST (2 VW)   Final Result   Cardiomegaly with pulmonary vascular congestion      Prominence of the main pulmonary arteries bilaterally can be seen in   pulmonary arterial hypertension               ED BEDSIDE ULTRASOUND:   Performed by ED Physician - none    LABS:  Labs Reviewed   CBC WITH AUTO DIFFERENTIAL - Abnormal; Notable for the following components:       Result Value    RBC 3.22 (*)     Hemoglobin 10.1 (*)     Hematocrit 31.2 (*)     All other components within normal limits    Narrative:     Performed at:  [facility]  [address], [address] [place] [address] [address]   Phone [phone]   BASIC METABOLIC PANEL W/ REFLEX TO MG FOR LOW K - Abnormal; Notable for the following components:    Glucose 130 (*)     BUN 35 (*)     CREATININE 1.9 (*)     GFR Non- 27 (*)     GFR  32 (*)     All other components within normal limits    Narrative:     Performed at:  [facility]  [address], [place] [address] [address] [address]   Phone [phone]   BRAIN NATRIURETIC PEPTIDE - Abnormal; Notable for the following components:    Pro-BNP 1,805 (*)     All other components within normal limits    Narrative:     Performed at:  [facility]  [address] [facility] [email or web address] [address]   Phone [phone]   BLOOD GAS, VENOUS - Abnormal; Notable for the following components:    pH, Luis 7.221 (*)     pCO2, Luis 54.6 (*)     HCO3, Venous 22 (*)     All other components within normal limits    Narrative:     Performed at:  [facility]  [address] [facility] [place] [address]   Phone [phone]   TROPONIN    Narrative:     Performed at:  [facility]  [address] [facility] [place] [address]   Phone [phone]       All other labs were within normal range or not returned as of this dictation. EMERGENCY DEPARTMENT COURSE and DIFFERENTIAL DIAGNOSIS/MDM:   Vitals:    Vitals:    21 1845 21 1859 21 1915 21   BP: (!) 145/76 (!) 157/91 133/60 (!) 121/54   Pulse: 56 65 67 68   Resp: 16 17 17 17   Temp:       TempSrc:       SpO2: 99% 98% 98% 98%   Height:             Medical decision makin-year-old female with past medical history of COPD, hypertension, CKD who presents with shortness of breath for the past week. Signs and symptoms consistent with COPD exacerbation, labs, chest x-ray support this assessment. Patient does have mild hypercapnia from CO2 retention causing mild respiratory acidosis. She has excellent response to treatment however and patient wants to be discharged home. She is offered admission but wants to be discharged. Discussed the risks and benefits of admission, agree that the discharge disposition is reasonable as patient agrees that she will adhere to strict return precautions, her  at the bedside states that he wants to look out for any of the features that we talked about that would indicate that she needs to return to the emergency department.   Patient is hemodynamically stable, moving air better on repeat auscultation of her chest.  Has no oxygen requirement and is ambulatory in the emergency department without dyspnea or difficulty and is subsequently discharged home with prescription for albuterol, prednisone, azithromycin. Ambulated steadily from the emergency department upon discharge, given primary care provider and pulmonology follow-up. Medications   ipratropium-albuterol (DUONEB) nebulizer solution 3 ampule (3 ampules Inhalation Given 8/23/21 1821)   methylPREDNISolone sodium (SOLU-MEDROL) injection 125 mg (125 mg IntraVENous Given 8/23/21 1818)         CRITICAL CARE TIME   Total Critical Care time was 40  minutes, excluding separately reportable procedures. There was a high probability of clinically significant/life threatening deterioration in the patient's condition which required my urgent intervention. Frequent reassessments of patient's hemodynamic status, respiratory status, administration of nebulized medication as well as IV steroids for treatment of COPD exacerbation, hypercapnic respiratory failure to near complete resolution of her symptoms. FINAL IMPRESSION      1. Acute hypercapnic respiratory failure ([address]    2. COPD exacerbation ([address]    3. Respiratory acidosis          DISPOSITION/PLAN   DISPOSITION Decision To Discharge 08/23/2021 08:00:57 PM      PATIENT REFERRED TO:  Robert House MD  [address] [place] [address] [address] [facility] [address]  [phone]    In 2 days      [facility] Emergency Department  [address]  [phone]    If symptoms worsen    [facility] Pulmonology Sleep and [address] [place] [address]  In 1 day        DISCHARGE MEDICATIONS:  Discharge Medication List as of 8/23/2021  8:09 PM      START taking these medications    Details   albuterol sulfate HFA (VENTOLIN HFA) 108 (90 Base) MCG/ACT inhaler Inhale 2 puffs into the lungs every 6 hours as needed for Wheezing, Disp-1 Inhaler, R-0Print      predniSONE (DELTASONE) 50 MG tablet Take 1 tablet by mouth daily for 5 days, Disp-5 tablet, R-0Print      azithromycin (ZITHROMAX) 250 MG tablet Take 1 tablet by mouth See Admin Instructions for 5 days 500mg on day 1 followed by 250mg on days 2 - 5, Disp-6 tablet, R-0Print                (Please note that portions of this note were completed with a voice recognition program.Efforts were made to edit the dictations but occasionally words are mis-transcribed.)    Macrina Leblanc MD (electronically signed)  Attending Emergency Physician          Macrina Leblanc MD  08/24/21 [id number]

## 2021-08-30 RX ORDER — BUPROPION HYDROCHLORIDE 150 MG/1
TABLET ORAL
Qty: 90 TABLET | Refills: 3 | Status: SHIPPED | OUTPATIENT
Start: 2021-08-30

## 2021-08-30 NOTE — TELEPHONE ENCOUNTER
Medication:   Requested Prescriptions     Pending Prescriptions Disp Refills    carbidopa-levodopa (SINEMET)  MG per tablet [Pharmacy Med Name: CARBIDOPA-LEVODOPA  TAB] 180 tablet 3     Sig: TAKE 2 TABLETS BY MOUTH EVERY DAY IN THE EVENING    buPROPion (WELLBUTRIN XL) 150 MG extended release tablet [Pharmacy Med Name: BUPROPION HCL  MG TABLET] 90 tablet 2     Sig: TAKE 1 TABLET BY MOUTH EVERY DAY        Last Filled:  Sinemet 6/30/21 #60, 0 RF   wellbutrin 1/7/21 #90, 2 RF   Patient Phone Number: [phone] (home)     Last appt: 7/8/2021 Chest pain   Next appt: Visit date not found    Last OARRS:   RX Monitoring 7/8/2021   Attestation -   Acute Pain Prescriptions -   Periodic Controlled Substance Monitoring No signs of potential drug abuse or diversion identified.    Chronic Pain > 80 MEDD -

## 2021-09-02 DIAGNOSIS — Z76.0 ENCOUNTER FOR MEDICATION REFILL: ICD-10-CM

## 2021-09-02 DIAGNOSIS — M15.9 PRIMARY OSTEOARTHRITIS INVOLVING MULTIPLE JOINTS: ICD-10-CM

## 2021-09-02 RX ORDER — HYDROCODONE BITARTRATE AND ACETAMINOPHEN 7.5; 325 MG/1; MG/1
1 TABLET ORAL EVERY 6 HOURS PRN
Qty: 120 TABLET | Refills: 0 | Status: SHIPPED | OUTPATIENT
Start: 2021-09-02 | End: 2021-10-01 | Stop reason: SDUPTHER

## 2021-09-02 NOTE — TELEPHONE ENCOUNTER
Medication and Quantity requested:    HYDROcodone-acetaminophen (NORCO) 7.5-325 MG per tablet - qty 120      Last Visit   07/08/21 - Dr Barak Dunne and phone number updated in EPIC:  Yes    [facility]

## 2021-09-20 ENCOUNTER — TELEPHONE (OUTPATIENT)
Dept: FAMILY MEDICINE CLINIC | Age: 66
End: 2021-09-20

## 2021-09-20 RX ORDER — ALBUTEROL SULFATE 90 UG/1
2 AEROSOL, METERED RESPIRATORY (INHALATION) EVERY 6 HOURS PRN
Qty: 1 EACH | Refills: 2 | Status: SHIPPED | OUTPATIENT
Start: 2021-09-20 | End: 2021-12-23

## 2021-09-20 NOTE — TELEPHONE ENCOUNTER
[address] Trans[facility] Initial Follow Up Call    Outreach made within 2 business days of discharge: Yes    Patient: Cipriano Coffey Patient : 1955   MRN: [id number]  Reason for Admission: There are no discharge diagnoses documented for the most recent discharge. Discharge Date: 21       Spoke with: keo    Discharge department/facility:     [facility] Interactive Patient Contact:  Was patient able to fill all prescriptions: Yes  Was patient instructed to bring all medications to the follow-up visit: Yes  Is patient taking all medications as directed in the discharge summary?  Yes  Does patient understand their discharge instructions: Yes  Does patient have questions or concerns that need addressed prior to 7-14 day follow up office visit: no  Scheduled appointment with PCP within 7-14 days    Follow Up  Future Appointments   Date Time Provider Reece Greenfield   10/8/2021 10:00 AM MD VERONICA Zamoranoci - [facility]   2021 12:30 PM Esthela Hwang MD [address] [facility]

## 2021-10-01 DIAGNOSIS — Z76.0 ENCOUNTER FOR MEDICATION REFILL: ICD-10-CM

## 2021-10-01 DIAGNOSIS — M15.9 PRIMARY OSTEOARTHRITIS INVOLVING MULTIPLE JOINTS: ICD-10-CM

## 2021-10-01 RX ORDER — HYDROCODONE BITARTRATE AND ACETAMINOPHEN 7.5; 325 MG/1; MG/1
1 TABLET ORAL EVERY 6 HOURS PRN
Qty: 120 TABLET | Refills: 0 | Status: SHIPPED | OUTPATIENT
Start: 2021-10-01 | End: 2021-10-28 | Stop reason: SDUPTHER

## 2021-10-01 NOTE — TELEPHONE ENCOUNTER
Medication and Quantity requested: *HYDROcodone-acetaminophen (NORCO) 7.5-325 MG per tablet -QTY.  120 tablets      Last Visit  7/8/21    Pharmacy and phone number updated in EPIC:  yes  [facility]

## 2021-10-01 NOTE — TELEPHONE ENCOUNTER
Medication:   Requested Prescriptions     Pending Prescriptions Disp Refills    HYDROcodone-acetaminophen (NORCO) 7.5-325 MG per tablet 120 tablet 0     Sig: Take 1 tablet by mouth every 6 hours as needed for Pain for up to 30 days. Last Filled:  120 x 0 RF 9/2/21    Patient Phone Number: [phone] (home)     Last appt: 7/8/2021    Next appt: 10/8/2021    Last OARRS:   RX Monitoring 7/8/2021   Attestation -   Acute Pain Prescriptions -   Periodic Controlled Substance Monitoring No signs of potential drug abuse or diversion identified. Chronic Pain > 80 MEDD -       Medication:   Requested Prescriptions     Pending Prescriptions Disp Refills    HYDROcodone-acetaminophen (NORCO) 7.5-325 MG per tablet 120 tablet 0     Sig: Take 1 tablet by mouth every 6 hours as needed for Pain for up to 30 days. Last Filled:      Patient Phone Number: [phone] (home)     Last appt: 7/8/2021   Next appt: 10/8/2021    Last OARRS:   RX Monitoring 7/8/2021   Attestation -   Acute Pain Prescriptions -   Periodic Controlled Substance Monitoring No signs of potential drug abuse or diversion identified.    Chronic Pain > 80 MEDD -

## 2021-10-28 ENCOUNTER — OFFICE VISIT (OUTPATIENT)
Dept: FAMILY MEDICINE CLINIC | Age: 66
End: 2021-10-28
Payer: COMMERCIAL

## 2021-10-28 VITALS
DIASTOLIC BLOOD PRESSURE: 95 MMHG | HEART RATE: 68 BPM | BODY MASS INDEX: 43.63 KG/M2 | HEIGHT: 67 IN | WEIGHT: 278 LBS | SYSTOLIC BLOOD PRESSURE: 155 MMHG | OXYGEN SATURATION: 96 %

## 2021-10-28 DIAGNOSIS — I25.10 CORONARY ARTERY DISEASE INVOLVING NATIVE CORONARY ARTERY OF NATIVE HEART WITHOUT ANGINA PECTORIS: ICD-10-CM

## 2021-10-28 DIAGNOSIS — I10 PRIMARY HYPERTENSION: ICD-10-CM

## 2021-10-28 DIAGNOSIS — F17.200 TOBACCO USE DISORDER: ICD-10-CM

## 2021-10-28 DIAGNOSIS — J44.1 COPD EXACERBATION (HCC): Primary | ICD-10-CM

## 2021-10-28 DIAGNOSIS — E78.2 MIXED HYPERLIPIDEMIA: ICD-10-CM

## 2021-10-28 DIAGNOSIS — M15.9 PRIMARY OSTEOARTHRITIS INVOLVING MULTIPLE JOINTS: ICD-10-CM

## 2021-10-28 DIAGNOSIS — Z23 FLU VACCINE NEED: ICD-10-CM

## 2021-10-28 DIAGNOSIS — N18.30 STAGE 3 CHRONIC KIDNEY DISEASE, UNSPECIFIED WHETHER STAGE 3A OR 3B CKD (HCC): ICD-10-CM

## 2021-10-28 DIAGNOSIS — Z76.0 ENCOUNTER FOR MEDICATION REFILL: ICD-10-CM

## 2021-10-28 PROCEDURE — 90674 CCIIV4 VAC NO PRSV 0.5 ML IM: CPT | Performed by: FAMILY MEDICINE

## 2021-10-28 PROCEDURE — 90471 IMMUNIZATION ADMIN: CPT | Performed by: FAMILY MEDICINE

## 2021-10-28 PROCEDURE — 99213 OFFICE O/P EST LOW 20 MIN: CPT | Performed by: FAMILY MEDICINE

## 2021-10-28 RX ORDER — LOSARTAN POTASSIUM 50 MG/1
50 TABLET ORAL DAILY
Qty: 90 TABLET | Refills: 1 | Status: SHIPPED | OUTPATIENT
Start: 2021-10-28 | End: 2021-10-29 | Stop reason: SDUPTHER

## 2021-10-28 RX ORDER — HYDROCODONE BITARTRATE AND ACETAMINOPHEN 7.5; 325 MG/1; MG/1
1 TABLET ORAL EVERY 6 HOURS PRN
Qty: 120 TABLET | Refills: 0 | Status: SHIPPED | OUTPATIENT
Start: 2021-10-28 | End: 2021-10-29 | Stop reason: SDUPTHER

## 2021-10-28 NOTE — PROGRESS NOTES
Edna Zuniga is a 61 y.o. female. HPI:went  To ed 2 mo ago for acute exacerbation of copd  Treated, released , back to baseline  Uses rescue inhaler qd  Smokes 1 cig per day  No chest pain  Still uses Norco 4 times a day for chronic foot pain and back pain  Meds, vitamins and allergies reviewed with pt    ROS: No TIA's or unusual headaches, no dysphagia. No prolonged cough. No dyspnea or chest pain on exertion. No abdominal pain, change in bowel habits, black or bloody stools. No urinary tract symptoms. No new or unusual musculoskeletal symptoms. Prior to Visit Medications    Medication Sig Taking? Authorizing Provider   HYDROcodone-acetaminophen (NORCO) 7.5-325 MG per tablet Take 1 tablet by mouth every 6 hours as needed for Pain for up to 30 days.  Yes Mandy Stephens MD   albuterol sulfate HFA (VENTOLIN HFA) 108 (90 Base) MCG/ACT inhaler Inhale 2 puffs into the lungs every 6 hours as needed for Wheezing Yes Mandy Stephens MD   carbidopa-levodopa (SINEMET)  MG per tablet TAKE 2 [address] [place] [address] Yes Mandy Stephens MD   buPROPion (WELLBUTRIN XL) 150 MG extended release tablet TAKE 1 TABLET BY MOUTH EVERY DAY Yes Mandy Stephens MD   metoprolol succinate (TOPROL XL) 50 MG extended release tablet TAKE 1 TABLET BY MOUTH EVERY DAY Yes Deshawn Redd MD   rosuvastatin (CRESTOR) 40 MG tablet TAKE 1 TABLET BY MOUTH EVERY DAY AT NIGHT Yes Caitlyn Zhu MD   clopidogrel (PLAVIX) 75 MG tablet TAKE 1 TABLET BY MOUTH EVERY DAY Yes Caitlyn Zhu MD   isosorbide mononitrate (IMDUR) 60 MG extended release tablet Take 1 tablet by mouth daily Yes Khoi Rodriguez MD   FLUoxetine (PROZAC) 40 MG capsule TAKE 2 CAPSULES DAILY  Patient taking differently: Take 80 mg by mouth daily TAKE 2 CAPSULES DAILY Yes Mandy Stephens MD   traZODone (DESYREL) 50 MG tablet TAKE 1 TABLET BY MOUTH AT BEDTIME AS NEEDED FOR SLEEP  Patient not taking: Reported on 10/28/2021  Mandy Stephens MD       Past Medical History:   Diagnosis Date    Arthritis     ASHD (arteriosclerotic heart disease)     Chronic kidney disease     Chronic midline low back pain without sciatica 4/3/2019    COPD (chronic obstructive pulmonary disease) ([facility]     unknown     Coronary artery disease involving native coronary artery of native heart without angina pectoris 3/31/2016    Coronary artery disease involving native coronary artery of native heart without angina pectoris 3/31/2016    Coronary artery disease involving native coronary artery of native heart without angina pectoris 3/31/2016    Depression     Full dentures     HTN     Hyperlipidemia     Iron deficiency anemia 3/31/2016    Iron deficiency anemia secondary to blood loss (chronic) 2020    Kidney stone 2012    Morbid obesity (HCC)     Restless legs syndrome     pt on cabidopa/levodopa    Stage 3 chronic kidney disease ([facility] 4/3/2019    Wears glasses        Social History     Tobacco Use    Smoking status: Current Every Day Smoker     Packs/day: 0.50     Years: 30.00     Pack years: 15.00     Types: Cigarettes     Last attempt to quit: 2016     Years since quittin.6    Smokeless tobacco: Never Used   Vaping Use    Vaping Use: Never used   Substance Use Topics    Alcohol use: No     Alcohol/week: 0.0 standard drinks    Drug use: No       Family History   Problem Relation Age of Onset    Stroke Mother     Diabetes Mother     Heart Disease Mother     High Blood Pressure Mother     High Cholesterol Mother     Cancer Neg Hx        Allergies   Allergen Reactions    Mobic [Meloxicam] Nausea Only     Stomach would get upset after taking this medication    Morphine Other (See Comments)     When taken in  pt had an adverse reaction to morphine. She ended up in  hospital with kidney failure, dehydration, and in ICU. She prefers this not to be given ever.  Other      ?  Suture from CABG caused blister (10/30/2015) OBJECTIVE:  BP (!) 148/88   Pulse 68   Ht 5' 7\" (1.702 m)   Wt 278 lb (126.1 kg)   LMP 01/01/2004   SpO2 96%   BMI 43.54 kg/m²   GEN:  in NAD obese  NECK:  Supple without adenopathy. No bruit  CV:  Regular rate and rhythm, S1 and S2 normal, no murmurs, clicks  PULM:  Chest is clear, no wheezing ,  symmetric air entry throughout both lung fields. EXT: No rash or edema  NEURO: nonfocal  OARRS report reviewed and assessed. Consistent. Lab Results   Component Value Date     08/23/2021    K 5.0 08/23/2021     08/23/2021    CO2 21 08/23/2021    BUN 35 (H) 08/23/2021    CREATININE 1.9 (H) 08/23/2021    GLUCOSE 130 (H) 08/23/2021    CALCIUM 9.1 08/23/2021    PROT 7.6 06/29/2021    LABALBU 4.3 06/29/2021    BILITOT <0.2 06/29/2021    ALKPHOS 127 06/29/2021    AST 24 06/29/2021    ALT 22 06/29/2021    LABGLOM 27 (A) 08/23/2021    GFRAA 32 (A) 08/23/2021    AGRATIO 1.3 06/29/2021    GLOB 3.3 06/29/2021     Lab Results   Component Value Date    WBC 7.1 08/23/2021    HGB 10.1 (L) 08/23/2021    HCT 31.2 (L) 08/23/2021    MCV 97.0 08/23/2021     08/23/2021   Notes, labs,tests, discharge summary, imaging, procedures from recent hospitalization reviewed   Lab Results   Component Value Date    CHOL 144 06/30/2021    CHOL 178 02/27/2020    CHOL 259 (H) 05/15/2017     Lab Results   Component Value Date    TRIG 321 (H) 06/30/2021    TRIG 200 (H) 02/27/2020    TRIG 441 (H) 05/15/2017     Lab Results   Component Value Date    HDL 40 06/30/2021    HDL 47 02/27/2020    HDL 44 05/15/2017     Lab Results   Component Value Date    LDLCALC see below 06/30/2021    [address] 02/27/2020    LDLCALC see below 05/15/2017     Lab Results   Component Value Date    LABVLDL see below 06/30/2021    LABVLDL 40 02/27/2020    LABVLDL see below 05/15/2017     No results found for: PRAVEEN  ASSESSMENT/PLAN:  1.  COPD exacerbation (HCC)  Stable COPD on albuterol once a day  Would consider a maintenance inhaler if her breathing worsened    2. Coronary artery disease involving native coronary artery of native heart without angina pectoris  Stable off Imdur    3. Primary hypertension  Not at goal, will add losartan    4. Mixed hyperlipidemia  Stable, continue present medication    5. Stage 3 chronic kidney disease, unspecified whether stage 3a or 3b CKD (HCC)  P BMP in 3 months  Fair. Avoid NSAIDs. 6. Tobacco use disorder  Down to 1 per day    7 anemia-probably due to CKD  Repeat CBC in 3 months    8 IMM due for shingrix #2  At least 4 weeks after covid booster at  pharm  Flu shot today    9 HM - colon due    10 chronic pain  Back and left foot  Discussed use of narcotic pain meds, benefits and risks.   Patient aware he/she will need to be seen every 3 months, will be subject to random urine drug screens, will be asked to sign a medication agreement, and may be required to see a pain specialist, especially if his/her morphine equivalent is over 80  Refill norco

## 2021-10-29 DIAGNOSIS — Z76.0 ENCOUNTER FOR MEDICATION REFILL: ICD-10-CM

## 2021-10-29 DIAGNOSIS — M15.9 PRIMARY OSTEOARTHRITIS INVOLVING MULTIPLE JOINTS: ICD-10-CM

## 2021-10-29 RX ORDER — HYDROCODONE BITARTRATE AND ACETAMINOPHEN 7.5; 325 MG/1; MG/1
1 TABLET ORAL EVERY 6 HOURS PRN
Qty: 120 TABLET | Refills: 0 | Status: SHIPPED | OUTPATIENT
Start: 2021-10-29 | End: 2021-11-26 | Stop reason: SDUPTHER

## 2021-10-29 RX ORDER — LOSARTAN POTASSIUM 50 MG/1
50 TABLET ORAL DAILY
Qty: 90 TABLET | Refills: 1 | Status: SHIPPED | OUTPATIENT
Start: 2021-10-29 | End: 2022-04-22

## 2021-10-29 NOTE — TELEPHONE ENCOUNTER
Patient called to see if this has been sent to the pharmacy yet.   It is still pending  Patient is out of her medication

## 2021-10-29 NOTE — TELEPHONE ENCOUNTER
Medication and Quantity requested: losartan (COZAAR) 50 MG tablet     And    HYDROcodone-acetaminophen (NORCO) 7.5-325 MG per tablet       Last Visit  10/28/21    Pharmacy and phone number updated in [facility]:  Yes  [phone] states [facility] Angela did not have the medication and she needs it to go to [facility]

## 2021-11-11 PROBLEM — J44.1 COPD WITH EXACERBATION (HCC): Status: RESOLVED | Noted: 2019-08-19 | Resolved: 2021-11-11

## 2021-11-11 PROBLEM — N17.9 ACUTE KIDNEY INJURY (HCC): Status: RESOLVED | Noted: 2020-07-09 | Resolved: 2021-11-11

## 2021-11-11 PROBLEM — L03.317 CELLULITIS AND ABSCESS OF BUTTOCK: Status: RESOLVED | Noted: 2018-04-18 | Resolved: 2021-11-11

## 2021-11-11 PROBLEM — R79.89 ELEVATED SERUM CREATININE: Status: RESOLVED | Noted: 2017-09-19 | Resolved: 2021-11-11

## 2021-11-11 PROBLEM — N18.30 STAGE 3 CHRONIC KIDNEY DISEASE (HCC): Status: RESOLVED | Noted: 2019-04-03 | Resolved: 2021-11-11

## 2021-11-11 PROBLEM — N18.4 CKD (CHRONIC KIDNEY DISEASE) STAGE 4, GFR 15-29 ML/MIN (HCC): Status: ACTIVE | Noted: 2021-11-11

## 2021-11-11 PROBLEM — L02.31 CELLULITIS AND ABSCESS OF BUTTOCK: Status: RESOLVED | Noted: 2018-04-18 | Resolved: 2021-11-11

## 2021-11-11 PROBLEM — R07.9 CHEST PAIN: Status: RESOLVED | Noted: 2021-06-29 | Resolved: 2021-11-11

## 2021-11-26 DIAGNOSIS — Z76.0 ENCOUNTER FOR MEDICATION REFILL: ICD-10-CM

## 2021-11-26 DIAGNOSIS — M15.9 PRIMARY OSTEOARTHRITIS INVOLVING MULTIPLE JOINTS: ICD-10-CM

## 2021-11-26 RX ORDER — HYDROCODONE BITARTRATE AND ACETAMINOPHEN 7.5; 325 MG/1; MG/1
1 TABLET ORAL EVERY 6 HOURS PRN
Qty: 120 TABLET | Refills: 0 | Status: SHIPPED | OUTPATIENT
Start: 2021-11-26 | End: 2021-12-22 | Stop reason: SDUPTHER

## 2021-11-26 NOTE — TELEPHONE ENCOUNTER
Medication and Quantity requested: norco 7.5/325 #120    Last Visit  10.28.21    Pharmacy and phone number updated in EPIC:  yes

## 2021-12-01 ENCOUNTER — TELEPHONE (OUTPATIENT)
Dept: FAMILY MEDICINE CLINIC | Age: 66
End: 2021-12-01
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
## 2021-12-02 ENCOUNTER — TELEPHONE (OUTPATIENT)
Dept: FAMILY MEDICINE CLINIC | Age: 66
End: 2021-12-02

## 2021-12-02 RX ORDER — DESOXIMETASONE 2.5 MG/G
CREAM TOPICAL
Qty: 15 G | Refills: 0 | Status: SHIPPED | OUTPATIENT
Start: 2021-12-02 | End: 2022-08-08 | Stop reason: ALTCHOICE

## 2021-12-02 NOTE — TELEPHONE ENCOUNTER
Pt was discharged from [facility] yesterday. Pt developed a rash where the IV was, looks like poison ivy. Pt is asking if Dr Anisha Hernandez could call something into [facility].  Pt will call later to schedule her HFU.

## 2021-12-03 NOTE — TELEPHONE ENCOUNTER
Medication:   Requested Prescriptions     Pending Prescriptions Disp Refills    carbidopa-levodopa (SINEMET)  MG per tablet [Pharmacy Med Name: CARB/LEVO TAB 10-100MG] 60 tablet 5     Sig: TAKE 2 TABLETS NIGHTLY        Last Filled:  08/30/2021 #180 3rf     Patient Phone Number: [phone] (home)     Last appt: 10/28/2021  Next appt: Visit date not found    Last OARRS:   RX Monitoring 11/26/2021   Attestation -   Acute Pain Prescriptions -   Periodic Controlled Substance Monitoring No signs of potential drug abuse or diversion identified.    Chronic Pain > 80 MEDD -

## 2021-12-09 NOTE — TELEPHONE ENCOUNTER
Medication:   Requested Prescriptions     Pending Prescriptions Disp Refills    carbidopa-levodopa (SINEMET)  MG per tablet 60 tablet 5        Last Filled:  60 x 5 RF 12/3/21    Patient Phone Number: [phone] (home)     Last appt: 10/28/2021   Next appt: Visit date not found    Last OARRS:   RX Monitoring 11/26/2021   Attestation -   Acute Pain Prescriptions -   Periodic Controlled Substance Monitoring No signs of potential drug abuse or diversion identified.    Chronic Pain > 80 MEDD -

## 2021-12-09 NOTE — TELEPHONE ENCOUNTER
carbidopa-levodopa (SINEMET)  MG per tablet    [facility] - request for 90 day supply    (scanned request into [employer])

## 2021-12-22 ENCOUNTER — TELEPHONE (OUTPATIENT)
Dept: FAMILY MEDICINE CLINIC | Age: 66
End: 2021-12-22

## 2021-12-22 DIAGNOSIS — Z76.0 ENCOUNTER FOR MEDICATION REFILL: ICD-10-CM

## 2021-12-22 DIAGNOSIS — M15.9 PRIMARY OSTEOARTHRITIS INVOLVING MULTIPLE JOINTS: ICD-10-CM

## 2021-12-22 DIAGNOSIS — R05.9 COUGH: Primary | ICD-10-CM

## 2021-12-22 RX ORDER — HYDROCODONE BITARTRATE AND ACETAMINOPHEN 7.5; 325 MG/1; MG/1
1 TABLET ORAL EVERY 6 HOURS PRN
Qty: 120 TABLET | Refills: 0 | Status: SHIPPED | OUTPATIENT
Start: 2021-12-22 | End: 2022-01-20 | Stop reason: SDUPTHER

## 2021-12-22 NOTE — TELEPHONE ENCOUNTER
----- Message from Darlin Robertson sent at 12/22/2021  8:59 AM EST -----  Subject: Refill Request    QUESTIONS  Name of Medication? HYDROcodone-acetaminophen (NORCO) 7.5-325 MG per   tablet  Patient-reported dosage and instructions? three times a day   How many days do you have left? 2  Preferred Pharmacy? [id number]  Pharmacy phone number (if available)? [phone]  ---------------------------------------------------------------------------  --------------  Winter QUEVEDO  What is the best way for the office to contact you? OK to leave message on   voicemail  Preferred Call Back Phone Number?  [phone]

## 2021-12-22 NOTE — TELEPHONE ENCOUNTER
Medication:   Requested Prescriptions     Pending Prescriptions Disp Refills    HYDROcodone-acetaminophen (NORCO) 7.5-325 MG per tablet 120 tablet 0     Sig: Take 1 tablet by mouth every 6 hours as needed for Pain for up to 30 days. Last Filled:  11/26/21    Patient Phone Number: [phone] (home)     Last appt: 10/28/2021   Next appt: 12/28/2021    Last OARRS:   RX Monitoring 11/26/2021   Attestation -   Acute Pain Prescriptions -   Periodic Controlled Substance Monitoring No signs of potential drug abuse or diversion identified.    Chronic Pain > 80 MEDD -

## 2021-12-22 NOTE — TELEPHONE ENCOUNTER
Pt states that she is having muscle aches, she is coughing, and she is congested. She feels like she is dying, and would like to be tested for Covid and Flu. Please advise. Katharina Ley

## 2021-12-23 ENCOUNTER — NURSE ONLY (OUTPATIENT)
Dept: FAMILY MEDICINE CLINIC | Age: 66
End: 2021-12-23
Payer: COMMERCIAL

## 2021-12-23 DIAGNOSIS — R05.9 COUGH: ICD-10-CM

## 2021-12-23 DIAGNOSIS — R06.02 SOB (SHORTNESS OF BREATH): Primary | ICD-10-CM

## 2021-12-23 LAB
INFLUENZA A ANTIGEN, POC: NOT DETECTED
INFLUENZA B ANTIGEN, POC: NOT DETECTED
SARS-COV-2, PCR: DETECTED

## 2021-12-23 PROCEDURE — 87804 INFLUENZA ASSAY W/OPTIC: CPT | Performed by: FAMILY MEDICINE

## 2021-12-23 RX ORDER — ALBUTEROL SULFATE 90 UG/1
AEROSOL, METERED RESPIRATORY (INHALATION)
Qty: 6.7 EACH | Refills: 2 | Status: SHIPPED | OUTPATIENT
Start: 2021-12-23 | End: 2022-09-21

## 2021-12-23 NOTE — TELEPHONE ENCOUNTER
Medication:   Requested Prescriptions     Pending Prescriptions Disp Refills    albuterol sulfate  (90 Base) MCG/ACT inhaler [Pharmacy Med Name: ALBUTEROL HFA (PROVENTIL) INH] 6.7 each 2     Sig: TAKE 2 PUFFS BY MOUTH EVERY 6 HOURS AS NEEDED FOR WHEEZE        Last Filled:  9/20/21 with 2 refills    Patient Phone Number: [phone] (home)     Last appt: 10/28/2021   Next appt: 12/28/2021    Last OARRS:   RX Monitoring 11/26/2021   Attestation -   Acute Pain Prescriptions -   Periodic Controlled Substance Monitoring No signs of potential drug abuse or diversion identified.    Chronic Pain > 80 MEDD -

## 2022-01-20 DIAGNOSIS — M15.9 PRIMARY OSTEOARTHRITIS INVOLVING MULTIPLE JOINTS: ICD-10-CM

## 2022-01-20 DIAGNOSIS — Z76.0 ENCOUNTER FOR MEDICATION REFILL: ICD-10-CM

## 2022-01-20 RX ORDER — HYDROCODONE BITARTRATE AND ACETAMINOPHEN 7.5; 325 MG/1; MG/1
1 TABLET ORAL EVERY 6 HOURS PRN
Qty: 120 TABLET | Refills: 0 | Status: SHIPPED | OUTPATIENT
Start: 2022-01-20 | End: 2022-02-18 | Stop reason: SDUPTHER

## 2022-01-20 NOTE — TELEPHONE ENCOUNTER
Medication and Quantity requested: HYDROcodone-acetaminophen (NORCO) 7.5-325 MG per tablet         Last Visit  7/8/21    Pharmacy and phone number updated in EPIC:  Yes      [id number]

## 2022-01-20 NOTE — TELEPHONE ENCOUNTER
Medication:   Requested Prescriptions     Pending Prescriptions Disp Refills    HYDROcodone-acetaminophen (NORCO) 7.5-325 MG per tablet 120 tablet 0     Sig: Take 1 tablet by mouth every 6 hours as needed for Pain for up to 30 days. Last Filled:  120 x 0 RF 12/22/21     Patient Phone Number: [phone] (home)     Last appt: 10/28/2021   Next appt: Visit date not found    Last OARRS:   RX Monitoring 11/26/2021   Attestation -   Acute Pain Prescriptions -   Periodic Controlled Substance Monitoring No signs of potential drug abuse or diversion identified.    Chronic Pain > 80 MEDD -

## 2022-01-25 LAB
A/G RATIO: 1.2 (ref 1.1–2.2)
ALBUMIN SERPL-MCNC: 4.4 G/DL (ref 3.4–5)
ALP BLD-CCNC: 118 U/L (ref 40–129)
ALT SERPL-CCNC: 13 U/L (ref 10–40)
ANION GAP SERPL CALCULATED.3IONS-SCNC: 13 MMOL/L (ref 3–16)
AST SERPL-CCNC: 16 U/L (ref 15–37)
BACTERIA: ABNORMAL /HPF
BASOPHILS ABSOLUTE: 0 K/UL (ref 0–0.2)
BASOPHILS RELATIVE PERCENT: 0.5 %
BILIRUB SERPL-MCNC: 0.3 MG/DL (ref 0–1)
BILIRUBIN URINE: NEGATIVE
BLOOD, URINE: ABNORMAL
BUN BLDV-MCNC: 29 MG/DL (ref 7–20)
CALCIUM SERPL-MCNC: 9.6 MG/DL (ref 8.3–10.6)
CHLORIDE BLD-SCNC: 102 MMOL/L (ref 99–110)
CLARITY: ABNORMAL
CO2: 20 MMOL/L (ref 21–32)
COLOR: YELLOW
CREAT SERPL-MCNC: 1.6 MG/DL (ref 0.6–1.2)
EOSINOPHILS ABSOLUTE: 0.2 K/UL (ref 0–0.6)
EOSINOPHILS RELATIVE PERCENT: 3.3 %
EPITHELIAL CELLS, UA: 8 /HPF (ref 0–5)
GFR AFRICAN AMERICAN: 39
GFR NON-AFRICAN AMERICAN: 32
GLUCOSE BLD-MCNC: 107 MG/DL (ref 70–99)
GLUCOSE URINE: NEGATIVE MG/DL
HCT VFR BLD CALC: 34.5 % (ref 36–48)
HEMOGLOBIN: 11 G/DL (ref 12–16)
HYALINE CASTS: 1 /LPF (ref 0–8)
KETONES, URINE: NEGATIVE MG/DL
LEUKOCYTE ESTERASE, URINE: ABNORMAL
LIPASE: 34 U/L (ref 13–60)
LYMPHOCYTES ABSOLUTE: 1.2 K/UL (ref 1–5.1)
LYMPHOCYTES RELATIVE PERCENT: 17.7 %
MCH RBC QN AUTO: 29.7 PG (ref 26–34)
MCHC RBC AUTO-ENTMCNC: 31.8 G/DL (ref 31–36)
MCV RBC AUTO: 93.4 FL (ref 80–100)
MICROSCOPIC EXAMINATION: YES
MONOCYTES ABSOLUTE: 0.4 K/UL (ref 0–1.3)
MONOCYTES RELATIVE PERCENT: 5.8 %
NEUTROPHILS ABSOLUTE: 4.9 K/UL (ref 1.7–7.7)
NEUTROPHILS RELATIVE PERCENT: 72.7 %
NITRITE, URINE: NEGATIVE
PDW BLD-RTO: 16.5 % (ref 12.4–15.4)
PH UA: 6.5 (ref 5–8)
PLATELET # BLD: 246 K/UL (ref 135–450)
PMV BLD AUTO: 6.6 FL (ref 5–10.5)
POTASSIUM REFLEX MAGNESIUM: 4.8 MMOL/L (ref 3.5–5.1)
PROTEIN UA: 100 MG/DL
RBC # BLD: 3.7 M/UL (ref 4–5.2)
RBC UA: 2 /HPF (ref 0–4)
SODIUM BLD-SCNC: 135 MMOL/L (ref 136–145)
SPECIFIC GRAVITY UA: 1.02 (ref 1–1.03)
TOTAL PROTEIN: 8.2 G/DL (ref 6.4–8.2)
URINE REFLEX TO CULTURE: YES
URINE TYPE: ABNORMAL
UROBILINOGEN, URINE: 0.2 E.U./DL
WBC # BLD: 6.8 K/UL (ref 4–11)
WBC UA: 64 /HPF (ref 0–5)

## 2022-01-25 PROCEDURE — 80053 COMPREHEN METABOLIC PANEL: CPT

## 2022-01-25 PROCEDURE — 36415 COLL VENOUS BLD VENIPUNCTURE: CPT

## 2022-01-25 PROCEDURE — 96375 TX/PRO/DX INJ NEW DRUG ADDON: CPT

## 2022-01-25 PROCEDURE — 83690 ASSAY OF LIPASE: CPT

## 2022-01-25 PROCEDURE — 81003 URINALYSIS AUTO W/O SCOPE: CPT

## 2022-01-25 PROCEDURE — 85025 COMPLETE CBC W/AUTO DIFF WBC: CPT

## 2022-01-25 PROCEDURE — 87086 URINE CULTURE/COLONY COUNT: CPT

## 2022-01-25 PROCEDURE — 93005 ELECTROCARDIOGRAM TRACING: CPT | Performed by: EMERGENCY MEDICINE

## 2022-01-25 PROCEDURE — 99283 EMERGENCY DEPT VISIT LOW MDM: CPT

## 2022-01-25 PROCEDURE — 96365 THER/PROPH/DIAG IV INF INIT: CPT

## 2022-01-25 PROCEDURE — 87186 SC STD MICRODIL/AGAR DIL: CPT

## 2022-01-25 PROCEDURE — 87088 URINE BACTERIA CULTURE: CPT

## 2022-01-25 ASSESSMENT — PAIN DESCRIPTION - LOCATION: LOCATION: ABDOMEN

## 2022-01-25 ASSESSMENT — PAIN DESCRIPTION - DESCRIPTORS: DESCRIPTORS: DISCOMFORT

## 2022-01-25 ASSESSMENT — PAIN SCALES - GENERAL: PAINLEVEL_OUTOF10: 9

## 2022-01-25 ASSESSMENT — PAIN DESCRIPTION - PAIN TYPE: TYPE: ACUTE PAIN

## 2022-01-26 ENCOUNTER — HOSPITAL ENCOUNTER (EMERGENCY)
Age: 67
Discharge: HOME | End: 2022-01-26
Attending: EMERGENCY MEDICINE
Payer: COMMERCIAL

## 2022-01-26 ENCOUNTER — APPOINTMENT (OUTPATIENT)
Dept: CT IMAGING | Age: 67
End: 2022-01-26
Payer: COMMERCIAL

## 2022-01-26 VITALS
DIASTOLIC BLOOD PRESSURE: 90 MMHG | RESPIRATION RATE: 16 BRPM | OXYGEN SATURATION: 95 % | TEMPERATURE: 97 F | SYSTOLIC BLOOD PRESSURE: 189 MMHG | HEART RATE: 71 BPM

## 2022-01-26 DIAGNOSIS — N30.00 ACUTE CYSTITIS WITHOUT HEMATURIA: Primary | ICD-10-CM

## 2022-01-26 LAB
EKG ATRIAL RATE: 64 BPM
EKG DIAGNOSIS: NORMAL
EKG P AXIS: 96 DEGREES
EKG P-R INTERVAL: 132 MS
EKG Q-T INTERVAL: 418 MS
EKG QRS DURATION: 86 MS
EKG QTC CALCULATION (BAZETT): 431 MS
EKG R AXIS: -12 DEGREES
EKG T AXIS: 108 DEGREES
EKG VENTRICULAR RATE: 64 BPM

## 2022-01-26 PROCEDURE — 6360000002 HC RX W HCPCS: Performed by: EMERGENCY MEDICINE

## 2022-01-26 PROCEDURE — 6360000004 HC RX CONTRAST MEDICATION: Performed by: EMERGENCY MEDICINE

## 2022-01-26 PROCEDURE — 2580000003 HC RX 258: Performed by: EMERGENCY MEDICINE

## 2022-01-26 PROCEDURE — 93010 ELECTROCARDIOGRAM REPORT: CPT | Performed by: INTERNAL MEDICINE

## 2022-01-26 PROCEDURE — 74177 CT ABD & PELVIS W/CONTRAST: CPT

## 2022-01-26 RX ORDER — CEPHALEXIN 500 MG/1
500 CAPSULE ORAL 4 TIMES DAILY
Qty: 28 CAPSULE | Refills: 0 | Status: SHIPPED | OUTPATIENT
Start: 2022-01-26 | End: 2022-02-02

## 2022-01-26 RX ORDER — DICYCLOMINE HYDROCHLORIDE 10 MG/1
10 CAPSULE ORAL EVERY 6 HOURS PRN
Qty: 12 CAPSULE | Refills: 0 | Status: SHIPPED | OUTPATIENT
Start: 2022-01-26 | End: 2022-05-27 | Stop reason: CLARIF

## 2022-01-26 RX ORDER — KETOROLAC TROMETHAMINE 30 MG/ML
30 INJECTION, SOLUTION INTRAMUSCULAR; INTRAVENOUS ONCE
Status: COMPLETED | OUTPATIENT
Start: 2022-01-26 | End: 2022-01-26

## 2022-01-26 RX ADMIN — CEFTRIAXONE 1000 MG: 1 INJECTION, POWDER, FOR SOLUTION INTRAMUSCULAR; INTRAVENOUS at 02:06

## 2022-01-26 RX ADMIN — IOPAMIDOL 75 ML: 755 INJECTION, SOLUTION INTRAVENOUS at 01:37

## 2022-01-26 RX ADMIN — KETOROLAC TROMETHAMINE 30 MG: 30 INJECTION, SOLUTION INTRAMUSCULAR at 01:19

## 2022-01-26 ASSESSMENT — ENCOUNTER SYMPTOMS
VOMITING: 0
SHORTNESS OF BREATH: 0
EYE ITCHING: 0
COUGH: 0
CONSTIPATION: 0
COLOR CHANGE: 0
EYE DISCHARGE: 0
ABDOMINAL PAIN: 1

## 2022-01-26 ASSESSMENT — PAIN SCALES - GENERAL: PAINLEVEL_OUTOF10: 9

## 2022-01-26 NOTE — ED NOTES
Patient DCed from ED. Discussed with patient AVS, scripts, and follow up. She verbalized understanding. VSS. IV DCed. Reinforced to patient that if symptoms persist or worsen to return to the ED. She verbalizes understanding. Patient was wheeled out of ED by her .      Arlander Hatchet, RN  01/26/22 [id number]

## 2022-01-26 NOTE — ED PROVIDER NOTES
[address] [facility]      Pt Name: Ima Soulier  MRN: [id number]  [place] 1955  Date of evaluation: 1/25/2022  Provider: Aiyana Claros MD    CHIEF COMPLAINT       Chief Complaint   Patient presents with    Abdominal Pain     right quad pain onset today. reports constant pain, at times it is severe. denies n/v/d       HISTORY OF PRESENT ILLNESS    Ima Soulier is a 61 y.o. female who presents to the emergency department with abdominal pain. Patient endorses generalized abdominal pain the last 24 hours. 8 of 10 achy nature. Positive for dysuria, frequency, urgency. No constipation or diarrhea. No nausea or vomiting. Never happened before. Worse with movement. Better with rest.  No other associated symptoms. Nursing Notes were reviewed. Including nursing noted for FM, Surgical History, Past Medical History, Social History, vitals, and allergies; agree with all. REVIEW OF SYSTEMS       Review of Systems   Constitutional: Negative for diaphoresis and unexpected weight change. HENT: Negative for congestion and dental problem. Eyes: Negative for discharge and itching. Respiratory: Negative for cough and shortness of breath. Cardiovascular: Negative for chest pain and leg swelling. Gastrointestinal: Positive for abdominal pain. Negative for constipation and vomiting. Endocrine: Negative for cold intolerance and heat intolerance. Genitourinary: Positive for dysuria, frequency and urgency. Negative for vaginal bleeding, vaginal discharge and vaginal pain. Musculoskeletal: Negative for neck pain and neck stiffness. Skin: Negative for color change and pallor. Neurological: Negative for tremors and weakness. Psychiatric/Behavioral: Negative for agitation and behavioral problems. Except as noted above the remainder of the review of systems was reviewed and negative.      PAST MEDICAL HISTORY     Past Medical History: Diagnosis Date    Arthritis     ASHD (arteriosclerotic heart disease)     Chronic kidney disease     Chronic midline low back pain without sciatica 4/3/2019    COPD (chronic obstructive pulmonary disease) (HCC)     unknown     Coronary artery disease involving native coronary artery of native heart without angina pectoris 3/31/2016    Coronary artery disease involving native coronary artery of native heart without angina pectoris 3/31/2016    Coronary artery disease involving native coronary artery of native heart without angina pectoris 3/31/2016    Depression     Full dentures     HTN     Hyperlipidemia     Iron deficiency anemia 3/31/2016    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/2020    Kidney stone 2012    Morbid obesity (HCC)     Restless legs syndrome     pt on cabidopa/levodopa    Stage 3 chronic kidney disease ([address] 4/3/2019    Wears glasses        SURGICAL HISTORY       Past Surgical History:   Procedure Laterality Date    ABSCESS DRAINAGE Left 04/19/2018    incision and drainage of right buttock abscess    APPENDECTOMY      CARPAL TUNNEL RELEASE Left 11/14/2017    CORONARY ARTERY BYPASS GRAFT  9/28/10    4 way bypass    HIP SURGERY Left 2016    I&D of joint    KNEE ARTHROSCOPY Right 2000    TOTAL HIP ARTHROPLASTY Left 11-4-2015    TOTAL KNEE ARTHROPLASTY Right 8-24-10    TOTAL KNEE ARTHROPLASTY Left 4/8/11    TUBAL LIGATION      UPPER GASTROINTESTINAL ENDOSCOPY N/A 7/10/2020    EGD DIAGNOSTIC ONLY performed by Berta Curiel MD at [address] [facility] [facility]       Discharge Medication List as of 1/26/2022  2:40 AM      CONTINUE these medications which have NOT CHANGED    Details   HYDROcodone-acetaminophen (NORCO) 7.5-325 MG per tablet Take 1 tablet by mouth every 6 hours as needed for Pain for up to 30 days. , Disp-120 tablet, R-0Normal      albuterol sulfate  (90 Base) MCG/ACT inhaler TAKE 2 PUFFS BY MOUTH EVERY 6 HOURS AS NEEDED FOR WHEEZE, Disp-6.7 each, R-2Normal      carbidopa-levodopa (SINEMET)  MG per tablet TAKE 2 TABLETS NIGHTLY, Disp-60 tablet, R-5Normal      desoximetasone (TOPICORT) 0.25 % cream Apply topically 2 times daily. , Disp-15 g, R-0, Normal      losartan (COZAAR) 50 MG tablet Take 1 tablet by mouth daily, Disp-90 tablet, R-1Normal      buPROPion (WELLBUTRIN XL) 150 MG extended release tablet TAKE 1 TABLET BY MOUTH EVERY DAY, Disp-90 tablet, R-3Normal      traZODone (DESYREL) 50 MG tablet TAKE 1 TABLET BY MOUTH AT BEDTIME AS NEEDED FOR SLEEP, Disp-90 tablet, R-1Normal      metoprolol succinate (TOPROL XL) 50 MG extended release tablet TAKE 1 TABLET BY MOUTH EVERY DAY, Disp-90 tablet, R-3Normal      rosuvastatin (CRESTOR) 40 MG tablet TAKE 1 TABLET BY MOUTH EVERY DAY AT NIGHT, Disp-90 tablet, R-3Normal      clopidogrel (PLAVIX) 75 MG tablet TAKE 1 TABLET BY MOUTH EVERY DAY, Disp-90 tablet, R-3Normal      FLUoxetine (PROZAC) 40 MG capsule TAKE 2 CAPSULES DAILY, Disp-180 capsule, R-3Normal             ALLERGIES     Mobic [meloxicam], Morphine, and Other    FAMILY HISTORY        Family History   Problem Relation Age of Onset    Stroke Mother     Diabetes Mother     Heart Disease Mother     High Blood Pressure Mother     High Cholesterol Mother     Cancer Neg Hx        SOCIAL HISTORY       Social History     Socioeconomic History    Marital status:      Spouse name: Not on file    Number of children: Not on file    Years of education: Not on file    Highest education level: Not on file   Occupational History    Occupation:    Tobacco Use    Smoking status: Current Every Day Smoker     Packs/day: 0.50     Years: 30.00     Pack years: 15.00     Types: Cigarettes     Last attempt to quit: 2016     Years since quittin.9    Smokeless tobacco: Never Used   Vaping Use    Vaping Use: Never used   Substance and Sexual Activity    Alcohol use: No     Alcohol/week: 0.0 standard drinks    Drug use: No    Sexual activity: Yes     Partners: Male   Other Topics Concern    Not on file   Social History Narrative    Not on file     Social Determinants of Health     Financial Resource Strain: Low Risk     Difficulty of Paying Living Expenses: Not hard at all   Food Insecurity: No Food Insecurity    Worried About Running Out of Food in the Last Year: Never true    [address] in the Last Year: Never true   Transportation Needs:     Lack of Transportation (Medical): Not on file    Lack of Transportation (Non-Medical): Not on file   Physical Activity:     Days of Exercise per Week: Not on file    Minutes of Exercise per Session: Not on file   Stress:     Feeling of Stress : Not on file   Social Connections:     Frequency of Communication with Friends and Family: Not on file    Frequency of Social Gatherings with Friends and Family: Not on file    Attends Christianity Services: Not on file    Active Member of [address] [place] [address] [employer] or Organizations: Not on file    Attends Club or Organization Meetings: Not on file    Marital Status: Not on file   Intimate Partner Violence:     Fear of Current or Ex-Partner: Not on file    Emotionally Abused: Not on file    Physically Abused: Not on file    Sexually Abused: Not on file   Housing Stability:     Unable to Pay for Housing in the Last Year: Not on file    Number of [place] in the Last Year: Not on file    Unstable Housing in the Last Year: Not on file       PHYSICAL EXAM       ED Triage Vitals [01/25/22 2044]   BP Temp Temp Source Pulse Resp SpO2 Height Weight   (!) 176/89 97 °F (36.1 °C) Temporal 63 18 97 % -- --       Physical Exam  Vitals and nursing note reviewed. Constitutional:       General: She is not in acute distress. Appearance: She is well-developed. She is not ill-appearing, toxic-appearing or diaphoretic. HENT:      Head: Normocephalic and atraumatic.       Right Ear: External ear normal.      Left Ear: External ear normal.   Eyes: General:         Right eye: No discharge. Left eye: No discharge. Conjunctiva/sclera: Conjunctivae normal.      Pupils: Pupils are equal, round, and reactive to light. Cardiovascular:      Rate and Rhythm: Normal rate and regular rhythm. Heart sounds: No murmur heard. Pulmonary:      Effort: Pulmonary effort is normal. No respiratory distress. Breath sounds: Normal breath sounds. No wheezing or rales. Abdominal:      General: Bowel sounds are normal. There is no distension. Palpations: Abdomen is soft. There is no mass. Tenderness: There is no abdominal tenderness. There is no guarding or rebound. Comments: No redness or rash noted to abdominal wall   Genitourinary:     Comments: Deferred  Musculoskeletal:         General: No deformity. Normal range of motion. Cervical back: Normal range of motion and neck supple. Skin:     General: Skin is warm. Findings: No erythema or rash. Neurological:      Mental Status: She is alert and oriented to person, place, and time. She is not disoriented. Cranial Nerves: No cranial nerve deficit. Motor: No atrophy or abnormal muscle tone. Coordination: Coordination normal.   Psychiatric:         Behavior: Behavior normal.         Thought Content: Thought content normal.         DIAGNOSTIC RESULTS     EKG: All EKG's are interpreted by the Emergency Department Physician who either signs or Co-signs this chart in the absence of acardiologist.    None    RADIOLOGY:   Non-plain film images such as CT, Ultrasoundand MRI are read by the radiologist. Plain radiographic images are visualized and preliminarily interpreted by the emergency physician with the below findings:    Impression   1.  Focal area of subcutaneous stranding is seen involving the anterior   abdominal wall in the midline and slightly left of midline in the epigastric   region.  This could be related to cellulitis.  No evidence of an abscess or soft tissue gas. 2. No acute findings within the abdomen or pelvis. 3. Colonic diverticulosis.      ED BEDSIDE ULTRASOUND:   Performed by ED Physician - none    LABS:  Labs Reviewed   CBC WITH AUTO DIFFERENTIAL - Abnormal; Notable for the following components:       Result Value    RBC 3.70 (*)     Hemoglobin 11.0 (*)     Hematocrit 34.5 (*)     RDW 16.5 (*)     All other components within normal limits    Narrative:     Performed at:  [facility]  [address], [place]   Phone [phone]   COMPREHENSIVE METABOLIC PANEL W/ REFLEX TO MG FOR LOW K - Abnormal; Notable for the following components:    Sodium 135 (*)     CO2 20 (*)     Glucose 107 (*)     BUN 29 (*)     CREATININE 1.6 (*)     GFR Non- 32 (*)     GFR  39 (*)     All other components within normal limits    Narrative:     Performed at:  [address] [place]   Phone [phone]   URINE RT REFLEX TO CULTURE - Abnormal; Notable for the following components:    Clarity, UA CLOUDY (*)     Blood, Urine TRACE (*)     Protein,  (*)     Leukocyte Esterase, Urine SMALL (*)     All other components within normal limits    Narrative:     Performed at:  [place] [address], [place] [facility]   Phone [phone]   MICROSCOPIC URINALYSIS - Abnormal; Notable for the following components:    Bacteria, UA 3+ (*)     WBC, UA 64 (*)     Epithelial Cells, UA 8 (*)     All other components within normal limits    Narrative:     Performed at:  [place] [address], [place]   Phone [phone]   CULTURE, URINE   LIPASE    Narrative:     Performed at:  [address] [place]   Phone [phone]   TROPONIN       All other labs were withinnormal range or not returned as of this dictation. EMERGENCY DEPARTMENT COURSE and DIFFERENTIAL DIAGNOSIS/MDM:     PMH, Surgical Hx, FH, Social Hx reviewed by myself (ETOH usage, Tobacco usage, Drug usage reviewed by myself, no pertinent Hx)- No Pertinent Hx     Old records were reviewed by me     63-year-old evidence of acute cystitis. Fluids antibiotics initiated. CT otherwise reassuring. There is no evidence of cellulitis on my exam.  Outpatient follow-up. I estimate there is LOW risk for Sepsis, MI, Stroke, Tamponade, PTX, Toxicity or other life threatening etiology thus I consider the discharge disposition reasonable. The patient is at low risk for mortality based on demographic, history and clinical factors. Given the best available information and clinical assessment, I estimate the risk of hospitalization to be greater than risk of treatment at home. I have explained to the patient that the risk could rapidly change, given precautions for return and instructions. Explained to patient that the risk for mortality is low based on demographic, history and clinical factors. I discussed with patient the results of evaluation in the ED, diagnosis, care, and prognosis. The plan is to discharge to home. Patient is in agreement with plan and questions have been answered. I also discussed with patient the reasons which may require a return visit and the importance of follow-up care. The patient is well-appearing, nontoxic, and improved at the time of discharge. Patient agrees to call to arrange follow-up care as directed. Patient understands to return immediately for worsening/change in symptoms. CRITICAL CARE TIME   Total Critical Caretime was 21 minutes, excluding separately reportable procedures. There was a high probability of clinically significant/life threatening deterioration in the patient's condition which required my urgent intervention. PROCEDURES:  Unlessotherwise noted below, none    FINAL IMPRESSION      1. Acute cystitis without hematuria          DISPOSITION/PLAN   DISPOSITION Decision To Discharge 01/26/2022 02:08:59 AM    PATIENT REFERRED TO:  Nikki Miranda MD  [address] [place] [address] RD.   [address] [place]  [phone]            DISCHARGE MEDICATIONS:  Discharge Medication List as of 1/26/2022  2:40 AM      START taking these medications    Details   cephALEXin (KEFLEX) 500 MG capsule Take 1 capsule by mouth 4 times daily for 7 days, Disp-28 capsule, R-0Print      dicyclomine (BENTYL) 10 MG capsule Take 1 capsule by mouth every 6 hours as needed (cramps), Disp-12 capsule, R-0Print                (Please note that portions ofthis note were completed with a voice recognition program.  Efforts were made to edit the dictations but occasionally words are mis-transcribed.)    Julio C Padilla MD(electronically signed)  Attending Emergency Physician        Julio C Padilla MD  01/26/22 5698

## 2022-01-27 LAB
ORGANISM: ABNORMAL
URINE CULTURE, ROUTINE: ABNORMAL

## 2022-02-16 DIAGNOSIS — M15.9 PRIMARY OSTEOARTHRITIS INVOLVING MULTIPLE JOINTS: ICD-10-CM

## 2022-02-16 DIAGNOSIS — Z76.0 ENCOUNTER FOR MEDICATION REFILL: ICD-10-CM

## 2022-02-16 RX ORDER — HYDROCODONE BITARTRATE AND ACETAMINOPHEN 7.5; 325 MG/1; MG/1
1 TABLET ORAL EVERY 6 HOURS PRN
Qty: 120 TABLET | Refills: 0 | OUTPATIENT
Start: 2022-02-16 | End: 2022-03-18

## 2022-02-16 NOTE — TELEPHONE ENCOUNTER
Medication and Quantity requested: Norco     Last Visit  10-28-21,    Pharmacy and phone number updated in EPIC:  Yes,[facility]

## 2022-02-16 NOTE — TELEPHONE ENCOUNTER
Medication:   Requested Prescriptions     Pending Prescriptions Disp Refills    HYDROcodone-acetaminophen (NORCO) 7.5-325 MG per tablet 120 tablet 0     Sig: Take 1 tablet by mouth every 6 hours as needed for Pain for up to 30 days. Last Filled:  01/20/2022 #120 0rf    Patient Phone Number: [phone] (home)     Last appt: 10/28/2021   Next appt: Visit date not found    Last OARRS:   RX Monitoring 11/26/2021   Attestation -   Acute Pain Prescriptions -   Periodic Controlled Substance Monitoring No signs of potential drug abuse or diversion identified.    Chronic Pain > 80 MEDD -

## 2022-02-18 DIAGNOSIS — Z76.0 ENCOUNTER FOR MEDICATION REFILL: ICD-10-CM

## 2022-02-18 DIAGNOSIS — M15.9 PRIMARY OSTEOARTHRITIS INVOLVING MULTIPLE JOINTS: ICD-10-CM

## 2022-02-18 RX ORDER — HYDROCODONE BITARTRATE AND ACETAMINOPHEN 7.5; 325 MG/1; MG/1
1 TABLET ORAL EVERY 6 HOURS PRN
Qty: 120 TABLET | Refills: 0 | Status: SHIPPED | OUTPATIENT
Start: 2022-02-18 | End: 2022-04-05 | Stop reason: SDUPTHER

## 2022-02-21 RX ORDER — TRAZODONE HYDROCHLORIDE 50 MG/1
TABLET ORAL
Qty: 90 TABLET | Refills: 1 | Status: SHIPPED | OUTPATIENT
Start: 2022-02-21 | End: 2022-08-26 | Stop reason: CLARIF

## 2022-02-21 NOTE — TELEPHONE ENCOUNTER
Medication:   Requested Prescriptions     Pending Prescriptions Disp Refills    traZODone (DESYREL) 50 MG tablet [Pharmacy Med Name: TRAZODONE 50 MG TABLET] 90 tablet 1     Sig: TAKE 1 TABLET BY MOUTH AT BEDTIME AS NEEDED FOR SLEEP        Last Filled:  08/16/2021 #90 1rf    Patient Phone Number: [phone] (home)     Last appt: 10/28/2021   Next appt: 2/22/2022    Last OARRS:   RX Monitoring 11/26/2021   Attestation -   Acute Pain Prescriptions -   Periodic Controlled Substance Monitoring No signs of potential drug abuse or diversion identified.    Chronic Pain > 80 MEDD -

## 2022-03-06 ENCOUNTER — HOSPITAL ENCOUNTER (EMERGENCY)
Age: 67
Discharge: HOME | End: 2022-03-06
Payer: COMMERCIAL

## 2022-03-06 ENCOUNTER — APPOINTMENT (OUTPATIENT)
Dept: GENERAL RADIOLOGY | Age: 67
End: 2022-03-06
Payer: COMMERCIAL

## 2022-03-06 VITALS
DIASTOLIC BLOOD PRESSURE: 88 MMHG | BODY MASS INDEX: 39.24 KG/M2 | HEART RATE: 57 BPM | SYSTOLIC BLOOD PRESSURE: 173 MMHG | TEMPERATURE: 98.1 F | WEIGHT: 250 LBS | OXYGEN SATURATION: 95 % | RESPIRATION RATE: 18 BRPM | HEIGHT: 67 IN

## 2022-03-06 DIAGNOSIS — M79.601 PAIN OF RIGHT UPPER EXTREMITY: Primary | ICD-10-CM

## 2022-03-06 PROCEDURE — 6370000000 HC RX 637 (ALT 250 FOR IP): Performed by: NURSE PRACTITIONER

## 2022-03-06 PROCEDURE — 73080 X-RAY EXAM OF ELBOW: CPT

## 2022-03-06 PROCEDURE — 99284 EMERGENCY DEPT VISIT MOD MDM: CPT

## 2022-03-06 PROCEDURE — 73060 X-RAY EXAM OF HUMERUS: CPT

## 2022-03-06 RX ORDER — LIDOCAINE 50 MG/G
1 PATCH TOPICAL DAILY
Qty: 10 PATCH | Refills: 0 | Status: SHIPPED | OUTPATIENT
Start: 2022-03-06 | End: 2022-03-16

## 2022-03-06 RX ORDER — IBUPROFEN 400 MG/1
800 TABLET ORAL ONCE
Status: COMPLETED | OUTPATIENT
Start: 2022-03-06 | End: 2022-03-06

## 2022-03-06 RX ORDER — LIDOCAINE 4 G/G
1 PATCH TOPICAL DAILY
Status: DISCONTINUED | OUTPATIENT
Start: 2022-03-06 | End: 2022-03-06 | Stop reason: HOSPADM

## 2022-03-06 RX ORDER — IBUPROFEN 600 MG/1
600 TABLET ORAL 3 TIMES DAILY PRN
Qty: 15 TABLET | Refills: 0 | Status: SHIPPED | OUTPATIENT
Start: 2022-03-06 | End: 2022-08-08

## 2022-03-06 RX ADMIN — IBUPROFEN 800 MG: 400 TABLET, FILM COATED ORAL at 13:23

## 2022-03-06 ASSESSMENT — PAIN - FUNCTIONAL ASSESSMENT: PAIN_FUNCTIONAL_ASSESSMENT: 0-10

## 2022-03-06 ASSESSMENT — PAIN DESCRIPTION - PAIN TYPE: TYPE: ACUTE PAIN

## 2022-03-06 ASSESSMENT — PAIN SCALES - GENERAL
PAINLEVEL_OUTOF10: 8
PAINLEVEL_OUTOF10: 10
PAINLEVEL_OUTOF10: 10

## 2022-03-06 ASSESSMENT — PAIN DESCRIPTION - LOCATION: LOCATION: ARM

## 2022-03-06 ASSESSMENT — PAIN DESCRIPTION - ORIENTATION: ORIENTATION: RIGHT

## 2022-03-06 NOTE — ED NOTES
Pt d/c to home with scripts x 2, pt stable at time of d/c with personal belongings, pt is assisted by so to lobby via wc. Pt stable at time of d/c. Instructions discussed with pt and s/o. Both verbally agree they understand.       Rabia Mcfadden RN  03/06/22 4115

## 2022-03-06 NOTE — ED PROVIDER NOTES
[facility] Emergency Department    CHIEF COMPLAINT  Arm Pain (pain to right upper arm that she noticed yesterday, denies injury)      SHARED SERVICE VISIT:  Evaluated by DANIEL. My supervising physician was available for consultation. HISTORY OF PRESENT ILLNESS  Geovanny Fitch is a 61 y.o. nontoxic, well-appearing but drowsy female who presents to the ED complaining of acute onset yesterday with \"throbbing\" 10/10 right upper arm pain. Denies wrist or elbow pain. Onset was \"late last night\". NKI. Denies chest pain, nausea, vomiting, dizziness, lightheadedness, presyncope, shortness of breath, fever, chills, sweats, slurred speech, confusion, headache, or other concerns. When questioned the patient states that she is taking Tylenol for pain. When I asked her about her Vicodin she states \"Oh yeah, and that\". No other complaints, modifying factors or associated symptoms. Nursing notes reviewed and I agree.    Past Medical History:   Diagnosis Date    Arthritis     ASHD (arteriosclerotic heart disease)     Chronic kidney disease     Chronic midline low back pain without sciatica 4/3/2019    COPD (chronic obstructive pulmonary disease) (HCC)     unknown     Coronary artery disease involving native coronary artery of native heart without angina pectoris 3/31/2016    Coronary artery disease involving native coronary artery of native heart without angina pectoris 3/31/2016    Coronary artery disease involving native coronary artery of native heart without angina pectoris 3/31/2016    Depression     Full dentures     HTN     Hyperlipidemia     Iron deficiency anemia 3/31/2016    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/2020    Kidney stone 2012    Morbid obesity (HCC)     Restless legs syndrome     pt on cabidopa/levodopa    Stage 3 chronic kidney disease ([facility] [address] 4/3/2019    Wears glasses      Past Surgical History:   Procedure Laterality Date    ABSCESS DRAINAGE Left 2018    incision and drainage of right buttock abscess    APPENDECTOMY      CARPAL TUNNEL RELEASE Left 2017    CORONARY ARTERY BYPASS GRAFT  9/28/10    4 way bypass    HIP SURGERY Left     I&D of joint    KNEE ARTHROSCOPY Right     TOTAL HIP ARTHROPLASTY Left 2015    TOTAL KNEE ARTHROPLASTY Right 8-24-10    TOTAL KNEE ARTHROPLASTY Left 11    TUBAL LIGATION      UPPER GASTROINTESTINAL ENDOSCOPY N/A 7/10/2020    EGD DIAGNOSTIC ONLY performed by Gino Woodall MD at [address] History   Problem Relation Age of Onset    Stroke Mother     Diabetes Mother     Heart Disease Mother     High Blood Pressure Mother     High Cholesterol Mother     Cancer Neg Hx      Social History     Socioeconomic History    Marital status:      Spouse name: Not on file    Number of children: Not on file    Years of education: Not on file    Highest education level: Not on file   Occupational History    Occupation:    Tobacco Use    Smoking status: Current Every Day Smoker     Packs/day: 0.50     Years: 30.00     Pack years: 15.00     Types: Cigarettes     Last attempt to quit: 2016     Years since quittin.0    Smokeless tobacco: Never Used   Vaping Use    Vaping Use: Never used   Substance and Sexual Activity    Alcohol use: No     Alcohol/week: 0.0 standard drinks    Drug use: No    Sexual activity: Yes     Partners: Male   Other Topics Concern    Not on file   Social History Narrative    Not on file     Social Determinants of Health     Financial Resource Strain: Low Risk     Difficulty of Paying Living Expenses: Not hard at all   Food Insecurity: No Food Insecurity    Worried About Running Out of Food in the Last Year: Never true    Lois of Food in the Last Year: Never true   Transportation Needs:     Lack of Transportation (Medical): Not on file    Lack of Transportation (Non-Medical):  Not on file   Physical Activity:  Days of Exercise per Week: Not on file    Minutes of Exercise per Session: Not on file   Stress:     Feeling of Stress : Not on file   Social Connections:     Frequency of Communication with Friends and Family: Not on file    Frequency of Social Gatherings with Friends and Family: Not on file    Attends [employer] Services: Not on file    Active Member of [address] [place] [address] or Organizations: Not on file    Attends Club or Organization Meetings: Not on file    Marital Status: Not on file   Intimate Partner Violence:     Fear of Current or Ex-Partner: Not on file    Emotionally Abused: Not on file    Physically Abused: Not on file    Sexually Abused: Not on file   Housing Stability:     Unable to Pay for Housing in the Last Year: Not on file    Number of [place] in the Last Year: Not on file    Unstable Housing in the Last Year: Not on file     Current Facility-Administered Medications   Medication Dose Route Frequency Provider Last Rate Last Admin    lidocaine 4 % external patch 1 patch  1 patch TransDERmal Daily Lynvone Corinne Reilly, APRN - CNP        ibuprofen (ADVIL;MOTRIN) tablet 800 mg  800 mg Oral Once DELIA Hathaway CNP         Current Outpatient Medications   Medication Sig Dispense Refill    lidocaine (LIDODERM) 5 % Place 1 patch onto the skin daily for 10 days As needed, 12 hours on, 12 hours off. 10 patch 0    ibuprofen (ADVIL;MOTRIN) 600 MG tablet Take 1 tablet by mouth 3 times daily as needed for Pain With meals 15 tablet 0    traZODone (DESYREL) 50 MG tablet TAKE 1 TABLET BY MOUTH AT BEDTIME AS NEEDED FOR SLEEP 90 tablet 1    HYDROcodone-acetaminophen (NORCO) 7.5-325 MG per tablet Take 1 tablet by mouth every 6 hours as needed for Pain for up to 30 days.  120 tablet 0    dicyclomine (BENTYL) 10 MG capsule Take 1 capsule by mouth every 6 hours as needed (cramps) 12 capsule 0    albuterol sulfate  (90 Base) MCG/ACT inhaler TAKE 2 PUFFS BY MOUTH EVERY 6 HOURS AS NEEDED FOR WHEEZE 6.7 each 2    carbidopa-levodopa (SINEMET)  MG per tablet TAKE 2 TABLETS NIGHTLY 60 tablet 5    desoximetasone (TOPICORT) 0.25 % cream Apply topically 2 times daily. 15 g 0    losartan (COZAAR) 50 MG tablet Take 1 tablet by mouth daily 90 tablet 1    buPROPion (WELLBUTRIN XL) 150 MG extended release tablet TAKE 1 TABLET BY MOUTH EVERY DAY 90 tablet 3    metoprolol succinate (TOPROL XL) 50 MG extended release tablet TAKE 1 TABLET BY MOUTH EVERY DAY 90 tablet 3    rosuvastatin (CRESTOR) 40 MG tablet TAKE 1 TABLET BY MOUTH EVERY DAY AT NIGHT 90 tablet 3    clopidogrel (PLAVIX) 75 MG tablet TAKE 1 TABLET BY MOUTH EVERY DAY 90 tablet 3    FLUoxetine (PROZAC) 40 MG capsule TAKE 2 CAPSULES DAILY (Patient taking differently: Take 80 mg by mouth daily TAKE 2 CAPSULES DAILY) 180 capsule 3     Allergies   Allergen Reactions    Mobic [Meloxicam] Nausea Only     Stomach would get upset after taking this medication    Morphine Other (See Comments)     When taken in 2013 pt had an adverse reaction to morphine. She ended up in [facility] with kidney failure, dehydration, and in ICU. She prefers this not to be given ever.  Other      ? Suture from CABG caused blister (10/30/2015)       Review of Systems   Constitutional: Negative for chills, diaphoresis, fatigue and fever. HENT: Negative for congestion and sore throat. Eyes: Negative for pain and visual disturbance. Respiratory: Negative for cough and shortness of breath. Cardiovascular: Negative for chest pain and leg swelling. Gastrointestinal: Negative for abdominal pain, anal bleeding, nausea and vomiting. Genitourinary: Negative for difficulty urinating, dysuria, frequency and urgency. Musculoskeletal: Positive for arthralgias (+ Right mid shaft upper arm/humerus pain). Negative for back pain and neck pain. Skin: Negative for rash and wound. Neurological: Negative for dizziness and light-headedness.          PHYSICAL EXAM  BP (!) 173/88   Pulse 57   Temp 98.1 °F (36.7 °C)   Resp 18   Ht 5' 7\" (1.702 m)   Wt 250 lb (113.4 kg)   LMP 01/01/2004   SpO2 95%   BMI 39.16 kg/m²   GENERAL APPEARANCE: Awake and alert. Cooperative. No acute distress. HEAD: Normocephalic. Atraumatic. EYES: PERRL. EOM's grossly intact. ENT: Mucous membranes are moist.   NECK: Supple. Normal ROM. CHEST: Equal symmetric chest rise. LUNGS: Breathing is unlabored. Speaking comfortably in full sentences. Abdomen: Nondistended. Nontender. EXTREMITIES: MAEE. No acute deformities. + Equal grasp bilaterally. Patient is unable to supinate her right upper extremity. No tenderness or erythema noted to the right elbow or wrist.  Vascular: 2+ radial pulses bilaterally.  + Brisk cap refill <2 seconds. SKIN: Warm and dry. No rashes noted to exposed skin. NEUROLOGICAL: Alert and oriented. Strength is 5/5 in all extremities and sensation is intact. +2 biceps and brachial radialis reflexes    RADIOLOGY  No results found. ED COURSE  Patient received lidocaine patch and ibuprofen for pain, with good relief. Labs ordered  None      Imaging ordered  XR HUMERUS RIGHT (MIN 2 VIEWS)    Result Date: 3/6/2022  No acute abnormality of the humerus. Probable remote fracture of the radial head partially seen at the elbow. XR ELBOW RIGHT (MIN 3 VIEWS)    Result Date: 3/6/2022  No acute bony or joint abnormality       A discussion was had with Mrs. Evangelista Contreras regarding right arm pain. A lidocaine patch was applied. Risk management discussed and shared decision making had with patient and/or surrogate. All questions were answered. Patient will follow up with orthopedics and her PCP for further evaluation/treatment. Patient will return to ED for new/worsening symptoms. MDM  Patient presents to the emergency department with acute onset of right arm pain.  Alternative diagnoses are less likely based on history and physical. Considered fracture, dislocation, neurovascular injury, other    Patient denies any injury to her right upper arm. She states the symptoms onset spontaneously. She has no accompanying symptoms including no chest pain, shortness of breath, presyncope, syncope, cough, hemoptysis, or other concerning features. She is neurovascularly intact. On my exam the patient is very somnolent with pinpoint pupils during my exam.  I do not feel comfortable giving her narcotic pain medication. She is on Vicodin chronically. She gets 120 Vicodin monthly. I will obtain x-ray of her right humerus with intention to discharge to home with outpatient follow-up with PCP. Given that the patient's XR of right humerus identifies a probable remote fracture of the radial head partially seen at the elbow I did obtain a dedicated x-ray of the patient's right. Pending. XR right elbow identifies no acute bony or joint abnormality. Upon reevaluation of the patient she is on able to supinate her right hand/extremity. Therefore, given that there is one image identifying a remote fracture and a second dedicated image that does not identify a remote radial head fracture but the patient is experiencing discomfort I consulted orthopedics via [facility]. I updated the patient regarding findings and ask if she would like a sling for arm she and her male visitor state that they have a sling at home that she will use. Dr. Maida Vicente responded to [address] [place] [address] and patient being placed in a sling or a long-arm posterior splint if there is significant discomfort. Upon reentering patient's room I am told by the nurse that the patient has left the department. However, the plan remains the same to follow-up with orthopedics in the next 1-2 days and use her own sling and continue her previously prescribed medications for discomfort at home with the addition of a ibuprofen every 12 hours. Strict and precautions.         Clinical impression  Right arm pain        DISPOSITION  Discharge outpatient follow-up PCP and orthopedics      [address], DELIA - JOSE M  03/08/22 [id number]

## 2022-03-07 ENCOUNTER — CARE COORDINATION (OUTPATIENT)
Dept: CARE COORDINATION | Age: 67
End: 2022-03-07

## 2022-03-07 NOTE — CARE COORDINATION
Outbound call made to patient and spoke to patients  who was very dissatisfied with patients ED visit. Michelle Reynaga said that patient is inpatient at another hospital and cannot believe that the ED sent her home yesterday. [facility] provided Michelle Reynaga with the number to the Complaints and Grievances line at [phone]. Michelle Reynaga said he had to go because someone else is calling. No further outreach scheduled with this [facility] d/t patient inpatient.      [facility]  [phone]  [facility] [address]  [address] [place] [address]

## 2022-03-08 ASSESSMENT — ENCOUNTER SYMPTOMS
SHORTNESS OF BREATH: 0
EYE PAIN: 0
ANAL BLEEDING: 0
SORE THROAT: 0
COUGH: 0
ABDOMINAL PAIN: 0
VOMITING: 0
BACK PAIN: 0
NAUSEA: 0

## 2022-03-09 ENCOUNTER — TELEPHONE (OUTPATIENT)
Dept: ORTHOPEDIC SURGERY | Age: 67
End: 2022-03-09

## 2022-03-25 ENCOUNTER — TELEPHONE (OUTPATIENT)
Dept: FAMILY MEDICINE CLINIC | Age: 67
End: 2022-03-25

## 2022-03-28 RX ORDER — FLUOXETINE HYDROCHLORIDE 40 MG/1
CAPSULE ORAL
Qty: 180 CAPSULE | Refills: 2 | Status: SHIPPED | OUTPATIENT
Start: 2022-03-28

## 2022-03-28 NOTE — TELEPHONE ENCOUNTER
Medication:   Requested Prescriptions     Pending Prescriptions Disp Refills    FLUoxetine (PROZAC) 40 MG capsule [Pharmacy Med Name: FLUOXETIN(P) CAP 40MG] 180 capsule 3     Sig: TAKE 2 CAPSULES DAILY        Last Filled:  3/17/2021, 180, 3    Patient Phone Number: [phone] (home)     Last appt: 10/28/2021, [address]  Next appt: Visit date not found    Last OARRS:   RX Monitoring 11/26/2021   Attestation -   Acute Pain Prescriptions -   Periodic Controlled Substance Monitoring No signs of potential drug abuse or diversion identified.    Chronic Pain > 80 MEDD -

## 2022-04-04 ENCOUNTER — TELEPHONE (OUTPATIENT)
Dept: CARDIOLOGY CLINIC | Age: 67
End: 2022-04-04

## 2022-04-04 ENCOUNTER — TELEPHONE (OUTPATIENT)
Dept: FAMILY MEDICINE CLINIC | Age: 67
End: 2022-04-04

## 2022-04-04 DIAGNOSIS — Z76.0 ENCOUNTER FOR MEDICATION REFILL: ICD-10-CM

## 2022-04-04 DIAGNOSIS — M15.9 PRIMARY OSTEOARTHRITIS INVOLVING MULTIPLE JOINTS: ICD-10-CM

## 2022-04-04 RX ORDER — HYDROCODONE BITARTRATE AND ACETAMINOPHEN 7.5; 325 MG/1; MG/1
1 TABLET ORAL EVERY 6 HOURS PRN
Qty: 120 TABLET | Refills: 0 | Status: CANCELLED | OUTPATIENT
Start: 2022-04-04 | End: 2022-05-04

## 2022-04-04 NOTE — TELEPHONE ENCOUNTER
Medication:   Requested Prescriptions     Pending Prescriptions Disp Refills    HYDROcodone-acetaminophen (NORCO) 7.5-325 MG per tablet 120 tablet 0     Sig: Take 1 tablet by mouth every 6 hours as needed for Pain for up to 30 days. Last Filled:  02/18/2022 #120 0rf    Patient Phone Number: [phone] (home)     Last appt: 10/28/2021   Next appt: Visit date not found    Last OARRS:   RX Monitoring 11/26/2021   Attestation -   Acute Pain Prescriptions -   Periodic Controlled Substance Monitoring No signs of potential drug abuse or diversion identified.    Chronic Pain > 80 MEDD -

## 2022-04-04 NOTE — TELEPHONE ENCOUNTER
Vincent Nicole called in and states that she just had surgery on her shoulder and the hospitalist wants her to stop hr blood thinners.  She does not feel comfortable doing so until she talks with her cardiologist.    She can be reached back at [phone]

## 2022-04-04 NOTE — TELEPHONE ENCOUNTER
Given her history of duodenal ulcer, I would rather have her take Plavix with a PPI then aspirin  I do not need to see her until August

## 2022-04-04 NOTE — TELEPHONE ENCOUNTER
Dr. Krishna Willis,     She is due for routine yearly f/u with you 8/2022-CAD s/p CABG s/p PCI  W/ stent SVG to OM 9/2019, HLD, anemia. Does her f/u need to be scheduled any sooner from your standpoint? Thanks. UPDATE: Plavix was stopped and started on ASA 81 mg daily during recent hospitalization 3/15-3/25/22 for sepsis and encephalopathy found to have right shoulder septic arthritis-ID managing. Cardiology was also consulted for elevated troponin. She then developed GI bleeding and gastroenterology was consulted. An EGD and colonoscopy were performed and showed duodenal ulcer without evidence of active bleeding and erosions in the antrum and body. She was continued on a PPI twice daily. She was restarted on her aspirin for history of CAD with NSTEMI. I reached out to patient and left a vm on below contact number provided.

## 2022-04-04 NOTE — TELEPHONE ENCOUNTER
Medication:   Requested Prescriptions     Pending Prescriptions Disp Refills    HYDROcodone-acetaminophen (NORCO) 7.5-325 MG per tablet 120 tablet 0     Sig: Take 1 tablet by mouth every 6 hours as needed for Pain for up to 30 days. Last Filled:  0218/2022    Patient Phone Number: [phone] (home)     Last appt: 10/28/2021   Next appt: Visit date not found    Last OARRS:   RX Monitoring 11/26/2021   Attestation -   Acute Pain Prescriptions -   Periodic Controlled Substance Monitoring No signs of potential drug abuse or diversion identified.    Chronic Pain > 80 MEDD -

## 2022-04-04 NOTE — TELEPHONE ENCOUNTER
----- Message from [address] [facility] sent at 4/1/2022  4:56 PM EDT -----  Subject: Refill Request    QUESTIONS  Name of Medication? HYDROcodone-acetaminophen (NORCO) 7.5-325 MG per   tablet  Patient-reported dosage and instructions? 7.5-325 mg, 1 po upto QID PRN  How many days do you have left? 4  Preferred Pharmacy? [id number]  Pharmacy phone number (if available)? [phone]  ---------------------------------------------------------------------------  --------------  Abbie QUEVEDO  What is the best way for the office to contact you? OK to leave message on   voicemail  Preferred Call Back Phone Number? [phone]  ---------------------------------------------------------------------------  --------------  SCRIPT ANSWERS  Relationship to Patient?  Self

## 2022-04-05 RX ORDER — HYDROCODONE BITARTRATE AND ACETAMINOPHEN 7.5; 325 MG/1; MG/1
1 TABLET ORAL EVERY 6 HOURS PRN
Qty: 120 TABLET | Refills: 0 | Status: SHIPPED | OUTPATIENT
Start: 2022-04-05 | End: 2022-05-10 | Stop reason: SDUPTHER

## 2022-04-07 ENCOUNTER — TELEMEDICINE (OUTPATIENT)
Dept: FAMILY MEDICINE CLINIC | Age: 67
End: 2022-04-07
Payer: COMMERCIAL

## 2022-04-07 DIAGNOSIS — J44.1 COPD EXACERBATION (HCC): ICD-10-CM

## 2022-04-07 DIAGNOSIS — E66.01 MORBID OBESITY (HCC): ICD-10-CM

## 2022-04-07 DIAGNOSIS — N18.9 ACUTE KIDNEY INJURY SUPERIMPOSED ON CKD (HCC): Primary | ICD-10-CM

## 2022-04-07 DIAGNOSIS — I25.10 CORONARY ARTERY DISEASE INVOLVING NATIVE CORONARY ARTERY OF NATIVE HEART WITHOUT ANGINA PECTORIS: ICD-10-CM

## 2022-04-07 DIAGNOSIS — N17.9 ACUTE KIDNEY INJURY SUPERIMPOSED ON CKD (HCC): Primary | ICD-10-CM

## 2022-04-07 DIAGNOSIS — I10 PRIMARY HYPERTENSION: ICD-10-CM

## 2022-04-07 DIAGNOSIS — F17.200 TOBACCO USE DISORDER: ICD-10-CM

## 2022-04-07 DIAGNOSIS — E78.2 MIXED HYPERLIPIDEMIA: ICD-10-CM

## 2022-04-07 PROCEDURE — 99443 PR PHYS/QHP TELEPHONE EVALUATION 21-30 MIN: CPT | Performed by: FAMILY MEDICINE

## 2022-04-07 NOTE — PROGRESS NOTES
Lesa Mack is a 61 y.o. female. Lesa Mack, was evaluated through a synchronous (real-time) audio-video encounter. The patient (or guardian if applicable) is aware that this is a billable service, which includes applicable co-pays. This Virtual Visit was conducted with patient's (and/or legal guardian's) consent. The visit was conducted pursuant to the emergency declaration under the [address] [place] [address], [address] [place] [address] and the [employer] Act. Patient identification was verified, and a caregiver was present when appropriate. The patient was located at home in a state where the provider was licensed to provide care. Total time spent for this encounter: 25    --Mayelin Trent MD on 4/7/2022 at 2:41 PM    An electronic signature was used to authenticate this note. HPI: Doing a virtual hospital follow-up visit. End of February she fell and injured her right arm by February 6 it was hurting so much she had to go the emergency room there they found fluid in the arm and did an aspiration and thought she had infection put her on IV antibiotics. She did get better so on March 15 she is back in the hospital for IV antibiotics and further right shoulder surgery during that hospitalization she is developed a GI bleed acute on chronic renal failure and a non-STEMI  Eventually she was sent home with a PICC line and has followed up with her second right surgery aspiration on March 23. She then saw the ID as outpatient on March 25 she is getting 6 weeks of IV Rocephin 2 g a day given by her  through a PICC line.   Her arm still sore but she is overall feeling better  She denies chest pain  She generally has some weakness  1 good thing that happens that she quit smoking through all these hospitalizations  She is scheduled to see ID in 1 week  Meds, vitamins and allergies reviewed with pt    ROS: No TIA's or unusual headaches, no dysphagia. No prolonged cough. No dyspnea or chest pain on exertion. No abdominal pain, change in bowel habits, black or bloody stools. No urinary tract symptoms. No new or unusual musculoskeletal symptoms. Prior to Visit Medications    Medication Sig Taking? Authorizing Provider   HYDROcodone-acetaminophen (NORCO) 7.5-325 MG per tablet Take 1 tablet by mouth every 6 hours as needed for Pain for up to 30 days. Yes Radha Rodriguez MD   FLUoxetine (PROZAC) 40 MG capsule TAKE 2 CAPSULES DAILY Yes Radha Rodriguez MD   traZODone (DESYREL) 50 MG tablet TAKE 1 TABLET BY MOUTH AT BEDTIME AS NEEDED FOR SLEEP Yes Radha Rodriguez MD   dicyclomine (BENTYL) 10 MG capsule Take 1 capsule by mouth every 6 hours as needed (cramps) Yes Bg Sierra MD   albuterol sulfate  (90 Base) MCG/ACT inhaler TAKE 2 PUFFS BY MOUTH EVERY 6 HOURS AS NEEDED FOR WHEEZE Yes Radha Rodriguez MD   carbidopa-levodopa (SINEMET)  MG per tablet TAKE 2 TABLETS NIGHTLY Yes Radha Rodriguez MD   desoximetasone (TOPICORT) 0.25 % cream Apply topically 2 times daily.  Yes Radha Rodriguez MD   losartan (COZAAR) 50 MG tablet Take 1 tablet by mouth daily Yes Geraldine Davis MD   buPROPion (WELLBUTRIN XL) 150 MG extended release tablet TAKE 1 TABLET BY MOUTH EVERY DAY Yes Radha Rodriguez MD   metoprolol succinate (TOPROL XL) 50 MG extended release tablet TAKE 1 TABLET BY MOUTH EVERY DAY Yes Felipe Bonilla MD   rosuvastatin (CRESTOR) 40 MG tablet TAKE 1 TABLET BY MOUTH EVERY DAY AT NIGHT Yes Chelly Rizzo MD   clopidogrel (PLAVIX) 75 MG tablet TAKE 1 TABLET BY MOUTH EVERY DAY Yes Chelly Rizzo MD   ibuprofen (ADVIL;MOTRIN) 600 MG tablet Take 1 tablet by mouth 3 times daily as needed for Pain With meals  Ok Gavin, APRN - CNP       Past Medical History:   Diagnosis Date    Arthritis     ASHD (arteriosclerotic heart disease)     Chronic kidney disease     Chronic midline low back pain without sciatica 4/3/2019    COPD (chronic obstructive pulmonary disease) (HCC)     unknown     Coronary artery disease involving native coronary artery of native heart without angina pectoris 3/31/2016    Coronary artery disease involving native coronary artery of native heart without angina pectoris 3/31/2016    Coronary artery disease involving native coronary artery of native heart without angina pectoris 3/31/2016    Depression     Full dentures     HTN     Hyperlipidemia     Iron deficiency anemia 3/31/2016    Iron deficiency anemia secondary to blood loss (chronic) 2020    Kidney stone     Morbid obesity (HCC)     Restless legs syndrome     pt on cabidopa/levodopa    Stage 3 chronic kidney disease ([facility] [address] 4/3/2019    Wears glasses        Social History     Tobacco Use    Smoking status: Current Every Day Smoker     Packs/day: 0.50     Years: 30.00     Pack years: 15.00     Types: Cigarettes     Last attempt to quit: 2016     Years since quittin.1    Smokeless tobacco: Never Used   Vaping Use    Vaping Use: Never used   Substance Use Topics    Alcohol use: No     Alcohol/week: 0.0 standard drinks    Drug use: No       Family History   Problem Relation Age of Onset    Stroke Mother     Diabetes Mother     Heart Disease Mother     High Blood Pressure Mother     High Cholesterol Mother     Cancer Neg Hx        Allergies   Allergen Reactions    Mobic [Meloxicam] Nausea Only     Stomach would get upset after taking this medication    Morphine Other (See Comments)     When taken in  pt had an adverse reaction to morphine. She ended up in  hospital with kidney failure, dehydration, and in ICU. She prefers this not to be given ever.  Other      ? Suture from CABG caused blister (10/30/2015)       OBJECTIVE:  LMP 2004   GEN:  in NAD  NEURO: nonfocal, oriented x3  OARRS report reviewed and assessed. Consistent.   Notes, labs,tests, discharge summary, imaging, procedures from recent hospitalization reviewed   ASSESSMENT/PLAN:  Right shoulder septic joint  Encounter Diagnoses   Name Primary?     Acute kidney injury superimposed on CKD ([facility] [address] Yes    COPD exacerbation ([facility] [address]     Morbid obesity ([facility] [address]     Coronary artery disease involving native coronary artery of native heart without angina pectoris     Primary hypertension     Mixed hyperlipidemia     Tobacco use disorder - quit tob        Continue iv atb for 6 weeks, until 5/4/22  F/u with ID  Follow-up with me in 4 to 6 weeks at which time we will repeat her labs  She will be due for second Shingrix and a COVID booster and a colonoscopy which we will discuss at her upcoming visit

## 2022-04-08 ENCOUNTER — TELEPHONE (OUTPATIENT)
Dept: FAMILY MEDICINE CLINIC | Age: 67
End: 2022-04-08

## 2022-04-08 NOTE — TELEPHONE ENCOUNTER
Patient's  called very upset about her taking too much HYDROcodone-acetaminophen ([address]) 7.5-325 MG per tablet    He claims that she is taking them like candy, and that he feels that the medicine is not being monitored.      Please contact Cyndi Martin at [phone]

## 2022-04-08 NOTE — TELEPHONE ENCOUNTER
I left a voicemail message that patient has been on this medicine 4 times a day for months and stopping it now would be a bad idea since she has a drain in her arm is still having pain and she could withdrawal.  We do want her to decrease the medicine gradually and are concerned of course about her long-term use.   Hopefully he will call me back talk things over with her and we can reduce her pain medicine slowly over time

## 2022-04-11 NOTE — TELEPHONE ENCOUNTER
Please call the patient but do not let her know that I spoke with the .   Tell her I would like her to come see me next week for medication follow-up

## 2022-04-20 NOTE — TELEPHONE ENCOUNTER
Medication:   Requested Prescriptions     Pending Prescriptions Disp Refills    carbidopa-levodopa (SINEMET)  MG per tablet [Pharmacy Med Name: CARB/LEVO TAB 10-100MG] 60 tablet 5     Sig: TAKE 2 TABLETS NIGHTLY        Last Filled:      Patient Phone Number: [phone] (home)     Last appt: 4/7/2022   Next appt: Visit date not found    Last OARRS:   RX Monitoring 11/26/2021   Attestation -   Acute Pain Prescriptions -   Periodic Controlled Substance Monitoring No signs of potential drug abuse or diversion identified.    Chronic Pain > 80 MEDD -

## 2022-04-22 RX ORDER — LOSARTAN POTASSIUM 50 MG/1
TABLET ORAL
Qty: 90 TABLET | Refills: 1 | Status: SHIPPED | OUTPATIENT
Start: 2022-04-22

## 2022-04-22 NOTE — TELEPHONE ENCOUNTER
Medication:   Requested Prescriptions     Pending Prescriptions Disp Refills    losartan (COZAAR) 50 MG tablet [Pharmacy Med Name: LOSARTAN POTASSIUM 50 MG TAB] 90 tablet 1     Sig: TAKE 1 TABLET BY MOUTH EVERY DAY        Last Filled:  90 x 1 RF 10/29/21    Patient Phone Number: [phone] (home)     Last appt: 4/7/2022   Next appt: Visit date not found    Last OARRS:   RX Monitoring 11/26/2021   Attestation -   Acute Pain Prescriptions -   Periodic Controlled Substance Monitoring No signs of potential drug abuse or diversion identified.    Chronic Pain > 80 MEDD -

## 2022-04-27 ENCOUNTER — TELEPHONE (OUTPATIENT)
Dept: FAMILY MEDICINE CLINIC | Age: 67
End: 2022-04-27

## 2022-04-27 NOTE — TELEPHONE ENCOUNTER
Please ask her how she is taking this medication currently.   (If she is taking it twice a day then she should be on 180 tablets and 3 refills if she is only taking once a day should be 90 tablets and 3 refills

## 2022-04-27 NOTE — TELEPHONE ENCOUNTER
[facility] Request For 90 Day Supply scanned in Epic and attached to this encounter. [facility] [facility] the prescription written for Carbidopa-Levodopa 10-100MG be sent for a 90 day supply instead of a 30 day supply.

## 2022-05-10 DIAGNOSIS — M15.9 PRIMARY OSTEOARTHRITIS INVOLVING MULTIPLE JOINTS: ICD-10-CM

## 2022-05-10 DIAGNOSIS — Z76.0 ENCOUNTER FOR MEDICATION REFILL: ICD-10-CM

## 2022-05-10 RX ORDER — HYDROCODONE BITARTRATE AND ACETAMINOPHEN 7.5; 325 MG/1; MG/1
1 TABLET ORAL EVERY 6 HOURS PRN
Qty: 120 TABLET | Refills: 0 | Status: SHIPPED | OUTPATIENT
Start: 2022-05-10 | End: 2022-06-08 | Stop reason: SDUPTHER

## 2022-05-10 NOTE — TELEPHONE ENCOUNTER
Medication:   Requested Prescriptions     Pending Prescriptions Disp Refills    HYDROcodone-acetaminophen (NORCO) 7.5-325 MG per tablet 120 tablet 0     Sig: Take 1 tablet by mouth every 6 hours as needed for Pain for up to 30 days. Last Filled:  04/05/2022    Patient Phone Number: [phone] (home)     Last appt: 4/7/2022   Next appt: Visit date not found    Last OARRS:   RX Monitoring 11/26/2021   Attestation -   Acute Pain Prescriptions -   Periodic Controlled Substance Monitoring No signs of potential drug abuse or diversion identified.    Chronic Pain > 80 MEDD -

## 2022-05-10 NOTE — TELEPHONE ENCOUNTER
Medication and Quantity requested:    HYDROcodone-acetaminophen ([address]) 7.5-325 MG per tablet [[id number]]  ENDED  Qty unknown    Last Visit  04/22/22    Pharmacy and phone number updated in [facility]:  yes

## 2022-05-25 ENCOUNTER — NURSE TRIAGE (OUTPATIENT)
Dept: OTHER | Facility: CLINIC | Age: 67
End: 2022-05-25

## 2022-05-25 NOTE — TELEPHONE ENCOUNTER
Received call from [facility] at [facility] with Red Flag Complaint. Subjective: Caller states \"I was in hospital 2 weeks ago for about a week and was in a coma for a few days. Since then I have been having dizzy spells and it is starting scare me. They said it was normal to feel a little dizzy after being in a coma and on bed rest but it has been a few weeks and I feel like it should be getting better by now. \"     Current Symptoms: Reports dizziness, lightheadedness, trouble walking with dizziness and have to hold on to something, intermittent headache. Denies falls, vertigo, reduced food or fluid intake, fevers, chills, body aches, CP, SOB, palpitations, hx of DM, unilateral weakness, loss of  strength, loss of speech/confusion, blood in stool, hematuria, melena, N/V/D/C, visual disturbances. Onset: a few weeks ago; unchanged    Associated Symptoms: NA    Pain Severity: denies    Temperature: denies    What has been tried: resting, fluids,     LMP: NA Pregnant: NA    Recommended disposition: See PCP within 24 Hours    Care advice provided, patient verbalizes understanding; denies any other questions or concerns; instructed to call back for any new or worsening symptoms. Patient/Caller agrees with recommended disposition; writer provided warm transfer to [employer] at [facility] for appointment scheduling     Attention Provider: Thank you for allowing me to participate in the care of your patient. The patient was connected to triage in response to information provided to the ECC/PSC. Please do not respond through this encounter as the response is not directed to a shared pool.       Reason for Disposition   [1] MODERATE dizziness (e.g., interferes with normal activities) AND [2] has NOT been evaluated by physician for this  (Exception: dizziness caused by heat exposure, sudden standing, or poor fluid intake)    Protocols used: [facility]

## 2022-05-27 ENCOUNTER — OFFICE VISIT (OUTPATIENT)
Dept: FAMILY MEDICINE CLINIC | Age: 67
End: 2022-05-27
Payer: COMMERCIAL

## 2022-05-27 VITALS
BODY MASS INDEX: 38.69 KG/M2 | WEIGHT: 247 LBS | DIASTOLIC BLOOD PRESSURE: 80 MMHG | SYSTOLIC BLOOD PRESSURE: 138 MMHG | OXYGEN SATURATION: 98 % | HEART RATE: 77 BPM

## 2022-05-27 DIAGNOSIS — D50.9 IRON DEFICIENCY ANEMIA, UNSPECIFIED IRON DEFICIENCY ANEMIA TYPE: ICD-10-CM

## 2022-05-27 DIAGNOSIS — E66.01 MORBID OBESITY (HCC): ICD-10-CM

## 2022-05-27 DIAGNOSIS — I25.10 CORONARY ARTERY DISEASE INVOLVING NATIVE CORONARY ARTERY OF NATIVE HEART WITHOUT ANGINA PECTORIS: ICD-10-CM

## 2022-05-27 DIAGNOSIS — G89.29 CHRONIC MIDLINE LOW BACK PAIN WITHOUT SCIATICA: ICD-10-CM

## 2022-05-27 DIAGNOSIS — N18.9 ACUTE KIDNEY INJURY SUPERIMPOSED ON CKD (HCC): Primary | ICD-10-CM

## 2022-05-27 DIAGNOSIS — G25.81 RESTLESS LEGS SYNDROME: ICD-10-CM

## 2022-05-27 DIAGNOSIS — I10 PRIMARY HYPERTENSION: ICD-10-CM

## 2022-05-27 DIAGNOSIS — N17.9 ACUTE KIDNEY INJURY SUPERIMPOSED ON CKD (HCC): Primary | ICD-10-CM

## 2022-05-27 DIAGNOSIS — M54.50 CHRONIC MIDLINE LOW BACK PAIN WITHOUT SCIATICA: ICD-10-CM

## 2022-05-27 DIAGNOSIS — J44.1 COPD EXACERBATION (HCC): ICD-10-CM

## 2022-05-27 PROCEDURE — 99214 OFFICE O/P EST MOD 30 MIN: CPT | Performed by: FAMILY MEDICINE

## 2022-05-27 RX ORDER — CEPHALEXIN 500 MG/1
1000 CAPSULE ORAL EVERY 12 HOURS
COMMUNITY
Start: 2022-05-08 | End: 2022-08-08 | Stop reason: ALTCHOICE

## 2022-05-27 RX ORDER — DOXYCYCLINE HYCLATE 100 MG
100 TABLET ORAL 2 TIMES DAILY
COMMUNITY
Start: 2022-05-08 | End: 2022-08-08 | Stop reason: ALTCHOICE

## 2022-05-27 RX ORDER — CARVEDILOL 12.5 MG/1
12.5 TABLET ORAL 2 TIMES DAILY WITH MEALS
COMMUNITY
Start: 2022-04-14 | End: 2022-05-27 | Stop reason: CLARIF

## 2022-05-27 ASSESSMENT — PATIENT HEALTH QUESTIONNAIRE - PHQ9
SUM OF ALL RESPONSES TO PHQ9 QUESTIONS 1 & 2: 0
SUM OF ALL RESPONSES TO PHQ QUESTIONS 1-9: 0
2. FEELING DOWN, DEPRESSED OR HOPELESS: 0
1. LITTLE INTEREST OR PLEASURE IN DOING THINGS: 0

## 2022-05-27 NOTE — LETTER
CONTROLLED SUBSTANCE MEDICATION AGREEMENT     Patient Name: Jerica Given  Patient YOB: 1955   I understand, that controlled substance medications may be used to help better manage my symptoms and to improve my ability to function at home, work and in social settings. However, I also understand that these medications do have risks, which have been discussed with me, including possible development of physical or psychological dependence. I understand that successful treatment requires mutual trust and honesty between me and my provider. I understand and agree that following this Medication Agreement is necessary in continuing my provider-patient relationship and the success of my treatment plan. Explanation from my Provider: Benefits and Goals of Controlled Substance Medications: There are two potential goals for your treatment: (1) decreased pain and suffering (2) improved daily life functions. There are many possible treatments for your chronic condition(s). Alternatives such as physical therapy, yoga, massage, home daily exercise, meditation, relaxation techniques, injections, chiropractic manipulations, surgery, cognitive therapy, hypnosis and many medications that are not habit-forming may be used. Use of controlled substance medications may be helpful, but they are unlikely to resolve all symptoms or restore all function. Explanation from my Provider: Risks of Controlled Substance Medications:  Opioid pain medications: These medications can lead to problems such as addiction/dependence, sedation, lightheadedness/dizziness, memory issues, falls, constipation, nausea, or vomiting. They may also impair the ability to drive or operate machinery. Additionally, these medications may lower testosterone levels, leading to loss of bone strength, stamina and sex drive.   They may cause problems with breathing, sleep apnea and reduced coughing, which is especially dangerous for patients with lung disease. Overdose or dangerous interactions with alcohol and other medications may occur, leading to death. Hyperalgesia may develop, which means patients receiving opioids for the treatment of pain may become more sensitive to certain painful stimuli, and in some cases, experience pain from ordinarily non-painful stimuli. Women between the ages of 14-53 who could become pregnant should carefully weigh the risks and benefits of opioids with their physicians, as these medications increase the risk of pregnancy complications, including miscarriage,  delivery and stillbirth. It is also possible for babies to be born addicted to opioids. Opioid dependence withdrawal symptoms may include; feelings of uneasiness, increased pain, irritability, belly pain, diarrhea, sweats and goose-flesh. Benzodiazepines and non-benzodiazepine sleep medications: These medications can lead to problems such as addiction/dependence, sedation, fatigue, lightheadedness, dizziness, incoordination, falls, depression, hallucinations, and impaired judgment, memory and concentration. The ability to drive and operate machinery may also be affected. Abnormal sleep-related behaviors have been reported, including sleepwalking, driving, making telephone calls, eating, or having sex while not fully awake. These medications can suppress breathing and worsen sleep apnea, particularly when combined with alcohol or other sedating medications, potentially leading to death. Dependence withdrawal symptoms may include tremors, anxiety, hallucinations and seizures. Stimulants:  Common adverse effects include addiction/dependence, increased blood  pressure and heart rate, decreased appetite, nausea, involuntary weight loss, insomnia,                                                                                                                     Initials:_______   irritability, and headaches.   These risks may increase when these medications are combined with other stimulants, such as caffeine pills or energy drinks, certain weight loss supplements and oral decongestants. Dependence withdrawal symptoms may include depressed mood, loss of interest, suicidal thoughts, anxiety, fatigue, appetite changes and agitation. Testosterone replacement therapy:  Potential side effects include increased risk of stroke and heart attack, blood clots, increased blood pressure, increased cholesterol, enlarged prostate, sleep apnea, irritability/aggression and other mood disorders, and decreased fertility. I agree and understand that I and my prescriber have the following rights and responsibilities regarding my treatment plan:     1. MY RIGHTS:  To be informed of my treatment and medication plan. To be an active participant in my health and wellbeing. 2. MY RESPONSIBILITY AND UNDERSTANDING FOR USE OF MEDICATIONS   I will take medications at the dose and frequency as directed. For my safety, I will not increase or change how I take my medications without the recommendation of my healthcare provider.  I will actively participate in any program recommended by my provider which may improve function, including social, physical, psychological programs.  I will not take my medications with alcohol or other drugs not prescribed to me. I understand that drinking alcohol with my medications increases the chances of side effects, including reduced breathing rate and could lead to personal injury when operating machinery.  I understand that if I have a history of substance use disorders, including alcohol or other illicit drugs, that I may be at increased risk of addiction to my medications.  I agree to notify my provider immediately if I should become pregnant so that my treatment plan can be adjusted.    I agree and understand that I shall only receive controlled substance medications from the prescriber that signed this agreement unless there is written agreement among other prescribers of controlled substances outlining the responsibility of the medications being prescribed.  I understand that the if the controlled medication is not helping to achieve goals, the dosage may be tapered and no longer prescribed. 3. MY RESPONSIBILITY FOR COMMUNICATION / PRESCRIPTION RENEWALS   I agree that all controlled substance medications that I take will be prescribed only by my provider. If another healthcare provider prescribes me medication in an emergency, I will notify my provider within seventy-two (72) hours.  I will arrange for refills at the prescribed interval ONLY during regular office hours. I will not ask for refills earlier than agreed, after-hours, on holidays or weekends. Refills may take up to 72 hours for processing and prescriptions to reach the pharmacy.  I will inform my other health care providers that I am taking these medications and of the existence of this [id number]. In the event of an emergency, I will provide the same information to the emergency department prescribers.  I will keep my provider updated on the pharmacy I am using for controlled medication prescription filling. Initials:_______  4. MY RESPONSIBILITY FOR PROTECTING MEDICATIONS   I will protect my prescriptions and medications. I understand that lost or misplaced prescriptions will not be replaced.  I will keep medications only for my own use and will not share them with others. I will keep all medications away from children.  I agree that if my medications are adjusted or discontinued, I will properly dispose of any remaining medications. I understand that I will be required to dispose of any remaining controlled medications as, directed by my prescriber, prior to being provided with any prescriptions for other controlled medications.   Medication drop box locations can be found at: [email or web address]    5. MY RESPONSIBILITY WITH ILLEGAL DRUGS    I will not use illegal or street drugs or another person's prescription medications not prescribed to me.  If there are identified addiction type symptoms, then referral to a program may be provided by my provider and I agree to follow through with this recommendation. 6. MY RESPONSIBILITY FOR COOPERATION WITH INVESTIGATIONS   I understand that my provider will comply with any applicable law and may discuss my use and/or possible misuse/abuse of controlled substances and alcohol, as appropriate, with any health care provider involved in my care, pharmacist, or legal authority.  I authorize my provider and pharmacy to cooperate fully with law enforcement agencies (as permitted by law) in the investigation of any possible misuse, sale, or other diversion of my controlled substances.  I agree to waive any applicable privilege or right of privacy or confidentiality with respect to these authorizations. 7. PROVIDERS RIGHT TO MONITOR FOR SAFETY: PRESCRIPTION MONITORING / DRUG TESTING   I consent to drug/toxicology screening and will submit to a drug screen upon my providers request to assure I am only taking the prescribed drugs for my safety monitoring. I understand that a drug screen is a laboratory test in which a sample of my urine, blood or saliva is checked to see what drugs I have been taking. This may entail an observed urine specimen, which means that a nurse or other health care provider may watch me provide urine, and I will cooperate if I am asked to provide an observed specimen.  I understand that my provider will check a copy of my State Prescription Monitoring Program () Report in order to safely prescribe medications.  Pill Counts: I consent to pill counts when requested.   I may be asked to bring all my prescribed controlled substance medications, in their original bottles, to all of my scheduled appointments. In addition, my provider may ask me to come to the practice at any time for a random pill count. 8. TERMINATION OF THIS AGREEMENT  For my safety, my prescriber has the right to stop prescribing controlled substance medications and may end this agreement. Initials:_______   Conditions that may result in termination of this agreement:  a. I do not show any improvement in pain, or my activity has not improved. b. I develop rapid tolerance or loss of improvement, as described in my treatment plan.  c. I develop significant side effects from the medication. d. My behavior is not consistent with the responsibilities outlined above, thereby causing safety concerns to continue prescribing controlled substance medications. e. I fail to follow the terms of this agreement. f. Other:____________________________       UNDERSTANDING THIS MEDICATION AGREEMENT:    I have read the above and have had all my questions answered. For chronic disease management, I know that my symptoms can be managed with many types of treatments. A chronic medication trial may be part of my treatment, but I must be an active participant in my care. Medication therapy is only one part of my symptom management plan. In some cases, there may be limited scientific evidence to support the chronic use of certain medications to improve symptoms and daily function. Furthermore, in certain circumstances, there may be scientific information that suggests that the use of chronic controlled substances may worsen my symptoms and increase my risk of unintentional death directly related to this medication therapy. I know that if my provider feels my risk from controlled medications is greater than my benefit, I will have my controlled substance medication(s) compassionately lowered or removed altogether.      I further agree to allow this office to contact my HIPAA contact if there are concerns about my safety and use of the controlled medications. I have agreed to use the prescribed controlled substance medications to me as instructed by my provider and as stated in this Medication Agreement. My initial on each page and my signature below shows that I have read each page and I have had the opportunity to ask questions with answers provided by my provider.     Patient Name (Printed): _____________________________________  Patient Signature:  ______________________   Date: _____________    Prescriber Name (Printed): ___________________________________  Prescriber Signature: _____________________  Date: _____________

## 2022-05-27 NOTE — LETTER
CONTROLLED SUBSTANCE MEDICATION AGREEMENT     Patient Name: Kristin Solano  Patient YOB: 1955   I understand, that controlled substance medications may be used to help better manage my symptoms and to improve my ability to function at home, work and in social settings. However, I also understand that these medications do have risks, which have been discussed with me, including possible development of physical or psychological dependence. I understand that successful treatment requires mutual trust and honesty between me and my provider. I understand and agree that following this Medication Agreement is necessary in continuing my provider-patient relationship and the success of my treatment plan. Explanation from my Provider: Benefits and Goals of Controlled Substance Medications: There are two potential goals for your treatment: (1) decreased pain and suffering (2) improved daily life functions. There are many possible treatments for your chronic condition(s). Alternatives such as physical therapy, yoga, massage, home daily exercise, meditation, relaxation techniques, injections, chiropractic manipulations, surgery, cognitive therapy, hypnosis and many medications that are not habit-forming may be used. Use of controlled substance medications may be helpful, but they are unlikely to resolve all symptoms or restore all function. Explanation from my Provider: Risks of Controlled Substance Medications:  Opioid pain medications: These medications can lead to problems such as addiction/dependence, sedation, lightheadedness/dizziness, memory issues, falls, constipation, nausea, or vomiting. They may also impair the ability to drive or operate machinery. Additionally, these medications may lower testosterone levels, leading to loss of bone strength, stamina and sex drive.   They may cause problems with breathing, sleep apnea and reduced coughing, which is especially dangerous for patients with lung disease. Overdose or dangerous interactions with alcohol and other medications may occur, leading to death. Hyperalgesia may develop, which means patients receiving opioids for the treatment of pain may become more sensitive to certain painful stimuli, and in some cases, experience pain from ordinarily non-painful stimuli. Women between the ages of 14-53 who could become pregnant should carefully weigh the risks and benefits of opioids with their physicians, as these medications increase the risk of pregnancy complications, including miscarriage,  delivery and stillbirth. It is also possible for babies to be born addicted to opioids. Opioid dependence withdrawal symptoms may include; feelings of uneasiness, increased pain, irritability, belly pain, diarrhea, sweats and goose-flesh. Benzodiazepines and non-benzodiazepine sleep medications: These medications can lead to problems such as addiction/dependence, sedation, fatigue, lightheadedness, dizziness, incoordination, falls, depression, hallucinations, and impaired judgment, memory and concentration. The ability to drive and operate machinery may also be affected. Abnormal sleep-related behaviors have been reported, including sleepwalking, driving, making telephone calls, eating, or having sex while not fully awake. These medications can suppress breathing and worsen sleep apnea, particularly when combined with alcohol or other sedating medications, potentially leading to death. Dependence withdrawal symptoms may include tremors, anxiety, hallucinations and seizures. Stimulants:  Common adverse effects include addiction/dependence, increased blood  pressure and heart rate, decreased appetite, nausea, involuntary weight loss, insomnia,                                                                                                                     Initials:_______   irritability, and headaches.   These risks may increase when these written agreement among other prescribers of controlled substances outlining the responsibility of the medications being prescribed.  I understand that the if the controlled medication is not helping to achieve goals, the dosage may be tapered and no longer prescribed. 3. MY RESPONSIBILITY FOR COMMUNICATION / PRESCRIPTION RENEWALS   I agree that all controlled substance medications that I take will be prescribed only by my provider. If another healthcare provider prescribes me medication in an emergency, I will notify my provider within seventy-two (72) hours.  I will arrange for refills at the prescribed interval ONLY during regular office hours. I will not ask for refills earlier than agreed, after-hours, on holidays or weekends. Refills may take up to 72 hours for processing and prescriptions to reach the pharmacy.  I will inform my other health care providers that I am taking these medications and of the existence of this [id number]. In the event of an emergency, I will provide the same information to the emergency department prescribers.  I will keep my provider updated on the pharmacy I am using for controlled medication prescription filling. Initials:_______  4. MY RESPONSIBILITY FOR PROTECTING MEDICATIONS   I will protect my prescriptions and medications. I understand that lost or misplaced prescriptions will not be replaced.  I will keep medications only for my own use and will not share them with others. I will keep all medications away from children.  I agree that if my medications are adjusted or discontinued, I will properly dispose of any remaining medications. I understand that I will be required to dispose of any remaining controlled medications as, directed by my prescriber, prior to being provided with any prescriptions for other controlled medications.   Medication drop box locations can be found at: [email or web address]    5. MY RESPONSIBILITY WITH ILLEGAL DRUGS    I will not use illegal or street drugs or another person's prescription medications not prescribed to me.  If there are identified addiction type symptoms, then referral to a program may be provided by my provider and I agree to follow through with this recommendation. 6. MY RESPONSIBILITY FOR COOPERATION WITH INVESTIGATIONS   I understand that my provider will comply with any applicable law and may discuss my use and/or possible misuse/abuse of controlled substances and alcohol, as appropriate, with any health care provider involved in my care, pharmacist, or legal authority.  I authorize my provider and pharmacy to cooperate fully with law enforcement agencies (as permitted by law) in the investigation of any possible misuse, sale, or other diversion of my controlled substances.  I agree to waive any applicable privilege or right of privacy or confidentiality with respect to these authorizations. 7. PROVIDERS RIGHT TO MONITOR FOR SAFETY: PRESCRIPTION MONITORING / DRUG TESTING   I consent to drug/toxicology screening and will submit to a drug screen upon my providers request to assure I am only taking the prescribed drugs for my safety monitoring. I understand that a drug screen is a laboratory test in which a sample of my urine, blood or saliva is checked to see what drugs I have been taking. This may entail an observed urine specimen, which means that a nurse or other health care provider may watch me provide urine, and I will cooperate if I am asked to provide an observed specimen.  I understand that my provider will check a copy of my State Prescription Monitoring Program () Report in order to safely prescribe medications.  Pill Counts: I consent to pill counts when requested.   I may be asked to bring all my prescribed controlled substance medications, in their original bottles, to all of my scheduled appointments. In addition, my provider may ask me to come to the practice at any time for a random pill count. 8. TERMINATION OF THIS AGREEMENT  For my safety, my prescriber has the right to stop prescribing controlled substance medications and may end this agreement. Initials:_______   Conditions that may result in termination of this agreement:  a. I do not show any improvement in pain, or my activity has not improved. b. I develop rapid tolerance or loss of improvement, as described in my treatment plan.  c. I develop significant side effects from the medication. d. My behavior is not consistent with the responsibilities outlined above, thereby causing safety concerns to continue prescribing controlled substance medications. e. I fail to follow the terms of this agreement. f. Other:____________________________       UNDERSTANDING THIS MEDICATION AGREEMENT:    I have read the above and have had all my questions answered. For chronic disease management, I know that my symptoms can be managed with many types of treatments. A chronic medication trial may be part of my treatment, but I must be an active participant in my care. Medication therapy is only one part of my symptom management plan. In some cases, there may be limited scientific evidence to support the chronic use of certain medications to improve symptoms and daily function. Furthermore, in certain circumstances, there may be scientific information that suggests that the use of chronic controlled substances may worsen my symptoms and increase my risk of unintentional death directly related to this medication therapy. I know that if my provider feels my risk from controlled medications is greater than my benefit, I will have my controlled substance medication(s) compassionately lowered or removed altogether.      I further agree to allow this office to contact my HIPAA contact if there are concerns about my safety and use of the controlled medications. I have agreed to use the prescribed controlled substance medications to me as instructed by my provider and as stated in this Medication Agreement. My initial on each page and my signature below shows that I have read each page and I have had the opportunity to ask questions with answers provided by my provider.     Patient Name (Printed): _____________________________________  Patient Signature:  ______________________   Date: _____________    Prescriber Name (Printed): ___________________________________  Prescriber Signature: _____________________  Date: _____________

## 2022-05-27 NOTE — PROGRESS NOTES
Lesa Mack is a 61 y.o. female. HPI: Katia Gonzales is here for hospital follow-up  She was seen through care everywhere and patient together records available looks as though she was admitted to [facility] from May 2 through May 9 for metabolic encephalopathy and acute kidney disease on top of chronic kidney disease  Was receiving antibiotics through a PICC line for what sounds like sepsis. The PICC line is out and she was sent home on Keflex and doxycycline with follow-up with ID coming up  Abdominal pelvic CT MRI of the brain and head brain CT were all negative. Her encephalopathy resolved and her mental status returned to normal.  She been home about 2 weeks and her only complaint now is that she gets dizzy on occasion sometimes when she changes positions too quickly  She also complains of right shoulder pain where she had a mass removed before she could hardly abduct her shoulder above the horizontal  She continues to take her Norco 3 times a day for chronic low back pain  She is right-handed  Meds, vitamins and allergies reviewed with pt    ROS: No TIA's or unusual headaches, no dysphagia. No prolonged cough. No dyspnea or chest pain on exertion. No abdominal pain, change in bowel habits, black or bloody stools. No urinary tract symptoms. No new or unusual musculoskeletal symptoms. Prior to Visit Medications    Medication Sig Taking? Authorizing Provider   cephALEXin (KEFLEX) 500 MG capsule Take 1,000 mg by mouth every 12 hours Yes Historical Provider, MD   doxycycline hyclate (VIBRA-TABS) 100 MG tablet Take 100 mg by mouth 2 times daily Yes Historical Provider, MD   HYDROcodone-acetaminophen (NORCO) 7.5-325 MG per tablet Take 1 tablet by mouth every 6 hours as needed for Pain for up to 30 days.  Yes Mayelin Trent MD   carbidopa-levodopa (SINEMET)  MG per tablet Take 2 tabs nightly Yes Mayelin Trent MD   losartan (COZAAR) 50 MG tablet TAKE 1 TABLET BY MOUTH EVERY DAY Yes Mitch CHRISTIANSON MD Anabel   albuterol sulfate  (90 Base) MCG/ACT inhaler TAKE 2 PUFFS BY MOUTH EVERY 6 HOURS AS NEEDED FOR WHEEZE Yes Abbe Carpio MD   desoximetasone (TOPICORT) 0.25 % cream Apply topically 2 times daily.  Yes Abbe Carpio MD   buPROPion (WELLBUTRIN XL) 150 MG extended release tablet TAKE 1 TABLET BY MOUTH EVERY DAY Yes Abbe Carpio MD   rosuvastatin (CRESTOR) 40 MG tablet TAKE 1 TABLET BY MOUTH EVERY DAY AT NIGHT Yes Max Worrell MD   clopidogrel (PLAVIX) 75 MG tablet TAKE 1 TABLET BY MOUTH EVERY DAY Yes Max Worrell MD   FLUoxetine (PROZAC) 40 MG capsule TAKE 2 CAPSULES DAILY  Patient not taking: Reported on 5/27/2022  Abbe Carpio MD   ibuprofen (ADVIL;MOTRIN) 600 MG tablet Take 1 tablet by mouth 3 times daily as needed for Pain With meals  Alireza Davies APRN - CNP   traZODone (DESYREL) 50 MG tablet TAKE 1 TABLET BY MOUTH AT BEDTIME AS NEEDED FOR SLEEP  Patient not taking: Reported on 5/27/2022  Abbe Carpio MD       Past Medical History:   Diagnosis Date    Arthritis     ASHD (arteriosclerotic heart disease)     Chronic kidney disease     Chronic midline low back pain without sciatica 4/3/2019    COPD (chronic obstructive pulmonary disease) ([facility] [address]     unknown     Coronary artery disease involving native coronary artery of native heart without angina pectoris 3/31/2016    Coronary artery disease involving native coronary artery of native heart without angina pectoris 3/31/2016    Coronary artery disease involving native coronary artery of native heart without angina pectoris 3/31/2016    Depression     Full dentures     HTN     Hyperlipidemia     Iron deficiency anemia 3/31/2016    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/2020    Kidney stone 2012    Morbid obesity (HCC)     Restless legs syndrome     pt on cabidopa/levodopa    Stage 3 chronic kidney disease ([address] 4/3/2019    Wears glasses        Social History     Tobacco Use  Smoking status: Current Every Day Smoker     Packs/day: 0.50     Years: 30.00     Pack years: 15.00     Types: Cigarettes     Last attempt to quit: 2016     Years since quittin.2    Smokeless tobacco: Never Used   Vaping Use    Vaping Use: Never used   Substance Use Topics    Alcohol use: No     Alcohol/week: 0.0 standard drinks    Drug use: No       Family History   Problem Relation Age of Onset    Stroke Mother     Diabetes Mother     Heart Disease Mother     High Blood Pressure Mother     High Cholesterol Mother     Cancer Neg Hx        Allergies   Allergen Reactions    Mobic [Meloxicam] Nausea Only     Stomach would get upset after taking this medication    Morphine Other (See Comments)     When taken in  pt had an adverse reaction to morphine. She ended up in  hospital with kidney failure, dehydration, and in ICU. She prefers this not to be given ever.  Other      ? Suture from CABG caused blister (10/30/2015)       OBJECTIVE:  /80   Pulse 77   Wt 247 lb (112 kg)   LMP 2004   SpO2 98%   BMI 38.69 kg/m²   GEN:  in NAD obese, weight down 3 pounds  NECK:  Supple without adenopathy. no bruit  CV:  Regular rate and rhythm, S1 and S2 normal, no murmurs, clicks  PULM:  Chest is clear, no wheezing ,  symmetric air entry throughout both lung fields. EXT: No rash or edema right shoulder diffusely tender, incisions well healed, decreased range of motion in abduction by almost 75%, radial pulse 2+  NEURO: nonfocal  Notes, labs,tests, discharge summary, imaging, procedures from recent hospitalization reviewed   OARRS report reviewed and assessed. Consistent. ASSESSMENT/PLAN:  Encounter Diagnoses   Name Primary?     Acute kidney injury superimposed on CKD (HCC) creatinine was 3.36 but came down to 1.69 at the time of her discharge Yes    COPD      Morbid obesity ([facility] [address]     Chronic midline low back pain without sciatica     Coronary artery disease involving native coronary artery of native heart without angina pectoris     Primary hypertension     Iron deficiency anemia, unspecified iron deficiency anemia type     Restless legs syndrome    right frozen shoulder  Chronic pain    Discussed use of narcotic pain meds, benefits and risks.   Patient aware he/she will need to be seen every 3 months, will be subject to random urine drug screens, will be asked to sign a medication agreement, and may be required to see a pain specialist, especially if his/her morphine equivalent is over 80    Suggest she follow-up with ID, orthopedics, and renal as intended  Follow-up with me in 3 months try to wean off her narcotic slowly if possible  Change positions slowly  Warning signs about her dizziness discussed further

## 2022-06-02 RX ORDER — CARVEDILOL 12.5 MG/1
12.5 TABLET ORAL 2 TIMES DAILY
Qty: 60 TABLET | Refills: 5 | Status: SHIPPED | OUTPATIENT
Start: 2022-06-02

## 2022-06-02 NOTE — TELEPHONE ENCOUNTER
meloxicam = non steroidal anti-inflammatory medication    Take one daily as needed for pain with food    If no better in about a week, will initiate physicial therapy   metoprolol succinate (TOPROL XL) 50 MG extended release tablet     Pt has been taking the above medication. Pt was at [facility] about 3 wks ago. She was instructed to stop taking this medication and start taking:  Carvedilol 12.5 MG (twice a day). Pt is out of the Carvedilol and is asking if Dr Linard Gosselin would refill this?     Pharmacy - [facility]

## 2022-06-02 NOTE — TELEPHONE ENCOUNTER
Last OV: 8/4/21  Next OV: 8/8/22  Last refill:7/20/21  Most recent Labs: 1/25/22  Last EKG (if needed):1/25/22

## 2022-06-03 RX ORDER — ROSUVASTATIN CALCIUM 40 MG/1
TABLET, COATED ORAL
Qty: 180 TABLET | Refills: 3 | Status: SHIPPED | OUTPATIENT
Start: 2022-06-03

## 2022-06-08 DIAGNOSIS — Z76.0 ENCOUNTER FOR MEDICATION REFILL: ICD-10-CM

## 2022-06-08 DIAGNOSIS — M15.9 PRIMARY OSTEOARTHRITIS INVOLVING MULTIPLE JOINTS: ICD-10-CM

## 2022-06-08 RX ORDER — HYDROCODONE BITARTRATE AND ACETAMINOPHEN 7.5; 325 MG/1; MG/1
1 TABLET ORAL EVERY 6 HOURS PRN
Qty: 120 TABLET | Refills: 0 | Status: SHIPPED | OUTPATIENT
Start: 2022-06-08 | End: 2022-07-07 | Stop reason: SDUPTHER

## 2022-06-08 NOTE — TELEPHONE ENCOUNTER
Medication:   Requested Prescriptions     Pending Prescriptions Disp Refills    HYDROcodone-acetaminophen (NORCO) 7.5-325 MG per tablet 120 tablet 0     Sig: Take 1 tablet by mouth every 6 hours as needed for Pain for up to 30 days. Last Filled:  5/10/2022, 120, 0    Patient Phone Number: [phone] (home)     Last appt: 5/27/2022 SD, Anabel, kidney injury  Next appt: Visit date not found    Last OARRS:   RX Monitoring 5/10/2022   Attestation -   Acute Pain Prescriptions -   Periodic Controlled Substance Monitoring No signs of potential drug abuse or diversion identified.    Chronic Pain > 80 MEDD -

## 2022-06-08 NOTE — TELEPHONE ENCOUNTER
Medication and Quantity requested: HYDROcodone-acetaminophen ([address] [employer] [employer]) 7.5-325 MG per tablet         Last Visit  5/27/2022    Pharmacy and phone number updated in EPIC:  Yes    [id number] [facility]

## 2022-07-07 DIAGNOSIS — M15.9 PRIMARY OSTEOARTHRITIS INVOLVING MULTIPLE JOINTS: ICD-10-CM

## 2022-07-07 DIAGNOSIS — Z76.0 ENCOUNTER FOR MEDICATION REFILL: ICD-10-CM

## 2022-07-07 RX ORDER — HYDROCODONE BITARTRATE AND ACETAMINOPHEN 7.5; 325 MG/1; MG/1
1 TABLET ORAL EVERY 6 HOURS PRN
Qty: 120 TABLET | Refills: 0 | Status: SHIPPED | OUTPATIENT
Start: 2022-07-07 | End: 2022-08-05 | Stop reason: SDUPTHER

## 2022-07-07 NOTE — TELEPHONE ENCOUNTER
Medication:   Requested Prescriptions     Pending Prescriptions Disp Refills    HYDROcodone-acetaminophen (NORCO) 7.5-325 MG per tablet 120 tablet 0     Sig: Take 1 tablet by mouth every 6 hours as needed for Pain for up to 30 days. Last Filled:      Patient Phone Number: [phone] (home)     Last appt: 5/27/2022   Next appt: Visit date not found    Last OARRS:   RX Monitoring 5/10/2022   Attestation -   Acute Pain Prescriptions -   Periodic Controlled Substance Monitoring No signs of potential drug abuse or diversion identified.    Chronic Pain > 80 MEDD -

## 2022-07-28 ENCOUNTER — TELEMEDICINE (OUTPATIENT)
Dept: FAMILY MEDICINE CLINIC | Age: 67
End: 2022-07-28
Payer: COMMERCIAL

## 2022-07-28 ENCOUNTER — NURSE TRIAGE (OUTPATIENT)
Dept: OTHER | Facility: CLINIC | Age: 67
End: 2022-07-28

## 2022-07-28 DIAGNOSIS — M79.641 HAND PAIN, RIGHT: Primary | ICD-10-CM

## 2022-07-28 PROCEDURE — 99213 OFFICE O/P EST LOW 20 MIN: CPT | Performed by: FAMILY MEDICINE

## 2022-07-28 RX ORDER — MECLIZINE HYDROCHLORIDE 25 MG/1
25 TABLET ORAL 3 TIMES DAILY PRN
Qty: 30 TABLET | Refills: 0 | Status: SHIPPED | OUTPATIENT
Start: 2022-07-28 | End: 2022-08-07

## 2022-07-28 NOTE — PROGRESS NOTES
Sheila Gloria is a 61 y.o. female. Due to the current coronavirus pandemic, this telephone/video visit was insisted, with patient's consent, to reduce the patient's risk of exposure to COVID-19 and provide continuity of care for an established patient. The patient was at home while the provider was either at home or at the clinic. Services were provided through a synchronous discussion over the telephone and/or video chat to substitute for in person clinic visit, and coded as such. HPI:  States that ever since he got back from being in the hospital back in May about 2 months ago she has had vertigo whenever she lies on her right side in bed or moves to her right side once sitting up. She does not have any true lightheadedness syncope chest pain palpitations diaphoresis or shortness of breath  But if she is lying in bed or rolls her right side she will middle immediately feel the room spinning and if she sits up is still takes a while to go away  Also she fell 2 weeks ago tripping over her puppy landing on her right hand and her hand is still swollen and tender    Wt Readings from Last 3 Encounters:   05/27/22 247 lb (112 kg)   03/06/22 250 lb (113.4 kg)   10/28/21 278 lb (126.1 kg)     Meds, vitamins and allergies reviewed with Patient    ROS:  Gen: No fever  HEENT: No cold symptoms, no sore throat. CV:  Denies chest pain or palpitations. Pulm:  Denies shortness of breath, cough. Abd:  Denies abdominal pain, nausea and vomiting. Skin: no rash    Allergies   Allergen Reactions    Mobic [Meloxicam] Nausea Only     Stomach would get upset after taking this medication    Morphine Other (See Comments)     When taken in 2013 pt had an adverse reaction to morphine. She ended up in  hospital with kidney failure, dehydration, and in ICU. She prefers this not to be given ever. Other      ? Suture from CABG caused blister (10/30/2015)       Prior to Visit Medications    Medication Sig Taking?  Authorizing Provider   HYDROcodone-acetaminophen (NORCO) 7.5-325 MG per tablet Take 1 tablet by mouth every 6 hours as needed for Pain for up to 30 days. Yes Yudelka Palencia MD   rosuvastatin (CRESTOR) 40 MG tablet TAKE 1 TABLET BY MOUTH EVERY DAY AT NIGHT Yes Joao Grimm MD   carvedilol (COREG) 12.5 MG tablet Take 1 tablet by mouth 2 times daily Yes Yudelka Palencia MD   cephALEXin (KEFLEX) 500 MG capsule Take 1,000 mg by mouth every 12 hours Yes Historical Provider, MD   doxycycline hyclate (VIBRA-TABS) 100 MG tablet Take 100 mg by mouth 2 times daily Yes Historical Provider, MD   carbidopa-levodopa (SINEMET)  MG per tablet Take 2 tabs nightly Yes Yudelka Palencia MD   losartan (COZAAR) 50 MG tablet TAKE 1 TABLET BY MOUTH EVERY DAY Yes Yudelka Palencia MD   FLUoxetine (PROZAC) 40 MG capsule TAKE 2 CAPSULES DAILY Yes Yudelka Palencia MD   traZODone (DESYREL) 50 MG tablet TAKE 1 TABLET BY MOUTH AT BEDTIME AS NEEDED FOR SLEEP Yes Yudelka Palencia MD   albuterol sulfate  (90 Base) MCG/ACT inhaler TAKE 2 PUFFS BY MOUTH EVERY 6 HOURS AS NEEDED FOR WHEEZE Yes Yudelka Palencia MD   desoximetasone (TOPICORT) 0.25 % cream Apply topically 2 times daily. Yes Yudelka Palencia MD   buPROPion (WELLBUTRIN XL) 150 MG extended release tablet TAKE 1 TABLET BY MOUTH EVERY DAY Yes Yudelka Palencia MD   clopidogrel (PLAVIX) 75 MG tablet TAKE 1 TABLET BY MOUTH EVERY DAY Yes Joao Grimm MD   ibuprofen (ADVIL;MOTRIN) 600 MG tablet Take 1 tablet by mouth 3 times daily as needed for Pain With meals  Sanket Pruitt, APRN - CNP       OBJECTIVE:  LMP 01/01/2004   GEN:  in NAD, obese  CV:  Regular rate and rhythm, S1 and S2 normal, no murmurs, clicks  PULM:  Chest is clear, no wheezing ,  symmetric air entry throughout both lung fields.   EXT: Dorsal right hand appears puffy compared to the left does have full range of motion  NEURO: Alert and oriented ×3    ASSESSMENT/PLAN:  #1 atypical vertigo  Avoid triggering positions  Antivert 25 mg 3 times daily as needed    #2 right hand contusion versus fracture  X-ray of the right hand  Elevation  Probably no need to ice any longer

## 2022-07-28 NOTE — TELEPHONE ENCOUNTER
Received call from [address] [place] [address] at [facility] with Red Flag Complaint. Subjective: Caller states \"I went to the emergency room for the dizziness and because my kidneys were bad. I was on bed rest for 7 days.  I did talk to the doctor about these dizzy spells\"     Current Symptoms: dizzy spells    No triage needed   Reason for Disposition   Caller has already spoken with the PCP (or office), and has no further questions    Protocols used: No Contact or Duplicate Contact Call-ADULT-[place]

## 2022-08-01 RX ORDER — CLOPIDOGREL BISULFATE 75 MG/1
TABLET ORAL
Qty: 90 TABLET | Refills: 3 | Status: SHIPPED | OUTPATIENT
Start: 2022-08-01

## 2022-08-01 NOTE — TELEPHONE ENCOUNTER
Last OV: 8/4/22  Next OV: 8/8/22  Last refill:7/20/21  Most recent Labs: 1/25/22  Last EKG (if needed):1/25/22

## 2022-08-05 DIAGNOSIS — Z76.0 ENCOUNTER FOR MEDICATION REFILL: ICD-10-CM

## 2022-08-05 DIAGNOSIS — M15.9 PRIMARY OSTEOARTHRITIS INVOLVING MULTIPLE JOINTS: ICD-10-CM

## 2022-08-05 RX ORDER — HYDROCODONE BITARTRATE AND ACETAMINOPHEN 7.5; 325 MG/1; MG/1
1 TABLET ORAL EVERY 6 HOURS PRN
Qty: 120 TABLET | Refills: 0 | Status: SHIPPED | OUTPATIENT
Start: 2022-08-05 | End: 2022-08-26 | Stop reason: SDUPTHER

## 2022-08-05 NOTE — TELEPHONE ENCOUNTER
Medication and Quantity requested: HYDROcodone-acetaminophen (NORCO) 7.5-325 MG per tablet        Last Visit  7/28/2022    Pharmacy and phone number updated in EPIC:  yes

## 2022-08-08 ENCOUNTER — OFFICE VISIT (OUTPATIENT)
Dept: CARDIOLOGY CLINIC | Age: 67
End: 2022-08-08
Payer: MEDICAID

## 2022-08-08 VITALS
WEIGHT: 248 LBS | HEART RATE: 66 BPM | DIASTOLIC BLOOD PRESSURE: 74 MMHG | BODY MASS INDEX: 38.92 KG/M2 | SYSTOLIC BLOOD PRESSURE: 132 MMHG | OXYGEN SATURATION: 96 % | HEIGHT: 67 IN

## 2022-08-08 DIAGNOSIS — E78.5 HYPERLIPIDEMIA LDL GOAL <70: ICD-10-CM

## 2022-08-08 DIAGNOSIS — I25.10 CAD IN NATIVE ARTERY: Primary | ICD-10-CM

## 2022-08-08 PROCEDURE — 99214 OFFICE O/P EST MOD 30 MIN: CPT | Performed by: INTERNAL MEDICINE

## 2022-08-08 NOTE — PROGRESS NOTES
[address]  Cardiology Note      Rebel Deluca  1955, 61 y.o.      CC: \" I had a rough time a month ago. \"                  Ad Dixon MD:      HPI:   This is a 61 y.o. female with PMH of COPD, CKD, obesity and coronary artery bypass surgery > 9 years ago   2019 she presented with chest pain with EKG showing ST depressions in the high lateral leads. Underwent angiography which showed high-grade lesion in the vein graft to the OM that was stented. Since then she is doing fine is on Plavix. Subsequently she developed GI bleed and anemia requiring discontinuation of aspirin and Brilinta. Patient here for follow up.  states she fell 1 month ago and injured her shoulder. She later developed a \"lump\" on her shoulder and became \"incoherent\" and her  took her to the hospital. She developed an infection that spread and spent approximately 1 month in the hospital for treatment. She reports feeling lightheaded/dizzy and states she has been treated for an inner ear infection. She denies any active heart issues today.  helps manage medications. In general, she has been well. Previous note  presented 9/2019 with chest pain and shortness of breath. That morning the patient had a rather severe episode of chest pain; at least 2 episodes. ECG showed new ST segment depressions in the high lateral leads. The pain was relieved with nitroglycerin. Coronary angiogram revealed 3-V disease and patient is now s/p PCI and stenting of saphenous vein graft to the OM with [address] [place] [address] on 9/16/2019. Hospitalized at [facility] 7/9-7/11/20 after returning from [place] with c/o black stools. Underwent EGD on 7/10/20 negative for source of bleeding suspected small bowel angiectasia versus right sided colonic polyp/neoplasm. Brilinta and aspirin discontinued and Plavix started.         Past Medical History:   Diagnosis Date    Arthritis     ASHD (arteriosclerotic heart disease)     Chronic kidney tablet by mouth every 6 hours as needed for Pain for up to 30 days. 120 tablet 0    clopidogrel (PLAVIX) 75 MG tablet TAKE 1 TABLET BY MOUTH EVERY DAY 90 tablet 3    rosuvastatin (CRESTOR) 40 MG tablet TAKE 1 TABLET BY MOUTH EVERY DAY AT NIGHT 180 tablet 3    carvedilol (COREG) 12.5 MG tablet Take 1 tablet by mouth 2 times daily 60 tablet 5    carbidopa-levodopa (SINEMET)  MG per tablet Take 2 tabs nightly 180 tablet 3    losartan (COZAAR) 50 MG tablet TAKE 1 TABLET BY MOUTH EVERY DAY 90 tablet 1    FLUoxetine (PROZAC) 40 MG capsule TAKE 2 CAPSULES DAILY 180 capsule 2    traZODone (DESYREL) 50 MG tablet TAKE 1 TABLET BY MOUTH AT BEDTIME AS NEEDED FOR SLEEP 90 tablet 1    albuterol sulfate  (90 Base) MCG/ACT inhaler TAKE 2 PUFFS BY MOUTH EVERY 6 HOURS AS NEEDED FOR WHEEZE 6.7 each 2    buPROPion (WELLBUTRIN XL) 150 MG extended release tablet TAKE 1 TABLET BY MOUTH EVERY DAY 90 tablet 3         Labs:   Lab Results   Component Value Date/Time    HDL 40 2021 03:50 AM    HDL 36 2010 05:24 PM    LDLDIRECT 57 2021 03:50 AM    LDLCALC see below 2021 03:50 AM    TRIG 321 2021 03:50 AM       EK21, sinus rhythm     Echo: 2019  Summary   Left ventricular cavity size is upper limits of normal. There is mild conc. LVH. EF    55%. The left atrium is dilated. Trivial mitral & tricuspid regurgitation. Normal right ventricular size. [facility] angiogram  & Intervention:2019     CORONARY ANGIOGRAM:  1. There is three-vessel native coronary artery disease. 2.  The left main is free of disease. 3.  The LAD gives off an intermediate-sized first diagonal which is free  of disease. The second diagonal had 70% to 80% ostial lesion. The LAD  beyond that has another 60% lesion. It gives off several large septal  branches and then is occluded. The septals are supplying collaterals to  the right coronary artery.   4.  The right coronary artery is occluded in the mid segment. There is  right-to-right collaterals. The RV branch has an 80% to 90% ostial  lesion. It is a pretty big branch. The distal right coronary artery  has collaterals coming from septals from the left anterior descending  artery. You can also see the saphenous vein graft to the RCA fill  retrograde. 5.  Saphenous vein graft to the RCA:  I could not engage and did not  want to use too much contrast because of the patient's renal  insufficiency. I assumed it is occluded, but since selective  angiography was not performed, I am not certain of its patency. No  aortic root shot was given, again, to reduce contrast load. 6.  SKYLAR graft to the LAD is patent. The saphenous vein graft to the  diagonal is patent. The portion of the graft to the OM has a 99%  stenosis and may be the culprit vessel. CONCLUSION:  A 99% stenosis in the saphenous vein graft to OM. Saphenous vein graft to the RCA could not be located but is assumed to  be occluded. You can see it fill retrogradely through collaterals. The  right coronary artery is occluded in the mid segment. The distal right  has collaterals from the left and the right circulation. The LAD is  occluded in the mid segment. The SKYLAR graft to the LAD is widely patent. The 3 to 4+ collaterals are going from the LAD septals into the right  coronary artery. INTERVENTIONAL PROCEDURE CATHETERS USED:  JR4 guide, Runthrough wire, a  2.5 balloon, and a 4.0 KATIE stent. The guide engaged the ostium well and provided good support. The lesion  was crossed with some degree of difficulty. The lesion was quite tight. I did not want to use a FilterWire because of the tightness of the  lesion. I therefore had to predilate it using a 2.5 balloon. I then  proceeded with direct stenting using a 4.0 balloon at single inflation  up to 16 atmospheres which corresponded to a vessel size of 4.28. Followup angiography showed 0% residual with good antegrade flow. CONCLUSION:  Successful PCI and stenting of the saphenous vein graft to  the OM, lesion reduced from 99% to 0%. A 4.0     ASSESSMENT AND PLAN:    CAD  S/p PCI and stenting of the saphenous vein graft to the OM, lesion reduced from 99% to 0% (9/16/2019)  ASA and Brilinta discontinued due to GI bleed   On Plavix alone and tolerating   Denies any angina     Hyperlipidemia  Goal LDL <70  LDL 14 (3/19/22) [facility]  Continue statin therapy    Anemia  EGD 7/2020  H/H 9.7/29.0 (5/8/22)      Follow up in 1 year       Thank you very much for allowing me to participate in the care of your patient. Please do not hesitate to contact me if you have any questions. Sincerely,    Koffi Cohen M.D  [facility], [address] [place] [address]  Ph: [phone]  Fax: [phone]    This note was scribed in the presence of Dr. Koffi Cohne MD by Deepti Last RN  Physician Attestation:  The scribes documentation has been prepared under my direction and personally reviewed by me in its entirety. I confirm that the note above accurately reflects all work, treatment, procedures, and medical decision making performed by me.

## 2022-08-09 NOTE — PROGRESS NOTES
CALCIUM 8.9 02/27/2020    hbg - 8.9 , was 11  In MI 6-26  Notes, labs,tests, discharge summary, imaging, procedures from recent hospitalization reviewed   OARRS report reviewed and assessed. Consistent. ASSESSMENT/PLAN:  1Left ankle djd - signif pain  Tylenol, ice ,heat  Refer to ortho/foot specialist  Also uses [facility]    2 recent gastroenteritis, question mild upper GI bleed/anemia  Stay on protonix   Hold blood thinners, return to office 2 more weeks  Warning signs given    3 ckd stage III  Continue to hold diuretic    4 coronary artery disease  Continue to hold aspirin and Brilinta    5 morbid obesity  Down 9 #  Lose more with low impact exercise    6 hypertension stable    7 tobacco abuse  I told pt it is very important to quit smoking! When ready, I would like to schedule an appointment to discuss the various tools we can offer, such as classes, hypnosis, accupuncture, or/and medications. Suggest pt. get to a class, pick a quit date, and RTO to discuss. Multiple approaches toward motivating the patient were explored.     Spent 25 minutes with patient greater than 50% time reviewing her hospital records from [place] and coordinating her care Behavioral Problems (includes ADHD, Oppositionality)

## 2022-08-26 ENCOUNTER — OFFICE VISIT (OUTPATIENT)
Dept: FAMILY MEDICINE CLINIC | Age: 67
End: 2022-08-26
Payer: MEDICAID

## 2022-08-26 VITALS
OXYGEN SATURATION: 96 % | SYSTOLIC BLOOD PRESSURE: 128 MMHG | BODY MASS INDEX: 39.47 KG/M2 | DIASTOLIC BLOOD PRESSURE: 75 MMHG | WEIGHT: 252 LBS | HEART RATE: 92 BPM

## 2022-08-26 DIAGNOSIS — J44.1 COPD EXACERBATION (HCC): Primary | ICD-10-CM

## 2022-08-26 DIAGNOSIS — M15.9 PRIMARY OSTEOARTHRITIS INVOLVING MULTIPLE JOINTS: ICD-10-CM

## 2022-08-26 DIAGNOSIS — Z76.0 ENCOUNTER FOR MEDICATION REFILL: ICD-10-CM

## 2022-08-26 DIAGNOSIS — D50.9 IRON DEFICIENCY ANEMIA, UNSPECIFIED IRON DEFICIENCY ANEMIA TYPE: ICD-10-CM

## 2022-08-26 DIAGNOSIS — I25.10 CORONARY ARTERY DISEASE INVOLVING NATIVE CORONARY ARTERY OF NATIVE HEART WITHOUT ANGINA PECTORIS: ICD-10-CM

## 2022-08-26 DIAGNOSIS — E66.01 MORBID OBESITY (HCC): ICD-10-CM

## 2022-08-26 DIAGNOSIS — I10 PRIMARY HYPERTENSION: ICD-10-CM

## 2022-08-26 PROCEDURE — 99214 OFFICE O/P EST MOD 30 MIN: CPT | Performed by: FAMILY MEDICINE

## 2022-08-26 RX ORDER — MECLIZINE HCL 12.5 MG/1
12.5 TABLET ORAL 3 TIMES DAILY PRN
Qty: 30 TABLET | Refills: 3 | Status: SHIPPED | OUTPATIENT
Start: 2022-08-26 | End: 2022-09-05

## 2022-08-26 RX ORDER — HYDROCODONE BITARTRATE AND ACETAMINOPHEN 7.5; 325 MG/1; MG/1
1 TABLET ORAL EVERY 6 HOURS PRN
Qty: 120 TABLET | Refills: 0 | Status: CANCELLED | OUTPATIENT
Start: 2022-08-26 | End: 2022-09-25

## 2022-08-26 RX ORDER — HYDROCODONE BITARTRATE AND ACETAMINOPHEN 7.5; 325 MG/1; MG/1
1 TABLET ORAL EVERY 6 HOURS PRN
Qty: 120 TABLET | Refills: 0 | Status: SHIPPED | OUTPATIENT
Start: 2022-08-26 | End: 2022-10-03 | Stop reason: SDUPTHER

## 2022-08-26 SDOH — ECONOMIC STABILITY: FOOD INSECURITY: WITHIN THE PAST 12 MONTHS, THE FOOD YOU BOUGHT JUST DIDN'T LAST AND YOU DIDN'T HAVE MONEY TO GET MORE.: NEVER TRUE

## 2022-08-26 SDOH — ECONOMIC STABILITY: FOOD INSECURITY: WITHIN THE PAST 12 MONTHS, YOU WORRIED THAT YOUR FOOD WOULD RUN OUT BEFORE YOU GOT MONEY TO BUY MORE.: NEVER TRUE

## 2022-08-26 ASSESSMENT — SOCIAL DETERMINANTS OF HEALTH (SDOH): HOW HARD IS IT FOR YOU TO PAY FOR THE VERY BASICS LIKE FOOD, HOUSING, MEDICAL CARE, AND HEATING?: NOT HARD AT ALL

## 2022-08-26 NOTE — PROGRESS NOTES
Joe Murray is a 61 y.o. female. HPI:dizziness for last 2 mo after getting out of hospital, here with  today  Positional, some vertigo meclizine seem to help a little but cause some sedation  No n,v,cp,sob,palpitation, diaphoreses  Meds, vitamins and allergies reviewed with pt    ROS: No TIA's or unusual headaches, no dysphagia. No prolonged cough. No dyspnea or chest pain on exertion. No abdominal pain, change in bowel habits, black or bloody stools. No urinary tract symptoms. No new or unusual musculoskeletal symptoms. Prior to Visit Medications    Medication Sig Taking? Authorizing Provider   HYDROcodone-acetaminophen (NORCO) 7.5-325 MG per tablet Take 1 tablet by mouth every 6 hours as needed for Pain for up to 30 days.  Yes Christina Valdez MD   clopidogrel (PLAVIX) 75 MG tablet TAKE 1 TABLET BY MOUTH EVERY DAY Yes Eleazar Ramirez MD   rosuvastatin (CRESTOR) 40 MG tablet TAKE 1 TABLET BY MOUTH EVERY DAY AT NIGHT Yes Ciara Watkins MD   carvedilol (COREG) 12.5 MG tablet Take 1 tablet by mouth 2 times daily Yes Christina Valdez MD   carbidopa-levodopa (SINEMET)  MG per tablet Take 2 tabs nightly Yes Christina Valdez MD   losartan (COZAAR) 50 MG tablet TAKE 1 TABLET BY MOUTH EVERY DAY Yes Christina Valdez MD   FLUoxetine (PROZAC) 40 MG capsule TAKE 2 CAPSULES DAILY Yes Christina Valdez MD   traZODone (DESYREL) 50 MG tablet TAKE 1 TABLET BY MOUTH AT BEDTIME AS NEEDED FOR SLEEP Yes Christina Valdez MD   albuterol sulfate  (90 Base) MCG/ACT inhaler TAKE 2 PUFFS BY MOUTH EVERY 6 HOURS AS NEEDED FOR WHEEZE Yes Christina Valdez MD   buPROPion (WELLBUTRIN XL) 150 MG extended release tablet TAKE 1 TABLET BY MOUTH EVERY DAY Yes Christina Valdez MD       Past Medical History:   Diagnosis Date    Arthritis     ASHD (arteriosclerotic heart disease)     Chronic kidney disease     Chronic midline low back pain without sciatica 4/3/2019    COPD (chronic obstructive pulmonary disease) [facility])     unknown     Coronary artery disease involving native coronary artery of native heart without angina pectoris 3/31/2016    Coronary artery disease involving native coronary artery of native heart without angina pectoris 3/31/2016    Coronary artery disease involving native coronary artery of native heart without angina pectoris 3/31/2016    Depression     Full dentures     HTN     Hyperlipidemia     Iron deficiency anemia 3/31/2016    Iron deficiency anemia secondary to blood loss (chronic) 2020    Kidney stone     Morbid obesity (HCC)     Restless legs syndrome     pt on cabidopa/levodopa    Stage 3 chronic kidney disease ([address] 4/3/2019    Wears glasses        Social History     Tobacco Use    Smoking status: Every Day     Packs/day: 0.50     Years: 30.00     Pack years: 15.00     Types: Cigarettes     Last attempt to quit: 2016     Years since quittin.4    Smokeless tobacco: Never   Vaping Use    Vaping Use: Never used   Substance Use Topics    Alcohol use: No     Alcohol/week: 0.0 standard drinks    Drug use: No       Family History   Problem Relation Age of Onset    Stroke Mother     Diabetes Mother     Heart Disease Mother     High Blood Pressure Mother     High Cholesterol Mother     Cancer Neg Hx        Allergies   Allergen Reactions    Mobic [Meloxicam] Nausea Only     Stomach would get upset after taking this medication    Morphine Other (See Comments)     When taken in  pt had an adverse reaction to morphine. She ended up in  hospital with kidney failure, dehydration, and in ICU. She prefers this not to be given ever. Other      ? Suture from CABG caused blister (10/30/2015)       OBJECTIVE:  /75   Pulse 92   Wt 252 lb (114.3 kg)   LMP 2004   SpO2 96%   BMI 39.47 kg/m²   GEN:  in NAD obese  HEENT:  NCAT, TMs:normal and throat: clear  NECK:  Supple without adenopathy.   No bruits  CV:  Regular rate and rhythm, S1 and S2 normal, no murmurs, clicks  PULM: Chest is clear, no wheezing ,  symmetric air entry throughout both lung fields. EXT: No rash or edema  NEURO: nonfocal cranial nerves II through XII intact, visual field by confrontation normal.  Pronator drift negative. Gait slow steady  OARRS report reviewed and assessed. Consistent. ASSESSMENT/PLAN:  1. COPD exacerbation ([facility]  Stable continue present medication    2. Morbid obesity ([facility]  Encouraged further weight loss    3. Coronary artery disease involving native coronary artery of native heart without angina pectoris  No chest pain continue aggressive risk factor modification    4. Primary hypertension  Stable continue present medication    5. Iron deficiency anemia, unspecified iron deficiency anemia type  Resolved    6 dizziness  Multifactorial  Will encourage her to change positions slowly  Extend her meclizine course as it is helping little bit but cut the dose to 12.5 3 times daily due to sedation  Stop Mucinex  Try to use less Vicodin    7 tob - I told pt it is very important to quit smoking! When ready, I would like to schedule an appointment to discuss the various tools we can offer, such as classes, hypnosis, accupuncture, or/and medications. Suggest pt. get to a class, pick a quit date, and RTO to discuss. Multiple approaches toward motivating the patient were explored. 8 chronic low back pain  Discussed use of narcotic pain meds, benefits and risks.   Patient aware he/she will need to be seen every 3 months, will be subject to random urine drug screens, will be asked to sign a medication agreement, and may be required to see a pain specialist, especially if his/her morphine equivalent is over 80

## 2022-09-01 ENCOUNTER — TELEPHONE (OUTPATIENT)
Dept: ADMINISTRATIVE | Age: 67
End: 2022-09-01

## 2022-09-01 NOTE — TELEPHONE ENCOUNTER
PA submitted VIA CM for  HYDROcodone-Acetaminophen 7.5-325MG tablets  [place]: Nancy Akhtar) - [id number]  PENDING

## 2022-09-02 NOTE — TELEPHONE ENCOUNTER
RECEIVED APPROVAL: LETTER ATTACHED. If this requires a response please respond to the pool. [address]). Please advise patient thank you.

## 2022-09-21 DIAGNOSIS — R06.02 SOB (SHORTNESS OF BREATH): ICD-10-CM

## 2022-09-21 RX ORDER — ALBUTEROL SULFATE 90 UG/1
AEROSOL, METERED RESPIRATORY (INHALATION)
Qty: 8.5 EACH | Refills: 2 | Status: SHIPPED | OUTPATIENT
Start: 2022-09-21

## 2022-10-03 DIAGNOSIS — Z76.0 ENCOUNTER FOR MEDICATION REFILL: ICD-10-CM

## 2022-10-03 DIAGNOSIS — M15.9 PRIMARY OSTEOARTHRITIS INVOLVING MULTIPLE JOINTS: ICD-10-CM

## 2022-10-03 RX ORDER — HYDROCODONE BITARTRATE AND ACETAMINOPHEN 7.5; 325 MG/1; MG/1
1 TABLET ORAL EVERY 6 HOURS PRN
Qty: 120 TABLET | Refills: 0 | Status: SHIPPED | OUTPATIENT
Start: 2022-10-03 | End: 2022-10-31 | Stop reason: SDUPTHER

## 2022-10-03 NOTE — TELEPHONE ENCOUNTER
Medication and Quantity requested: \HYDROcodone-acetaminophen ([employer]) 7.5-325 MG per tablet        Last Visit    08/26/2022    Pharmacy and phone number updated in EPIC:  yes    [facility]

## 2022-10-03 NOTE — TELEPHONE ENCOUNTER
Lov 8/26/22  Lrf 120 0 8/26/22 Medication:   Requested Prescriptions     Pending Prescriptions Disp Refills    HYDROcodone-acetaminophen (NORCO) 7.5-325 MG per tablet 120 tablet 0     Sig: Take 1 tablet by mouth every 6 hours as needed for Pain for up to 30 days. Last Filled:      Patient Phone Number: [phone] (home)     Last appt: 8/26/2022   Next appt: Visit date not found    Last OARRS:   RX Monitoring 5/10/2022   Attestation -   Acute Pain Prescriptions -   Periodic Controlled Substance Monitoring No signs of potential drug abuse or diversion identified.    Chronic Pain > 80 MEDD -

## 2022-10-31 DIAGNOSIS — Z76.0 ENCOUNTER FOR MEDICATION REFILL: ICD-10-CM

## 2022-10-31 DIAGNOSIS — M15.9 PRIMARY OSTEOARTHRITIS INVOLVING MULTIPLE JOINTS: ICD-10-CM

## 2022-10-31 RX ORDER — HYDROCODONE BITARTRATE AND ACETAMINOPHEN 7.5; 325 MG/1; MG/1
1 TABLET ORAL EVERY 6 HOURS PRN
Qty: 120 TABLET | Refills: 0 | Status: SHIPPED | OUTPATIENT
Start: 2022-10-31 | End: 2022-11-30

## 2022-10-31 NOTE — TELEPHONE ENCOUNTER
Medication:   Requested Prescriptions     Pending Prescriptions Disp Refills    HYDROcodone-acetaminophen (NORCO) 7.5-325 MG per tablet 120 tablet 0     Sig: Take 1 tablet by mouth every 6 hours as needed for Pain for up to 30 days. Last Filled: 10/3/2022     Patient Phone Number: [phone] (home)     Last appt: 8/26/2022   Next appt: Visit date not found    Last OARRS:   RX Monitoring 10/3/2022   Attestation -   Acute Pain Prescriptions -   Periodic Controlled Substance Monitoring No signs of potential drug abuse or diversion identified.    Chronic Pain > 80 MEDD -

## 2022-10-31 NOTE — TELEPHONE ENCOUNTER
----- Message from João Gonsales sent at 10/31/2022 10:25 AM EDT -----  Subject: Refill Request    QUESTIONS  Name of Medication? HYDROcodone-acetaminophen (NORCO) 7.5-325 MG per   tablet  Patient-reported dosage and instructions? 3 to 4 per day   How many days do you have left? 5  [facility]erred [facility]acy? [id number]  Pharmacy phone number (if available)? [phone]  ---------------------------------------------------------------------------  --------------  Lum Chance INFO  What is the best way for the office to contact you? OK to leave message on   voicemail  Preferred Call Back Phone Number? [phone]  ---------------------------------------------------------------------------  --------------  SCRIPT ANSWERS  Relationship to Patient?  Self

## 2022-11-07 ENCOUNTER — TELEPHONE (OUTPATIENT)
Dept: FAMILY MEDICINE CLINIC | Age: 67
End: 2022-11-07

## 2022-11-07 RX ORDER — ONDANSETRON 4 MG/1
4 TABLET, ORALLY DISINTEGRATING ORAL EVERY 8 HOURS PRN
Qty: 20 TABLET | Refills: 1 | Status: SHIPPED | OUTPATIENT
Start: 2022-11-07

## 2022-11-07 NOTE — TELEPHONE ENCOUNTER
Patient has been vomiting since this am  Patient is vomiting phlegm  Patient has severe pains across entire lower abdomen  Patient has chills  No fever\  Patient had same issue last week  No food change  Patient is asking to speak w/a nurse

## 2022-11-08 ENCOUNTER — TELEPHONE (OUTPATIENT)
Dept: FAMILY MEDICINE CLINIC | Age: 67
End: 2022-11-08

## 2022-11-08 NOTE — TELEPHONE ENCOUNTER
[facility] sent fax stating pt has multiple rescue inhalers without filling a controller medication at [facility] in the last 180 days      They would like to determine if the daily asthma controller therapy would be appropriate         Please advise and review this information

## 2022-11-28 RX ORDER — CARVEDILOL 12.5 MG/1
TABLET ORAL
Qty: 180 TABLET | Refills: 1 | Status: SHIPPED | OUTPATIENT
Start: 2022-11-28

## 2022-12-02 DIAGNOSIS — Z76.0 ENCOUNTER FOR MEDICATION REFILL: ICD-10-CM

## 2022-12-02 DIAGNOSIS — M15.9 PRIMARY OSTEOARTHRITIS INVOLVING MULTIPLE JOINTS: ICD-10-CM

## 2022-12-02 RX ORDER — HYDROCODONE BITARTRATE AND ACETAMINOPHEN 7.5; 325 MG/1; MG/1
1 TABLET ORAL EVERY 6 HOURS PRN
Qty: 120 TABLET | Refills: 0 | Status: SHIPPED | OUTPATIENT
Start: 2022-12-02 | End: 2023-01-01

## 2022-12-02 NOTE — TELEPHONE ENCOUNTER
Medication:   Requested Prescriptions     Pending Prescriptions Disp Refills    HYDROcodone-acetaminophen (NORCO) 7.5-325 MG per tablet 120 tablet 0     Sig: Take 1 tablet by mouth every 6 hours as needed for Pain for up to 30 days. Last Filled:  10/31/2022    Patient Phone Number: [phone] (home)     Last appt: 8/26/2022   Next appt: Visit date not found    Last OARRS:   RX Monitoring 10/3/2022   Attestation -   Acute Pain Prescriptions -   Periodic Controlled Substance Monitoring No signs of potential drug abuse or diversion identified.    Chronic Pain > 80 MEDD -

## 2022-12-02 NOTE — TELEPHONE ENCOUNTER
----- Message from Kelly Lesiamatty sent at 12/1/2022 11:00 AM EST -----  Subject: Refill Request    QUESTIONS  Name of Medication? HYDROcodone-acetaminophen (NORCO) 7.5-325 MG per   tablet  Patient-reported dosage and instructions? 7.5-325 MG 4 times a day  How many days do you have left? 2  [id number]  Pharmacy phone number (if available)? [phone]  ---------------------------------------------------------------------------  --------------  Angy Gallardo INFO  What is the best way for the office to contact you? OK to leave message on   voicemail  Preferred Call Back Phone Number? [phone]  ---------------------------------------------------------------------------  --------------  SCRIPT ANSWERS  Relationship to Patient?  Self

## 2022-12-14 ENCOUNTER — TELEPHONE (OUTPATIENT)
Dept: FAMILY MEDICINE CLINIC | Age: 67
End: 2022-12-14

## 2022-12-14 RX ORDER — DOXYCYCLINE HYCLATE 100 MG
100 TABLET ORAL 2 TIMES DAILY
Qty: 20 TABLET | Refills: 0 | Status: SHIPPED | OUTPATIENT
Start: 2022-12-14 | End: 2022-12-24

## 2022-12-14 NOTE — TELEPHONE ENCOUNTER
Pt states she had head cold last week. She states it moved to her chest. She states she is currently not having any symptoms and is requesting medication. She states she tested negative for flu and covid last week. Pt was told to go to ER if having severe chest pain or SOB. She wants to know if you can call in medication to her pharmacy.

## 2022-12-19 ENCOUNTER — TELEPHONE (OUTPATIENT)
Dept: FAMILY MEDICINE CLINIC | Age: 67
End: 2022-12-19

## 2022-12-19 NOTE — TELEPHONE ENCOUNTER
Pt  called and stated pt is currently acting weird and in a state of confusion and that is coming back         They would like blood work done to see what's going on    She had infection in her shoulder back then when this used to happen to her      She is very lethargic

## 2022-12-19 NOTE — TELEPHONE ENCOUNTER
Pt states she is really dizzy and lack of energy x 1 day. She states she been taking mucinex for her chest. Dr. Jim Lin said ok to put on schedule for tomorrow.

## 2023-01-03 ENCOUNTER — TELEPHONE (OUTPATIENT)
Dept: FAMILY MEDICINE CLINIC | Age: 68
End: 2023-01-03

## 2023-01-03 DIAGNOSIS — M15.9 PRIMARY OSTEOARTHRITIS INVOLVING MULTIPLE JOINTS: ICD-10-CM

## 2023-01-03 DIAGNOSIS — Z76.0 ENCOUNTER FOR MEDICATION REFILL: ICD-10-CM

## 2023-01-03 RX ORDER — HYDROCODONE BITARTRATE AND ACETAMINOPHEN 7.5; 325 MG/1; MG/1
1 TABLET ORAL EVERY 6 HOURS PRN
Qty: 120 TABLET | Refills: 0 | Status: SHIPPED | OUTPATIENT
Start: 2023-01-03 | End: 2023-02-02

## 2023-01-03 NOTE — TELEPHONE ENCOUNTER
Medication:   Requested Prescriptions     Pending Prescriptions Disp Refills    HYDROcodone-acetaminophen (NORCO) 7.5-325 MG per tablet 120 tablet 0     Sig: Take 1 tablet by mouth every 6 hours as needed for Pain for up to 30 days. Last Filled:  12/2/2022    Patient Phone Number: [phone] (home)     Last appt: 8/26/2022   Next appt: Visit date not found    Last OARRS:   RX Monitoring 10/3/2022   Attestation -   Acute Pain Prescriptions -   Periodic Controlled Substance Monitoring No signs of potential drug abuse or diversion identified.    Chronic Pain > 80 MEDD -

## 2023-01-03 NOTE — TELEPHONE ENCOUNTER
----- Message from Le Nielsen sent at 1/3/2023  4:07 PM EST -----  Subject: Refill Request    QUESTIONS  Name of Medication? HYDROcodone-acetaminophen (NORCO) 7.5-325 MG per   tablet  Patient-reported dosage and instructions? 4 daily  How many days do you have left? 2  [id number]  Pharmacy phone number (if available)? [phone]  ---------------------------------------------------------------------------  --------------  Warden Beto QUEVEDO  What is the best way for the office to contact you? OK to leave message on   voicemail  Preferred Call Back Phone Number? [phone]  ---------------------------------------------------------------------------  --------------  SCRIPT ANSWERS  Relationship to Patient?  Self

## 2023-01-04 NOTE — TELEPHONE ENCOUNTER
Pt called back and scheduled appt for next week, and would like to know if she could get a week's dose until the appt. Pt is currently out of below medication.     HYDROcodone-acetaminophen (NORCO) 7.5-325 MG per tablet

## 2023-01-05 RX ORDER — BUPROPION HYDROCHLORIDE 150 MG/1
TABLET ORAL
Qty: 90 TABLET | Refills: 3 | Status: SHIPPED | OUTPATIENT
Start: 2023-01-05

## 2023-01-11 ENCOUNTER — TELEPHONE (OUTPATIENT)
Dept: FAMILY MEDICINE CLINIC | Age: 68
End: 2023-01-11

## 2023-01-11 RX ORDER — FLUTICASONE PROPIONATE AND SALMETEROL 250; 50 UG/1; UG/1
1 POWDER RESPIRATORY (INHALATION) EVERY 12 HOURS
Qty: 60 EACH | Refills: 3 | Status: SHIPPED | OUTPATIENT
Start: 2023-01-11

## 2023-01-11 NOTE — TELEPHONE ENCOUNTER
Pharmacy said \"we spoke with your patient about asthma care and noticed your patient has multiple rescue inhaler fills without filling a controller medication at [address] [place] [address] in the last 180 days. We are reaching out on behalf of your patient to determine if it is appropriate to start a daily asthma controller therapy. Please sen a new prescription for controller therapy if it appropriate. \"

## 2023-01-12 ENCOUNTER — OFFICE VISIT (OUTPATIENT)
Dept: FAMILY MEDICINE CLINIC | Age: 68
End: 2023-01-12

## 2023-01-12 VITALS
WEIGHT: 257 LBS | SYSTOLIC BLOOD PRESSURE: 138 MMHG | DIASTOLIC BLOOD PRESSURE: 88 MMHG | HEART RATE: 85 BPM | OXYGEN SATURATION: 95 % | BODY MASS INDEX: 40.25 KG/M2

## 2023-01-12 DIAGNOSIS — I25.10 CORONARY ARTERY DISEASE INVOLVING NATIVE CORONARY ARTERY OF NATIVE HEART WITHOUT ANGINA PECTORIS: ICD-10-CM

## 2023-01-12 DIAGNOSIS — Z12.31 ENCOUNTER FOR SCREENING MAMMOGRAM FOR MALIGNANT NEOPLASM OF BREAST: Primary | ICD-10-CM

## 2023-01-12 DIAGNOSIS — I10 PRIMARY HYPERTENSION: ICD-10-CM

## 2023-01-12 DIAGNOSIS — Z02.83 ENCOUNTER FOR DRUG SCREENING: ICD-10-CM

## 2023-01-12 DIAGNOSIS — E78.2 MIXED HYPERLIPIDEMIA: ICD-10-CM

## 2023-01-12 DIAGNOSIS — Z12.11 SCREEN FOR COLON CANCER: ICD-10-CM

## 2023-01-12 DIAGNOSIS — E66.01 MORBID OBESITY (HCC): ICD-10-CM

## 2023-01-12 DIAGNOSIS — F17.200 TOBACCO USE DISORDER: ICD-10-CM

## 2023-01-12 DIAGNOSIS — N18.4 CKD (CHRONIC KIDNEY DISEASE) STAGE 4, GFR 15-29 ML/MIN (HCC): ICD-10-CM

## 2023-01-12 DIAGNOSIS — J44.1 COPD EXACERBATION (HCC): ICD-10-CM

## 2023-01-12 LAB
A/G RATIO: 1.7 (ref 1.1–2.2)
ALBUMIN SERPL-MCNC: 4.1 G/DL (ref 3.4–5)
ALP BLD-CCNC: 138 U/L (ref 40–129)
ALT SERPL-CCNC: 16 U/L (ref 10–40)
ANION GAP SERPL CALCULATED.3IONS-SCNC: 12 MMOL/L (ref 3–16)
AST SERPL-CCNC: 25 U/L (ref 15–37)
BASOPHILS ABSOLUTE: 0 K/UL (ref 0–0.2)
BASOPHILS RELATIVE PERCENT: 0.6 %
BILIRUB SERPL-MCNC: <0.2 MG/DL (ref 0–1)
BUN BLDV-MCNC: 33 MG/DL (ref 7–20)
CALCIUM SERPL-MCNC: 8.8 MG/DL (ref 8.3–10.6)
CHLORIDE BLD-SCNC: 105 MMOL/L (ref 99–110)
CHOLESTEROL, FASTING: 113 MG/DL (ref 0–199)
CO2: 22 MMOL/L (ref 21–32)
CREAT SERPL-MCNC: 2 MG/DL (ref 0.6–1.2)
EOSINOPHILS ABSOLUTE: 0.2 K/UL (ref 0–0.6)
EOSINOPHILS RELATIVE PERCENT: 2.9 %
GFR SERPL CREATININE-BSD FRML MDRD: 27 ML/MIN/{1.73_M2}
GLUCOSE FASTING: 107 MG/DL (ref 70–99)
HCT VFR BLD CALC: 31.4 % (ref 36–48)
HDLC SERPL-MCNC: 49 MG/DL (ref 40–60)
HEMOGLOBIN: 10.1 G/DL (ref 12–16)
HEPATITIS C ANTIBODY INTERPRETATION: NORMAL
LDL CHOLESTEROL CALCULATED: 48 MG/DL
LYMPHOCYTES ABSOLUTE: 0.9 K/UL (ref 1–5.1)
LYMPHOCYTES RELATIVE PERCENT: 11 %
MCH RBC QN AUTO: 30.7 PG (ref 26–34)
MCHC RBC AUTO-ENTMCNC: 32.1 G/DL (ref 31–36)
MCV RBC AUTO: 95.9 FL (ref 80–100)
MONOCYTES ABSOLUTE: 0.6 K/UL (ref 0–1.3)
MONOCYTES RELATIVE PERCENT: 6.6 %
NEUTROPHILS ABSOLUTE: 6.7 K/UL (ref 1.7–7.7)
NEUTROPHILS RELATIVE PERCENT: 78.9 %
PDW BLD-RTO: 15.1 % (ref 12.4–15.4)
PLATELET # BLD: 194 K/UL (ref 135–450)
PMV BLD AUTO: 7.2 FL (ref 5–10.5)
POTASSIUM SERPL-SCNC: 5.8 MMOL/L (ref 3.5–5.1)
RBC # BLD: 3.27 M/UL (ref 4–5.2)
SODIUM BLD-SCNC: 139 MMOL/L (ref 136–145)
TOTAL PROTEIN: 6.5 G/DL (ref 6.4–8.2)
TRIGLYCERIDE, FASTING: 82 MG/DL (ref 0–150)
TSH REFLEX: 1.18 UIU/ML (ref 0.27–4.2)
VLDLC SERPL CALC-MCNC: 16 MG/DL
WBC # BLD: 8.5 K/UL (ref 4–11)

## 2023-01-12 ASSESSMENT — PATIENT HEALTH QUESTIONNAIRE - PHQ9
SUM OF ALL RESPONSES TO PHQ QUESTIONS 1-9: 2
SUM OF ALL RESPONSES TO PHQ9 QUESTIONS 1 & 2: 2
SUM OF ALL RESPONSES TO PHQ QUESTIONS 1-9: 2
SUM OF ALL RESPONSES TO PHQ QUESTIONS 1-9: 2
2. FEELING DOWN, DEPRESSED OR HOPELESS: 1
SUM OF ALL RESPONSES TO PHQ QUESTIONS 1-9: 2
1. LITTLE INTEREST OR PLEASURE IN DOING THINGS: 1

## 2023-01-12 NOTE — LETTER
[place] [address]  Phone: [phone]  Fax: [phone]    Steven Rai MD        January 12, 2023    Patient: Renee Cedeño   YOB: 1955   Date of Visit: 1/12/2023       To Whom It May Concern:    Our clinic recently performed a tuberculosis skin test on one of your employees, Yenny Logan. The results of this test are as follows: No results found for: PPD, INDURATION  This test was {+/-:88719::\"negative\"} for tuberculosis exposure per current Centers for Disease Control guidelines. A chest x-ray {was/not:[id number]::\"was not\"} required. If you have any questions or concerns, please don't hesitate to call.     Sincerely,        Steven Rai MD

## 2023-01-12 NOTE — PROGRESS NOTES
Juan R Willett is a 74 y.o. female. HPI: Here with  for complex medical visit  Still having pain despite narcotic use  Has not been able to quit smoking entirely  Only using albuterol not using a controller inhaler  Does get out of breath easily  Meds, vitamins and allergies reviewed with pt    ROS: No TIA's or unusual headaches, no dysphagia. No prolonged cough. No dyspnea or chest pain on exertion. No abdominal pain, change in bowel habits, black or bloody stools. No urinary tract symptoms. No new or unusual musculoskeletal symptoms. Prior to Visit Medications    Medication Sig Taking? Authorizing Provider   fluticasone-salmeterol (ADVAIR DISKUS) 250-50 MCG/ACT AEPB diskus inhaler Inhale 1 puff into the lungs in the morning and 1 puff in the evening. Yes Ross Rodriguez MD   buPROPion (WELLBUTRIN XL) 150 MG extended release tablet TAKE 1 TABLET BY MOUTH EVERY DAY Yes Ross Rodriguez MD   HYDROcodone-acetaminophen (NORCO) 7.5-325 MG per tablet Take 1 tablet by mouth every 6 hours as needed for Pain for up to 30 days.  Yes Ross Rodriguez MD   carvedilol (COREG) 12.5 MG tablet TAKE 1 TABLET BY MOUTH TWICE A DAY Yes Ross Rodriguez MD   carbidopa-levodopa (SINEMET)  MG per tablet TAKE 2 TABLETS BY MOUTH EVERY DAY IN THE EVENING Yes Ross Rodriguez MD   ondansetron (ZOFRAN ODT) 4 MG disintegrating tablet Take 1 tablet by mouth every 8 hours as needed for Nausea or Vomiting Yes Ross Rodriguez MD   albuterol sulfate HFA (PROVENTIL;VENTOLIN;PROAIR) 108 (90 Base) MCG/ACT inhaler INHALE 2 PUFFS BY MOUTH EVERY 6 HOURS AS NEEDED FOR WHEEZE Yes Ross Rodriguez MD   clopidogrel (PLAVIX) 75 MG tablet TAKE 1 TABLET BY MOUTH EVERY DAY Yes Arlin Gonzales MD   rosuvastatin (CRESTOR) 40 MG tablet TAKE 1 TABLET BY MOUTH EVERY DAY AT NIGHT Yes Cyndi Farr MD   losartan (COZAAR) 50 MG tablet TAKE 1 TABLET BY MOUTH EVERY DAY Yes Ross Rodriguez MD   FLUoxetine (PROZAC) 40 MG capsule TAKE 2 CAPSULES DAILY Yes Isabel Castro MD       Past Medical History:   Diagnosis Date    Arthritis     ASHD (arteriosclerotic heart disease)     Chronic kidney disease     Chronic midline low back pain without sciatica 4/3/2019    COPD (chronic obstructive pulmonary disease) (HCC)     unknown     Coronary artery disease involving native coronary artery of native heart without angina pectoris 3/31/2016    Coronary artery disease involving native coronary artery of native heart without angina pectoris 3/31/2016    Coronary artery disease involving native coronary artery of native heart without angina pectoris 3/31/2016    Depression     Full dentures     HTN     Hyperlipidemia     Iron deficiency anemia 3/31/2016    Iron deficiency anemia secondary to blood loss (chronic) 2020    Kidney stone     Morbid obesity (HCC)     Restless legs syndrome     pt on cabidopa/levodopa    Stage 3 chronic kidney disease ([address] 4/3/2019    Wears glasses        Social History     Tobacco Use    Smoking status: Every Day     Packs/day: 0.50     Years: 30.00     Pack years: 15.00     Types: Cigarettes     Last attempt to quit: 2016     Years since quittin.8    Smokeless tobacco: Never   Vaping Use    Vaping Use: Never used   Substance Use Topics    Alcohol use: No     Alcohol/week: 0.0 standard drinks    Drug use: No       Family History   Problem Relation Age of Onset    Stroke Mother     Diabetes Mother     Heart Disease Mother     High Blood Pressure Mother     High Cholesterol Mother     Cancer Neg Hx        Allergies   Allergen Reactions    Mobic [Meloxicam] Nausea Only     Stomach would get upset after taking this medication    Morphine Other (See Comments)     When taken in  pt had an adverse reaction to morphine. She ended up in  hospital with kidney failure, dehydration, and in ICU. She prefers this not to be given ever. Other      ?  Suture from CABG caused blister (10/30/2015)       OBJECTIVE:  BP 138/88   Pulse 85   Wt 257 lb (116.6 kg)   LMP 01/01/2004   SpO2 95%   BMI 40.25 kg/m²   GEN:  in NAD obese, weight up 5 pounds  NECK:  Supple without adenopathy. No bruits  CV:  Regular rate and rhythm, S1 and S2 normal, no murmurs, clicks  PULM:  Chest is clear, no wheezing ,  symmetric air entry throughout both lung fields. EXT: No rash or edema  NEURO: nonfocal  OARRS report reviewed and assessed. Consistent. Lab Results   Component Value Date    CHOL 144 06/30/2021    CHOL 178 02/27/2020    CHOL 259 (H) 05/15/2017     Lab Results   Component Value Date    TRIG 321 (H) 06/30/2021    TRIG 200 (H) 02/27/2020    TRIG 441 (H) 05/15/2017     Lab Results   Component Value Date    HDL 49 01/12/2023    HDL 40 06/30/2021    HDL 47 02/27/2020     Lab Results   Component Value Date    LDLCALC 48 01/12/2023    LDLCALC see below 06/30/2021    LDLCALC 91 02/27/2020     Lab Results   Component Value Date    LABVLDL 16 01/12/2023    LABVLDL see below 06/30/2021    LABVLDL 40 02/27/2020     No results found for: [facility]   Lab Results   Component Value Date     01/12/2023    K 5.8 (H) 01/12/2023     01/12/2023    CO2 22 01/12/2023    BUN 33 (H) 01/12/2023    CREATININE 2.0 (H) 01/12/2023    GLUCOSE 107 (H) 01/25/2022    CALCIUM 8.8 01/12/2023    PROT 6.5 01/12/2023    LABALBU 4.1 01/12/2023    BILITOT <0.2 01/12/2023    ALKPHOS 138 (H) 01/12/2023    AST 25 01/12/2023    ALT 16 01/12/2023    LABGLOM 27 (A) 01/12/2023    GFRAA 39 (A) 01/25/2022    AGRATIO 1.7 01/12/2023    GLOB 3.3 06/29/2021        Lab Results   Component Value Date    WBC 8.5 01/12/2023    HGB 10.1 (L) 01/12/2023    HCT 31.4 (L) 01/12/2023    MCV 95.9 01/12/2023     01/12/2023      ASSESSMENT/PLAN:  1. Due for mammogram, patient is not interested    2. Encounter for drug screening  Urine drug screen today    3. Morbid obesity (HCC)-getting worse  Encourage better diet more exercise      4. COPD  ([address]  Start advair    5.  CKD (chronic kidney disease) stage 4, GFR 15-29 ml/min ([facility])-potassium is higher creatinine is higher GFR is down to 27  See nephrologist  Avoid nephrotoxins    6. Primary hypertension  stable, continue Coreg and losartan    7. Mixed hyperlipidemia-stable  Continue Crestor    8. Coronary artery disease involving native coronary artery of native heart without angina pectoris-strongly urged her to aggressively reduce her risk factors      9. Tobacco use disorder  I told pt it is very important to quit smoking! When ready, I would like to schedule an appointment to discuss the various tools we can offer, such as classes, hypnosis, accupuncture, or/and medications. Suggest pt. get to a class, pick a quit date, and RTO to discuss. Multiple approaches toward motivating the patient were explored. Will dec prozac from 40 bid to qd, inc wellbutrin form 150 qd to bid    10imm/hm  Flu shot today  Shingrix #2 today  Urge covid booster in 1 mo  Mammogram-declined  cologuard ordered    11 chronic low back pain  Discussed use of narcotic pain meds, benefits and risks.   Patient aware he/she will need to be seen every 3 months, will be subject to random urine drug screens, will be asked to sign a medication agreement, and may be required to see a pain specialist, especially if his/her morphine equivalent is over 80

## 2023-01-13 LAB
HIV AG/AB: NORMAL
HIV ANTIGEN: NORMAL
HIV-1 ANTIBODY: NORMAL
HIV-2 AB: NORMAL

## 2023-01-24 ENCOUNTER — TELEPHONE (OUTPATIENT)
Dept: FAMILY MEDICINE CLINIC | Age: 68
End: 2023-01-24

## 2023-01-24 DIAGNOSIS — R06.02 SOB (SHORTNESS OF BREATH): ICD-10-CM

## 2023-01-24 RX ORDER — ALBUTEROL SULFATE 90 UG/1
AEROSOL, METERED RESPIRATORY (INHALATION)
Qty: 8.5 EACH | Refills: 2 | Status: SHIPPED | OUTPATIENT
Start: 2023-01-24

## 2023-01-24 NOTE — TELEPHONE ENCOUNTER
Patient was recently prescribed Advair Diskus to take along with her Albuterol. Patient went to the pharmacy for a refill on the albuterol and was told we refused it.  Please advise

## 2023-01-31 DIAGNOSIS — M15.9 PRIMARY OSTEOARTHRITIS INVOLVING MULTIPLE JOINTS: ICD-10-CM

## 2023-01-31 DIAGNOSIS — Z76.0 ENCOUNTER FOR MEDICATION REFILL: ICD-10-CM

## 2023-01-31 RX ORDER — HYDROCODONE BITARTRATE AND ACETAMINOPHEN 7.5; 325 MG/1; MG/1
1 TABLET ORAL EVERY 6 HOURS PRN
Qty: 120 TABLET | Refills: 0 | Status: SHIPPED | OUTPATIENT
Start: 2023-01-31 | End: 2023-02-01 | Stop reason: SDUPTHER

## 2023-01-31 NOTE — TELEPHONE ENCOUNTER
Medication and Quantity requested: HYDROcodone-acetaminophen (NORCO) 7.5-325 MG per tablet        Last Visit  1.12.23    Pharmacy and phone number updated in EPIC:  yes

## 2023-01-31 NOTE — TELEPHONE ENCOUNTER
Lov 1/12/23  Lrf 120 0 1/3/23Medication:   Requested Prescriptions     Pending Prescriptions Disp Refills    HYDROcodone-acetaminophen (NORCO) 7.5-325 MG per tablet 120 tablet 0     Sig: Take 1 tablet by mouth every 6 hours as needed for Pain for up to 30 days. Last Filled:      Patient Phone Number: [phone] (home)     Last appt: 1/12/2023   Next appt: Visit date not found    Last OARRS:   RX Monitoring 10/3/2022   Attestation -   Acute Pain Prescriptions -   Periodic Controlled Substance Monitoring No signs of potential drug abuse or diversion identified.    Chronic Pain > 80 MEDD -

## 2023-02-01 RX ORDER — HYDROCODONE BITARTRATE AND ACETAMINOPHEN 7.5; 325 MG/1; MG/1
1 TABLET ORAL EVERY 6 HOURS PRN
Qty: 120 TABLET | Refills: 0 | Status: SHIPPED | OUTPATIENT
Start: 2023-02-01 | End: 2023-03-03

## 2023-02-01 NOTE — TELEPHONE ENCOUNTER
Patient called in regards to this medication      This was supposed to be sent to [facility]       Please send this asap

## 2023-02-06 ENCOUNTER — TELEPHONE (OUTPATIENT)
Dept: FAMILY MEDICINE CLINIC | Age: 68
End: 2023-02-06

## 2023-02-06 DIAGNOSIS — R05.1 ACUTE COUGH: Primary | ICD-10-CM

## 2023-02-06 DIAGNOSIS — R26.81 UNSTEADY GAIT: ICD-10-CM

## 2023-02-06 RX ORDER — ONDANSETRON 4 MG/1
4 TABLET, ORALLY DISINTEGRATING ORAL 3 TIMES DAILY PRN
Qty: 21 TABLET | Refills: 0 | Status: SHIPPED | OUTPATIENT
Start: 2023-02-06

## 2023-02-06 RX ORDER — LEVOFLOXACIN 500 MG/1
500 TABLET, FILM COATED ORAL DAILY
Qty: 7 TABLET | Refills: 0 | Status: SHIPPED | OUTPATIENT
Start: 2023-02-06 | End: 2023-02-13

## 2023-02-06 NOTE — TELEPHONE ENCOUNTER
Patient called and stated that her head cold and throwing and coughing is back like she had a couple months ago and would like to have medication called in     Needs to have something called in to calm the stomach down and a antibiotic       [facility] is pharm

## 2023-02-06 NOTE — TELEPHONE ENCOUNTER
Patient  called and would like to see about  getting a mobility scooter for his wife that way it can go through the insurance he believes     Not sure what are the next steps

## 2023-02-07 NOTE — TELEPHONE ENCOUNTER
Patient went to ED on 8-23-21  Patient was having trouble breathing  Patient started smoking again and is having some trouble breathing, and wheezing  Patient was diagnosed w/COPD  Patient is asking if she should sched. appt.w/? Patient is requesting refill of Albuterol Sulfate,given to patient ad the ED  Dolv - 7-8-21,  [facility]  Contact patient re: RX and appt. ? Odomzo Counseling- I discussed with the patient the risks of Odomzo including but not limited to nausea, vomiting, diarrhea, constipation, weight loss, changes in the sense of taste, decreased appetite, muscle spasms, and hair loss.  The patient verbalized understanding of the proper use and possible adverse effects of Odomzo.  All of the patient's questions and concerns were addressed.

## 2023-02-07 NOTE — TELEPHONE ENCOUNTER
Spoke with Cinthya Brink () and he understood Dr Chris White response for pt. He stated he picked up the medication yesterday and he thinks it's helping the pt.

## 2023-02-20 ENCOUNTER — HOSPITAL ENCOUNTER (OUTPATIENT)
Dept: PHYSICAL THERAPY | Age: 68
Setting detail: THERAPIES SERIES
Discharge: HOME | End: 2023-02-20
Payer: MEDICAID

## 2023-02-20 PROCEDURE — 97110 THERAPEUTIC EXERCISES: CPT

## 2023-02-20 PROCEDURE — 97161 PT EVAL LOW COMPLEX 20 MIN: CPT

## 2023-02-20 NOTE — PLAN OF CARE
[address] [address], [address]  Phone: [phone]   Fax:     [phone]                                                       Physical Therapy Certification    Dear Danae Nur MD  ,    We had the pleasure of evaluating the following patient for physical therapy services at [address] [place] [address]. A summary of our findings can be found in the initial assessment below. This includes our plan of care. If you have any questions or concerns regarding these findings, please do not hesitate to contact me at the office phone number checked above. Thank you for the referral.       Physician Signature:_______________________________Date:__________________  By signing above (or electronic signature), therapists plan is approved by physician      Patient: Zhou Rodas   : 1955   MRN: [id number]  Referring Physician: Danae Nur MD        Evaluation Date: 2023      Medical Diagnosis Information:  Unsteady gait [R26.81]   PT diagnosis: LE weakness, poor endurance, impaired gait, decreased mobility                                       Insurance information: PT Insurance Information: [place] Medicaid (no auth, no copay)    Precautions/ Contra-indications: none  Latex Allergy:  [x]NO      []YES  Preferred Language for Healthcare:   [x]English       []Other:    C-SSRS Triggered by Intake questionnaire (Past 2 wk assessment ):   [x] No, Questionnaire did not trigger screening.   [] Yes, Patient intake triggered C-SSRS Screening     [] Completed, no further action required. [] Completed, PCP notified via Epic    SUBJECTIVE: Patient stated complaint:  here because she wants a wheelchair. Has been in therapy at home. Not interested in therapy at this time. Has pain in her low back. Can't stand very long - feels like her legs are going to give out. Has to use motorized cart at the grocery store.      Relevant Medical History: B TKA, L SHWETHA, R shoulder debridement, clavicle excision, septic arthritis with abscess in 3/2022, STAN on CKD Stage III/IV, COPD, restless leg, CABD    Functional Scale:       Date assessed:  KARLIE: raw score = next visit                     NV    Pain Scale: 4/10  Easing factors: rest, sitting, tylenol   Provocative factors: walking, standing, stairs      Type: [x]Constant   []Intermittent  []Radiating []Localized []other:     Numbness/Tingling: hands    Occupation/School: unemployed      Living Status/Prior Level of Function: Prior to this injury / incident, pt was independent with ADLs and IADLs,     OBJECTIVE:     Palpation: TTP at SIJ B    Functional Mobility/Transfers: independent  - uses shower chair, has some difficulty with getting in and out of bed     Inspection: edema in L ankle - has been present since knee sx     Posture:  WNL    Bandages/Dressings/Incisions: none    Gait: (include devices/WB status): with SPC in R hand - short step length B, cautious, increased lateral sway       Myotomes Normal Abnormal Comments   Neck flexion (C1-C2)      Neck sidebending (C3)      Shoulder elevation (C4)      Shoulder abduction (C5)      Elbow flexion/wrist extension (C6)      Elbow extension/wrist flexion (C7)      Thumb abduction (C8)      Finger abduction (T1)      Hip flexion (L1-L2) x     Knee extension (L2-L4) x     Dorsiflexion (L4-L5) x     Great Toe Ext (L5) x     Ankle Eversion (S1-S2) x     Ankle PF(S1-S2)          Reflexes Normal Abnormal Comments   C5-6 Biceps      C5-6 Brachioradialis      C7-8 Triceps      Cortezs      S1-2 Seated achilles      S1-2 Prone knee bend      L3-4 Patellar tendon x     Clonus      Babinski           PROM AROM    L R L R          Lumb flexion   60    Lumb ext   10    Lumb SBing   15 20                                                 Joint mobility:    []Normal    [x]Hypo   []Hyper    Strength (0-5) Left Right   Hip flexion 4 4   Hip ER 4 4   Hip IR 4- 4 Knee flexion 4+ 4   Knee ext  4+ 4                   [x] Patient history, allergies, meds reviewed. Medical chart reviewed. See intake form. Review Of Systems (ROS):  [x]Performed Review of systems (Integumentary, CardioPulmonary, Neurological) by intake and observation. Intake form has been scanned into medical record. Patient has been instructed to contact their primary care physician regarding ROS issues if not already being addressed at this time.       Co-morbidities/Complexities (which will affect course of rehabilitation):   []None        []Hx of COVID   Arthritic conditions   []Rheumatoid arthritis (M05.9)  [x]Osteoarthritis (M19.91)  []Gout   Cardiovascular conditions   [x]Hypertension (I10)  [x]Hyperlipidemia (E78.5)  []Angina pectoris (I20)  []Atherosclerosis (I70)  []Pacemaker  [x]Hx of CABG/stent/  cardiac surgeries   Musculoskeletal conditions   []Disc pathology   []Congenital spine pathologies   []Osteoporosis (M81.8)  []Osteopenia (M85.8)  []Scoliosis       Endocrine conditions   []Hypothyroid (E03.9)  []Hyperthyroid Gastrointestinal conditions   []Constipation (W95.88)   Metabolic conditions   []Morbid obesity (E66.01)  []Diabetes type 1(E10.65) or 2 (E11.65)   []Neuropathy (G60.9)     Cardio/Pulmonary conditions   []Asthma (J45)  []Coughing   [x]COPD (J44.9)  []CHF  []A-fib   Psychological Disorders  []Anxiety (F41.9)  [x]Depression (F32.9)   []Other:   Developmental Disorders  []Autism (F84.0)  []CP (G80)  []Down Syndrome (Q90.9)  []Developmental delay     Neurological conditions  []Prior Stroke (I69.30)  []Parkinson's (G20)  []Encephalopathy (G93.40)  []MS (G35)  []Post-polio (G14)  []SCI  []TBI  []ALS Other conditions  []Fibromyalgia (M79.7)  []Vertigo  []Syncope  []Kidney Failure  []Cancer      []currently undergoing                treatment  []Pregnancy  []Incontinence   Prior surgeries  []involved limb  []previous spinal surgery  [] section birth  []hysterectomy  []bowel / bladder surgery  [x]other relevant surgeries - SHWETHA, TKA   [x]Other:     CKD  obesity                   [x]Age  [x]Sex   []Smoker              [x]Motivation/Lack of Motivation                        [x]Co-Morbidities              []Cognitive Function, education/learning barriers              [x]Environmental, home barriers              []profession/work barriers  []past PT/medical experience  []other:  Justification: pt has had many recent sx, pain limits distance for walking and weight bearing, low motivation for exercise     Falls Risk Assessment (30 days):   [x] Falls Risk assessed and no intervention required.   [] Falls Risk assessed and Patient requires intervention due to being higher risk   TUG score (>12s at risk):     [] Falls education provided, including         ASSESSMENT:    Functional Impairments:     [x]Noted lumb hypomobility   []Noted  joint hypermobility   []Decreased functional ROM   []Abnormal reflexes/sensation/myotomal/dermatomal deficits   [x]Decreased strength and neuromuscular control    [x]Decreased functional strength   []other:      Functional Activity Limitations (from functional questionnaire and intake)   [x]Reduced ability to tolerate prolonged functional positions   [x]Reduced ability or difficulty with changes of positions or transfers between positions   [x]Reduced ability to maintain good posture and demonstrate good body mechanics with sitting, bending, and lifting   [] Reduced ability or tolerance with driving and/or computer work   []Reduced ability to perform lifting, reaching, carrying tasks   []Reduced ability to concentrate   []Reduced ability to sleep    []Reduced ability to tolerate any impact through    [x]Reduced ability to ambulate prolonged functional periods/distances   []other:    Participation Restrictions   [x]Reduced participation in self care activities   [x]Reduced participation in home management activities   []Reduced participation in work activities   [x]Reduced participation in social activities. []Reduced participation in sport/recreational activities. Classification/Subgrouping:   [x]signs/symptoms consistent with general deconditioning     Prognosis/Rehab Potential:      []Excellent   []Good    [x]Fair   []Poor    Tolerance of evaluation/treatment:    []Excellent   []Good    [x]Fair   []Poor    Physical Therapy Evaluation Complexity Justification  [x] A history of present problem with:  [] no personal factors and/or comorbidities that impact the plan of care;  []1-2 personal factors and/or comorbidities that impact the plan of care  [x]3 personal factors and/or comorbidities that impact the plan of care  [x] An examination of body systems using standardized tests and measures addressing any of the following: body structures and functions (impairments), activity limitations, and/or participation restrictions;:  [] a total of 1-2 or more elements   [x] a total of 3 or more elements   [] a total of 4 or more elements   [x] A clinical presentation with:  [x] stable and/or uncomplicated characteristics   [] evolving clinical presentation with changing characteristics  [] unstable and unpredictable characteristics;   [x] Clinical decision making of [x] low, [] moderate, [] high complexity using standardized patient assessment instrument and/or measurable assessment of functional outcome. [x] EVAL (LOW) 88798 (typically 20 minutes face-to-face)  [] EVAL (MOD) 10677 (typically 30 minutes face-to-face)  [] EVAL (HIGH) 86058 (typically 45 minutes face-to-face)  [] RE-EVAL     PLAN:   Frequency/Duration:  1 days per week for 3 Weeks:  Interventions:  [x]  Therapeutic exercise including: strength training, ROM, including postural re-education. [x]  NMR activation and proprioception, including postural re-education. [x]  Manual therapy as indicated to include: Dry Needling/IASTM, STM, PROM, Gr I-IV mobilizations, manipulation.    [x] Modalities as needed that may include: thermal agents, E-stim, Biofeedback, US, iontophoresis as indicated  [x] Patient education on joint protection, postural re-education, activity modification, progression of HEP. HEP instruction: Written HEP instructions provided and reviewed. GOALS:  Patient stated goal: acquire a scooter     Therapist goals for Patient:   Long Term Goals: To be achieved in: 4 weeks  1. Pt will participate in WC eval and home assessment in order to obtain a scooter to improve independence and community mobility.   [] Progressing: [] Met: [] Not Met: [] Adjusted        Electronically signed by:  Rich Irving, PT, DPT

## 2023-02-20 NOTE — FLOWSHEET NOTE
[facility]  Phone: [phone]   Fax: [phone]    Physical Therapy Daily Treatment Note    Date:  2023     Patient Name:  Jai Spence    :  1955  MRN: [id number]  Medical Diagnosis:  Unsteady gait [R26.81]  Treatment Diagnosis: LE weakness, poor endurance, impaired gait, decreased mobility   Insurance/Certification information:  PT Insurance Information: [place] Medicaid (no auth, no copay)  Physician Information:  Naya Ventura MD    Plan of care signed (Y/N): []  Yes [x]  No     Date of Patient follow up with Physician:      Progress Report: []  Yes  [x]  No     Date Range for reporting period:  Beginnin2023  Ending:     Progress report due (10 Rx/or 30 days whichever is less): visit #3 or 91    Recertification due (POC duration/ or 90 days whichever is less): visit #3 or 23      Visit # Insurance Allowable Auth required?  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 Education:  2023  -educated pt and  on process of acquiring a wheelchair, discussed types of mobility and which would best fit patient's needs, need to be able to lift in and out of car, explained wheelchair rep will come to home and measure doorways etc, will also need rx from MD stating specifically motorized scooter x 25 min   -all patient questions were answered    Home Exercise Program:  2/20: encouraged pt to get up and walk for a few min each day, slowly increase time       Therapeutic Exercise and NMR EXR  [x] (53914) Provided verbal/tactile cueing for activities related to strengthening, flexibility, endurance, ROM for improvements in  [] LE / Lumbar: LE, proximal hip, and core control with self care, mobility, lifting, ambulation. [] UE / Cervical: cervical, postural, scapular, scapulothoracic and UE control with self care, reaching, carrying, lifting, house/yardwork, driving, computer work.  [] (62800) Provided verbal/tactile cueing for activities related to improving balance, coordination, kinesthetic sense, posture, motor skill, proprioception to assist with   [] LE / lumbar: LE, proximal hip, and core control in self care, mobility, lifting, ambulation and eccentric single leg control. [] UE / cervical: cervical, scapular, scapulothoracic and UE control with self care, reaching, carrying, lifting, house/yardwork, driving, computer work.   [] (60037) Therapist is in constant attendance of 2 or more patients providing skilled therapy interventions, but not providing any significant amount of measurable one-on-one time to either patient, for improvements in  [] LE / lumbar: LE, proximal hip, and core control in self care, mobility, lifting, ambulation and eccentric single leg control. [] UE / cervical: cervical, scapular, scapulothoracic and UE control with self care, reaching, carrying, lifting, house/yardwork, driving, computer work.      NMR and Therapeutic Activities:    [] (00432 or ) Provided verbal/tactile cueing for activities related to improving balance, coordination, kinesthetic sense, posture, motor skill, proprioception and motor activation to allow for proper function of   [] LE: / Lumbar core, proximal hip and LE with self care and ADLs  [] UE / Cervical: cervical, postural, scapular, scapulothoracic and UE control with self care, carrying, lifting, driving, computer work.   [] (43381) Gait Re-education- Provided training and instruction to the patient for proper LE, core and proximal hip recruitment and positioning and eccentric body weight control with ambulation re-education including up and down stairs     Home Management Training / Self Care:  [] (87472) Provided self-care/home management training related to activities of daily living and compensatory training, and/or use of adaptive equipment for improvement with: ADLs and compensatory training, meal preparation, safety procedures and instruction in use of adaptive equipment, including bathing, grooming, dressing, personal hygiene, basic household cleaning and chores.      Home Exercise Program:    [] (65278) Reviewed/Progressed HEP activities related to strengthening, flexibility, endurance, ROM of   [] LE / Lumbar: core, proximal hip and LE for functional self-care, mobility, lifting and ambulation/stair navigation   [] UE / Cervical: cervical, postural, scapular, scapulothoracic and UE control with self care, reaching, carrying, lifting, house/yardwork, driving, computer work  [] (62820)Reviewed/Progressed HEP activities related to improving balance, coordination, kinesthetic sense, posture, motor skill, proprioception of   [] LE: core, proximal hip and LE for self care, mobility, lifting, and ambulation/stair navigation    [] UE / Cervical: cervical, postural,  scapular, scapulothoracic and UE control with self care, reaching, carrying, lifting, house/yardwork, driving, computer work    Manual Treatments:  PROM / STM / Oscillations-Mobs:  G-I, II, III, IV (PA's, Inf., Post.)  [] (43762) Provided manual therapy to mobilize LE, proximal hip and/or LS spine soft tissue/joints for the purpose of modulating pain, promoting relaxation,  increasing ROM, reducing/eliminating soft tissue swelling/inflammation/restriction, improving soft tissue extensibility and allowing for proper ROM for normal function with   [] LE / lumbar: self care, mobility, lifting and ambulation. [] UE / Cervical: self care, reaching, carrying, lifting, house/yardwork, driving, computer work. Modalities:  [] (50033) Vasopneumatic compression: Utilized vasopneumatic compression to decrease edema / swelling for the purpose of improving mobility and quad tone / recruitment which will allow for increased overall function including but not limited to self-care, transfers, ambulation, and ascending / descending stairs. Charges:  Timed Code Treatment Minutes: 25   Total Treatment Minutes: 45     [x] EVAL - LOW (22507)   [] EVAL - MOD (29568)  [] EVAL - HIGH (51457)  [] RE-EVAL (14099)  [x] PD(49823) x   2    [] Ionto  [] NMR (14297) x       [] Vaso  [] Manual (90058) x       [] Ultrasound  [] TA x        [] Mech Traction (76748)  [] Aquatic Therapy x     [] ES (un) (90491):   [] Home Management Training x  [] ES(attended) (04000)   [] Dry Needling 1-2 muscles (91707):  [] Dry Needling 3+ muscles (159912)  [] Group:      [] Other:     GOALS:   Patient stated goal: acquire a scooter      Therapist goals for Patient:   Long Term Goals: To be achieved in: 4 weeks  1. Pt will participate in WC eval and home assessment in order to obtain a scooter to improve independence and community mobility. [] Progressing: [] Met: [] Not Met: [] Adjusted         Overall Progression Towards Functional goals/ Treatment Progress Update:  [] Patient is progressing as expected towards functional goals listed. [] Progression is slowed due to complexities/Impairments listed. [] Progression has been slowed due to co-morbidities.   [x] Plan just implemented, too soon to assess goals progression <30days   [] Goals require adjustment due to lack of progress  [] Patient is not progressing as expected and requires additional follow up with physician  [] Other    Persisting Functional Limitations/Impairments:  []Sleeping []Sitting               []Standing []Transfers        []Walking []Kneeling               []Stairs []Squatting / bending   []ADLs []Reaching  []Lifting  []Housework  []Driving []Job related tasks  []Sports/Recreation []Other:        ASSESSMENT:  See eval    Treatment/Activity Tolerance:  [] Patient able to complete tx [] Patient limited by fatigue  [] Patient limited by pain  [] Patient limited by other medical complications  [] Other:     Prognosis: [] Good [] Fair  [] Poor    Patient Requires Follow-up: [x] Yes  [] No    Plan for next treatment session:  WHEELCHAIR ASSESSMENT     PLAN: See eval. PT 1x / week for 3 weeks if needed  [] Continue per plan of care [] Alter current plan (see comments)  [x] Plan of care initiated [] Hold pending MD visit [] Discharge    Electronically signed by: Umesh Johnston, PT, DPT    Note: If patient does not return for scheduled/ recommended follow up visits, this note will serve as a discharge from care along with most recent update on progress.

## 2023-02-21 DIAGNOSIS — R26.81 UNSTEADY GAIT: Primary | ICD-10-CM

## 2023-02-28 DIAGNOSIS — Z76.0 ENCOUNTER FOR MEDICATION REFILL: ICD-10-CM

## 2023-02-28 DIAGNOSIS — M15.9 PRIMARY OSTEOARTHRITIS INVOLVING MULTIPLE JOINTS: ICD-10-CM

## 2023-02-28 RX ORDER — HYDROCODONE BITARTRATE AND ACETAMINOPHEN 7.5; 325 MG/1; MG/1
1 TABLET ORAL EVERY 6 HOURS PRN
Qty: 120 TABLET | Refills: 0 | Status: SHIPPED | OUTPATIENT
Start: 2023-02-28 | End: 2023-03-30

## 2023-02-28 NOTE — TELEPHONE ENCOUNTER
Medication and Quantity requested: HYDROcodone-acetaminophen (NORCO) 7.5-325 MG per tablet          Last Visit 01/12/2023      Pharmacy and phone number updated in EPIC:  yes

## 2023-02-28 NOTE — TELEPHONE ENCOUNTER
Lov 1/12/23  Lrf 120 0 2/1/23Medication:   Requested Prescriptions     Pending Prescriptions Disp Refills    HYDROcodone-acetaminophen (NORCO) 7.5-325 MG per tablet 120 tablet 0     Sig: Take 1 tablet by mouth every 6 hours as needed for Pain for up to 30 days. Last Filled:      Patient Phone Number: [phone] (home)     Last appt: 1/12/2023   Next appt: Visit date not found    Last OARRS:   RX Monitoring 1/31/2023   Attestation -   Acute Pain Prescriptions -   Periodic Controlled Substance Monitoring No signs of potential drug abuse or diversion identified.    Chronic Pain > 80 MEDD -

## 2023-03-13 ENCOUNTER — TELEPHONE (OUTPATIENT)
Dept: FAMILY MEDICINE CLINIC | Age: 68
End: 2023-03-13

## 2023-03-13 ENCOUNTER — HOSPITAL ENCOUNTER (OUTPATIENT)
Dept: PHYSICAL THERAPY | Age: 68
Setting detail: THERAPIES SERIES
Discharge: HOME | End: 2023-03-13
Payer: MEDICAID

## 2023-03-13 PROCEDURE — 97542 WHEELCHAIR MNGMENT TRAINING: CPT

## 2023-03-13 NOTE — FLOWSHEET NOTE
[address] [facility]  Phone: [phone]   Fax: [phone]    Physical Therapy Daily Treatment Note    Date:  2023     Patient Name:  Corinne Cabrera    :  1955  MRN: [id number]  Medical Diagnosis:  Unsteady gait [R26.81]  Treatment Diagnosis: LE weakness, poor endurance, impaired gait, decreased mobility   Insurance/Certification information:  PT Insurance Information: [place] Medicaid (no auth, no copay)  Physician Information:  Fatuma Franklin MD    Plan of care signed (Y/N): []  Yes [x]  No     Date of Patient follow up with Physician:      Progress Report: []  Yes  [x]  No     Date Range for reporting period:  Beginnin2023  Ending:     Progress report due (10 Rx/or 30 days whichever is less): visit #3 or 77    Recertification due (POC duration/ or 90 days whichever is less): visit #3 or 23      Visit # Insurance Allowable Auth required? Date Range   2/3 30 hard max []  Yes  [x]  No N/a           Latex Allergy:  [x]NO      []YES  Preferred Language for Healthcare:   [x]English       []other:      Pain level:  5/10     SUBJECTIVE:  Pt forgot her cane today. Pt has been doing some walking around her home.      OBJECTIVE:     3/13: Pt present with  and wheelchair rep Romeo [employer] from [employer]     Evaluation findings indicate:  Patient presents with:  [] Progressive disease [x] Multiple Co-morbidities  [] Acute Onset  Comments:    Patient has a mobility limitation that significantly impairs their ability to participate in one or more mobility-related ADL's:  [x] Yes  [] No    ADL Limitations include:   [] Feeding [] Bathing  [] Dressing   [] Grooming [x] Ambulation  [] Other:       Primary cause of decreased ADL ability:  deconditioning from coronary artery disease, stage 3 kidney disease, COPD, asthma, morbid obesity     Factors/recent changes leading to decreased mobility causing patient need for additional equipment: stage 3 kidney disease, CAD, COPD, asthma, RLS - decreasing mobility, balance, and endurance, limited distance walking and prolonged standing     Patient's mobility limitation can be resolved by the use of a wheelchair:  [x] Yes  [] No  Explanation: pt will be able to be independent with ADLs, running errands, [place]     Patient has attempted use of a manual wheelchair:  [] Yes  [x] No  Explanation:  morbid obesity will not allow for pt to push herself    Patient's mobility limitation can be resolved by use of a walker:  [] Yes  [x] No  Explanation:  Patient has attempted to use a walker for mobility needs:  [] Yes  [x] No  Explanation:    Evaluation findings indicate that the patient will benefit from:  [x] Scooter [] Power Wheelchair  [] N/A  [] Other: The device indicated above will significantly improve the patient's ability to perform ADLs and/or maintain higher level of independence.     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 Education:  02/20/2023  -educated pt and  on process of acquiring a wheelchair, discussed types of mobility and which would best fit patient's needs, need to be able to lift in and out of car, explained wheelchair rep will come to home and measure doorways etc, will also need rx from MD stating specifically motorized scooter x 25 min   -all patient questions were answered    Home Exercise Program:  2/20: encouraged pt to get up and walk for a few min each day, slowly increase time       Therapeutic Exercise and NMR EXR  [] [phone]) Provided verbal/tactile cueing for activities related to strengthening, flexibility, endurance, ROM for improvements in  [] LE / Lumbar: LE, proximal hip, and core control with self care, mobility, lifting, ambulation. [] UE / Cervical: cervical, postural, scapular, scapulothoracic and UE control with self care, reaching, carrying, lifting, house/yardwork, driving, computer work.  [] (70242) Provided verbal/tactile cueing for activities related to improving balance, coordination, kinesthetic sense, posture, motor skill, proprioception to assist with   [] LE / lumbar: LE, proximal hip, and core control in self care, mobility, lifting, ambulation and eccentric single leg control. [] UE / cervical: cervical, scapular, scapulothoracic and UE control with self care, reaching, carrying, lifting, house/yardwork, driving, computer work.   [] (06377) Therapist is in constant attendance of 2 or more patients providing skilled therapy interventions, but not providing any significant amount of measurable one-on-one time to either patient, for improvements in  [] LE / lumbar: LE, proximal hip, and core control in self care, mobility, lifting, ambulation and eccentric single leg control. [] UE / cervical: cervical, scapular, scapulothoracic and UE control with self care, reaching, carrying, lifting, house/yardwork, driving, computer work.      NMR and Therapeutic Activities:    [] (14240 or 30530) Provided verbal/tactile cueing for activities related to improving balance, coordination, kinesthetic sense, posture, motor skill, proprioception and motor activation to allow for proper function of   [] LE: / Lumbar core, proximal hip and LE with self care and ADLs  [] UE / Cervical: cervical, postural, scapular, scapulothoracic and UE control with self care, carrying, lifting, driving, computer work.   [] (72039) Gait Re-education- Provided training and instruction to the patient for proper LE, core and proximal hip recruitment and positioning and eccentric body weight control with ambulation re-education including up and down stairs     Home Management Training / Self Care:  [] (70803) Provided self-care/home management training related to activities of daily living and compensatory training, and/or use of adaptive equipment for improvement with: ADLs and compensatory training, meal preparation, safety procedures and instruction in use of adaptive equipment, including bathing, grooming, dressing, personal hygiene, basic household cleaning and chores.      Home Exercise Program:    [] (87583) Reviewed/Progressed HEP activities related to strengthening, flexibility, endurance, ROM of   [] LE / Lumbar: core, proximal hip and LE for functional self-care, mobility, lifting and ambulation/stair navigation   [] UE / Cervical: cervical, postural, scapular, scapulothoracic and UE control with self care, reaching, carrying, lifting, house/yardwork, driving, computer work  [] (94487)Reviewed/Progressed HEP activities related to improving balance, coordination, kinesthetic sense, posture, motor skill, proprioception of   [] LE: core, proximal hip and LE for self care, mobility, lifting, and ambulation/stair navigation    [] UE / Cervical: cervical, postural,  scapular, scapulothoracic and UE control with self care, reaching, carrying, lifting, house/yardwork, driving, computer work    Manual Treatments:  PROM / STM / Oscillations-Mobs:  G-I, II, III, IV (PA's, Inf., Post.)  [] (85074) Provided manual therapy to mobilize LE, proximal hip and/or LS spine soft tissue/joints for the purpose of modulating pain, promoting relaxation,  increasing ROM, reducing/eliminating soft tissue swelling/inflammation/restriction, improving soft tissue extensibility and allowing for proper ROM for normal function with   [] LE / lumbar: self care, mobility, lifting and ambulation. [] UE / Cervical: self care, reaching, carrying, lifting, house/yardwork, driving, computer work. Modalities:  [] (24851) Vasopneumatic compression: Utilized vasopneumatic compression to decrease edema / swelling for the purpose of improving mobility and quad tone / recruitment which will allow for increased overall function including but not limited to self-care, transfers, ambulation, and ascending / descending stairs. Charges:  Timed Code Treatment Minutes: 39   Total Treatment Minutes: 39     [] EVAL - LOW (32092)   [] EVAL - MOD (93581)  [] EVAL - HIGH (95520)  [] RE-EVAL (71904)  [] UQ(65095) x       [] Ionto  [] NMR (25970) x       [] Vaso  [] Manual (03261) x       [] Ultrasound  [] TA x        [] Mech Traction (67907)  [] Aquatic Therapy x     [] ES (un) (17355):   [] Home Management Training x  [] ES(attended) (90499)   [] Dry Needling 1-2 muscles (48404):  [] Dry Needling 3+ muscles (950804)  [] Group:      [x] Other: WC assessment/training x 3    GOALS:   Patient stated goal: acquire a scooter      Therapist goals for Patient:   Long Term Goals: To be achieved in: 4 weeks  1. Pt will participate in WC eval and home assessment in order to obtain a scooter to improve independence and community mobility. [] Progressing: [] Met: [] Not Met: [] Adjusted         Overall Progression Towards Functional goals/ Treatment Progress Update:  [] Patient is progressing as expected towards functional goals listed.     [] Progression is slowed due to complexities/Impairments listed. [] Progression has been slowed due to co-morbidities. [x] Plan just implemented, too soon to assess goals progression <30days   [] Goals require adjustment due to lack of progress  [] Patient is not progressing as expected and requires additional follow up with physician  [] Other    Persisting Functional Limitations/Impairments:  []Sleeping []Sitting               []Standing []Transfers        []Walking []Kneeling               []Stairs []Squatting / bending   []ADLs []Reaching  []Lifting  []Housework  []Driving []Job related tasks  []Sports/Recreation []Other:        ASSESSMENT:  See above    Treatment/Activity Tolerance:  [] Patient able to complete tx [] Patient limited by fatigue  [] Patient limited by pain  [] Patient limited by other medical complications  [] Other:     Prognosis: [x] Good [] Fair  [] Poor    Patient Requires Follow-up: [x] Yes  [] No        PLAN: See ranjana PT 1x / week for 3 weeks if needed  [x] Continue per plan of care [] Alter current plan (see comments)  [] Plan of care initiated [] Hold pending MD visit [] Discharge    Electronically signed by: Mignon Oliveira, PT, DPT    Note: If patient does not return for scheduled/ recommended follow up visits, this note will serve as a discharge from care along with most recent update on progress.

## 2023-03-13 NOTE — TELEPHONE ENCOUNTER
Spoke with Yoav Ortiz () and he understood Dr Diamond Monzon response.  Gave him office fax number [phone]

## 2023-03-13 NOTE — TELEPHONE ENCOUNTER
is requesting a note stating that the patient needs a mobility scooter and \"why\" she needs the scooter  Fax- [phone], [facility].Maribel

## 2023-03-30 DIAGNOSIS — Z76.0 ENCOUNTER FOR MEDICATION REFILL: ICD-10-CM

## 2023-03-30 DIAGNOSIS — M15.9 PRIMARY OSTEOARTHRITIS INVOLVING MULTIPLE JOINTS: ICD-10-CM

## 2023-03-30 RX ORDER — HYDROCODONE BITARTRATE AND ACETAMINOPHEN 7.5; 325 MG/1; MG/1
1 TABLET ORAL EVERY 6 HOURS PRN
Qty: 120 TABLET | Refills: 0 | Status: SHIPPED | OUTPATIENT
Start: 2023-03-30 | End: 2023-04-29

## 2023-03-30 NOTE — TELEPHONE ENCOUNTER
Lov 1/12/23  Lrf 120 0 2/28/23 Medication:   Requested Prescriptions     Pending Prescriptions Disp Refills    HYDROcodone-acetaminophen (NORCO) 7.5-325 MG per tablet 120 tablet 0     Sig: Take 1 tablet by mouth every 6 hours as needed for Pain for up to 30 days. Last Filled:      Patient Phone Number: [phone] (home)     Last appt: 1/12/2023   Next appt: Visit date not found    Last OARRS:   RX Monitoring 1/31/2023   Attestation -   Acute Pain Prescriptions -   Periodic Controlled Substance Monitoring No signs of potential drug abuse or diversion identified.    Chronic Pain > 80 MEDD -

## 2023-03-30 NOTE — TELEPHONE ENCOUNTER
Medication and Quantity requested: HYDROcodone-acetaminophen (NORCO) 7.5-325 MG per tablet        Last Visit  1/12/2023    Pharmacy and phone number updated in EPIC:  yes

## 2023-04-27 DIAGNOSIS — M15.9 PRIMARY OSTEOARTHRITIS INVOLVING MULTIPLE JOINTS: ICD-10-CM

## 2023-04-27 DIAGNOSIS — Z76.0 ENCOUNTER FOR MEDICATION REFILL: ICD-10-CM

## 2023-04-27 RX ORDER — HYDROCODONE BITARTRATE AND ACETAMINOPHEN 7.5; 325 MG/1; MG/1
1 TABLET ORAL EVERY 6 HOURS PRN
Qty: 120 TABLET | Refills: 0 | Status: SHIPPED | OUTPATIENT
Start: 2023-04-27 | End: 2023-05-27

## 2023-04-27 NOTE — TELEPHONE ENCOUNTER
----- Message from Ponciano Ganser sent at 4/27/2023  9:12 AM EDT -----  Subject: Refill Request    QUESTIONS  Name of Medication? HYDROcodone-acetaminophen (NORCO) 7.5-325 MG per   tablet  Patient-reported dosage and instructions? 1x6-8 hours. How many days do you have left? 4  Preferred Pharmacy? [id number]  Pharmacy phone number (if available)? [phone]  ---------------------------------------------------------------------------  --------------  Mesha QUEVEDO  What is the best way for the office to contact you? Do not leave any   message, patient will call back for answer  Preferred Call Back Phone Number? [phone]  ---------------------------------------------------------------------------  --------------  SCRIPT ANSWERS  Relationship to Patient?  Self

## 2023-04-27 NOTE — TELEPHONE ENCOUNTER
Medication:   Requested Prescriptions     Pending Prescriptions Disp Refills    HYDROcodone-acetaminophen (NORCO) 7.5-325 MG per tablet 120 tablet 0     Sig: Take 1 tablet by mouth every 6 hours as needed for Pain for up to 30 days. Last Filled:  3/30/23    Patient Phone Number: [phone] (home)     Last appt: 1/12/2023   Next appt: Visit date not found    Last OARRS:   RX Monitoring 1/31/2023   Attestation -   Acute Pain Prescriptions -   Periodic Controlled Substance Monitoring No signs of potential drug abuse or diversion identified.    Chronic Pain > 80 MEDD -

## 2023-05-17 RX ORDER — FLUTICASONE PROPIONATE AND SALMETEROL 50; 250 UG/1; UG/1
POWDER RESPIRATORY (INHALATION)
Qty: 60 EACH | Refills: 3 | Status: SHIPPED | OUTPATIENT
Start: 2023-05-17

## 2023-05-19 ENCOUNTER — OFFICE VISIT (OUTPATIENT)
Dept: FAMILY MEDICINE CLINIC | Age: 68
End: 2023-05-19
Payer: MEDICAID

## 2023-05-19 VITALS
BODY MASS INDEX: 39.31 KG/M2 | DIASTOLIC BLOOD PRESSURE: 80 MMHG | HEART RATE: 71 BPM | SYSTOLIC BLOOD PRESSURE: 136 MMHG | OXYGEN SATURATION: 96 % | WEIGHT: 251 LBS

## 2023-05-19 DIAGNOSIS — Z23 NEED FOR PROPHYLACTIC VACCINATION AGAINST STREPTOCOCCUS PNEUMONIAE (PNEUMOCOCCUS): ICD-10-CM

## 2023-05-19 DIAGNOSIS — E66.01 CLASS 2 SEVERE OBESITY DUE TO EXCESS CALORIES WITH SERIOUS COMORBIDITY AND BODY MASS INDEX (BMI) OF 39.0 TO 39.9 IN ADULT (HCC): ICD-10-CM

## 2023-05-19 DIAGNOSIS — F17.200 TOBACCO USE DISORDER: ICD-10-CM

## 2023-05-19 DIAGNOSIS — M79.671 PAIN IN BOTH FEET: ICD-10-CM

## 2023-05-19 DIAGNOSIS — I10 PRIMARY HYPERTENSION: ICD-10-CM

## 2023-05-19 DIAGNOSIS — I25.10 CORONARY ARTERY DISEASE INVOLVING NATIVE CORONARY ARTERY OF NATIVE HEART WITHOUT ANGINA PECTORIS: ICD-10-CM

## 2023-05-19 DIAGNOSIS — E66.01 MORBID OBESITY (HCC): Primary | ICD-10-CM

## 2023-05-19 DIAGNOSIS — M79.672 PAIN IN BOTH FEET: ICD-10-CM

## 2023-05-19 PROCEDURE — 90471 IMMUNIZATION ADMIN: CPT | Performed by: FAMILY MEDICINE

## 2023-05-19 PROCEDURE — 1123F ACP DISCUSS/DSCN MKR DOCD: CPT | Performed by: FAMILY MEDICINE

## 2023-05-19 PROCEDURE — 99214 OFFICE O/P EST MOD 30 MIN: CPT | Performed by: FAMILY MEDICINE

## 2023-05-19 PROCEDURE — 90677 PCV20 VACCINE IM: CPT | Performed by: FAMILY MEDICINE

## 2023-05-19 PROCEDURE — 3075F SYST BP GE 130 - 139MM HG: CPT | Performed by: FAMILY MEDICINE

## 2023-05-19 PROCEDURE — 3079F DIAST BP 80-89 MM HG: CPT | Performed by: FAMILY MEDICINE

## 2023-05-19 SDOH — ECONOMIC STABILITY: INCOME INSECURITY: HOW HARD IS IT FOR YOU TO PAY FOR THE VERY BASICS LIKE FOOD, HOUSING, MEDICAL CARE, AND HEATING?: NOT HARD AT ALL

## 2023-05-19 SDOH — ECONOMIC STABILITY: FOOD INSECURITY: WITHIN THE PAST 12 MONTHS, YOU WORRIED THAT YOUR FOOD WOULD RUN OUT BEFORE YOU GOT MONEY TO BUY MORE.: NEVER TRUE

## 2023-05-19 SDOH — ECONOMIC STABILITY: FOOD INSECURITY: WITHIN THE PAST 12 MONTHS, THE FOOD YOU BOUGHT JUST DIDN'T LAST AND YOU DIDN'T HAVE MONEY TO GET MORE.: NEVER TRUE

## 2023-05-19 SDOH — ECONOMIC STABILITY: HOUSING INSECURITY
IN THE LAST 12 MONTHS, WAS THERE A TIME WHEN YOU DID NOT HAVE A STEADY PLACE TO SLEEP OR SLEPT IN A SHELTER (INCLUDING NOW)?: NO

## 2023-05-19 NOTE — PROGRESS NOTES
Seun Cano is a 74 y.o. female. HPI:here for 3 mo oars visit  Down to 5-10 cigs per day  C/o foot \pain  Breathing good lately  Not able to exercise much due to chronic low back pain  Has been told after evaluation that there is no surgery to help with her back at all degenerative disc disease and degenerative joint disease  Meds, vitamins and allergies reviewed with pt    ROS: No TIA's or unusual headaches, no dysphagia. No prolonged cough. No dyspnea or chest pain on exertion. No abdominal pain, change in bowel habits, black or bloody stools. No urinary tract symptoms. No new or unusual musculoskeletal symptoms. Prior to Visit Medications    Medication Sig Taking? Authorizing Provider   ADVAIR DISKUS 250-50 MCG/ACT AEPB diskus inhaler INHALE 1 PUFF INTO THE LUNGS IN THE MORNING AND IN THE EVENING Yes Elizabeth Watts MD   HYDROcodone-acetaminophen (NORCO) 7.5-325 MG per tablet Take 1 tablet by mouth every 6 hours as needed for Pain for up to 30 days.  Yes Elizabeth Watts MD   ondansetron (ZOFRAN-ODT) 4 MG disintegrating tablet Take 1 tablet by mouth 3 times daily as needed for Nausea or Vomiting Yes Elizabeth Watts MD   albuterol sulfate HFA (PROVENTIL;VENTOLIN;PROAIR) 108 (90 Base) MCG/ACT inhaler INHALE 2 PUFFS BY MOUTH EVERY 6 HOURS AS NEEDED FOR WHEEZE Yes Elizabeth Watts MD   buPROPion (WELLBUTRIN XL) 150 MG extended release tablet TAKE 1 TABLET BY MOUTH EVERY DAY Yes Elizabeth Watts MD   carvedilol (COREG) 12.5 MG tablet TAKE 1 TABLET BY MOUTH TWICE A DAY Yes Elizabeth Watts MD   carbidopa-levodopa (SINEMET)  MG per tablet TAKE 2 TABLETS BY MOUTH EVERY DAY IN THE EVENING Yes Elizabeth Watts MD   clopidogrel (PLAVIX) 75 MG tablet TAKE 1 TABLET BY MOUTH EVERY DAY Yes Matthew Powell MD   rosuvastatin (CRESTOR) 40 MG tablet TAKE 1 TABLET BY MOUTH EVERY DAY AT NIGHT Yes William Bennett MD   losartan (COZAAR) 50 MG tablet TAKE 1 TABLET BY MOUTH EVERY DAY Yes Elizabeth Watts

## 2023-05-25 DIAGNOSIS — M15.9 PRIMARY OSTEOARTHRITIS INVOLVING MULTIPLE JOINTS: ICD-10-CM

## 2023-05-25 DIAGNOSIS — Z76.0 ENCOUNTER FOR MEDICATION REFILL: ICD-10-CM

## 2023-05-25 RX ORDER — HYDROCODONE BITARTRATE AND ACETAMINOPHEN 7.5; 325 MG/1; MG/1
1 TABLET ORAL EVERY 6 HOURS PRN
Qty: 120 TABLET | Refills: 0 | Status: SHIPPED | OUTPATIENT
Start: 2023-05-25 | End: 2023-06-24

## 2023-05-25 NOTE — TELEPHONE ENCOUNTER
Lov 5/19/23  Lrf 120 0 4/27/23Medication:   Requested Prescriptions     Pending Prescriptions Disp Refills    HYDROcodone-acetaminophen (NORCO) 7.5-325 MG per tablet 120 tablet 0     Sig: Take 1 tablet by mouth every 6 hours as needed for Pain for up to 30 days. Last Filled:      Patient Phone Number: [phone] (home)     Last appt: 5/19/2023   Next appt: Visit date not found    Last OARRS:   RX Monitoring 1/31/2023   Attestation -   Acute Pain Prescriptions -   Periodic Controlled Substance Monitoring No signs of potential drug abuse or diversion identified.    Chronic Pain > 80 MEDD -

## 2023-05-25 NOTE — TELEPHONE ENCOUNTER
Medication and Quantity requested:  HYDROcodone-acetaminophen (NORCO) 7.5-325 MG per tablet - qty 120      Last Visit  05/19/23 - Dr Victor Manuel Umanzor and phone number updated in [facility]:  yes    [facility]

## 2023-05-26 ENCOUNTER — TELEPHONE (OUTPATIENT)
Dept: FAMILY MEDICINE CLINIC | Age: 68
End: 2023-05-26

## 2023-05-26 DIAGNOSIS — Z76.0 ENCOUNTER FOR MEDICATION REFILL: ICD-10-CM

## 2023-05-26 DIAGNOSIS — M15.9 PRIMARY OSTEOARTHRITIS INVOLVING MULTIPLE JOINTS: ICD-10-CM

## 2023-05-26 RX ORDER — HYDROCODONE BITARTRATE AND ACETAMINOPHEN 7.5; 325 MG/1; MG/1
1 TABLET ORAL EVERY 6 HOURS PRN
Qty: 120 TABLET | Refills: 0 | OUTPATIENT
Start: 2023-05-26 | End: 2023-06-25

## 2023-05-26 RX ORDER — CARVEDILOL 12.5 MG/1
TABLET ORAL
Qty: 180 TABLET | Refills: 3 | Status: SHIPPED | OUTPATIENT
Start: 2023-05-26

## 2023-05-30 ENCOUNTER — TELEPHONE (OUTPATIENT)
Dept: FAMILY MEDICINE CLINIC | Age: 68
End: 2023-05-30

## 2023-05-30 DIAGNOSIS — M15.9 PRIMARY OSTEOARTHRITIS INVOLVING MULTIPLE JOINTS: ICD-10-CM

## 2023-05-30 DIAGNOSIS — Z76.0 ENCOUNTER FOR MEDICATION REFILL: ICD-10-CM

## 2023-05-30 RX ORDER — HYDROCODONE BITARTRATE AND ACETAMINOPHEN 7.5; 325 MG/1; MG/1
1 TABLET ORAL EVERY 6 HOURS PRN
Qty: 120 TABLET | Refills: 0 | Status: SHIPPED | OUTPATIENT
Start: 2023-05-30 | End: 2023-06-29

## 2023-05-30 RX ORDER — HYDROCODONE BITARTRATE AND ACETAMINOPHEN 7.5; 325 MG/1; MG/1
1 TABLET ORAL EVERY 6 HOURS PRN
Qty: 120 TABLET | Refills: 0 | Status: CANCELLED | OUTPATIENT
Start: 2023-05-30 | End: 2023-06-29

## 2023-05-30 NOTE — TELEPHONE ENCOUNTER
Medication and Quantity requested:    HYDROcodone-acetaminophen ([place]) 7.5-325 MG per tablet [[id number]]    Patient called last week and [facility] OUT of [facility] and [facility] needs to [facility] sent [facility]      Please resend she is out of this medication

## 2023-06-15 NOTE — TELEPHONE ENCOUNTER
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 Patient is not taking Brilinta. She is only taking Clopidogrel. Patient states that she has contacted [employer] and asked them to stop sending her Brilinta. Wound Care: Petrolatum

## 2023-06-23 DIAGNOSIS — M15.9 PRIMARY OSTEOARTHRITIS INVOLVING MULTIPLE JOINTS: ICD-10-CM

## 2023-06-23 DIAGNOSIS — Z76.0 ENCOUNTER FOR MEDICATION REFILL: ICD-10-CM

## 2023-06-23 RX ORDER — HYDROCODONE BITARTRATE AND ACETAMINOPHEN 7.5; 325 MG/1; MG/1
1 TABLET ORAL EVERY 6 HOURS PRN
Qty: 120 TABLET | Refills: 0 | Status: SHIPPED | OUTPATIENT
Start: 2023-06-23 | End: 2023-07-23

## 2023-06-23 NOTE — TELEPHONE ENCOUNTER
Medication:   Requested Prescriptions     Pending Prescriptions Disp Refills    HYDROcodone-acetaminophen (NORCO) 7.5-325 MG per tablet 120 tablet 0     Sig: Take 1 tablet by mouth every 6 hours as needed for Pain for up to 30 days. Last Filled:  5/30/2023    Patient Phone Number: [phone] (home)     Last appt: 5/19/2023   Next appt: Visit date not found    Last OARRS:   RX Monitoring 5/25/2023   Attestation -   Acute Pain Prescriptions -   Periodic Controlled Substance Monitoring No signs of potential drug abuse or diversion identified.    Chronic Pain > 80 MEDD -

## 2023-06-23 NOTE — TELEPHONE ENCOUNTER
Medication and Quantity requested: HYDROcodone-acetaminophen (NORCO) 7.5-325 MG per tablet       Last Visit  5/19/2023    Pharmacy and phone number updated in [facility]:  yes

## 2023-07-12 RX ORDER — ROSUVASTATIN CALCIUM 40 MG/1
TABLET, COATED ORAL
Qty: 30 TABLET | Refills: 0 | Status: SHIPPED | OUTPATIENT
Start: 2023-07-12

## 2023-07-24 RX ORDER — ROSUVASTATIN CALCIUM 40 MG/1
TABLET, COATED ORAL
Qty: 30 TABLET | Refills: 0 | Status: SHIPPED | OUTPATIENT
Start: 2023-07-24

## 2023-07-27 DIAGNOSIS — M15.9 PRIMARY OSTEOARTHRITIS INVOLVING MULTIPLE JOINTS: ICD-10-CM

## 2023-07-27 DIAGNOSIS — Z76.0 ENCOUNTER FOR MEDICATION REFILL: ICD-10-CM

## 2023-07-27 RX ORDER — HYDROCODONE BITARTRATE AND ACETAMINOPHEN 7.5; 325 MG/1; MG/1
1 TABLET ORAL EVERY 6 HOURS PRN
Qty: 120 TABLET | Refills: 0 | Status: SHIPPED | OUTPATIENT
Start: 2023-07-27 | End: 2023-08-26

## 2023-07-27 NOTE — TELEPHONE ENCOUNTER
Lov 5/19/23  Lrf 120 0 6/23/232Medication:   Requested Prescriptions     Pending Prescriptions Disp Refills    HYDROcodone-acetaminophen (NORCO) 7.5-325 MG per tablet 120 tablet 0     Sig: Take 1 tablet by mouth every 6 hours as needed for Pain for up to 30 days. Last Filled:      Patient Phone Number: [phone] (home)     Last appt: 5/19/2023   Next appt: Visit date not found    Last OARRS:   RX Monitoring 5/25/2023   Attestation -   Acute Pain Prescriptions -   Periodic Controlled Substance Monitoring No signs of potential drug abuse or diversion identified.    Chronic Pain > 80 MEDD -

## 2023-07-27 NOTE — TELEPHONE ENCOUNTER
Medication and Quantity requested: HYDROcodone-acetaminophen (NORCO) 7.5-325 MG per tablet     Last Visit  5/19/23    Pharmacy and phone number updated in EPIC:  yes

## 2023-08-11 ENCOUNTER — TELEPHONE (OUTPATIENT)
Dept: FAMILY MEDICINE CLINIC | Age: 68
End: 2023-08-11

## 2023-08-11 ENCOUNTER — OFFICE VISIT (OUTPATIENT)
Dept: FAMILY MEDICINE CLINIC | Age: 68
End: 2023-08-11
Payer: MEDICAID

## 2023-08-11 VITALS
WEIGHT: 247 LBS | OXYGEN SATURATION: 95 % | SYSTOLIC BLOOD PRESSURE: 137 MMHG | BODY MASS INDEX: 38.69 KG/M2 | HEART RATE: 65 BPM | DIASTOLIC BLOOD PRESSURE: 71 MMHG

## 2023-08-11 DIAGNOSIS — E78.2 MIXED HYPERLIPIDEMIA: ICD-10-CM

## 2023-08-11 DIAGNOSIS — J44.1 COPD EXACERBATION (HCC): ICD-10-CM

## 2023-08-11 DIAGNOSIS — E66.01 MORBID OBESITY (HCC): Primary | ICD-10-CM

## 2023-08-11 DIAGNOSIS — Z96.642 HISTORY OF TOTAL LEFT HIP ARTHROPLASTY: ICD-10-CM

## 2023-08-11 DIAGNOSIS — I25.10 CORONARY ARTERY DISEASE INVOLVING NATIVE CORONARY ARTERY OF NATIVE HEART WITHOUT ANGINA PECTORIS: ICD-10-CM

## 2023-08-11 DIAGNOSIS — M17.11 ARTHRITIS OF KNEE, RIGHT: ICD-10-CM

## 2023-08-11 DIAGNOSIS — G89.29 CHRONIC MIDLINE LOW BACK PAIN WITHOUT SCIATICA: ICD-10-CM

## 2023-08-11 DIAGNOSIS — I10 PRIMARY HYPERTENSION: ICD-10-CM

## 2023-08-11 DIAGNOSIS — M54.50 CHRONIC MIDLINE LOW BACK PAIN WITHOUT SCIATICA: ICD-10-CM

## 2023-08-11 PROCEDURE — 1123F ACP DISCUSS/DSCN MKR DOCD: CPT | Performed by: FAMILY MEDICINE

## 2023-08-11 PROCEDURE — 3075F SYST BP GE 130 - 139MM HG: CPT | Performed by: FAMILY MEDICINE

## 2023-08-11 PROCEDURE — 99214 OFFICE O/P EST MOD 30 MIN: CPT | Performed by: FAMILY MEDICINE

## 2023-08-11 PROCEDURE — 3078F DIAST BP <80 MM HG: CPT | Performed by: FAMILY MEDICINE

## 2023-08-11 NOTE — TELEPHONE ENCOUNTER
Marcie Pedersen from [employer] called and stated he missed a call from our office     He was unsure but thought maybe it was in regards to the forms he has faxed from them to our office      If we need to recall him to ask him a question, his phone number is [phone]      If we were just calling for the fax it is [phone]      Please advise

## 2023-08-11 NOTE — PROGRESS NOTES
Cesar Arreguin is a 74 y.o. female. HPI:here for complex medical visit  Needs forms completed to get an electric scooter  Can walk 200 ft with walker, but then has to stop due  to low back pain, sob  Scooter would improve mobility and quality of life  Has been to PT,  scooter was recommended  Down to 5 cigs per day  Meds, vitamins and allergies reviewed with pt    ROS: No TIA's or unusual headaches, no dysphagia. No prolonged cough. No dyspnea or chest pain on exertion. No abdominal pain, change in bowel habits, black or bloody stools. No urinary tract symptoms. No new or unusual musculoskeletal symptoms. Prior to Visit Medications    Medication Sig Taking? Authorizing Provider   HYDROcodone-acetaminophen (NORCO) 7.5-325 MG per tablet Take 1 tablet by mouth every 6 hours as needed for Pain for up to 30 days.  Yes Lupis Lima MD   rosuvastatin (CRESTOR) 40 MG tablet TAKE 1 TABLET BY MOUTH EVERY DAY AT NIGHT Yes Walker Alexandra MD   carvedilol (COREG) 12.5 MG tablet TAKE 1 TABLET BY MOUTH TWICE A DAY Yes Lupis Lima MD   ADVAIR DISKUS 250-50 MCG/ACT AEPB diskus inhaler INHALE 1 PUFF INTO THE LUNGS IN THE MORNING AND IN THE EVENING Yes Lupis Lima MD   ondansetron (ZOFRAN-ODT) 4 MG disintegrating tablet Take 1 tablet by mouth 3 times daily as needed for Nausea or Vomiting Yes Lupis Lima MD   albuterol sulfate HFA (PROVENTIL;VENTOLIN;PROAIR) 108 (90 Base) MCG/ACT inhaler INHALE 2 PUFFS BY MOUTH EVERY 6 HOURS AS NEEDED FOR WHEEZE Yes Lupis Lima MD   buPROPion (WELLBUTRIN XL) 150 MG extended release tablet TAKE 1 TABLET BY MOUTH EVERY DAY Yes Lupis Lima MD   carbidopa-levodopa (SINEMET)  MG per tablet TAKE 2 TABLETS BY MOUTH EVERY DAY IN THE EVENING Yes Lupis Lima MD   clopidogrel (PLAVIX) 75 MG tablet TAKE 1 TABLET BY MOUTH EVERY DAY Yes Walker Alexandra MD   losartan (COZAAR) 50 MG tablet TAKE 1 TABLET BY MOUTH EVERY DAY Yes Lupis Lima MD   FLUoxetine

## 2023-08-23 RX ORDER — ROSUVASTATIN CALCIUM 40 MG/1
TABLET, COATED ORAL
Qty: 30 TABLET | Refills: 0 | Status: SHIPPED | OUTPATIENT
Start: 2023-08-23

## 2023-08-23 NOTE — TELEPHONE ENCOUNTER
Requested Prescriptions     Pending Prescriptions Disp Refills    rosuvastatin (CRESTOR) 40 MG tablet [Pharmacy Med Name: ROSUVASTATIN CALCIUM 40 MG TAB] 30 tablet 0     Sig: TAKE 1 TABLET BY MOUTH EVERY DAY AT NIGHT          Number: 90    Refills: 3    Last Office Visit: 8/8/2022     Next Office Visit: 8/25/2023     Last Labs: 1/12/2023

## 2023-08-25 ENCOUNTER — OFFICE VISIT (OUTPATIENT)
Dept: CARDIOLOGY CLINIC | Age: 68
End: 2023-08-25
Payer: MEDICAID

## 2023-08-25 VITALS
SYSTOLIC BLOOD PRESSURE: 118 MMHG | OXYGEN SATURATION: 95 % | DIASTOLIC BLOOD PRESSURE: 70 MMHG | HEIGHT: 67 IN | BODY MASS INDEX: 40.93 KG/M2 | WEIGHT: 260.8 LBS | HEART RATE: 67 BPM

## 2023-08-25 DIAGNOSIS — Z76.0 ENCOUNTER FOR MEDICATION REFILL: ICD-10-CM

## 2023-08-25 DIAGNOSIS — D50.9 IRON DEFICIENCY ANEMIA, UNSPECIFIED IRON DEFICIENCY ANEMIA TYPE: ICD-10-CM

## 2023-08-25 DIAGNOSIS — E78.5 HYPERLIPIDEMIA LDL GOAL <70: ICD-10-CM

## 2023-08-25 DIAGNOSIS — M15.9 PRIMARY OSTEOARTHRITIS INVOLVING MULTIPLE JOINTS: ICD-10-CM

## 2023-08-25 DIAGNOSIS — I25.10 CAD IN NATIVE ARTERY: Primary | ICD-10-CM

## 2023-08-25 PROCEDURE — 99214 OFFICE O/P EST MOD 30 MIN: CPT | Performed by: INTERNAL MEDICINE

## 2023-08-25 PROCEDURE — 1123F ACP DISCUSS/DSCN MKR DOCD: CPT | Performed by: INTERNAL MEDICINE

## 2023-08-25 PROCEDURE — 3078F DIAST BP <80 MM HG: CPT | Performed by: INTERNAL MEDICINE

## 2023-08-25 PROCEDURE — 3074F SYST BP LT 130 MM HG: CPT | Performed by: INTERNAL MEDICINE

## 2023-08-25 PROCEDURE — 93000 ELECTROCARDIOGRAM COMPLETE: CPT | Performed by: INTERNAL MEDICINE

## 2023-08-25 RX ORDER — HYDROCODONE BITARTRATE AND ACETAMINOPHEN 7.5; 325 MG/1; MG/1
1 TABLET ORAL EVERY 6 HOURS PRN
Qty: 120 TABLET | Refills: 0 | Status: SHIPPED | OUTPATIENT
Start: 2023-08-25 | End: 2023-09-24

## 2023-08-29 RX ORDER — CLOPIDOGREL BISULFATE 75 MG/1
TABLET ORAL
Qty: 90 TABLET | Refills: 3 | Status: SHIPPED | OUTPATIENT
Start: 2023-08-29

## 2023-09-12 RX ORDER — ROSUVASTATIN CALCIUM 40 MG/1
TABLET, COATED ORAL
Qty: 90 TABLET | Refills: 3 | Status: SHIPPED | OUTPATIENT
Start: 2023-09-12

## 2023-09-12 NOTE — TELEPHONE ENCOUNTER
Last OV: 8/25/23  Next OV: X due yearly  Last refill: 8/23/23  #30  Most recent Labs: 1/12/23  Last EKG (if needed): 8/25/23

## 2023-09-15 ENCOUNTER — TELEPHONE (OUTPATIENT)
Dept: FAMILY MEDICINE CLINIC | Age: 68
End: 2023-09-15

## 2023-09-15 NOTE — TELEPHONE ENCOUNTER
Pt is  having a fever, body aches & pains, and coughing up dark white phelgm for 3 days. Pt took a covid test 2 days ago and it was neg. Pt said she was around neighbors who do have covid 3 days ago, so she doesn't know if she needs to retest for covid.     Pt will like a callback if medication can be called in to help

## 2023-09-21 ENCOUNTER — TELEPHONE (OUTPATIENT)
Dept: FAMILY MEDICINE CLINIC | Age: 68
End: 2023-09-21

## 2023-09-21 DIAGNOSIS — R06.02 SOB (SHORTNESS OF BREATH): ICD-10-CM

## 2023-09-21 RX ORDER — ALBUTEROL SULFATE 90 UG/1
AEROSOL, METERED RESPIRATORY (INHALATION)
Qty: 8.5 EACH | Refills: 2 | Status: SHIPPED | OUTPATIENT
Start: 2023-09-21

## 2023-09-21 NOTE — TELEPHONE ENCOUNTER
Medication and Quantity requested:  albuterol sulfate HFA (PROVENTIL;VENTOLIN;PROAIR) 108 (90 Base) MCG/ACT inhaler      Last Visit  08/11/23 - Dr Susie Lane and phone number updated in [facility]:  yes    Elizabeth

## 2023-09-21 NOTE — TELEPHONE ENCOUNTER
Since she is on Plavix and Crestor I have sent molnupiravir to her pharmacy rather than Paxlovid which has interactions with both of the aforementioned medications

## 2023-09-21 NOTE — TELEPHONE ENCOUNTER
Pt retested for covid yesterday and was positive. Pt has body aches, SOB and a fever. Pt is asking if something could be called into [facility]. Advise.

## 2023-09-22 DIAGNOSIS — M15.9 PRIMARY OSTEOARTHRITIS INVOLVING MULTIPLE JOINTS: ICD-10-CM

## 2023-09-22 DIAGNOSIS — Z76.0 ENCOUNTER FOR MEDICATION REFILL: ICD-10-CM

## 2023-09-22 RX ORDER — HYDROCODONE BITARTRATE AND ACETAMINOPHEN 7.5; 325 MG/1; MG/1
1 TABLET ORAL EVERY 6 HOURS PRN
Qty: 120 TABLET | Refills: 0 | Status: SHIPPED | OUTPATIENT
Start: 2023-09-22 | End: 2023-10-22

## 2023-09-22 NOTE — TELEPHONE ENCOUNTER
Medication and Quantity requested: HYDROcodone-acetaminophen (NORCO) 7.5-325 MG per tablet        Last Visit  8/11/2023    Pharmacy and phone number updated in [facility]:  yes

## 2023-09-22 NOTE — TELEPHONE ENCOUNTER
Lov 8/11/23  Led 120 0 8/25/223 Medication:   Requested Prescriptions     Pending Prescriptions Disp Refills    HYDROcodone-acetaminophen (NORCO) 7.5-325 MG per tablet 120 tablet 0     Sig: Take 1 tablet by mouth every 6 hours as needed for Pain for up to 30 days. Last Filled:      Patient Phone Number: [phone] (home)     Last appt: 8/11/2023   Next appt: Visit date not found    Last OARRS:       5/25/2023     4:55 PM   RX Monitoring   Periodic Controlled Substance Monitoring No signs of potential drug abuse or diversion identified.

## 2023-09-28 ENCOUNTER — TELEPHONE (OUTPATIENT)
Dept: FAMILY MEDICINE CLINIC | Age: 68
End: 2023-09-28

## 2023-09-28 NOTE — TELEPHONE ENCOUNTER
Patient called and was positive for covid last Monday     She said she finished the medication    She re tested and it was neg. But she is still feeling bad    Extreme fatigue  Coughing that is still bad   Feeling really wore out   Fever 100  Sweating     She said she just doesn't feel right.

## 2023-10-06 NOTE — TELEPHONE ENCOUNTER
Medication and Quantity requested: carbidopa-levodopa (SINEMET)  MG per        Last Visit  8/11/2023  Pharmacy and phone number updated in EPIC:  yes

## 2023-10-13 ENCOUNTER — TELEPHONE (OUTPATIENT)
Dept: FAMILY MEDICINE CLINIC | Age: 68
End: 2023-10-13

## 2023-10-13 NOTE — TELEPHONE ENCOUNTER
Medication and Quantity requested:       HYDROCHLORIDE 40 MG TABS     DIDN'T SEE ON HER CHART      Last Visit  08/11/2023    Pharmacy and phone number updated in [facility]:  [facility]

## 2023-10-16 ENCOUNTER — TELEPHONE (OUTPATIENT)
Dept: FAMILY MEDICINE CLINIC | Age: 68
End: 2023-10-16

## 2023-10-16 ENCOUNTER — OFFICE VISIT (OUTPATIENT)
Dept: FAMILY MEDICINE CLINIC | Age: 68
End: 2023-10-16
Payer: MEDICAID

## 2023-10-16 VITALS
HEART RATE: 71 BPM | WEIGHT: 256 LBS | BODY MASS INDEX: 40.18 KG/M2 | SYSTOLIC BLOOD PRESSURE: 116 MMHG | OXYGEN SATURATION: 98 % | HEIGHT: 67 IN | DIASTOLIC BLOOD PRESSURE: 76 MMHG

## 2023-10-16 DIAGNOSIS — J40 BRONCHITIS: Primary | ICD-10-CM

## 2023-10-16 PROCEDURE — 3074F SYST BP LT 130 MM HG: CPT | Performed by: FAMILY MEDICINE

## 2023-10-16 PROCEDURE — 99213 OFFICE O/P EST LOW 20 MIN: CPT | Performed by: FAMILY MEDICINE

## 2023-10-16 PROCEDURE — 1123F ACP DISCUSS/DSCN MKR DOCD: CPT | Performed by: FAMILY MEDICINE

## 2023-10-16 PROCEDURE — 3078F DIAST BP <80 MM HG: CPT | Performed by: FAMILY MEDICINE

## 2023-10-16 RX ORDER — BENZONATATE 200 MG/1
200 CAPSULE ORAL 3 TIMES DAILY PRN
Qty: 30 CAPSULE | Refills: 0 | Status: SHIPPED | OUTPATIENT
Start: 2023-10-16 | End: 2023-10-23

## 2023-10-16 RX ORDER — DOXYCYCLINE HYCLATE 100 MG
100 TABLET ORAL 2 TIMES DAILY
Qty: 20 TABLET | Refills: 0 | Status: SHIPPED | OUTPATIENT
Start: 2023-10-16 | End: 2023-10-26

## 2023-10-16 NOTE — TELEPHONE ENCOUNTER
E[facility] transfer to Nurse triage    Patient is calling with complaint of Cough and Sinus  , body aches , denies fever recently had positive COVID     This has been going on  2 weeks   Has patient tried any over the counter medications?  No      Patient is not scheduled \    should the patient be tested for flu or COVID or scheduled in office please advise

## 2023-10-20 DIAGNOSIS — M15.9 PRIMARY OSTEOARTHRITIS INVOLVING MULTIPLE JOINTS: ICD-10-CM

## 2023-10-20 DIAGNOSIS — Z76.0 ENCOUNTER FOR MEDICATION REFILL: ICD-10-CM

## 2023-10-20 RX ORDER — HYDROCODONE BITARTRATE AND ACETAMINOPHEN 7.5; 325 MG/1; MG/1
1 TABLET ORAL EVERY 6 HOURS PRN
Qty: 120 TABLET | Refills: 0 | Status: SHIPPED | OUTPATIENT
Start: 2023-10-20 | End: 2023-11-19

## 2023-10-20 NOTE — TELEPHONE ENCOUNTER
Medication:   Requested Prescriptions     Pending Prescriptions Disp Refills    HYDROcodone-acetaminophen (NORCO) 7.5-325 MG per tablet 120 tablet 0     Sig: Take 1 tablet by mouth every 6 hours as needed for Pain for up to 30 days. Last appt: 10/16/2023   Next appt: Visit date not found    Last OARRS:       9/22/2023    11:40 AM   RX Monitoring   Periodic Controlled Substance Monitoring No signs of potential drug abuse or diversion identified.

## 2023-10-20 NOTE — TELEPHONE ENCOUNTER
Medication and Quantity requested:      HYDROcodone-acetaminophen ([address]) 7.5-325 MG per tablet [[id number]]        Last Visit  10/16/2023      Pharmacy and phone number updated in [facility]:  yes        Elizabeth

## 2023-10-25 ENCOUNTER — APPOINTMENT (OUTPATIENT)
Dept: CT IMAGING | Age: 68
End: 2023-10-25
Payer: MEDICAID

## 2023-10-25 ENCOUNTER — APPOINTMENT (OUTPATIENT)
Dept: GENERAL RADIOLOGY | Age: 68
End: 2023-10-25
Payer: MEDICAID

## 2023-10-25 ENCOUNTER — HOSPITAL ENCOUNTER (EMERGENCY)
Age: 68
Discharge: HOME | End: 2023-10-26
Payer: MEDICAID

## 2023-10-25 DIAGNOSIS — K85.90 ACUTE ON CHRONIC PANCREATITIS (HCC): Primary | ICD-10-CM

## 2023-10-25 DIAGNOSIS — K86.1 ACUTE ON CHRONIC PANCREATITIS (HCC): Primary | ICD-10-CM

## 2023-10-25 LAB
ALBUMIN SERPL-MCNC: 4.2 G/DL (ref 3.4–5)
ALBUMIN/GLOB SERPL: 1.3 {RATIO} (ref 1.1–2.2)
ALP SERPL-CCNC: 149 U/L (ref 40–129)
ALT SERPL-CCNC: 26 U/L (ref 10–40)
ANION GAP SERPL CALCULATED.3IONS-SCNC: 10 MMOL/L (ref 3–16)
AST SERPL-CCNC: 27 U/L (ref 15–37)
BACTERIA URNS QL MICRO: NORMAL /HPF
BASOPHILS # BLD: 0.1 K/UL (ref 0–0.2)
BASOPHILS NFR BLD: 1.3 %
BILIRUB SERPL-MCNC: 0.3 MG/DL (ref 0–1)
BILIRUB UR QL STRIP.AUTO: NEGATIVE
BUN SERPL-MCNC: 30 MG/DL (ref 7–20)
CALCIUM SERPL-MCNC: 9.1 MG/DL (ref 8.3–10.6)
CHLORIDE SERPL-SCNC: 101 MMOL/L (ref 99–110)
CLARITY UR: CLEAR
CO2 SERPL-SCNC: 24 MMOL/L (ref 21–32)
COLOR UR: YELLOW
CREAT SERPL-MCNC: 1.6 MG/DL (ref 0.6–1.2)
DEPRECATED RDW RBC AUTO: 15.8 % (ref 12.4–15.4)
EOSINOPHIL # BLD: 0.2 K/UL (ref 0–0.6)
EOSINOPHIL NFR BLD: 2.1 %
EPI CELLS #/AREA URNS AUTO: 2 /HPF (ref 0–5)
GFR SERPLBLD CREATININE-BSD FMLA CKD-EPI: 35 ML/MIN/{1.73_M2}
GLUCOSE SERPL-MCNC: 108 MG/DL (ref 70–99)
GLUCOSE UR STRIP.AUTO-MCNC: NEGATIVE MG/DL
HCT VFR BLD AUTO: 36.2 % (ref 36–48)
HGB BLD-MCNC: 12 G/DL (ref 12–16)
HGB UR QL STRIP.AUTO: ABNORMAL
HYALINE CASTS #/AREA URNS AUTO: 0 /LPF (ref 0–8)
KETONES UR STRIP.AUTO-MCNC: NEGATIVE MG/DL
LEUKOCYTE ESTERASE UR QL STRIP.AUTO: NEGATIVE
LIPASE SERPL-CCNC: 151 U/L (ref 13–60)
LYMPHOCYTES # BLD: 1.6 K/UL (ref 1–5.1)
LYMPHOCYTES NFR BLD: 15.7 %
MCH RBC QN AUTO: 31.4 PG (ref 26–34)
MCHC RBC AUTO-ENTMCNC: 33.2 G/DL (ref 31–36)
MCV RBC AUTO: 94.5 FL (ref 80–100)
MONOCYTES # BLD: 0.5 K/UL (ref 0–1.3)
MONOCYTES NFR BLD: 4.6 %
NEUTROPHILS # BLD: 7.6 K/UL (ref 1.7–7.7)
NEUTROPHILS NFR BLD: 76.3 %
NITRITE UR QL STRIP.AUTO: NEGATIVE
PH UR STRIP.AUTO: 6.5 [PH] (ref 5–8)
PLATELET # BLD AUTO: 212 K/UL (ref 135–450)
PMV BLD AUTO: 7.1 FL (ref 5–10.5)
POTASSIUM SERPL-SCNC: 5.2 MMOL/L (ref 3.5–5.1)
PROT SERPL-MCNC: 7.5 G/DL (ref 6.4–8.2)
PROT UR STRIP.AUTO-MCNC: 100 MG/DL
RBC # BLD AUTO: 3.83 M/UL (ref 4–5.2)
RBC CLUMPS #/AREA URNS AUTO: 0 /HPF (ref 0–4)
SODIUM SERPL-SCNC: 135 MMOL/L (ref 136–145)
SP GR UR STRIP.AUTO: 1.01 (ref 1–1.03)
UA COMPLETE W REFLEX CULTURE PNL UR: ABNORMAL
UA DIPSTICK W REFLEX MICRO PNL UR: YES
URN SPEC COLLECT METH UR: ABNORMAL
UROBILINOGEN UR STRIP-ACNC: 0.2 E.U./DL
WBC # BLD AUTO: 10 K/UL (ref 4–11)
WBC #/AREA URNS AUTO: 1 /HPF (ref 0–5)

## 2023-10-25 PROCEDURE — 74177 CT ABD & PELVIS W/CONTRAST: CPT

## 2023-10-25 PROCEDURE — 71046 X-RAY EXAM CHEST 2 VIEWS: CPT

## 2023-10-25 PROCEDURE — 85025 COMPLETE CBC W/AUTO DIFF WBC: CPT

## 2023-10-25 PROCEDURE — 6360000004 HC RX CONTRAST MEDICATION: Performed by: PHYSICIAN ASSISTANT

## 2023-10-25 PROCEDURE — 96374 THER/PROPH/DIAG INJ IV PUSH: CPT

## 2023-10-25 PROCEDURE — 6360000002 HC RX W HCPCS: Performed by: PHYSICIAN ASSISTANT

## 2023-10-25 PROCEDURE — 80053 COMPREHEN METABOLIC PANEL: CPT

## 2023-10-25 PROCEDURE — 83690 ASSAY OF LIPASE: CPT

## 2023-10-25 PROCEDURE — 99285 EMERGENCY DEPT VISIT HI MDM: CPT

## 2023-10-25 PROCEDURE — 81001 URINALYSIS AUTO W/SCOPE: CPT

## 2023-10-25 PROCEDURE — 6370000000 HC RX 637 (ALT 250 FOR IP): Performed by: PHYSICIAN ASSISTANT

## 2023-10-25 RX ORDER — ONDANSETRON 2 MG/ML
4 INJECTION INTRAMUSCULAR; INTRAVENOUS ONCE
Status: COMPLETED | OUTPATIENT
Start: 2023-10-25 | End: 2023-10-25

## 2023-10-25 RX ORDER — OMEPRAZOLE 40 MG/1
40 CAPSULE, DELAYED RELEASE ORAL
Qty: 30 CAPSULE | Refills: 2 | Status: SHIPPED | OUTPATIENT
Start: 2023-10-25

## 2023-10-25 RX ORDER — ONDANSETRON 4 MG/1
4 TABLET, ORALLY DISINTEGRATING ORAL 3 TIMES DAILY PRN
Qty: 12 TABLET | Refills: 0 | Status: SHIPPED | OUTPATIENT
Start: 2023-10-25

## 2023-10-25 RX ADMIN — ONDANSETRON 4 MG: 2 INJECTION INTRAMUSCULAR; INTRAVENOUS at 20:57

## 2023-10-25 RX ADMIN — IOPAMIDOL 75 ML: 755 INJECTION, SOLUTION INTRAVENOUS at 21:05

## 2023-10-25 RX ADMIN — Medication: at 20:57

## 2023-10-25 ASSESSMENT — ENCOUNTER SYMPTOMS
ABDOMINAL PAIN: 1
SORE THROAT: 0
BACK PAIN: 0
SHORTNESS OF BREATH: 1
NAUSEA: 1
EYE PAIN: 0
VOMITING: 1
COUGH: 1

## 2023-10-25 ASSESSMENT — PAIN DESCRIPTION - PAIN TYPE: TYPE: ACUTE PAIN

## 2023-10-25 ASSESSMENT — PAIN - FUNCTIONAL ASSESSMENT: PAIN_FUNCTIONAL_ASSESSMENT: 0-10

## 2023-10-25 ASSESSMENT — PAIN DESCRIPTION - FREQUENCY: FREQUENCY: CONTINUOUS

## 2023-10-25 ASSESSMENT — PAIN DESCRIPTION - LOCATION: LOCATION: ABDOMEN

## 2023-10-25 ASSESSMENT — PAIN SCALES - GENERAL: PAINLEVEL_OUTOF10: 6

## 2023-10-25 NOTE — ED PROVIDER NOTES
**ADVANCED PRACTICE PROVIDER, I HAVE EVALUATED THIS PATIENT**        [address] ENCOUNTER      Pt Name: Evan Good  MDL:[id number]  [address] 1955  Date of evaluation: 10/25/2023  Provider: Irene Dc PA-C  Note Started: 7:57 PM EDT 10/25/23        Chief Complaint:    Chief Complaint   Patient presents with    Abdominal Pain     Edema x 1 day now with SOB         Nursing Notes, Past Medical Hx, Past Surgical Hx, Social Hx, Allergies, and Family Hx were all reviewed and agreed with or any disagreements were addressed in the HPI.    HPI: (Location, Duration, Timing, Severity, Quality, Assoc Sx, Context, Modifying factors)    History From: Patient  Limitations to history : None    Social Determinants Significantly Affecting Health : None    Chief Complaint of upper abdominal pain. Started having some mild pain is today. States she threw up before she came here. Denies chest pain, she does complain of cough. This a.m. when she coughed she coughed so hard she felt a little pop or pain on the right side lower rib. Denies nausea vomiting. Denies black tarry stools. Denies coughing up blood. Says she was seen and treated with doxycycline for bronchitis. And was given Chioma Cam which is not helping with her cough. She does use an inhaler. Denies fever, no sore throat, no generalized body aches. No other complaints. This is a  79 y.o. female who presents to the emergency room with the above complaint.     PastMedical/Surgical History:      Diagnosis Date    Arthritis     ASHD (arteriosclerotic heart disease)     Chronic kidney disease     Chronic midline low back pain without sciatica 4/3/2019    COPD (chronic obstructive pulmonary disease) (HCC)     unknown     Coronary artery disease involving native coronary artery of native heart without angina pectoris 3/31/2016    Coronary artery disease involving native coronary artery of

## 2023-10-26 VITALS
HEIGHT: 67 IN | RESPIRATION RATE: 18 BRPM | SYSTOLIC BLOOD PRESSURE: 168 MMHG | WEIGHT: 250.22 LBS | OXYGEN SATURATION: 94 % | BODY MASS INDEX: 39.27 KG/M2 | TEMPERATURE: 98.2 F | HEART RATE: 74 BPM | DIASTOLIC BLOOD PRESSURE: 91 MMHG

## 2023-10-26 PROBLEM — K85.00 IDIOPATHIC ACUTE PANCREATITIS: Status: ACTIVE | Noted: 2023-10-26

## 2023-10-26 ASSESSMENT — PAIN - FUNCTIONAL ASSESSMENT: PAIN_FUNCTIONAL_ASSESSMENT: NONE - DENIES PAIN

## 2023-10-26 NOTE — ED NOTES
Pt to CT at this time via [facility] by imaging staff. Pt to new rm 17 after.      Leah Conde RN  10/25/23 2033

## 2023-10-26 NOTE — DISCHARGE INSTRUCTIONS
Clear liquid diet for the next 72 hours then gradually increase to your regular diet. Follow-up with Dr. Joaquina Yao gastroenterologist  Continue your home medication as prescribed only. Take prescribed medication as prescribed only  Return emergency room for any worsening. This include pain, fever, lethargy, any persistent nausea vomiting and or diarrhea.

## 2023-11-13 RX ORDER — FLUOXETINE HYDROCHLORIDE 40 MG/1
CAPSULE ORAL
Qty: 180 CAPSULE | Refills: 3 | Status: SHIPPED | OUTPATIENT
Start: 2023-11-13

## 2023-11-13 RX ORDER — CLOPIDOGREL BISULFATE 75 MG/1
75 TABLET ORAL DAILY
Qty: 90 TABLET | Refills: 3 | Status: SHIPPED | OUTPATIENT
Start: 2023-11-13

## 2023-11-13 NOTE — TELEPHONE ENCOUNTER
Medication Refill    Medication needing refilled:  clopidogrel (PLAVIX)     Dosage of the medication:  75 MG tablet     How are you taking this medication (QD, BID, TID, QID, PRN):  TAKE 1 TABLET BY MOUTH EVERY DAY     30 or 90 day supply called in:  90 day supply    When will you run out of your medication:  Out currently    Which Pharmacy are we sending the medication to?:  [address] [phone] - f [phone]

## 2023-11-13 NOTE — TELEPHONE ENCOUNTER
Medication and Quantity requested: FLUoxetine (PROZAC) 40 MG capsule [[id number]      Last Visit  10/16/23    Pharmacy and phone number updated in EPIC:  yes

## 2023-11-17 DIAGNOSIS — Z76.0 ENCOUNTER FOR MEDICATION REFILL: ICD-10-CM

## 2023-11-17 DIAGNOSIS — M15.9 PRIMARY OSTEOARTHRITIS INVOLVING MULTIPLE JOINTS: ICD-10-CM

## 2023-11-17 RX ORDER — HYDROCODONE BITARTRATE AND ACETAMINOPHEN 7.5; 325 MG/1; MG/1
1 TABLET ORAL EVERY 6 HOURS PRN
Qty: 120 TABLET | Refills: 0 | Status: SHIPPED | OUTPATIENT
Start: 2023-11-17 | End: 2023-12-17

## 2023-11-17 NOTE — TELEPHONE ENCOUNTER
Lov 10/16/23  Lrf  120 0 10/20/23 Medication:   Requested Prescriptions     Pending Prescriptions Disp Refills    HYDROcodone-acetaminophen (NORCO) 7.5-325 MG per tablet 120 tablet 0     Sig: Take 1 tablet by mouth every 6 hours as needed for Pain for up to 30 days. Last Filled:      Patient Phone Number: [phone] (home)     Last appt: 10/16/2023   Next appt: Visit date not found    Last OARRS:       9/22/2023    11:40 AM   RX Monitoring   Periodic Controlled Substance Monitoring No signs of potential drug abuse or diversion identified.

## 2023-12-01 ENCOUNTER — TELEPHONE (OUTPATIENT)
Dept: FAMILY MEDICINE CLINIC | Age: 68
End: 2023-12-01

## 2023-12-01 DIAGNOSIS — R06.02 SOB (SHORTNESS OF BREATH): ICD-10-CM

## 2023-12-01 RX ORDER — ALBUTEROL SULFATE 90 UG/1
AEROSOL, METERED RESPIRATORY (INHALATION)
Qty: 8.5 EACH | Refills: 2 | Status: SHIPPED | OUTPATIENT
Start: 2023-12-01

## 2023-12-01 RX ORDER — BUPROPION HYDROCHLORIDE 150 MG/1
150 TABLET ORAL DAILY
Qty: 90 TABLET | Refills: 3 | Status: SHIPPED | OUTPATIENT
Start: 2023-12-01

## 2023-12-01 NOTE — TELEPHONE ENCOUNTER
Medication and Quantity requested: albuterol sulfate HFA (PROVENTIL;VENTOLIN;PROAIR) 108 (90 Base) MCG/ACT inhaler      buPROPion (WELLBUTRIN XL) 150 MG extended release tablet      Last Visit  10/16/2023      Pharmacy and phone number updated in EPIC:  yes

## 2023-12-14 DIAGNOSIS — Z76.0 ENCOUNTER FOR MEDICATION REFILL: ICD-10-CM

## 2023-12-14 DIAGNOSIS — M15.9 PRIMARY OSTEOARTHRITIS INVOLVING MULTIPLE JOINTS: ICD-10-CM

## 2023-12-14 RX ORDER — HYDROCODONE BITARTRATE AND ACETAMINOPHEN 7.5; 325 MG/1; MG/1
1 TABLET ORAL EVERY 6 HOURS PRN
Qty: 120 TABLET | Refills: 0 | Status: SHIPPED | OUTPATIENT
Start: 2023-12-14 | End: 2024-01-13

## 2023-12-14 NOTE — TELEPHONE ENCOUNTER
Patient's local [facility] does not have the Norco  Patient is asking for RX sent to another [facility] in [facility]  Patient is out of medication

## 2024-01-02 ENCOUNTER — OFFICE VISIT (OUTPATIENT)
Dept: FAMILY MEDICINE CLINIC | Age: 69
End: 2024-01-02
Payer: MEDICARE

## 2024-01-02 ENCOUNTER — TELEPHONE (OUTPATIENT)
Dept: FAMILY MEDICINE CLINIC | Age: 69
End: 2024-01-02

## 2024-01-02 VITALS
WEIGHT: 250 LBS | SYSTOLIC BLOOD PRESSURE: 138 MMHG | DIASTOLIC BLOOD PRESSURE: 76 MMHG | HEIGHT: 67 IN | BODY MASS INDEX: 39.24 KG/M2

## 2024-01-02 DIAGNOSIS — J18.9 COMMUNITY ACQUIRED PNEUMONIA OF RIGHT MIDDLE LOBE OF LUNG: Primary | ICD-10-CM

## 2024-01-02 DIAGNOSIS — J44.1 COPD EXACERBATION (HCC): ICD-10-CM

## 2024-01-02 PROCEDURE — G8427 DOCREV CUR MEDS BY ELIG CLIN: HCPCS | Performed by: STUDENT IN AN ORGANIZED HEALTH CARE EDUCATION/TRAINING PROGRAM

## 2024-01-02 PROCEDURE — 3075F SYST BP GE 130 - 139MM HG: CPT | Performed by: STUDENT IN AN ORGANIZED HEALTH CARE EDUCATION/TRAINING PROGRAM

## 2024-01-02 PROCEDURE — 3078F DIAST BP <80 MM HG: CPT | Performed by: STUDENT IN AN ORGANIZED HEALTH CARE EDUCATION/TRAINING PROGRAM

## 2024-01-02 PROCEDURE — 4004F PT TOBACCO SCREEN RCVD TLK: CPT | Performed by: STUDENT IN AN ORGANIZED HEALTH CARE EDUCATION/TRAINING PROGRAM

## 2024-01-02 PROCEDURE — 1090F PRES/ABSN URINE INCON ASSESS: CPT | Performed by: STUDENT IN AN ORGANIZED HEALTH CARE EDUCATION/TRAINING PROGRAM

## 2024-01-02 PROCEDURE — 3023F SPIROM DOC REV: CPT | Performed by: STUDENT IN AN ORGANIZED HEALTH CARE EDUCATION/TRAINING PROGRAM

## 2024-01-02 PROCEDURE — G8399 PT W/DXA RESULTS DOCUMENT: HCPCS | Performed by: STUDENT IN AN ORGANIZED HEALTH CARE EDUCATION/TRAINING PROGRAM

## 2024-01-02 PROCEDURE — G8484 FLU IMMUNIZE NO ADMIN: HCPCS | Performed by: STUDENT IN AN ORGANIZED HEALTH CARE EDUCATION/TRAINING PROGRAM

## 2024-01-02 PROCEDURE — 99213 OFFICE O/P EST LOW 20 MIN: CPT | Performed by: STUDENT IN AN ORGANIZED HEALTH CARE EDUCATION/TRAINING PROGRAM

## 2024-01-02 PROCEDURE — G8417 CALC BMI ABV UP PARAM F/U: HCPCS | Performed by: STUDENT IN AN ORGANIZED HEALTH CARE EDUCATION/TRAINING PROGRAM

## 2024-01-02 PROCEDURE — 1123F ACP DISCUSS/DSCN MKR DOCD: CPT | Performed by: STUDENT IN AN ORGANIZED HEALTH CARE EDUCATION/TRAINING PROGRAM

## 2024-01-02 PROCEDURE — 3017F COLORECTAL CA SCREEN DOC REV: CPT | Performed by: STUDENT IN AN ORGANIZED HEALTH CARE EDUCATION/TRAINING PROGRAM

## 2024-01-02 RX ORDER — DOXYCYCLINE HYCLATE 100 MG
100 TABLET ORAL 2 TIMES DAILY
Qty: 20 TABLET | Refills: 0 | Status: SHIPPED | OUTPATIENT
Start: 2024-01-02 | End: 2024-01-12

## 2024-01-02 RX ORDER — METHYLPREDNISOLONE 4 MG/1
TABLET ORAL
Qty: 1 KIT | Refills: 0 | Status: SHIPPED | OUTPATIENT
Start: 2024-01-02 | End: 2024-01-08

## 2024-01-02 NOTE — TELEPHONE ENCOUNTER
Patient called and has flu symptoms that started margy galdino    Congestion   Wheezing   Runny nose   Chest hurts   Shortness of breath     Had a fever before but non for the last 3 days     Pharm [facility] on [facility]

## 2024-01-03 ASSESSMENT — ENCOUNTER SYMPTOMS
TROUBLE SWALLOWING: 0
SHORTNESS OF BREATH: 1
COUGH: 1
DIARRHEA: 0
CONSTIPATION: 0
VOICE CHANGE: 0
SINUS PAIN: 0
SINUS PRESSURE: 0

## 2024-01-03 NOTE — PROGRESS NOTES
Twila Hall is a 68 y.o. year old female here for:    Chief Complaint:    Chief Complaint   Patient presents with   • Cough   • Shortness of Breath       Subjective:         HPI:       Patient with complaint of cough, shortness of breath, and \"feeling down\" over the past few days.  She is a smoker and has a history of COPD with intermittent exacerbation.  Denies any fever, chills, chest pain, rashes, diarrhea or vomiting.  No other systemic signs.  States that she wanted to be evaluated as it is becoming increasingly difficult for her to \"catch her breath \"whenever she walks around the house.  No other concerns.    Past Medical History:   Diagnosis Date   • Arthritis    • ASHD (arteriosclerotic heart disease)    • Chronic kidney disease    • Chronic midline low back pain without sciatica 4/3/2019   • COPD (chronic obstructive pulmonary disease) ([facility])     unknown    • Coronary artery disease involving native coronary artery of native heart without angina pectoris 3/31/2016   • Coronary artery disease involving native coronary artery of native heart without angina pectoris 3/31/2016   • Coronary artery disease involving native coronary artery of native heart without angina pectoris 3/31/2016   • Depression    • Full dentures    • HTN    • HTN (hypertension) 2012   • Hyperlipidemia    • Idiopathic acute pancreatitis 10/26/2023   • Iron deficiency anemia 3/31/2016   • Iron deficiency anemia secondary to blood loss (chronic) 2020   • Kidney stone    • Morbid obesity (HCC)    • Restless legs syndrome     pt on cabidopa/levodopa   • Stage 3 chronic kidney disease (HCC) 4/3/2019   • Wears glasses      Social History     Tobacco Use   • Smoking status: Every Day     Current packs/day: 0.00     Average packs/day: 0.5 packs/day for 2190.8 years (1095.4 ttl pk-yrs)     Types: Cigarettes     Last attempt to quit: 2016     Years since quittin.8   • Smokeless tobacco: Never   Vaping Use   • Vaping Use:

## 2024-01-08 NOTE — TELEPHONE ENCOUNTER
Lov 01/02/2024  Lrf  60,5 10/06/2023   Medication:   Requested Prescriptions     Pending Prescriptions Disp Refills    carbidopa-levodopa (SINEMET)  MG per tablet [Pharmacy Med Name: CARBIDOPA/LEVODOPA 10-100MG TABS] 60 tablet 5     Sig: TAKE 2 TABLETS BY MOUTH EVERY NIGHT        Last Filled:      Patient Phone Number: [phone] (home)     Last appt: 1/2/2024   Next appt: Visit date not found

## 2024-01-13 ENCOUNTER — APPOINTMENT (OUTPATIENT)
Dept: CT IMAGING | Age: 69
DRG: 439 | End: 2024-01-13
Payer: MEDICARE

## 2024-01-13 ENCOUNTER — HOSPITAL ENCOUNTER (INPATIENT)
Age: 69
LOS: 2 days | Discharge: HOME | DRG: 439 | End: 2024-01-15
Attending: EMERGENCY MEDICINE | Admitting: FAMILY MEDICINE
Payer: MEDICARE

## 2024-01-13 DIAGNOSIS — Z76.0 ENCOUNTER FOR MEDICATION REFILL: ICD-10-CM

## 2024-01-13 DIAGNOSIS — N39.0 URINARY TRACT INFECTION WITHOUT HEMATURIA, SITE UNSPECIFIED: ICD-10-CM

## 2024-01-13 DIAGNOSIS — K85.90 ACUTE PANCREATITIS WITHOUT INFECTION OR NECROSIS, UNSPECIFIED PANCREATITIS TYPE: Primary | ICD-10-CM

## 2024-01-13 DIAGNOSIS — M15.9 PRIMARY OSTEOARTHRITIS INVOLVING MULTIPLE JOINTS: ICD-10-CM

## 2024-01-13 PROBLEM — R79.89 ELEVATED TROPONIN: Status: ACTIVE | Noted: 2024-01-13

## 2024-01-13 PROBLEM — N18.32 STAGE 3B CHRONIC KIDNEY DISEASE (CKD) (HCC): Status: ACTIVE | Noted: 2019-04-03

## 2024-01-13 PROBLEM — I16.0 HYPERTENSIVE URGENCY: Status: ACTIVE | Noted: 2024-01-13

## 2024-01-13 LAB
ALBUMIN SERPL-MCNC: 3.9 G/DL (ref 3.4–5)
ALBUMIN/GLOB SERPL: 1.2 {RATIO} (ref 1.1–2.2)
ALP SERPL-CCNC: 133 U/L (ref 40–129)
ALT SERPL-CCNC: 11 U/L (ref 10–40)
ANION GAP SERPL CALCULATED.3IONS-SCNC: 11 MMOL/L (ref 3–16)
AST SERPL-CCNC: 11 U/L (ref 15–37)
BACTERIA URNS QL MICRO: ABNORMAL /HPF
BASOPHILS # BLD: 0 K/UL (ref 0–0.2)
BASOPHILS NFR BLD: 0.4 %
BILIRUB SERPL-MCNC: 0.3 MG/DL (ref 0–1)
BILIRUB UR QL STRIP.AUTO: NEGATIVE
BUN SERPL-MCNC: 30 MG/DL (ref 7–20)
CALCIUM SERPL-MCNC: 9.2 MG/DL (ref 8.3–10.6)
CHLORIDE SERPL-SCNC: 106 MMOL/L (ref 99–110)
CLARITY UR: CLEAR
CO2 SERPL-SCNC: 19 MMOL/L (ref 21–32)
COLOR UR: YELLOW
CREAT SERPL-MCNC: 1.5 MG/DL (ref 0.6–1.2)
DEPRECATED RDW RBC AUTO: 14.3 % (ref 12.4–15.4)
EOSINOPHIL # BLD: 0.2 K/UL (ref 0–0.6)
EOSINOPHIL NFR BLD: 1.4 %
EPI CELLS #/AREA URNS AUTO: 2 /HPF (ref 0–5)
FLUAV RNA UPPER RESP QL NAA+PROBE: NEGATIVE
FLUBV AG NPH QL: NEGATIVE
GFR SERPLBLD CREATININE-BSD FMLA CKD-EPI: 38 ML/MIN/{1.73_M2}
GLUCOSE SERPL-MCNC: 120 MG/DL (ref 70–99)
GLUCOSE UR STRIP.AUTO-MCNC: NEGATIVE MG/DL
HCT VFR BLD AUTO: 37.8 % (ref 36–48)
HGB BLD-MCNC: 12.6 G/DL (ref 12–16)
HGB UR QL STRIP.AUTO: ABNORMAL
HYALINE CASTS #/AREA URNS AUTO: 1 /LPF (ref 0–8)
KETONES UR STRIP.AUTO-MCNC: NEGATIVE MG/DL
LEUKOCYTE ESTERASE UR QL STRIP.AUTO: ABNORMAL
LIPASE SERPL-CCNC: 844 U/L (ref 13–60)
LYMPHOCYTES # BLD: 1.3 K/UL (ref 1–5.1)
LYMPHOCYTES NFR BLD: 12 %
MCH RBC QN AUTO: 31 PG (ref 26–34)
MCHC RBC AUTO-ENTMCNC: 33.5 G/DL (ref 31–36)
MCV RBC AUTO: 92.8 FL (ref 80–100)
MONOCYTES # BLD: 0.4 K/UL (ref 0–1.3)
MONOCYTES NFR BLD: 3.7 %
NEUTROPHILS # BLD: 8.7 K/UL (ref 1.7–7.7)
NEUTROPHILS NFR BLD: 82.5 %
NITRITE UR QL STRIP.AUTO: POSITIVE
PH UR STRIP.AUTO: 5.5 [PH] (ref 5–8)
PLATELET # BLD AUTO: 228 K/UL (ref 135–450)
PMV BLD AUTO: 7.1 FL (ref 5–10.5)
POTASSIUM SERPL-SCNC: 4.8 MMOL/L (ref 3.5–5.1)
PROT SERPL-MCNC: 7.1 G/DL (ref 6.4–8.2)
PROT UR STRIP.AUTO-MCNC: 100 MG/DL
RBC # BLD AUTO: 4.07 M/UL (ref 4–5.2)
RBC CLUMPS #/AREA URNS AUTO: 1 /HPF (ref 0–4)
SARS-COV-2 RDRP RESP QL NAA+PROBE: NOT DETECTED
SODIUM SERPL-SCNC: 136 MMOL/L (ref 136–145)
SP GR UR STRIP.AUTO: 1.03 (ref 1–1.03)
TRIGL SERPL-MCNC: 233 MG/DL (ref 0–150)
TROPONIN, HIGH SENSITIVITY: 22 NG/L (ref 0–14)
TROPONIN, HIGH SENSITIVITY: 24 NG/L (ref 0–14)
UA COMPLETE W REFLEX CULTURE PNL UR: YES
UA DIPSTICK W REFLEX MICRO PNL UR: YES
URN SPEC COLLECT METH UR: ABNORMAL
UROBILINOGEN UR STRIP-ACNC: 0.2 E.U./DL
WBC # BLD AUTO: 10.5 K/UL (ref 4–11)
WBC #/AREA URNS AUTO: 68 /HPF (ref 0–5)

## 2024-01-13 PROCEDURE — 96372 THER/PROPH/DIAG INJ SC/IM: CPT

## 2024-01-13 PROCEDURE — 74177 CT ABD & PELVIS W/CONTRAST: CPT

## 2024-01-13 PROCEDURE — 93005 ELECTROCARDIOGRAM TRACING: CPT | Performed by: NURSE PRACTITIONER

## 2024-01-13 PROCEDURE — 6360000002 HC RX W HCPCS: Performed by: NURSE PRACTITIONER

## 2024-01-13 PROCEDURE — 87635 SARS-COV-2 COVID-19 AMP PRB: CPT

## 2024-01-13 PROCEDURE — 96376 TX/PRO/DX INJ SAME DRUG ADON: CPT

## 2024-01-13 PROCEDURE — 87186 SC STD MICRODIL/AGAR DIL: CPT

## 2024-01-13 PROCEDURE — 6370000000 HC RX 637 (ALT 250 FOR IP): Performed by: FAMILY MEDICINE

## 2024-01-13 PROCEDURE — 1200000000 HC SEMI PRIVATE

## 2024-01-13 PROCEDURE — 81001 URINALYSIS AUTO W/SCOPE: CPT

## 2024-01-13 PROCEDURE — 96374 THER/PROPH/DIAG INJ IV PUSH: CPT

## 2024-01-13 PROCEDURE — 87804 INFLUENZA ASSAY W/OPTIC: CPT

## 2024-01-13 PROCEDURE — 99285 EMERGENCY DEPT VISIT HI MDM: CPT

## 2024-01-13 PROCEDURE — 83690 ASSAY OF LIPASE: CPT

## 2024-01-13 PROCEDURE — 87086 URINE CULTURE/COLONY COUNT: CPT

## 2024-01-13 PROCEDURE — 6360000002 HC RX W HCPCS: Performed by: EMERGENCY MEDICINE

## 2024-01-13 PROCEDURE — 84478 ASSAY OF TRIGLYCERIDES: CPT

## 2024-01-13 PROCEDURE — 85025 COMPLETE CBC W/AUTO DIFF WBC: CPT

## 2024-01-13 PROCEDURE — 96375 TX/PRO/DX INJ NEW DRUG ADDON: CPT

## 2024-01-13 PROCEDURE — 84484 ASSAY OF TROPONIN QUANT: CPT

## 2024-01-13 PROCEDURE — 36415 COLL VENOUS BLD VENIPUNCTURE: CPT

## 2024-01-13 PROCEDURE — 2580000003 HC RX 258: Performed by: NURSE PRACTITIONER

## 2024-01-13 PROCEDURE — 2580000003 HC RX 258: Performed by: FAMILY MEDICINE

## 2024-01-13 PROCEDURE — 80053 COMPREHEN METABOLIC PANEL: CPT

## 2024-01-13 PROCEDURE — 87077 CULTURE AEROBIC IDENTIFY: CPT

## 2024-01-13 PROCEDURE — 6360000004 HC RX CONTRAST MEDICATION: Performed by: NURSE PRACTITIONER

## 2024-01-13 PROCEDURE — 2580000003 HC RX 258: Performed by: EMERGENCY MEDICINE

## 2024-01-13 RX ORDER — MAGNESIUM HYDROXIDE/ALUMINUM HYDROXICE/SIMETHICONE 120; 1200; 1200 MG/30ML; MG/30ML; MG/30ML
30 SUSPENSION ORAL EVERY 6 HOURS PRN
Status: DISCONTINUED | OUTPATIENT
Start: 2024-01-13 | End: 2024-01-15 | Stop reason: HOSPADM

## 2024-01-13 RX ORDER — ROSUVASTATIN CALCIUM 40 MG/1
40 TABLET, COATED ORAL NIGHTLY
Status: DISCONTINUED | OUTPATIENT
Start: 2024-01-13 | End: 2024-01-15 | Stop reason: HOSPADM

## 2024-01-13 RX ORDER — ENOXAPARIN SODIUM 100 MG/ML
30 INJECTION SUBCUTANEOUS 2 TIMES DAILY
Status: DISCONTINUED | OUTPATIENT
Start: 2024-01-14 | End: 2024-01-15 | Stop reason: HOSPADM

## 2024-01-13 RX ORDER — ONDANSETRON 4 MG/1
4 TABLET, ORALLY DISINTEGRATING ORAL EVERY 8 HOURS PRN
Status: DISCONTINUED | OUTPATIENT
Start: 2024-01-13 | End: 2024-01-15 | Stop reason: HOSPADM

## 2024-01-13 RX ORDER — CARVEDILOL 12.5 MG/1
12.5 TABLET ORAL 2 TIMES DAILY WITH MEALS
Status: DISCONTINUED | OUTPATIENT
Start: 2024-01-13 | End: 2024-01-15 | Stop reason: HOSPADM

## 2024-01-13 RX ORDER — SODIUM CHLORIDE 9 MG/ML
INJECTION, SOLUTION INTRAVENOUS PRN
Status: DISCONTINUED | OUTPATIENT
Start: 2024-01-13 | End: 2024-01-15 | Stop reason: HOSPADM

## 2024-01-13 RX ORDER — HYDROMORPHONE HYDROCHLORIDE 2 MG/1
1 TABLET ORAL EVERY 4 HOURS PRN
Status: DISCONTINUED | OUTPATIENT
Start: 2024-01-13 | End: 2024-01-15 | Stop reason: HOSPADM

## 2024-01-13 RX ORDER — ACETAMINOPHEN 650 MG/1
650 SUPPOSITORY RECTAL EVERY 6 HOURS PRN
Status: DISCONTINUED | OUTPATIENT
Start: 2024-01-13 | End: 2024-01-15 | Stop reason: HOSPADM

## 2024-01-13 RX ORDER — SODIUM CHLORIDE 0.9 % (FLUSH) 0.9 %
5-40 SYRINGE (ML) INJECTION PRN
Status: DISCONTINUED | OUTPATIENT
Start: 2024-01-13 | End: 2024-01-15 | Stop reason: HOSPADM

## 2024-01-13 RX ORDER — ALBUTEROL SULFATE 90 UG/1
2 AEROSOL, METERED RESPIRATORY (INHALATION) EVERY 6 HOURS PRN
Status: DISCONTINUED | OUTPATIENT
Start: 2024-01-13 | End: 2024-01-15 | Stop reason: HOSPADM

## 2024-01-13 RX ORDER — ONDANSETRON 2 MG/ML
4 INJECTION INTRAMUSCULAR; INTRAVENOUS ONCE
Status: COMPLETED | OUTPATIENT
Start: 2024-01-13 | End: 2024-01-13

## 2024-01-13 RX ORDER — MAGNESIUM SULFATE IN WATER 40 MG/ML
2000 INJECTION, SOLUTION INTRAVENOUS PRN
Status: DISCONTINUED | OUTPATIENT
Start: 2024-01-13 | End: 2024-01-15 | Stop reason: HOSPADM

## 2024-01-13 RX ORDER — SODIUM CHLORIDE 0.9 % (FLUSH) 0.9 %
5-40 SYRINGE (ML) INJECTION EVERY 12 HOURS SCHEDULED
Status: DISCONTINUED | OUTPATIENT
Start: 2024-01-13 | End: 2024-01-15 | Stop reason: HOSPADM

## 2024-01-13 RX ORDER — 0.9 % SODIUM CHLORIDE 0.9 %
1000 INTRAVENOUS SOLUTION INTRAVENOUS ONCE
Status: COMPLETED | OUTPATIENT
Start: 2024-01-13 | End: 2024-01-13

## 2024-01-13 RX ORDER — HYDROMORPHONE HYDROCHLORIDE 2 MG/1
2 TABLET ORAL EVERY 4 HOURS PRN
Status: DISCONTINUED | OUTPATIENT
Start: 2024-01-13 | End: 2024-01-15 | Stop reason: HOSPADM

## 2024-01-13 RX ORDER — ACETAMINOPHEN 325 MG/1
650 TABLET ORAL EVERY 6 HOURS PRN
Status: DISCONTINUED | OUTPATIENT
Start: 2024-01-13 | End: 2024-01-15 | Stop reason: HOSPADM

## 2024-01-13 RX ORDER — ENOXAPARIN SODIUM 100 MG/ML
40 INJECTION SUBCUTANEOUS DAILY
Status: DISCONTINUED | OUTPATIENT
Start: 2024-01-14 | End: 2024-01-13 | Stop reason: DRUGHIGH

## 2024-01-13 RX ORDER — BUPROPION HYDROCHLORIDE 150 MG/1
150 TABLET ORAL DAILY
Status: DISCONTINUED | OUTPATIENT
Start: 2024-01-14 | End: 2024-01-15 | Stop reason: HOSPADM

## 2024-01-13 RX ORDER — POTASSIUM CHLORIDE 7.45 MG/ML
10 INJECTION INTRAVENOUS PRN
Status: DISCONTINUED | OUTPATIENT
Start: 2024-01-13 | End: 2024-01-15 | Stop reason: HOSPADM

## 2024-01-13 RX ORDER — POTASSIUM CHLORIDE 20 MEQ/1
40 TABLET, EXTENDED RELEASE ORAL PRN
Status: DISCONTINUED | OUTPATIENT
Start: 2024-01-13 | End: 2024-01-15 | Stop reason: HOSPADM

## 2024-01-13 RX ORDER — ONDANSETRON 2 MG/ML
4 INJECTION INTRAMUSCULAR; INTRAVENOUS EVERY 6 HOURS PRN
Status: DISCONTINUED | OUTPATIENT
Start: 2024-01-13 | End: 2024-01-15 | Stop reason: HOSPADM

## 2024-01-13 RX ORDER — DICYCLOMINE HYDROCHLORIDE 10 MG/ML
20 INJECTION INTRAMUSCULAR ONCE
Status: COMPLETED | OUTPATIENT
Start: 2024-01-13 | End: 2024-01-13

## 2024-01-13 RX ORDER — CLOPIDOGREL BISULFATE 75 MG/1
75 TABLET ORAL DAILY
Status: DISCONTINUED | OUTPATIENT
Start: 2024-01-14 | End: 2024-01-15 | Stop reason: HOSPADM

## 2024-01-13 RX ORDER — POLYETHYLENE GLYCOL 3350 17 G/17G
17 POWDER, FOR SOLUTION ORAL DAILY PRN
Status: DISCONTINUED | OUTPATIENT
Start: 2024-01-13 | End: 2024-01-15 | Stop reason: HOSPADM

## 2024-01-13 RX ORDER — SODIUM CHLORIDE, SODIUM LACTATE, POTASSIUM CHLORIDE, CALCIUM CHLORIDE 600; 310; 30; 20 MG/100ML; MG/100ML; MG/100ML; MG/100ML
INJECTION, SOLUTION INTRAVENOUS CONTINUOUS
Status: ACTIVE | OUTPATIENT
Start: 2024-01-13 | End: 2024-01-14

## 2024-01-13 RX ORDER — NALOXONE HYDROCHLORIDE 0.4 MG/ML
0.4 INJECTION, SOLUTION INTRAMUSCULAR; INTRAVENOUS; SUBCUTANEOUS PRN
Status: DISCONTINUED | OUTPATIENT
Start: 2024-01-13 | End: 2024-01-15 | Stop reason: HOSPADM

## 2024-01-13 RX ADMIN — WATER 1000 MG: 1 INJECTION INTRAMUSCULAR; INTRAVENOUS; SUBCUTANEOUS at 20:32

## 2024-01-13 RX ADMIN — HYDROMORPHONE HYDROCHLORIDE 2 MG: 2 TABLET ORAL at 23:09

## 2024-01-13 RX ADMIN — IOPAMIDOL 75 ML: 755 INJECTION, SOLUTION INTRAVENOUS at 18:14

## 2024-01-13 RX ADMIN — SODIUM CHLORIDE 1000 ML: 9 INJECTION, SOLUTION INTRAVENOUS at 17:39

## 2024-01-13 RX ADMIN — HYDROMORPHONE HYDROCHLORIDE 1 MG: 1 INJECTION, SOLUTION INTRAMUSCULAR; INTRAVENOUS; SUBCUTANEOUS at 18:26

## 2024-01-13 RX ADMIN — DICYCLOMINE HYDROCHLORIDE 20 MG: 10 INJECTION, SOLUTION INTRAMUSCULAR at 17:39

## 2024-01-13 RX ADMIN — CARVEDILOL 12.5 MG: 12.5 TABLET, FILM COATED ORAL at 22:12

## 2024-01-13 RX ADMIN — ROSUVASTATIN CALCIUM 40 MG: 40 TABLET, FILM COATED ORAL at 22:12

## 2024-01-13 RX ADMIN — HYDROMORPHONE HYDROCHLORIDE 1 MG: 1 INJECTION, SOLUTION INTRAMUSCULAR; INTRAVENOUS; SUBCUTANEOUS at 20:00

## 2024-01-13 RX ADMIN — SODIUM CHLORIDE, POTASSIUM CHLORIDE, SODIUM LACTATE AND CALCIUM CHLORIDE: 600; 310; 30; 20 INJECTION, SOLUTION INTRAVENOUS at 22:12

## 2024-01-13 RX ADMIN — CARBIDOPA AND LEVODOPA 1 TABLET: 10; 100 TABLET ORAL at 22:12

## 2024-01-13 RX ADMIN — ONDANSETRON 4 MG: 2 INJECTION INTRAMUSCULAR; INTRAVENOUS at 18:25

## 2024-01-13 ASSESSMENT — PAIN SCALES - GENERAL
PAINLEVEL_OUTOF10: 6
PAINLEVEL_OUTOF10: 10
PAINLEVEL_OUTOF10: 4

## 2024-01-13 ASSESSMENT — PAIN DESCRIPTION - DESCRIPTORS
DESCRIPTORS: ACHING
DESCRIPTORS: ACHING
DESCRIPTORS: SHARP

## 2024-01-13 ASSESSMENT — PAIN DESCRIPTION - ORIENTATION
ORIENTATION: MID
ORIENTATION: MID
ORIENTATION: UPPER;MID
ORIENTATION: MID;UPPER

## 2024-01-13 ASSESSMENT — PAIN DESCRIPTION - PAIN TYPE: TYPE: ACUTE PAIN

## 2024-01-13 ASSESSMENT — PAIN - FUNCTIONAL ASSESSMENT
PAIN_FUNCTIONAL_ASSESSMENT: PREVENTS OR INTERFERES SOME ACTIVE ACTIVITIES AND ADLS
PAIN_FUNCTIONAL_ASSESSMENT: 0-10
PAIN_FUNCTIONAL_ASSESSMENT: ACTIVITIES ARE NOT PREVENTED

## 2024-01-13 ASSESSMENT — PAIN DESCRIPTION - LOCATION
LOCATION: ABDOMEN

## 2024-01-13 NOTE — ED PROVIDER NOTES
Attending Supervisory Note/Shared Visit   I have personally performed a face to face diagnostic evaluation on this patient. I have reviewed the mid-level’s findings and agree.  History and Exam by me shows an alert morbidly obese white female complaining of epigastric abdominal pain radiating through to her back.  She had a history of recurrent pancreatitis.  Pain similar to her pancreatitis.  She also admits to being short of breath.  She has COPD.  She is a current smoker.  She denies drinking alcohol.    General: Alert morbidly obese white female no acute distress.  Heart: Regular rate and rhythm.  No murmurs gallops noted.  Lungs: Breath sounds equal bilaterally with mild expiratory wheezing.  Abdomen: Obese, soft, epigastric tenderness with guarding but no rebound.  No masses organomegaly.  Bowel sounds are normal.    EKG: Normal sinus rhythm, rate of 87, normal EKG.  Rhythm strip shows a sinus rhythm with a rate of 87, OR interval of 130 ms, QRS of 82 ms with no other ectopy as interpreted by me.  No significant change compared to 8/25/2023.    Labs Reviewed   CBC WITH AUTO DIFFERENTIAL - Abnormal; Notable for the following components:       Result Value    Neutrophils Absolute 8.7 (*)     All other components within normal limits   COMPREHENSIVE METABOLIC PANEL W/ REFLEX TO MG FOR LOW K - Abnormal; Notable for the following components:    CO2 19 (*)     Glucose 120 (*)     BUN 30 (*)     Creatinine 1.5 (*)     Est, Glom Filt Rate 38 (*)     Alkaline Phosphatase 133 (*)     AST 11 (*)     All other components within normal limits   LIPASE - Abnormal; Notable for the following components:    Lipase 844.0 (*)     All other components within normal limits   URINALYSIS WITH REFLEX TO CULTURE - Abnormal; Notable for the following components:    Blood, Urine TRACE (*)     Protein,  (*)     Nitrite, Urine POSITIVE (*)     Leukocyte Esterase, Urine SMALL (*)     All other components within normal limits

## 2024-01-14 LAB
ALBUMIN SERPL-MCNC: 3.7 G/DL (ref 3.4–5)
ALBUMIN/GLOB SERPL: 1.3 {RATIO} (ref 1.1–2.2)
ALP SERPL-CCNC: 120 U/L (ref 40–129)
ALT SERPL-CCNC: <5 U/L (ref 10–40)
ANION GAP SERPL CALCULATED.3IONS-SCNC: 10 MMOL/L (ref 3–16)
ANION GAP SERPL CALCULATED.3IONS-SCNC: 9 MMOL/L (ref 3–16)
AST SERPL-CCNC: 10 U/L (ref 15–37)
BASOPHILS # BLD: 0 K/UL (ref 0–0.2)
BASOPHILS NFR BLD: 0.3 %
BILIRUB SERPL-MCNC: <0.2 MG/DL (ref 0–1)
BUN SERPL-MCNC: 21 MG/DL (ref 7–20)
BUN SERPL-MCNC: 23 MG/DL (ref 7–20)
CALCIUM SERPL-MCNC: 9 MG/DL (ref 8.3–10.6)
CALCIUM SERPL-MCNC: 9 MG/DL (ref 8.3–10.6)
CHLORIDE SERPL-SCNC: 106 MMOL/L (ref 99–110)
CHLORIDE SERPL-SCNC: 109 MMOL/L (ref 99–110)
CO2 SERPL-SCNC: 23 MMOL/L (ref 21–32)
CO2 SERPL-SCNC: 24 MMOL/L (ref 21–32)
CREAT SERPL-MCNC: 1.2 MG/DL (ref 0.6–1.2)
CREAT SERPL-MCNC: 1.4 MG/DL (ref 0.6–1.2)
DEPRECATED RDW RBC AUTO: 14.3 % (ref 12.4–15.4)
EKG ATRIAL RATE: 87 BPM
EKG DIAGNOSIS: NORMAL
EKG P AXIS: 76 DEGREES
EKG P-R INTERVAL: 130 MS
EKG Q-T INTERVAL: 366 MS
EKG QRS DURATION: 82 MS
EKG QTC CALCULATION (BAZETT): 440 MS
EKG R AXIS: -26 DEGREES
EKG T AXIS: 59 DEGREES
EKG VENTRICULAR RATE: 87 BPM
EOSINOPHIL # BLD: 0.1 K/UL (ref 0–0.6)
EOSINOPHIL NFR BLD: 1.2 %
FERRITIN SERPL IA-MCNC: 312.8 NG/ML (ref 15–150)
FOLATE SERPL-MCNC: 16.37 NG/ML (ref 4.78–24.2)
GFR SERPLBLD CREATININE-BSD FMLA CKD-EPI: 41 ML/MIN/{1.73_M2}
GFR SERPLBLD CREATININE-BSD FMLA CKD-EPI: 50 ML/MIN/{1.73_M2}
GLUCOSE SERPL-MCNC: 89 MG/DL (ref 70–99)
GLUCOSE SERPL-MCNC: 92 MG/DL (ref 70–99)
HCT VFR BLD AUTO: 36 % (ref 36–48)
HGB BLD-MCNC: 11.9 G/DL (ref 12–16)
IRON SATN MFR SERPL: 15 % (ref 15–50)
IRON SERPL-MCNC: 33 UG/DL (ref 37–145)
LYMPHOCYTES # BLD: 1.1 K/UL (ref 1–5.1)
LYMPHOCYTES NFR BLD: 9.4 %
MCH RBC QN AUTO: 30.6 PG (ref 26–34)
MCHC RBC AUTO-ENTMCNC: 33.1 G/DL (ref 31–36)
MCV RBC AUTO: 92.5 FL (ref 80–100)
MONOCYTES # BLD: 0.5 K/UL (ref 0–1.3)
MONOCYTES NFR BLD: 4.8 %
NEUTROPHILS # BLD: 9.7 K/UL (ref 1.7–7.7)
NEUTROPHILS NFR BLD: 84.3 %
PLATELET # BLD AUTO: 210 K/UL (ref 135–450)
PMV BLD AUTO: 7.1 FL (ref 5–10.5)
POTASSIUM SERPL-SCNC: 4.9 MMOL/L (ref 3.5–5.1)
POTASSIUM SERPL-SCNC: 5.5 MMOL/L (ref 3.5–5.1)
PROT SERPL-MCNC: 6.5 G/DL (ref 6.4–8.2)
RBC # BLD AUTO: 3.89 M/UL (ref 4–5.2)
SODIUM SERPL-SCNC: 139 MMOL/L (ref 136–145)
SODIUM SERPL-SCNC: 142 MMOL/L (ref 136–145)
TIBC SERPL-MCNC: 225 UG/DL (ref 260–445)
TROPONIN, HIGH SENSITIVITY: 27 NG/L (ref 0–14)
VIT B12 SERPL-MCNC: 412 PG/ML (ref 211–911)
WBC # BLD AUTO: 11.4 K/UL (ref 4–11)

## 2024-01-14 PROCEDURE — 2580000003 HC RX 258: Performed by: FAMILY MEDICINE

## 2024-01-14 PROCEDURE — 82728 ASSAY OF FERRITIN: CPT

## 2024-01-14 PROCEDURE — 82607 VITAMIN B-12: CPT

## 2024-01-14 PROCEDURE — 6360000002 HC RX W HCPCS: Performed by: FAMILY MEDICINE

## 2024-01-14 PROCEDURE — 83540 ASSAY OF IRON: CPT

## 2024-01-14 PROCEDURE — 94760 N-INVAS EAR/PLS OXIMETRY 1: CPT

## 2024-01-14 PROCEDURE — 93010 ELECTROCARDIOGRAM REPORT: CPT | Performed by: INTERNAL MEDICINE

## 2024-01-14 PROCEDURE — 82746 ASSAY OF FOLIC ACID SERUM: CPT

## 2024-01-14 PROCEDURE — 94150 VITAL CAPACITY TEST: CPT

## 2024-01-14 PROCEDURE — 80053 COMPREHEN METABOLIC PANEL: CPT

## 2024-01-14 PROCEDURE — 85025 COMPLETE CBC W/AUTO DIFF WBC: CPT

## 2024-01-14 PROCEDURE — 6370000000 HC RX 637 (ALT 250 FOR IP): Performed by: FAMILY MEDICINE

## 2024-01-14 PROCEDURE — 83550 IRON BINDING TEST: CPT

## 2024-01-14 PROCEDURE — 84484 ASSAY OF TROPONIN QUANT: CPT

## 2024-01-14 PROCEDURE — 1200000000 HC SEMI PRIVATE

## 2024-01-14 PROCEDURE — 36415 COLL VENOUS BLD VENIPUNCTURE: CPT

## 2024-01-14 PROCEDURE — 6370000000 HC RX 637 (ALT 250 FOR IP): Performed by: INTERNAL MEDICINE

## 2024-01-14 RX ORDER — FLUOXETINE HYDROCHLORIDE 20 MG/1
80 CAPSULE ORAL DAILY
Status: DISCONTINUED | OUTPATIENT
Start: 2024-01-14 | End: 2024-01-15 | Stop reason: HOSPADM

## 2024-01-14 RX ORDER — GEMFIBROZIL 600 MG/1
600 TABLET, FILM COATED ORAL
Status: DISCONTINUED | OUTPATIENT
Start: 2024-01-14 | End: 2024-01-15 | Stop reason: HOSPADM

## 2024-01-14 RX ADMIN — SODIUM CHLORIDE, POTASSIUM CHLORIDE, SODIUM LACTATE AND CALCIUM CHLORIDE: 600; 310; 30; 20 INJECTION, SOLUTION INTRAVENOUS at 20:36

## 2024-01-14 RX ADMIN — ENOXAPARIN SODIUM 30 MG: 100 INJECTION SUBCUTANEOUS at 07:52

## 2024-01-14 RX ADMIN — SODIUM CHLORIDE, POTASSIUM CHLORIDE, SODIUM LACTATE AND CALCIUM CHLORIDE: 600; 310; 30; 20 INJECTION, SOLUTION INTRAVENOUS at 13:25

## 2024-01-14 RX ADMIN — HYDROMORPHONE HYDROCHLORIDE 2 MG: 2 TABLET ORAL at 07:51

## 2024-01-14 RX ADMIN — HYDROMORPHONE HYDROCHLORIDE 2 MG: 2 TABLET ORAL at 20:37

## 2024-01-14 RX ADMIN — ONDANSETRON 4 MG: 2 INJECTION INTRAMUSCULAR; INTRAVENOUS at 15:00

## 2024-01-14 RX ADMIN — CARBIDOPA AND LEVODOPA 1 TABLET: 10; 100 TABLET ORAL at 20:38

## 2024-01-14 RX ADMIN — BUPROPION HYDROCHLORIDE 150 MG: 150 TABLET, EXTENDED RELEASE ORAL at 07:52

## 2024-01-14 RX ADMIN — SODIUM CHLORIDE: 9 INJECTION, SOLUTION INTRAVENOUS at 08:00

## 2024-01-14 RX ADMIN — CLOPIDOGREL BISULFATE 75 MG: 75 TABLET ORAL at 07:51

## 2024-01-14 RX ADMIN — CARVEDILOL 12.5 MG: 12.5 TABLET, FILM COATED ORAL at 17:41

## 2024-01-14 RX ADMIN — HYDROMORPHONE HYDROCHLORIDE 2 MG: 2 TABLET ORAL at 03:08

## 2024-01-14 RX ADMIN — HYDROMORPHONE HYDROCHLORIDE 2 MG: 2 TABLET ORAL at 15:51

## 2024-01-14 RX ADMIN — HYDROMORPHONE HYDROCHLORIDE 2 MG: 2 TABLET ORAL at 11:50

## 2024-01-14 RX ADMIN — CEFTRIAXONE SODIUM 2000 MG: 2 INJECTION, POWDER, FOR SOLUTION INTRAMUSCULAR; INTRAVENOUS at 08:01

## 2024-01-14 RX ADMIN — ENOXAPARIN SODIUM 30 MG: 100 INJECTION SUBCUTANEOUS at 20:38

## 2024-01-14 RX ADMIN — GEMFIBROZIL 600 MG: 600 TABLET ORAL at 15:48

## 2024-01-14 RX ADMIN — ROSUVASTATIN CALCIUM 40 MG: 40 TABLET, FILM COATED ORAL at 20:38

## 2024-01-14 RX ADMIN — CARBIDOPA AND LEVODOPA 1 TABLET: 10; 100 TABLET ORAL at 08:01

## 2024-01-14 RX ADMIN — CARVEDILOL 12.5 MG: 12.5 TABLET, FILM COATED ORAL at 07:52

## 2024-01-14 RX ADMIN — SODIUM CHLORIDE, POTASSIUM CHLORIDE, SODIUM LACTATE AND CALCIUM CHLORIDE: 600; 310; 30; 20 INJECTION, SOLUTION INTRAVENOUS at 04:57

## 2024-01-14 ASSESSMENT — PAIN DESCRIPTION - ONSET: ONSET: ON-GOING

## 2024-01-14 ASSESSMENT — PAIN DESCRIPTION - LOCATION
LOCATION: ABDOMEN

## 2024-01-14 ASSESSMENT — PAIN SCALES - GENERAL
PAINLEVEL_OUTOF10: 5
PAINLEVEL_OUTOF10: 5
PAINLEVEL_OUTOF10: 9
PAINLEVEL_OUTOF10: 8
PAINLEVEL_OUTOF10: 8
PAINLEVEL_OUTOF10: 7
PAINLEVEL_OUTOF10: 8
PAINLEVEL_OUTOF10: 7
PAINLEVEL_OUTOF10: 5

## 2024-01-14 ASSESSMENT — PAIN - FUNCTIONAL ASSESSMENT
PAIN_FUNCTIONAL_ASSESSMENT: ACTIVITIES ARE NOT PREVENTED

## 2024-01-14 ASSESSMENT — PAIN DESCRIPTION - DESCRIPTORS
DESCRIPTORS: ACHING
DESCRIPTORS: SHARP
DESCRIPTORS: ACHING
DESCRIPTORS: SHARP
DESCRIPTORS: SHARP

## 2024-01-14 ASSESSMENT — PAIN DESCRIPTION - PAIN TYPE: TYPE: ACUTE PAIN

## 2024-01-14 ASSESSMENT — PAIN DESCRIPTION - ORIENTATION
ORIENTATION: RIGHT;LEFT;MID
ORIENTATION: MID
ORIENTATION: MID
ORIENTATION: RIGHT;LEFT;MID
ORIENTATION: MID

## 2024-01-14 ASSESSMENT — PAIN DESCRIPTION - FREQUENCY: FREQUENCY: CONTINUOUS

## 2024-01-14 NOTE — ASSESSMENT & PLAN NOTE
Mild elevation.  Chest pain-free.  Downtrending.  Monitor on telemetry overnight.  No clear clinical indication for therapeutic anticoagulation.  Overall low suspicion for ACS.  Low threshold to involve cardiology team pending clinical course.

## 2024-01-14 NOTE — PROGRESS NOTES
Pharmacy Medication Reconciliation Note     List of medications Twila Hall is currently taking is complete.    Source of information:   1. Conversation with patient at bedside  2. EMR    Notes regarding home medications:   1. Patient reports last dose of all home medications was yesterday   2. Reports using Advair inhaler PRN  3. Prescribed doxycycline 100 mg BID x 7 days; finished course yesterday     Patient denies taking any OTC or herbal medications other than those listed    Jamal Kerr, Pharmacy Intern  1/13/2024  8:16 PM

## 2024-01-14 NOTE — ED PROVIDER NOTES
[facility] EMERGENCY DEPARTMENT  EMERGENCY DEPARTMENT ENCOUNTER        Pt Name: Twila Hall  MRN: [id number]  Birthdate 1955  Date of evaluation: 1/13/2024  Provider: DELIA Redding - JOSE M  PCP: Mitch Little MD  Note Started: 8:25 PM EST 1/13/24       I have seen and evaluated this patient with my supervising physician Reji Villegas, *.      CHIEF COMPLAINT       Chief Complaint   Patient presents with    Abdominal Pain     Abdominal pain for a couple days.        HISTORY OF PRESENT ILLNESS: 1 or more Elements     History from : Patient    Limitations to history : None    Twila Hall is a 68 y.o.  nontoxic well-appearing, distressed and obese female with a medical history including, not limited to, pancreatitis who presents to the emergency department for evaluation of a 2-day history of \"aching\" 10/10 epigastric abdominal pain with radiation to her back.  Accompanying symptoms include lightheadedness, presyncope, and shortness of breath.  Patient denies chest pain, nausea, vomiting, dizziness, fever, chills, sweats, cough, change in ability to smell/taste, leg/calf pain or swelling, nasal chest congestion, body aches, diarrhea, urinary symptoms/retention, other symptoms/concerns.  Patient states that because of her previous pancreatitis never identified.  She does not drink alcohol and states she has never drink.    Nursing Notes were all reviewed and agreed with or any disagreements were addressed in the HPI.    REVIEW OF SYSTEMS :      Review of Systems   Constitutional:  Negative for chills, diaphoresis, fatigue and fever.   HENT:  Negative for congestion and sore throat.    Eyes:  Negative for pain and visual disturbance.   Respiratory:  Positive for shortness of breath. Negative for cough.    Cardiovascular:  Negative for chest pain and leg swelling.   Gastrointestinal:  Positive for abdominal pain. Negative for anal bleeding, diarrhea, nausea and

## 2024-01-14 NOTE — PLAN OF CARE
Problem: Discharge Planning  Goal: Discharge to home or other facility with appropriate resources  Outcome: Progressing     Problem: Pain  Goal: Verbalizes/displays adequate comfort level or baseline comfort level  1/14/2024 0930 by Latoya Abbasi RN  Outcome: Progressing  1/13/2024 2242 by Lennox Mccall RN  Outcome: Progressing  Flowsheets (Taken 1/13/2024 2242)  Verbalizes/displays adequate comfort level or baseline comfort level:   Encourage patient to monitor pain and request assistance   Assess pain using appropriate pain scale   Implement non-pharmacological measures as appropriate and evaluate response   Administer analgesics based on type and severity of pain and evaluate response   Notify Licensed Independent Practitioner if interventions unsuccessful or patient reports new pain     Problem: Safety - Adult  Goal: Free from fall injury  Outcome: Progressing

## 2024-01-14 NOTE — CONSULTS
GASTROENTEROLOGY INPATIENT CONSULTATION      IDENTIFYING DATA/REASON FOR CONSULTATION   PATIENT:  Twila Hall  MRN:  [id number]  ADMIT DATE: 1/13/2024  TIME OF EVALUATION: 1/14/2024 8:26 AM  HOSPITAL STAY:   LOS: 1 day     REASON FOR CONSULTATION: Pancreatitis    HISTORY OF PRESENT ILLNESS   Twila Hall is a 68 y.o. female who has a past history notable for hypertension, hyperlipidemia, COPD, CKD, pancreatitis, CAD, NSAID induced peptic ulcer disease who presented to [facility] 1/13/2024 with epigastric abdominal pain. We have been consulted regarding pancreatitis.  The patient reports epigastric abdominal pain, radiating to her back for approximately 3 days.  She notes concurrent nausea and vomiting.  She reports a prior similar episode of pancreatitis, which was not as severe.  She denies any alcohol abuse.  She denies any fevers or chills.  She reports her pain is improved, now 6/10 in severity.  She notes ongoing anorexia.    EGD 7/10/2020:  1. GAVE  2. Antral and duodenal bulb deformity, with healing lesion in the duodenal bulb consistent with healing peptic ulcer disease    PAST MEDICAL, SURGICAL, FAMILY, and SOCIAL HISTORY     Past Medical History:   Diagnosis Date    Arthritis     ASHD (arteriosclerotic heart disease)     Chronic kidney disease     Chronic midline low back pain without sciatica 4/3/2019    COPD (chronic obstructive pulmonary disease) (HCC)     unknown     Coronary artery disease involving native coronary artery of native heart without angina pectoris 3/31/2016    Coronary artery disease involving native coronary artery of native heart without angina pectoris 3/31/2016    Coronary artery disease involving native coronary artery of native heart without angina pectoris 3/31/2016    Depression     Full dentures     HTN     HTN (hypertension) 1/11/2012    Hyperlipidemia     Idiopathic acute pancreatitis 10/26/2023    Iron deficiency anemia 3/31/2016    Iron deficiency anemia

## 2024-01-14 NOTE — ED NOTES
ED SBAR report provider to DENISE RN. Patient to be transported to Room 4260 via stretcher by transport tech.Patient transported with bedside cardiac monitor and with IV medications infusing. IV site clean, dry, and intact. MEWS score and pain assessed as 0 and documented. Updated patient on plan of care.

## 2024-01-14 NOTE — PROGRESS NOTES
4 Eyes Skin Assessment     NAME:  Twila Hall  YOB: 1955  MEDICAL RECORD NUMBER:  [id number]    The patient is being assessed for  Admission    I agree that at least one RN has performed a thorough Head to Toe Skin Assessment on the patient. ALL assessment sites listed below have been assessed.      Areas assessed by both nurses:    Head, Face, Ears, Shoulders, Back, Chest, Arms, Elbows, Hands, Sacrum. Buttock, Coccyx, Ischium, and Legs. Feet and Heels        Does the Patient have a Wound? No noted wound(s)       [place] Prevention initiated by RN: No  Wound Care Orders initiated by RN: No    Pressure Injury (Stage 3,4, Unstageable, DTI, NWPT, and Complex wounds) if present, place Wound referral order by RN under : No    New Ostomies, if present place, Ostomy referral order under : No     Nurse 1 eSignature: Electronically signed by Lennox Mccall RN on 1/14/24 at 6:17 AM EST    **SHARE this note so that the co-signing nurse can place an eSignature**    Nurse 2 eSignature: Electronically signed by Alejandrina Jurado RN on 1/14/24 at 7:06 AM EST

## 2024-01-14 NOTE — ASSESSMENT & PLAN NOTE
Unclear etiology.  Possibly idiopathic.  Triglycerides mildly elevated.  Not a big drinker.  Hydrate and supportive care.

## 2024-01-14 NOTE — PROGRESS NOTES
Pt admitted to room 4260 from ED for abd pain rating 10/10. PRN dilaudid is being given to control the pain. Alert and oriented. VSS except lightly elevated BP. Tele is SN. Ambulated without issues. No skin issues. Rested well. IVF infusing via L wrist. No needs at this time.

## 2024-01-14 NOTE — PROGRESS NOTES
V2.0    [facility] Progress Note      Name:  Twila Hall /Age/Sex: 1955  (68 y.o. female)   MRN & CSN:  [id number] & [id number] Encounter Date/Time: 2024 6:39 AM EST   Location:  [id number]-8168/4260-01 PCP: Mitch Little MD     Attending:Bg De Los Santos MD       Hospital Day: 2    Assessment and Recommendations   Twila Hall is a 68 y.o. female who presents with Acute pancreatitis without infection or necrosis    68-year-old female admitted to the hospital with abdominal pain diagnosed with pancreatitis  Plan:   Acute pancreatitis without infection or necrosis, IV fluid, will consult GI, no clear etiology at this time likely idiopathic triglycerides mildly elevated but I doubt as a trigger.  Hypertensive urgency, likely triggered by pain, restart home medications.  Slowly improving, systolic blood pressure 145 this morning  CKD 3 creatinine at 1.4  Minimal hyperkalemia potassium at 5.5 4 repeat admission  Obesity, complicating current presentation increasing morbidity mortality, counseled for weight loss  UTI, starting ceftriaxone  Minimally elevated troponin, due to above no chest pain  Tobacco abuse, counseled for quitting      Diet ADULT DIET; Clear Liquid; 3 carb choices (45 gm/meal); Low Fat/Low Chol/High Fiber/NAYAN   DVT Prophylaxis [] Lovenox, []  Heparin, [] SCDs, [] Ambulation,  [] Eliquis, [] Xarelto  [] Coumadin   Code Status Full Code             Personally reviewed Lab Studies and Imaging     Discussed management of the case with nursing staff     Telemetry strip reviewed by myself no ST elevation.  Normal sinus rhythm    Imaging that was interpreted personally includes CT abdomen and results pancreatitis        Medical Decision Making:  The following items were considered in medical decision making:  Discussion of patient care with other providers  Reviewed clinical lab tests  Reviewed radiology tests  Reviewed other diagnostic tests/interventions  Independent review of radiologic      PRN Meds: sodium chloride flush, 5-40 mL, PRN  sodium chloride, , PRN  potassium chloride, 40 mEq, PRN   Or  potassium alternative oral replacement, 40 mEq, PRN   Or  potassium chloride, 10 mEq, PRN  magnesium sulfate, 2,000 mg, PRN  ondansetron, 4 mg, Q8H PRN   Or  ondansetron, 4 mg, Q6H PRN  polyethylene glycol, 17 g, Daily PRN  aluminum & magnesium hydroxide-simethicone, 30 mL, Q6H PRN  acetaminophen, 650 mg, Q6H PRN   Or  acetaminophen, 650 mg, Q6H PRN  HYDROmorphone, 1 mg, Q4H PRN   Or  HYDROmorphone, 2 mg, Q4H PRN  HYDROmorphone, 0.25 mg, Q3H PRN   Or  HYDROmorphone, 0.5 mg, Q3H PRN  naloxone, 0.4 mg, PRN  albuterol sulfate HFA, 2 puff, Q6H PRN        Labs and Imaging   CT ABDOMEN PELVIS W IV CONTRAST Additional Contrast? None    Result Date: 1/13/2024  EXAMINATION: CT OF THE ABDOMEN AND PELVIS WITH CONTRAST 1/13/2024 6:07 pm TECHNIQUE: CT of the abdomen and pelvis was performed with the administration of intravenous contrast. Multiplanar reformatted images are provided for review. Automated exposure control, iterative reconstruction, and/or weight based adjustment of the mA/kV was utilized to reduce the radiation dose to as low as reasonably achievable. COMPARISON: 10/25/2023 HISTORY: ORDERING SYSTEM PROVIDED HISTORY: upper abd pain/sob/rad to back TECHNOLOGIST PROVIDED HISTORY: Reason for exam:->upper abd pain/sob/rad to back Additional Contrast?->None Decision Support Exception - unselect if not a suspected or confirmed emergency medical condition->Emergency Medical Condition (MA) Reason for Exam: upper abd pain/sob/rad to back, r/o pancreatitis FINDINGS: Lower Chest: There are no focal infiltrates or pleural effusions Organs: There is atrophy and cortical scarring involving the right kidney. There is mild inflammatory stranding surrounding the pancreas which enhances homogeneously.  The splenic vein is patent.  The gallbladder is mildly distended but not appreciably thickened.  The remainder of the

## 2024-01-14 NOTE — PROGRESS NOTES
Patient alert and oriented x4, VSS, sitting up in bed eating breakfast. Patient c/o nausea and abdominal pain, denies vomiting, diarrhea, SOB. PRN pain medication given per patient request, see MAR.  Patient denies further needs at this time. Bed in lowest and locked position, safety alarm in place. Non-slip socks on, call light in reach.

## 2024-01-14 NOTE — PLAN OF CARE
Problem: Pain  Goal: Verbalizes/displays adequate comfort level or baseline comfort level  Outcome: Progressing  Flowsheets (Taken 1/13/2024 2242)  Verbalizes/displays adequate comfort level or baseline comfort level:   Encourage patient to monitor pain and request assistance   Assess pain using appropriate pain scale   Implement non-pharmacological measures as appropriate and evaluate response   Administer analgesics based on type and severity of pain and evaluate response   Notify Licensed Independent Practitioner if interventions unsuccessful or patient reports new pain

## 2024-01-14 NOTE — H&P
Hospitalist History and Physical        PCP: Mitch Little MD    Date of Admission:  Patient seen and examined on night of admission 01/13/24      Anticipate greater than 2 midnights inpatient hospitalization will be needed to appropriately care for the patient's presenting issues.    Anticipated Discharge Location:  pending clinical course and functional status.   Assessment/Plan:      Principal Problem:    Acute pancreatitis without infection or necrosis  Active Problems:    Class 2 severe obesity with serious comorbidity in adult ([facility])    Stage 3b chronic kidney disease (CKD) ([facility])    Hypertensive urgency    Elevated troponin  Resolved Problems:    * No resolved hospital problems. *       Acute pancreatitis without infection or necrosis       Unclear etiology.  Possibly idiopathic.  Triglycerides mildly elevated.  Not a big drinker.  Hydrate and supportive care.    Class 2 severe obesity with serious comorbidity in adult ([facility])       Signficant comorbitiy complicating all aspects of care.    Stage 3b chronic kidney disease (CKD) ([facility])       Overall chronic and stable appearing.  Monitor for now.    Hypertensive urgency       Pain control.  Resume home medications and adjust regimen pending clinical course    Elevated troponin       Mild elevation.  Chest pain-free.  Downtrending.  Monitor on telemetry overnight.  No clear clinical indication for therapeutic anticoagulation.  Overall low suspicion for ACS.  Low threshold to involve cardiology team pending clinical course.            See above for problem focused plan.      All other systems appear either stable or near patient's baseline at time of admission. Continue to monitor clinical status closely overnight and adjust plan accordingly.  Continue home medications overnight as ordered for now and adjust medications as needed throughout hospital stay.  Any urgent/emergent consult for the night has been called by either myself or the ED team.  I

## 2024-01-15 ENCOUNTER — APPOINTMENT (OUTPATIENT)
Dept: ULTRASOUND IMAGING | Age: 69
DRG: 439 | End: 2024-01-15
Payer: MEDICARE

## 2024-01-15 VITALS
TEMPERATURE: 98.2 F | HEIGHT: 67 IN | WEIGHT: 232.37 LBS | HEART RATE: 78 BPM | BODY MASS INDEX: 36.47 KG/M2 | DIASTOLIC BLOOD PRESSURE: 65 MMHG | OXYGEN SATURATION: 91 % | SYSTOLIC BLOOD PRESSURE: 112 MMHG | RESPIRATION RATE: 14 BRPM

## 2024-01-15 LAB
BACTERIA UR CULT: ABNORMAL
ORGANISM: ABNORMAL

## 2024-01-15 PROCEDURE — 76705 ECHO EXAM OF ABDOMEN: CPT

## 2024-01-15 PROCEDURE — 2580000003 HC RX 258: Performed by: FAMILY MEDICINE

## 2024-01-15 PROCEDURE — 6370000000 HC RX 637 (ALT 250 FOR IP): Performed by: FAMILY MEDICINE

## 2024-01-15 PROCEDURE — 6360000002 HC RX W HCPCS: Performed by: FAMILY MEDICINE

## 2024-01-15 PROCEDURE — 6370000000 HC RX 637 (ALT 250 FOR IP): Performed by: INTERNAL MEDICINE

## 2024-01-15 RX ORDER — HYDROCODONE BITARTRATE AND ACETAMINOPHEN 7.5; 325 MG/1; MG/1
1 TABLET ORAL EVERY 6 HOURS PRN
Qty: 12 TABLET | Refills: 0 | Status: SHIPPED | OUTPATIENT
Start: 2024-01-15 | End: 2024-01-18 | Stop reason: SDUPTHER

## 2024-01-15 RX ORDER — SULFAMETHOXAZOLE AND TRIMETHOPRIM 800; 160 MG/1; MG/1
1 TABLET ORAL 2 TIMES DAILY
Qty: 10 TABLET | Refills: 0 | Status: SHIPPED | OUTPATIENT
Start: 2024-01-15 | End: 2024-01-20

## 2024-01-15 RX ADMIN — HYDROMORPHONE HYDROCHLORIDE 1 MG: 2 TABLET ORAL at 10:36

## 2024-01-15 RX ADMIN — FLUOXETINE HYDROCHLORIDE 80 MG: 20 CAPSULE ORAL at 10:38

## 2024-01-15 RX ADMIN — HYDROMORPHONE HYDROCHLORIDE 2 MG: 2 TABLET ORAL at 01:22

## 2024-01-15 RX ADMIN — Medication 10 ML: at 10:39

## 2024-01-15 RX ADMIN — HYDROMORPHONE HYDROCHLORIDE 2 MG: 2 TABLET ORAL at 05:57

## 2024-01-15 RX ADMIN — CLOPIDOGREL BISULFATE 75 MG: 75 TABLET ORAL at 10:38

## 2024-01-15 RX ADMIN — GEMFIBROZIL 600 MG: 600 TABLET ORAL at 05:55

## 2024-01-15 RX ADMIN — CEFTRIAXONE SODIUM 2000 MG: 2 INJECTION, POWDER, FOR SOLUTION INTRAMUSCULAR; INTRAVENOUS at 10:49

## 2024-01-15 RX ADMIN — ENOXAPARIN SODIUM 30 MG: 100 INJECTION SUBCUTANEOUS at 10:40

## 2024-01-15 RX ADMIN — BUPROPION HYDROCHLORIDE 150 MG: 150 TABLET, EXTENDED RELEASE ORAL at 10:38

## 2024-01-15 RX ADMIN — CARVEDILOL 12.5 MG: 12.5 TABLET, FILM COATED ORAL at 10:38

## 2024-01-15 RX ADMIN — CARBIDOPA AND LEVODOPA 1 TABLET: 10; 100 TABLET ORAL at 10:52

## 2024-01-15 ASSESSMENT — PAIN DESCRIPTION - DESCRIPTORS
DESCRIPTORS: ACHING
DESCRIPTORS: STABBING;SHARP

## 2024-01-15 ASSESSMENT — PAIN DESCRIPTION - LOCATION
LOCATION: ABDOMEN

## 2024-01-15 ASSESSMENT — PAIN - FUNCTIONAL ASSESSMENT
PAIN_FUNCTIONAL_ASSESSMENT: PREVENTS OR INTERFERES SOME ACTIVE ACTIVITIES AND ADLS
PAIN_FUNCTIONAL_ASSESSMENT: ACTIVITIES ARE NOT PREVENTED
PAIN_FUNCTIONAL_ASSESSMENT: ACTIVITIES ARE NOT PREVENTED

## 2024-01-15 ASSESSMENT — PAIN SCALES - GENERAL
PAINLEVEL_OUTOF10: 4
PAINLEVEL_OUTOF10: 6
PAINLEVEL_OUTOF10: 8
PAINLEVEL_OUTOF10: 5
PAINLEVEL_OUTOF10: 8

## 2024-01-15 ASSESSMENT — PAIN DESCRIPTION - ORIENTATION
ORIENTATION: MID

## 2024-01-15 ASSESSMENT — PAIN DESCRIPTION - PAIN TYPE: TYPE: ACUTE PAIN

## 2024-01-15 ASSESSMENT — PAIN DESCRIPTION - ONSET: ONSET: ON-GOING

## 2024-01-15 ASSESSMENT — PAIN DESCRIPTION - FREQUENCY: FREQUENCY: INTERMITTENT

## 2024-01-15 NOTE — PLAN OF CARE
Problem: Discharge Planning  Goal: Discharge to home or other facility with appropriate resources  Outcome: Progressing  Flowsheets (Taken 1/15/2024 0740)  Discharge to home or other facility with appropriate resources:   Identify barriers to discharge with patient and caregiver   Arrange for needed discharge resources and transportation as appropriate   Identify discharge learning needs (meds, wound care, etc)     Problem: Pain  Goal: Verbalizes/displays adequate comfort level or baseline comfort level  Outcome: Progressing  Flowsheets (Taken 1/15/2024 0752)  Verbalizes/displays adequate comfort level or baseline comfort level:   Encourage patient to monitor pain and request assistance   Assess pain using appropriate pain scale   Administer analgesics based on type and severity of pain and evaluate response   Implement non-pharmacological measures as appropriate and evaluate response   Consider cultural and social influences on pain and pain management   Notify Licensed Independent Practitioner if interventions unsuccessful or patient reports new pain     Problem: Safety - Adult  Goal: Free from fall injury  Outcome: Progressing  Flowsheets (Taken 1/15/2024 0740)  Free From Fall Injury: Instruct family/caregiver on patient safety

## 2024-01-15 NOTE — DISCHARGE SUMMARY
Tests to be Followed up as an outpatient by PCP or Specialist:     Discharge Medications:        Medication List        START taking these medications      sulfamethoxazole-trimethoprim 800-160 MG per tablet  Commonly known as: BACTRIM DS;SEPTRA DS  Take 1 tablet by mouth 2 times daily for 5 days            CHANGE how you take these medications      FLUoxetine 40 MG capsule  Commonly known as: PROzac  Take 2 caps daily  What changed:   how much to take  how to take this  when to take this  additional instructions            CONTINUE taking these medications      Advair Diskus 250-50 MCG/ACT Aepb diskus inhaler  Generic drug: fluticasone-salmeterol  INHALE 1 PUFF INTO THE LUNGS IN THE MORNING AND IN THE EVENING     albuterol sulfate  (90 Base) MCG/ACT inhaler  Commonly known as: PROVENTIL;VENTOLIN;PROAIR  INHALE 2 PUFFS BY MOUTH EVERY 6 HOURS AS NEEDED FOR WHEEZE     buPROPion 150 MG extended release tablet  Commonly known as: WELLBUTRIN XL  Take 1 tablet by mouth daily     carbidopa-levodopa  MG per tablet  Commonly known as: SINEMET  TAKE 2 TABLETS BY MOUTH EVERY DAY IN THE EVENING     carvedilol 12.5 MG tablet  Commonly known as: COREG  TAKE 1 TABLET BY MOUTH TWICE A DAY     clopidogrel 75 MG tablet  Commonly known as: PLAVIX  Take 1 tablet by mouth daily     HYDROcodone-acetaminophen 7.5-325 MG per tablet  Commonly known as: NORCO  Take 1 tablet by mouth every 6 hours as needed for Pain for up to 3 days. Max Daily Amount: 4 tablets     omeprazole 40 MG delayed release capsule  Commonly known as: PRILOSEC  Take 1 capsule by mouth every morning (before breakfast)     rosuvastatin 40 MG tablet  Commonly known as: CRESTOR  TAKE 1 TABLET BY MOUTH EVERY DAY AT NIGHT               Where to Get Your Medications        You can get these medications from any pharmacy    Bring a paper prescription for each of these medications  HYDROcodone-acetaminophen 7.5-325 MG per tablet  sulfamethoxazole-trimethoprim  01/13/24  1718 01/14/24  0607   AST 11* 10*   ALT 11 <5*   BILITOT 0.3 <0.2   ALKPHOS 133* 120     Lipids:   Lab Results   Component Value Date/Time    CHOL 144 06/30/2021 03:50 AM    HDL 49 01/12/2023 09:30 AM    HDL 36 09/27/2010 05:24 PM    TRIG 233 01/13/2024 05:28 PM     Hemoglobin A1C:   Lab Results   Component Value Date/Time    LABA1C 5.4 06/30/2021 03:50 AM     TSH:   Lab Results   Component Value Date/Time    TSH 0.32 11/18/2019 04:07 PM     Troponin: No results found for: \"TROPONINT\"  Lactic Acid: No results for input(s): \"LACTA\" in the last 72 hours.  BNP: No results for input(s): \"PROBNP\" in the last 72 hours.  UA:  Lab Results   Component Value Date/Time    NITRU POSITIVE 01/13/2024 07:45 PM    COLORU Yellow 01/13/2024 07:45 PM    PHUR 5.5 01/13/2024 07:45 PM    LABCAST 5-10 Fine Gran. 04/01/2015 09:50 AM    WBCUA 68 01/13/2024 07:45 PM    RBCUA 1 01/13/2024 07:45 PM    MUCUS 1+ 09/09/2012 03:50 PM    BACTERIA 4+ 01/13/2024 07:45 PM    CLARITYU Clear 01/13/2024 07:45 PM    SPECGRAV 1.026 01/13/2024 07:45 PM    LEUKOCYTESUR SMALL 01/13/2024 07:45 PM    UROBILINOGEN 0.2 01/13/2024 07:45 PM    BILIRUBINUR Negative 01/13/2024 07:45 PM    BILIRUBINUR Negative 04/03/2019 05:29 PM    BILIRUBINUR NEGATIVE 04/05/2011 09:20 AM    BLOODU TRACE 01/13/2024 07:45 PM    GLUCOSEU Negative 01/13/2024 07:45 PM    GLUCOSEU NEGATIVE 04/05/2011 09:20 AM    KETUA Negative 01/13/2024 07:45 PM     Urine Cultures:   Lab Results   Component Value Date/Time    LABURIN >100,000 CFU/ml 01/13/2024 08:39 PM     Blood Cultures:   Lab Results   Component Value Date/Time    BC No growth after 5 days of incubation. 04/18/2018 07:52 PM     Lab Results   Component Value Date/Time    BLOODCULT2 No growth after 5 days of incubation. 04/18/2018 07:51 PM     Organism:   Lab Results   Component Value Date/Time    ORG Escherichia coli 01/13/2024 08:39 PM       Time Spent Discharging patient 35 minutes    Electronically signed by Rebekah Johnson MD

## 2024-01-15 NOTE — PROGRESS NOTES
Patient diet advanced from clear liquids to regular diet  Tolerated PO intake well  No nausea or vomiting  Denies any discomfort at this time      Patient discharged as per order.   Discharge instructions, follow-ups and  prescriptions reviewed with patient.   Patient informed of prescription pick-up location and importance of medication compliance.   Patient voiced understanding.       IV removed, catheter intact, site unremarkable.   Cardiac monitor removed     Patient taken to car in wheelchair by staff.     Patient left with all her belongings.  All questions were answered.

## 2024-01-15 NOTE — DISCHARGE INSTR - DIET
Good nutrition is important when healing from an illness, injury, or surgery.  Follow any nutrition recommendations given to you during your hospital stay.   If you were given an oral nutrition supplement while in the hospital, continue to take this supplement at home.  You can take it with meals, in-between meals, and/or before bedtime. These supplements can be purchased at most local grocery stores, pharmacies, and chain [employer]-stores.   If you have any questions about your diet or nutrition, call the hospital and ask for the dietitian.

## 2024-01-15 NOTE — PROGRESS NOTES
INPATIENT PROGRESS NOTE        IDENTIFYING DATA/REASON FOR CONSULTATION   PATIENT:  Twila Hall  MRN:  [id number]  ADMIT DATE: 1/13/2024  TIME OF EVALUATION: 1/15/2024 10:45 AM  HOSPITAL STAY:   LOS: 2 days   CONSULTING PHYSICIAN: Rebekah Johnson MD   REASON FOR CONSULTATION: pancreatitis    Subjective:    Patient seen in follow up     Patient reports she feels a little better.  She was able to tolerate Jell-O yesterday.  No nausea or vomiting overnight she is passing flatus and bowel movements.  No melena or hematochezia    MEDICATIONS   SCHEDULED:  FLUoxetine, 80 mg, Daily  gemfibrozil, 600 mg, BID AC  sodium chloride flush, 5-40 mL, 2 times per day  buPROPion, 150 mg, Daily  carvedilol, 12.5 mg, BID with meals  clopidogrel, 75 mg, Daily  rosuvastatin, 40 mg, Nightly  carbidopa-levodopa, 1 tablet, BID  enoxaparin, 30 mg, BID  cefTRIAXone (ROCEPHIN) IV, 2,000 mg, Q24H      FLUIDS/DRIPS:     sodium chloride Stopped (01/14/24 0917)     PRNs: sodium chloride flush, 5-40 mL, PRN  sodium chloride, , PRN  potassium chloride, 40 mEq, PRN   Or  potassium alternative oral replacement, 40 mEq, PRN   Or  potassium chloride, 10 mEq, PRN  magnesium sulfate, 2,000 mg, PRN  ondansetron, 4 mg, Q8H PRN   Or  ondansetron, 4 mg, Q6H PRN  polyethylene glycol, 17 g, Daily PRN  aluminum & magnesium hydroxide-simethicone, 30 mL, Q6H PRN  acetaminophen, 650 mg, Q6H PRN   Or  acetaminophen, 650 mg, Q6H PRN  HYDROmorphone, 1 mg, Q4H PRN   Or  HYDROmorphone, 2 mg, Q4H PRN  HYDROmorphone, 0.25 mg, Q3H PRN   Or  HYDROmorphone, 0.5 mg, Q3H PRN  naloxone, 0.4 mg, PRN  albuterol sulfate HFA, 2 puff, Q6H PRN      ALLERGIES:    Allergies   Allergen Reactions    Mobic [Meloxicam] Nausea Only     Stomach would get upset after taking this medication    Morphine Other (See Comments)     When taken in 2013 pt had an adverse reaction to morphine. She ended up in he hospital with kidney failure, dehydration, and in ICU. She prefers this not to be given  ever.     Other      ? Suture from CABG caused blister (10/30/2015)         PHYSICAL EXAM   VITALS:  /83   Pulse 76   Temp 98.4 °F (36.9 °C) (Oral)   Resp 18   Ht 1.702 m (5' 7\")   Wt 105.4 kg (232 lb 5.8 oz)   LMP 2004   SpO2 92%   BMI 36.39 kg/m²   TEMPERATURE:  Current - Temp: 98.4 °F (36.9 °C); Max - Temp  Av.3 °F (36.8 °C)  Min: 97.6 °F (36.4 °C)  Max: 98.6 °F (37 °C)    Physical Exam:  General appearance: alert, cooperative, no distress, appears stated age  Eyes: Anicteric  Head: Normocephalic, without obvious abnormality  Lungs: clear to auscultation bilaterally, Normal Effort  Heart: regular rate and rhythm, normal S1 and S2, no murmurs or rubs  Abdomen: soft, non-distended, non-tender. Bowel sounds normal.   Extremities: atraumatic, no cyanosis or edema  Skin: warm and dry, no jaundice  Neuro: Grossly intact, A&OX3    LABS AND IMAGING   Laboratory   Recent Labs     24  1718 24  0607   WBC 10.5 11.4*   HGB 12.6 11.9*   HCT 37.8 36.0   MCV 92.8 92.5    210     Recent Labs     24  1718 24  0607 24  1154    142 139   K 4.8 5.5* 4.9    109 106   CO2 19* 24 23   BUN 30* 23* 21*   CREATININE 1.5* 1.4* 1.2     Recent Labs     24  1718 24  0607   AST 11* 10*   ALT 11 <5*   BILITOT 0.3 <0.2   ALKPHOS 133* 120     Recent Labs     24   LIPASE 844.0*     No results for input(s): \"PROTIME\", \"INR\" in the last 72 hours.      Imaging  US GALLBLADDER RUQ   Final Result   No sonographic evidence cholelithiasis, cholecystitis, or choledocholithiasis.         CT ABDOMEN PELVIS W IV CONTRAST Additional Contrast? None   Final Result   Findings consistent with acute uncomplicated pancreatitis.         US ABDOMEN LIMITED Specify organ? LIVER, SPLEEN, PANCREAS    (Results Pending)       Endoscopy      ASSESSMENT AND RECOMMENDATIONS   Twila Hall is a 68 y.o. female with PMH of  hypertension, hyperlipidemia, COPD, CKD, pancreatitis,

## 2024-01-16 ENCOUNTER — TELEPHONE (OUTPATIENT)
Dept: FAMILY MEDICINE CLINIC | Age: 69
End: 2024-01-16

## 2024-01-16 DIAGNOSIS — Z76.0 ENCOUNTER FOR MEDICATION REFILL: ICD-10-CM

## 2024-01-16 DIAGNOSIS — M15.9 PRIMARY OSTEOARTHRITIS INVOLVING MULTIPLE JOINTS: ICD-10-CM

## 2024-01-16 NOTE — TELEPHONE ENCOUNTER
Medication and Quantity requested:        HYDROcodone-acetaminophen (NORCO) 7.5-325 MG per tablet [[id number]]       Last Visit  01/02/2024      Pharmacy and phone number updated in EPIC:  yes    [facility] [address]

## 2024-01-17 ENCOUNTER — TELEPHONE (OUTPATIENT)
Dept: FAMILY MEDICINE CLINIC | Age: 69
End: 2024-01-17

## 2024-01-17 NOTE — TELEPHONE ENCOUNTER
Patient called and would like list of prescriptions sent to the [facility] pharmacy on [address]   
Will fax med list   
no

## 2024-01-18 DIAGNOSIS — M15.9 PRIMARY OSTEOARTHRITIS INVOLVING MULTIPLE JOINTS: ICD-10-CM

## 2024-01-18 DIAGNOSIS — Z76.0 ENCOUNTER FOR MEDICATION REFILL: ICD-10-CM

## 2024-01-18 RX ORDER — HYDROCODONE BITARTRATE AND ACETAMINOPHEN 7.5; 325 MG/1; MG/1
1 TABLET ORAL EVERY 6 HOURS PRN
Qty: 12 TABLET | Refills: 0 | Status: SHIPPED | OUTPATIENT
Start: 2024-01-18 | End: 2024-01-21

## 2024-01-18 NOTE — TELEPHONE ENCOUNTER
HYDROcodone-acetaminophen (NORCO) 7.5-325 MG     Pt asking if this prescription was sent to [facility]?    Advise pt.

## 2024-01-19 DIAGNOSIS — M17.11 ARTHRITIS OF KNEE, RIGHT: Primary | ICD-10-CM

## 2024-01-19 RX ORDER — HYDROCODONE BITARTRATE AND ACETAMINOPHEN 7.5; 325 MG/1; MG/1
1 TABLET ORAL EVERY 6 HOURS PRN
Qty: 108 TABLET | Refills: 0 | Status: SHIPPED | OUTPATIENT
Start: 2024-01-19 | End: 2024-02-14

## 2024-01-19 NOTE — TELEPHONE ENCOUNTER
Patient called and picked her       HYDROcodone-acetaminophen (NORCO) 7.5-325 MG per tablet [[id number]]     She picked up the 12 tablets     She needs 120 tabs taken 1 tab every 6 hours     Should of been a 30 day supply       [facility] [address]

## 2024-01-23 NOTE — PROGRESS NOTES
Physician Progress Note      PATIENT:               DAYANNA OWENS  CSN #:                  [id number]  :                       1955  ADMIT DATE:       2024 4:34 PM  DISCH DATE:        1/15/2024 6:14 PM  RESPONDING  PROVIDER #:        Rebekah Johnson MD          QUERY TEXT:    Dear Dr. Johnson,  Patient admitted with Acute pancreatitis, noted to have elevated troponin   levels related to pancreatitis. If possible, please document in progress notes   and discharge summary if you are evaluating and/or treating any of the   following:    The medical record reflects the following:  Risk Factors: Hx CKD, COPD, CAD, HTN, HLD, Anemia, Current Acute pancreatitis  Clinical Indicators:  ED for abd pain epigastric 10/10, SOB, back pain,   lightheadedness, CT-Findings consistent with acute uncomplicated pancreatitis,    Trop=24, repeat=22, and on  trop=27. H and P notes \"elevated trop-Mild   elevation.  Chest pain-free.\" and \" Overall low suspicion for ACS.\" and  \"   Minimally elevated troponin, due to above (pancreatitis) no chest pain\"  Treatment: monitor troponin levels  Thank you,  Azucena Cintron RN, CDS  [email or web address]  Options provided:  -- Demand ischemia without infarction due to acute pancreatitis  -- Non ischemic Myocardial injury without infarction due to acute pancreatitis  -- Other - I will add my own diagnosis  -- Disagree - Not applicable / Not valid  -- Disagree - Clinically unable to determine / Unknown  -- Refer to Clinical Documentation Reviewer    PROVIDER RESPONSE TEXT:    This patient has non ischemic myocardial injury without infarction due to   acute pancreatitis .    Query created by: Azucena Long on 2024 8:06 AM      Electronically signed by:  Rebekah Johnson MD 2024 8:14 AM

## 2024-01-25 ENCOUNTER — TELEPHONE (OUTPATIENT)
Dept: FAMILY MEDICINE CLINIC | Age: 69
End: 2024-01-25

## 2024-01-25 NOTE — TELEPHONE ENCOUNTER
Care Transitions Initial Follow Up Call    Outreach made within 2 business days of discharge: Yes    Patient: Twila Hall Patient : 1955   MRN: [id number]  Reason for Admission: There are no discharge diagnoses documented for the most recent discharge.  Discharge Date: 1/15/24       Spoke with: DUSTY Mathis MA

## 2024-01-29 NOTE — TELEPHONE ENCOUNTER
Care Transitions Initial Follow Up Call    Outreach made within 2 business days of discharge: Yes    Patient: Twila Hall Patient : 1955   MRN: [id number]  Reason for Admission: There are no discharge diagnoses documented for the most recent discharge.  Discharge Date: 1/15/24       Spoke with: Twila    Discharge department/facility: [facility]    TCM Interactive Patient Contact:  Was patient able to fill all prescriptions: Yes  Was patient instructed to bring all medications to the follow-up visit: Yes  Is patient taking all medications as directed in the discharge summary? Yes  Does patient understand their discharge instructions: Yes  Does patient have questions or concerns that need addressed prior to 7-14 day follow up office visit: no    Scheduled appointment with PCP within 7-14 days    Follow Up  Future Appointments   Date Time Provider Department Center   2024  4:15 PM Mitch Little MD [facility] [place] [facility] Kaycie D Jordan, MA

## 2024-01-30 ENCOUNTER — OFFICE VISIT (OUTPATIENT)
Dept: FAMILY MEDICINE CLINIC | Age: 69
End: 2024-01-30

## 2024-01-30 VITALS
DIASTOLIC BLOOD PRESSURE: 82 MMHG | OXYGEN SATURATION: 96 % | BODY MASS INDEX: 41.35 KG/M2 | WEIGHT: 264 LBS | SYSTOLIC BLOOD PRESSURE: 126 MMHG | HEART RATE: 67 BPM

## 2024-01-30 DIAGNOSIS — Z12.31 ENCOUNTER FOR SCREENING MAMMOGRAM FOR MALIGNANT NEOPLASM OF BREAST: ICD-10-CM

## 2024-01-30 DIAGNOSIS — Z09 HOSPITAL DISCHARGE FOLLOW-UP: Primary | ICD-10-CM

## 2024-01-30 DIAGNOSIS — N18.4 CKD (CHRONIC KIDNEY DISEASE) STAGE 4, GFR 15-29 ML/MIN (HCC): ICD-10-CM

## 2024-01-30 RX ORDER — BUPROPION HYDROCHLORIDE 150 MG/1
150 TABLET ORAL 2 TIMES DAILY
Qty: 180 TABLET | Refills: 1 | Status: SHIPPED | OUTPATIENT
Start: 2024-01-30

## 2024-01-30 RX ORDER — FLUTICASONE PROPIONATE AND SALMETEROL 250; 50 UG/1; UG/1
POWDER RESPIRATORY (INHALATION)
Qty: 60 EACH | Refills: 3 | Status: SHIPPED | OUTPATIENT
Start: 2024-01-30

## 2024-01-30 RX ORDER — ROSUVASTATIN CALCIUM 40 MG/1
40 TABLET, COATED ORAL NIGHTLY
Qty: 90 TABLET | Refills: 3 | Status: SHIPPED | OUTPATIENT
Start: 2024-01-30

## 2024-01-30 ASSESSMENT — PATIENT HEALTH QUESTIONNAIRE - PHQ9
SUM OF ALL RESPONSES TO PHQ QUESTIONS 1-9: 2
SUM OF ALL RESPONSES TO PHQ9 QUESTIONS 1 & 2: 2
1. LITTLE INTEREST OR PLEASURE IN DOING THINGS: 1
2. FEELING DOWN, DEPRESSED OR HOPELESS: 1
SUM OF ALL RESPONSES TO PHQ QUESTIONS 1-9: 2

## 2024-01-30 NOTE — PROGRESS NOTES
Post-Discharge Transitional Care Follow Up  Arrives today with her     Twila Hall   YOB: 1955    Date of Office Visit:  1/30/2024  Date of Hospital Admission: 1/13/24  Date of Hospital Discharge: 1/15/24  Readmission Risk Score (high >=14%. Medium >=10%):Readmission Risk Score: 12.1      Care management risk score Rising risk (score 2-5) and Complex Care (Scores >=6): No Risk Score On File     Non face to face  following discharge, date last encounter closed (first attempt may have been earlier): 01/25/2024     Call initiated 2 business days of discharge: Yes     Hospital discharge follow-up  -     [place] DISCHARGE MEDS RECONCILED W/ CURRENT OUTPATIENT MED LIST  CKD (chronic kidney disease) stage 4, GFR 15-29 ml/min ([facility])      Medical Decision Making: high complexity  No follow-ups on file.    On this date 1/30/2024 I have spent 15 minutes reviewing previous notes, test results and face to face with the patient discussing the diagnosis and importance of compliance with the treatment plan as well as documenting on the day of the visit.       Subjective:   HPI  Presented with abdominal pain nausea and vomiting  Inpatient course: Discharge summary reviewed- see chart.  Found to have acute pancreatitis UTI and hypertension  Had supportive treatment IV fluids start with IV Rocephin then switched to p.o. Bactrim blood pressure came down as the pain level got better  Interval history/Current status:   Abd pain resolved, nause and v have resolved  Appetite is returning.  No dysuria or hematuria or frequency  Had cut back on her tobacco and now she is up  to 1/2 ppd  tob  Patient Active Problem List   Diagnosis    Primary localized osteoarthrosis, lower leg    Class 2 severe obesity with serious comorbidity in adult ([facility])    HTN (hypertension)    Hyperlipidemia    Abscess of skin of abdomen    Intertrochanteric fracture of left femur ([facility])    Osteopenia    Failed total hip arthroplasty ([facility])

## 2024-01-31 RX ORDER — FLUTICASONE PROPIONATE AND SALMETEROL 250; 50 UG/1; UG/1
POWDER RESPIRATORY (INHALATION)
Qty: 180 EACH | OUTPATIENT
Start: 2024-01-31

## 2024-01-31 NOTE — TELEPHONE ENCOUNTER
Medication:   Requested Prescriptions     Pending Prescriptions Disp Refills    fluticasone-salmeterol (ADVAIR) 250-50 MCG/ACT AEPB diskus inhaler [Pharmacy Med Name: FLUTICASONE/SALM DISK 250/50MCG 60S] 180 each      Sig: INHALE 1 PUFF INTO THE LUNGS IN THE MORNING AND IN THE EVENING        Last Filled:  01/30/2024    Patient Phone Number: [phone] (home)     Last appt: 1/30/2024   Next appt: Visit date not found    Last OARRS:       1/19/2024     3:28 PM   RX Monitoring   Periodic Controlled Substance Monitoring No signs of potential drug abuse or diversion identified.

## 2024-02-12 PROBLEM — R79.89 ELEVATED TROPONIN: Status: RESOLVED | Noted: 2024-01-13 | Resolved: 2024-02-12

## 2024-02-15 DIAGNOSIS — M17.11 ARTHRITIS OF KNEE, RIGHT: ICD-10-CM

## 2024-02-15 RX ORDER — HYDROCODONE BITARTRATE AND ACETAMINOPHEN 7.5; 325 MG/1; MG/1
1 TABLET ORAL EVERY 6 HOURS PRN
Qty: 108 TABLET | Refills: 0 | Status: SHIPPED | OUTPATIENT
Start: 2024-02-15 | End: 2024-03-12

## 2024-02-15 NOTE — TELEPHONE ENCOUNTER
Lov 1/30/24  Lrf 1/19/24 Medication:   Requested Prescriptions     Pending Prescriptions Disp Refills    HYDROcodone-acetaminophen (LORCET PLUS) 7.5-325 MG per tablet 108 tablet 0     Sig: Take 1 tablet by mouth every 6 hours as needed for Pain for up to 26 days. Intended supply: 30 days Max Daily Amount: 4 tablets        Last Filled:      Patient Phone Number: [phone] (home)     Last appt: 1/30/2024   Next appt: Visit date not found    Last OARRS:       1/19/2024     3:28 PM   RX Monitoring   Periodic Controlled Substance Monitoring No signs of potential drug abuse or diversion identified.

## 2024-02-15 NOTE — TELEPHONE ENCOUNTER
PT called for refill    Medication and Quantity requested:      HYDROcodone-acetaminophen (LORCET PLUS) 7.5-325 MG per tablet [[id number]]     Last Visit  1/30/24    Pharmacy and phone number updated in [facility]:  yes    [id number] - [facility] [address]

## 2024-03-14 ENCOUNTER — TELEPHONE (OUTPATIENT)
Dept: FAMILY MEDICINE CLINIC | Age: 69
End: 2024-03-14

## 2024-03-14 DIAGNOSIS — M15.9 PRIMARY OSTEOARTHRITIS INVOLVING MULTIPLE JOINTS: ICD-10-CM

## 2024-03-14 DIAGNOSIS — Z76.0 ENCOUNTER FOR MEDICATION REFILL: ICD-10-CM

## 2024-03-14 RX ORDER — HYDROCODONE BITARTRATE AND ACETAMINOPHEN 7.5; 325 MG/1; MG/1
1 TABLET ORAL EVERY 6 HOURS PRN
Qty: 108 TABLET | Refills: 0 | Status: SHIPPED | OUTPATIENT
Start: 2024-03-14 | End: 2024-04-10

## 2024-03-14 RX ORDER — HYDROCODONE BITARTRATE AND ACETAMINOPHEN 7.5; 325 MG/1; MG/1
1 TABLET ORAL EVERY 6 HOURS PRN
Qty: 12 TABLET | Refills: 0 | Status: SHIPPED | OUTPATIENT
Start: 2024-03-14 | End: 2024-03-14 | Stop reason: SDUPTHER

## 2024-03-14 NOTE — TELEPHONE ENCOUNTER
Medication:   Requested Prescriptions     Pending Prescriptions Disp Refills    HYDROcodone-acetaminophen (NORCO) 7.5-325 MG per tablet 12 tablet 0     Sig: Take 1 tablet by mouth every 6 hours as needed for Pain for up to 3 days. Max Daily Amount: 4 tablets        Last Filled:  1/18/24    Patient Phone Number: [phone] (home)     Last appt: 1/30/2024   Next appt: Visit date not found    Last OARRS:       1/19/2024     3:28 PM   RX Monitoring   Periodic Controlled Substance Monitoring No signs of potential drug abuse or diversion identified.

## 2024-03-14 NOTE — TELEPHONE ENCOUNTER
Pt needs 120 qty pills called in. called and said he picked up the prescription for 12 pills because he wasn't paying attention, so if they can just get the remainder of the pills call in to [facility]       HYDROcodone-acetaminophen (NORCO) 7.5-325 MG per table

## 2024-03-14 NOTE — TELEPHONE ENCOUNTER
Medication and Quantity requested:    HYDROcodone-acetaminophen (LORCET PLUS) 7.5-325 MG per table - qty 120    Last Visit  01/30/24 - Dr Little    Pharmacy and phone number updated in [facility]:  yes    [facility] - [address]

## 2024-03-15 ENCOUNTER — TELEPHONE (OUTPATIENT)
Dept: FAMILY MEDICINE CLINIC | Age: 69
End: 2024-03-15

## 2024-03-15 NOTE — TELEPHONE ENCOUNTER
Patient's  called to say that patient's shoulder has been causing her pain and discomfort since she had surgery months ago.  is concerned since she has been really disorientated due to the pain,and  doesn't know what the next steps should be.

## 2024-03-15 NOTE — TELEPHONE ENCOUNTER
Patient's  called and said the pharmacy is not releasing the rest of the prescription for 108 tablets for the hydrocodone. Would like someone medical to call on her behalf

## 2024-03-21 ENCOUNTER — TELEPHONE (OUTPATIENT)
Dept: FAMILY MEDICINE CLINIC | Age: 69
End: 2024-03-21

## 2024-03-21 ENCOUNTER — OFFICE VISIT (OUTPATIENT)
Dept: FAMILY MEDICINE CLINIC | Age: 69
End: 2024-03-21
Payer: MEDICARE

## 2024-03-21 VITALS
SYSTOLIC BLOOD PRESSURE: 128 MMHG | HEIGHT: 67 IN | DIASTOLIC BLOOD PRESSURE: 67 MMHG | WEIGHT: 255 LBS | OXYGEN SATURATION: 98 % | BODY MASS INDEX: 40.02 KG/M2 | HEART RATE: 65 BPM

## 2024-03-21 DIAGNOSIS — M25.511 CHRONIC RIGHT SHOULDER PAIN: Primary | ICD-10-CM

## 2024-03-21 DIAGNOSIS — F40.240 CLAUSTROPHOBIA: Primary | ICD-10-CM

## 2024-03-21 DIAGNOSIS — G89.29 CHRONIC RIGHT SHOULDER PAIN: Primary | ICD-10-CM

## 2024-03-21 DIAGNOSIS — R41.0 CONFUSION: ICD-10-CM

## 2024-03-21 PROBLEM — J96.02 ACUTE HYPERCAPNIC RESPIRATORY FAILURE (HCC): Status: ACTIVE | Noted: 2024-03-21

## 2024-03-21 LAB
BASOPHILS # BLD: 0 K/UL (ref 0–0.2)
BASOPHILS NFR BLD: 0.7 %
DEPRECATED RDW RBC AUTO: 15.2 % (ref 12.4–15.4)
EOSINOPHIL # BLD: 0.2 K/UL (ref 0–0.6)
EOSINOPHIL NFR BLD: 3.4 %
HCT VFR BLD AUTO: 34.1 % (ref 36–48)
HGB BLD-MCNC: 11.1 G/DL (ref 12–16)
LYMPHOCYTES # BLD: 1 K/UL (ref 1–5.1)
LYMPHOCYTES NFR BLD: 16.3 %
MCH RBC QN AUTO: 30.5 PG (ref 26–34)
MCHC RBC AUTO-ENTMCNC: 32.7 G/DL (ref 31–36)
MCV RBC AUTO: 93.5 FL (ref 80–100)
MONOCYTES # BLD: 0.4 K/UL (ref 0–1.3)
MONOCYTES NFR BLD: 6.4 %
NEUTROPHILS # BLD: 4.6 K/UL (ref 1.7–7.7)
NEUTROPHILS NFR BLD: 73.2 %
PLATELET # BLD AUTO: 208 K/UL (ref 135–450)
PMV BLD AUTO: 7.4 FL (ref 5–10.5)
RBC # BLD AUTO: 3.65 M/UL (ref 4–5.2)
WBC # BLD AUTO: 6.3 K/UL (ref 4–11)

## 2024-03-21 PROCEDURE — 99214 OFFICE O/P EST MOD 30 MIN: CPT | Performed by: STUDENT IN AN ORGANIZED HEALTH CARE EDUCATION/TRAINING PROGRAM

## 2024-03-21 PROCEDURE — G8427 DOCREV CUR MEDS BY ELIG CLIN: HCPCS | Performed by: STUDENT IN AN ORGANIZED HEALTH CARE EDUCATION/TRAINING PROGRAM

## 2024-03-21 PROCEDURE — G8399 PT W/DXA RESULTS DOCUMENT: HCPCS | Performed by: STUDENT IN AN ORGANIZED HEALTH CARE EDUCATION/TRAINING PROGRAM

## 2024-03-21 PROCEDURE — 1123F ACP DISCUSS/DSCN MKR DOCD: CPT | Performed by: STUDENT IN AN ORGANIZED HEALTH CARE EDUCATION/TRAINING PROGRAM

## 2024-03-21 PROCEDURE — G8484 FLU IMMUNIZE NO ADMIN: HCPCS | Performed by: STUDENT IN AN ORGANIZED HEALTH CARE EDUCATION/TRAINING PROGRAM

## 2024-03-21 PROCEDURE — 3017F COLORECTAL CA SCREEN DOC REV: CPT | Performed by: STUDENT IN AN ORGANIZED HEALTH CARE EDUCATION/TRAINING PROGRAM

## 2024-03-21 PROCEDURE — 1036F TOBACCO NON-USER: CPT | Performed by: STUDENT IN AN ORGANIZED HEALTH CARE EDUCATION/TRAINING PROGRAM

## 2024-03-21 PROCEDURE — 3074F SYST BP LT 130 MM HG: CPT | Performed by: STUDENT IN AN ORGANIZED HEALTH CARE EDUCATION/TRAINING PROGRAM

## 2024-03-21 PROCEDURE — 3078F DIAST BP <80 MM HG: CPT | Performed by: STUDENT IN AN ORGANIZED HEALTH CARE EDUCATION/TRAINING PROGRAM

## 2024-03-21 PROCEDURE — 1090F PRES/ABSN URINE INCON ASSESS: CPT | Performed by: STUDENT IN AN ORGANIZED HEALTH CARE EDUCATION/TRAINING PROGRAM

## 2024-03-21 PROCEDURE — G8417 CALC BMI ABV UP PARAM F/U: HCPCS | Performed by: STUDENT IN AN ORGANIZED HEALTH CARE EDUCATION/TRAINING PROGRAM

## 2024-03-21 RX ORDER — DIAZEPAM 5 MG/1
TABLET ORAL
Qty: 2 TABLET | Refills: 0 | Status: SHIPPED | OUTPATIENT
Start: 2024-03-21 | End: 2024-03-22

## 2024-03-21 ASSESSMENT — ENCOUNTER SYMPTOMS
COUGH: 0
DIARRHEA: 0
SHORTNESS OF BREATH: 0
CONSTIPATION: 0

## 2024-03-21 NOTE — PROGRESS NOTES
summary of the plan made at this visit:    1. Chronic right shoulder pain  -     MRI SHOULDER RIGHT WO CONTRAST; Future  2. Confusion  -     CBC with Auto Differential; Future      Reviewed all pertinent previous records, including lab work and imaging.    Physical exam largely benign.  She does have evidence of previous surgical intervention on the right shoulder and there is a small mass present along the superior aspect of the right shoulder.  It appears to be soft, mobile, 2 x 3 cm in size.  It is tender to palpation.  Will obtain an MRI of the right shoulder to determine if there is any new anatomical concern or change from previous.  Will also obtain a CBC to evaluate for any possible evidence of infection.  Patient vital signs are stable and she appears to be behaving at her baseline at this time.  Assuming no significant findings on MRI and normal lab workup, recommend follow-up with PCP as previously instructed.    Encouraged patient to call back with any question/concerns.  Return/ED precautions discussed, all questions/concerns answered and patient verbalized understanding/approval of treatment plan.   Marquise Escobedo, DO    This note was generated completely or in part utilizing Dragon dictation speech recognition software.  Occasionally, words are mistranscribed and despite editing, the text may contain inaccuracies due to incorrect word recognition.  If further clarification is needed please contact the office at [phone].

## 2024-03-21 NOTE — TELEPHONE ENCOUNTER
Pt needs to get sedation for her right shoulder mri because she can't lay still for a whole hour. Once the order with sedation is in epic, scheduling will call pt to set up appt

## 2024-04-04 DIAGNOSIS — F41.9 ANXIETY: Primary | ICD-10-CM

## 2024-04-04 RX ORDER — DIAZEPAM 5 MG/1
TABLET ORAL
Qty: 2 TABLET | Refills: 0 | Status: SHIPPED | OUTPATIENT
Start: 2024-04-04 | End: 2024-04-18

## 2024-04-04 NOTE — TELEPHONE ENCOUNTER
[facility] Scheduling is still waiting on new order with sedation.  [facility] - [phone]    When order updated notify pt so she can schedule.

## 2024-04-05 ENCOUNTER — TELEPHONE (OUTPATIENT)
Dept: FAMILY MEDICINE CLINIC | Age: 69
End: 2024-04-05

## 2024-04-05 NOTE — TELEPHONE ENCOUNTER
Patient is itching in the back of buttock area and the area is red.  No pain but irritating.since this morning.  Patient is concern the itching will irritate the front of her private area.    Patient is asking for a call back.  [phone]

## 2024-04-08 NOTE — TELEPHONE ENCOUNTER
Patient's  is needing patient to be sedated during the MRI. Patient was told by Dr. WALKER  that he is does not want her sedated during this. Patient's  wants us to ask Dr. Little.

## 2024-04-09 ENCOUNTER — TELEPHONE (OUTPATIENT)
Dept: FAMILY MEDICINE CLINIC | Age: 69
End: 2024-04-09

## 2024-04-09 RX ORDER — NYSTATIN 100000 U/G
CREAM TOPICAL
Qty: 30 G | Refills: 1 | Status: SHIPPED | OUTPATIENT
Start: 2024-04-09

## 2024-04-09 RX ORDER — TRIAMCINOLONE ACETONIDE 0.25 MG/G
CREAM TOPICAL
Qty: 30 G | Refills: 1 | Status: SHIPPED | OUTPATIENT
Start: 2024-04-09

## 2024-04-09 NOTE — TELEPHONE ENCOUNTER
Patient spouse called to have medication refilled.  Note spouse said that for insurance purposes to separate the two medications so that the medication will be no cost to the patient.    Please order Nystatin by itself    AND    Please order Triamcinolone by itself    --------------------------------------------------------------    Medication and Quantity requested:      nystatin-triamcinolone (MYCOLOG II) 986391-1.1 UNIT/GM-% cream [[id number]]     Last Visit  3-    Pharmacy and phone number updated in [facility]:  yes    [facility] #[id number] [facility] [address] - [place] [phone] - F [phone]

## 2024-04-15 DIAGNOSIS — M17.11 ARTHRITIS OF KNEE, RIGHT: ICD-10-CM

## 2024-04-15 RX ORDER — HYDROCODONE BITARTRATE AND ACETAMINOPHEN 7.5; 325 MG/1; MG/1
1 TABLET ORAL EVERY 6 HOURS PRN
Qty: 108 TABLET | Refills: 0 | Status: SHIPPED | OUTPATIENT
Start: 2024-04-15 | End: 2024-05-11

## 2024-04-15 NOTE — TELEPHONE ENCOUNTER
Patient called and would like to know why only 108 tablets instead of the 120 tabs as requested   Patient is upset this happens every month and wants to know why this keeps happening     Please call and advise

## 2024-04-15 NOTE — TELEPHONE ENCOUNTER
Lov 3/21/24  Lrf 108 0 2/15/24Medication:   Requested Prescriptions     Pending Prescriptions Disp Refills    HYDROcodone-acetaminophen (LORCET PLUS) 7.5-325 MG per tablet 108 tablet 0     Sig: Take 1 tablet by mouth every 6 hours as needed for Pain for up to 26 days. Intended supply: 30 days Max Daily Amount: 4 tablets        Last Filled:      Patient Phone Number: [phone] (home)     Last appt: 3/21/2024   Next appt: Visit date not found    Last OARRS:       1/19/2024     3:28 PM   RX Monitoring   Periodic Controlled Substance Monitoring No signs of potential drug abuse or diversion identified.

## 2024-04-15 NOTE — TELEPHONE ENCOUNTER
Medication and Quantity requested: Hydrocodone #120  Patient states #120 is not always sent to pharmacy  Patient states the quantity is #120  Last Visit  3-21-24 Dr. Escobedo    Pharmacy and phone number updated in [facility]:  yes,[place]

## 2024-05-13 DIAGNOSIS — M17.11 ARTHRITIS OF KNEE, RIGHT: ICD-10-CM

## 2024-05-13 RX ORDER — HYDROCODONE BITARTRATE AND ACETAMINOPHEN 7.5; 325 MG/1; MG/1
1 TABLET ORAL EVERY 6 HOURS PRN
Qty: 108 TABLET | Refills: 0 | Status: SHIPPED | OUTPATIENT
Start: 2024-05-13 | End: 2024-06-08

## 2024-05-13 NOTE — TELEPHONE ENCOUNTER
Lov 3/21/24  Lrf 108 0 4/15/24 Medication:   Requested Prescriptions     Pending Prescriptions Disp Refills    HYDROcodone-acetaminophen (LORCET PLUS) 7.5-325 MG per tablet 108 tablet 0     Sig: Take 1 tablet by mouth every 6 hours as needed for Pain for up to 26 days. Intended supply: 30 days Max Daily Amount: 120 tablets        Last Filled:      Patient Phone Number: [phone] (home)     Last appt: 3/21/2024   Next appt: Visit date not found    Last OARRS:       1/19/2024     3:28 PM   RX Monitoring   Periodic Controlled Substance Monitoring No signs of potential drug abuse or diversion identified.

## 2024-05-13 NOTE — TELEPHONE ENCOUNTER
Medication and Quantity requested:      HYDROcodone-acetaminophen (LORCET PLUS) 7.5-325 MG per tablet [[id number]]  ENDED       PATIENT STATED IT NEEDS TO BE  120 QTY     Last Visit  03/21/2024      Pharmacy and phone number updated in [facility]:  YES      [facility] [address]

## 2024-06-11 DIAGNOSIS — M17.11 ARTHRITIS OF KNEE, RIGHT: ICD-10-CM

## 2024-06-11 RX ORDER — HYDROCODONE BITARTRATE AND ACETAMINOPHEN 7.5; 325 MG/1; MG/1
1 TABLET ORAL EVERY 6 HOURS PRN
Qty: 108 TABLET | Refills: 0 | Status: SHIPPED | OUTPATIENT
Start: 2024-06-11 | End: 2024-07-07

## 2024-06-11 NOTE — TELEPHONE ENCOUNTER
Lov 03/21/2024Medication:   Requested Prescriptions     Pending Prescriptions Disp Refills    HYDROcodone-acetaminophen (LORCET PLUS) 7.5-325 MG per tablet 108 tablet 0     Sig: Take 1 tablet by mouth every 6 hours as needed for Pain for up to 26 days. Intended supply: 30 days Max Daily Amount: 120 tablets        Last Filled:      Patient Phone Number: [phone] (home)     Last appt: 3/21/2024   Next appt: Visit date not found    Last OARRS:       5/13/2024    12:47 PM   RX Monitoring   Periodic Controlled Substance Monitoring No signs of potential drug abuse or diversion identified.             Lrf 05/13/2024 108 tab 0 refills

## 2024-06-11 NOTE — TELEPHONE ENCOUNTER
Medication and Quantity requested:   HYDROcodone-acetaminophen (LORCET PLUS) 7.5-325 MG per tablet      Last Visit  3/21/2024    Pharmacy and phone number updated in [facility]:  yes- send to [facility]  '

## 2024-06-19 ENCOUNTER — APPOINTMENT (OUTPATIENT)
Dept: CT IMAGING | Age: 69
End: 2024-06-19
Payer: MEDICARE

## 2024-06-19 ENCOUNTER — HOSPITAL ENCOUNTER (INPATIENT)
Age: 69
LOS: 2 days | Discharge: HOME | End: 2024-06-22
Attending: EMERGENCY MEDICINE | Admitting: HOSPITALIST
Payer: MEDICARE

## 2024-06-19 ENCOUNTER — OFFICE VISIT (OUTPATIENT)
Dept: FAMILY MEDICINE CLINIC | Age: 69
End: 2024-06-19
Payer: MEDICARE

## 2024-06-19 VITALS
WEIGHT: 235 LBS | TEMPERATURE: 96.1 F | DIASTOLIC BLOOD PRESSURE: 60 MMHG | OXYGEN SATURATION: 96 % | HEART RATE: 81 BPM | BODY MASS INDEX: 36.81 KG/M2 | SYSTOLIC BLOOD PRESSURE: 88 MMHG

## 2024-06-19 DIAGNOSIS — R79.89 ELEVATED TROPONIN: ICD-10-CM

## 2024-06-19 DIAGNOSIS — N39.0 URINARY TRACT INFECTION WITHOUT HEMATURIA, SITE UNSPECIFIED: ICD-10-CM

## 2024-06-19 DIAGNOSIS — R11.2 NAUSEA AND VOMITING, UNSPECIFIED VOMITING TYPE: ICD-10-CM

## 2024-06-19 DIAGNOSIS — R42 DIZZINESS: ICD-10-CM

## 2024-06-19 DIAGNOSIS — I95.9 HYPOTENSION, UNSPECIFIED HYPOTENSION TYPE: Primary | ICD-10-CM

## 2024-06-19 DIAGNOSIS — R63.4 UNINTENTIONAL WEIGHT LOSS: ICD-10-CM

## 2024-06-19 DIAGNOSIS — N17.9 AKI (ACUTE KIDNEY INJURY) (HCC): ICD-10-CM

## 2024-06-19 LAB
ALBUMIN SERPL-MCNC: 4.1 G/DL (ref 3.4–5)
ALBUMIN/GLOB SERPL: 1.2 {RATIO} (ref 1.1–2.2)
ALP SERPL-CCNC: 147 U/L (ref 40–129)
ALT SERPL-CCNC: 7 U/L (ref 10–40)
ANION GAP SERPL CALCULATED.3IONS-SCNC: 20 MMOL/L (ref 3–16)
AST SERPL-CCNC: 19 U/L (ref 15–37)
BACTERIA URNS QL MICRO: ABNORMAL /HPF
BASOPHILS # BLD: 0.1 K/UL (ref 0–0.2)
BASOPHILS NFR BLD: 0.8 %
BILIRUB SERPL-MCNC: 0.3 MG/DL (ref 0–1)
BILIRUB UR QL STRIP.AUTO: NEGATIVE
BUN SERPL-MCNC: 62 MG/DL (ref 7–20)
CALCIUM SERPL-MCNC: 8.8 MG/DL (ref 8.3–10.6)
CHARACTER UR: ABNORMAL
CHLORIDE SERPL-SCNC: 96 MMOL/L (ref 99–110)
CLARITY UR: ABNORMAL
CO2 SERPL-SCNC: 15 MMOL/L (ref 21–32)
COLOR UR: YELLOW
CREAT SERPL-MCNC: 4.7 MG/DL (ref 0.6–1.2)
DEPRECATED RDW RBC AUTO: 14.8 % (ref 12.4–15.4)
EOSINOPHIL # BLD: 0.2 K/UL (ref 0–0.6)
EOSINOPHIL NFR BLD: 2.2 %
EPI CELLS #/AREA URNS AUTO: 50 /HPF (ref 0–5)
GFR SERPLBLD CREATININE-BSD FMLA CKD-EPI: 10 ML/MIN/{1.73_M2}
GLUCOSE SERPL-MCNC: 115 MG/DL (ref 70–99)
GLUCOSE UR STRIP.AUTO-MCNC: NEGATIVE MG/DL
HCT VFR BLD AUTO: 40.1 % (ref 36–48)
HGB BLD-MCNC: 13.4 G/DL (ref 12–16)
HGB UR QL STRIP.AUTO: NEGATIVE
HYALINE CASTS #/AREA URNS LPF: ABNORMAL /LPF (ref 0–2)
KETONES UR STRIP.AUTO-MCNC: 15 MG/DL
LACTATE BLDV-SCNC: 1.2 MMOL/L (ref 0.4–1.9)
LACTATE BLDV-SCNC: 1.6 MMOL/L (ref 0.4–1.9)
LEUKOCYTE ESTERASE UR QL STRIP.AUTO: ABNORMAL
LIPASE SERPL-CCNC: 35 U/L (ref 13–60)
LYMPHOCYTES # BLD: 2 K/UL (ref 1–5.1)
LYMPHOCYTES NFR BLD: 22.3 %
MCH RBC QN AUTO: 30.9 PG (ref 26–34)
MCHC RBC AUTO-ENTMCNC: 33.4 G/DL (ref 31–36)
MCV RBC AUTO: 92.4 FL (ref 80–100)
MONOCYTES # BLD: 0.9 K/UL (ref 0–1.3)
MONOCYTES NFR BLD: 9.6 %
NEUTROPHILS # BLD: 5.8 K/UL (ref 1.7–7.7)
NEUTROPHILS NFR BLD: 65.1 %
NITRITE UR QL STRIP.AUTO: NEGATIVE
NT-PROBNP SERPL-MCNC: 5624 PG/ML (ref 0–124)
PH UR STRIP.AUTO: 5 [PH] (ref 5–8)
PLATELET # BLD AUTO: 238 K/UL (ref 135–450)
PMV BLD AUTO: 7.2 FL (ref 5–10.5)
POTASSIUM SERPL-SCNC: 4.1 MMOL/L (ref 3.5–5.1)
PROT SERPL-MCNC: 7.6 G/DL (ref 6.4–8.2)
PROT UR STRIP.AUTO-MCNC: 100 MG/DL
RBC # BLD AUTO: 4.34 M/UL (ref 4–5.2)
RBC CLUMPS #/AREA URNS AUTO: 3 /HPF (ref 0–4)
SODIUM SERPL-SCNC: 131 MMOL/L (ref 136–145)
SP GR UR STRIP.AUTO: 1.03 (ref 1–1.03)
TROPONIN, HIGH SENSITIVITY: 120 NG/L (ref 0–14)
TROPONIN, HIGH SENSITIVITY: 126 NG/L (ref 0–14)
UA COMPLETE W REFLEX CULTURE PNL UR: YES
UA DIPSTICK W REFLEX MICRO PNL UR: YES
URN SPEC COLLECT METH UR: ABNORMAL
UROBILINOGEN UR STRIP-ACNC: 1 E.U./DL
WBC # BLD AUTO: 8.9 K/UL (ref 4–11)
WBC #/AREA URNS AUTO: 49 /HPF (ref 0–5)

## 2024-06-19 PROCEDURE — 87086 URINE CULTURE/COLONY COUNT: CPT

## 2024-06-19 PROCEDURE — 3078F DIAST BP <80 MM HG: CPT | Performed by: FAMILY MEDICINE

## 2024-06-19 PROCEDURE — 71250 CT THORAX DX C-: CPT

## 2024-06-19 PROCEDURE — 96374 THER/PROPH/DIAG INJ IV PUSH: CPT

## 2024-06-19 PROCEDURE — 85025 COMPLETE CBC W/AUTO DIFF WBC: CPT

## 2024-06-19 PROCEDURE — 3074F SYST BP LT 130 MM HG: CPT | Performed by: FAMILY MEDICINE

## 2024-06-19 PROCEDURE — 2580000003 HC RX 258: Performed by: PHYSICIAN ASSISTANT

## 2024-06-19 PROCEDURE — G8399 PT W/DXA RESULTS DOCUMENT: HCPCS | Performed by: FAMILY MEDICINE

## 2024-06-19 PROCEDURE — 1123F ACP DISCUSS/DSCN MKR DOCD: CPT | Performed by: FAMILY MEDICINE

## 2024-06-19 PROCEDURE — 81001 URINALYSIS AUTO W/SCOPE: CPT

## 2024-06-19 PROCEDURE — 3017F COLORECTAL CA SCREEN DOC REV: CPT | Performed by: FAMILY MEDICINE

## 2024-06-19 PROCEDURE — 83880 ASSAY OF NATRIURETIC PEPTIDE: CPT

## 2024-06-19 PROCEDURE — 83605 ASSAY OF LACTIC ACID: CPT

## 2024-06-19 PROCEDURE — G8417 CALC BMI ABV UP PARAM F/U: HCPCS | Performed by: FAMILY MEDICINE

## 2024-06-19 PROCEDURE — 83690 ASSAY OF LIPASE: CPT

## 2024-06-19 PROCEDURE — 1036F TOBACCO NON-USER: CPT | Performed by: FAMILY MEDICINE

## 2024-06-19 PROCEDURE — 93005 ELECTROCARDIOGRAM TRACING: CPT | Performed by: PHYSICIAN ASSISTANT

## 2024-06-19 PROCEDURE — 99214 OFFICE O/P EST MOD 30 MIN: CPT | Performed by: FAMILY MEDICINE

## 2024-06-19 PROCEDURE — 84484 ASSAY OF TROPONIN QUANT: CPT

## 2024-06-19 PROCEDURE — 6360000002 HC RX W HCPCS: Performed by: PHYSICIAN ASSISTANT

## 2024-06-19 PROCEDURE — G2211 COMPLEX E/M VISIT ADD ON: HCPCS | Performed by: FAMILY MEDICINE

## 2024-06-19 PROCEDURE — 80053 COMPREHEN METABOLIC PANEL: CPT

## 2024-06-19 PROCEDURE — G8427 DOCREV CUR MEDS BY ELIG CLIN: HCPCS | Performed by: FAMILY MEDICINE

## 2024-06-19 PROCEDURE — 1090F PRES/ABSN URINE INCON ASSESS: CPT | Performed by: FAMILY MEDICINE

## 2024-06-19 PROCEDURE — 99285 EMERGENCY DEPT VISIT HI MDM: CPT

## 2024-06-19 RX ORDER — 0.9 % SODIUM CHLORIDE 0.9 %
1000 INTRAVENOUS SOLUTION INTRAVENOUS ONCE
Status: COMPLETED | OUTPATIENT
Start: 2024-06-19 | End: 2024-06-19

## 2024-06-19 RX ORDER — ONDANSETRON 2 MG/ML
4 INJECTION INTRAMUSCULAR; INTRAVENOUS ONCE
Status: COMPLETED | OUTPATIENT
Start: 2024-06-19 | End: 2024-06-19

## 2024-06-19 RX ADMIN — SODIUM CHLORIDE 1000 ML: 9 INJECTION, SOLUTION INTRAVENOUS at 20:38

## 2024-06-19 RX ADMIN — SODIUM CHLORIDE 1000 ML: 9 INJECTION, SOLUTION INTRAVENOUS at 19:17

## 2024-06-19 RX ADMIN — ONDANSETRON 4 MG: 2 INJECTION INTRAMUSCULAR; INTRAVENOUS at 19:17

## 2024-06-19 SDOH — ECONOMIC STABILITY: FOOD INSECURITY: WITHIN THE PAST 12 MONTHS, YOU WORRIED THAT YOUR FOOD WOULD RUN OUT BEFORE YOU GOT MONEY TO BUY MORE.: NEVER TRUE

## 2024-06-19 SDOH — ECONOMIC STABILITY: FOOD INSECURITY: WITHIN THE PAST 12 MONTHS, THE FOOD YOU BOUGHT JUST DIDN'T LAST AND YOU DIDN'T HAVE MONEY TO GET MORE.: NEVER TRUE

## 2024-06-19 SDOH — ECONOMIC STABILITY: INCOME INSECURITY: HOW HARD IS IT FOR YOU TO PAY FOR THE VERY BASICS LIKE FOOD, HOUSING, MEDICAL CARE, AND HEATING?: NOT HARD AT ALL

## 2024-06-19 ASSESSMENT — ENCOUNTER SYMPTOMS
NAUSEA: 1
CONSTIPATION: 0
BACK PAIN: 0
COUGH: 0
SORE THROAT: 0
DIARRHEA: 1
RHINORRHEA: 0
SHORTNESS OF BREATH: 0
EYE PAIN: 0
VOMITING: 1
ABDOMINAL PAIN: 1

## 2024-06-19 ASSESSMENT — LIFESTYLE VARIABLES
HOW MANY STANDARD DRINKS CONTAINING ALCOHOL DO YOU HAVE ON A TYPICAL DAY: PATIENT DOES NOT DRINK
HOW OFTEN DO YOU HAVE A DRINK CONTAINING ALCOHOL: NEVER

## 2024-06-19 ASSESSMENT — PAIN - FUNCTIONAL ASSESSMENT: PAIN_FUNCTIONAL_ASSESSMENT: NONE - DENIES PAIN

## 2024-06-19 ASSESSMENT — PAIN SCALES - GENERAL: PAINLEVEL_OUTOF10: 0

## 2024-06-19 NOTE — PROGRESS NOTES
[facility]  Clinic Note    Date: 6/19/2024                                               /Objective:     Chief Complaint   Patient presents with    Dizziness     can't eat, low b/p  Started with pt having flu like symptom        HPI  Dizziness starting a few days ago. Describes it as room spinning at time, lightheaded at other times. Has had this before but not as bad. +nausea and vomiting, most recently vomited yesterday. Is drinking fluids okay. Hasn't eaten much in the last few days due to the nausea. +diarrhea a few days ago that resolved. No abdominal pain. No blood in stool.          Patient Active Problem List    Diagnosis Date Noted    Acute hypercapnic respiratory failure (HCC) 03/21/2024    Acute pancreatitis 01/13/2024    Hypertensive urgency 01/13/2024    Idiopathic acute pancreatitis 10/26/2023    CKD (chronic kidney disease) stage 4, GFR 15-29 ml/min ([facility]) 11/11/2021    Acute kidney injury superimposed on CKD ([facility]) 04/16/2021    Iron deficiency anemia secondary to blood loss (chronic) 08/04/2020    Restless legs syndrome 02/27/2020    Unstable angina ([facility])     COPD exacerbation ([facility]) 09/14/2019    SOB (shortness of breath) 07/28/2019    Stage 3b chronic kidney disease (CKD) ([facility]) 04/03/2019    Chronic midline low back pain without sciatica 04/03/2019    Acute-on-chronic kidney injury ([facility]) 04/19/2018    Metabolic acidosis, normal anion gap (NAG) 04/19/2018    Morbid obesity ([facility]) 04/19/2018    Sepsis ([facility]) 04/18/2018    Carpal tunnel syndrome of left wrist 08/21/2017    Tobacco use disorder 01/23/2017    Arthritis of knee, right 05/09/2016    Superficial postoperative wound infection 04/01/2016    Coronary artery disease involving native coronary artery of native heart without angina pectoris 03/31/2016    Iron deficiency anemia 03/31/2016    Status post coronary artery bypass graft 03/31/2016    History of total left hip arthroplasty 11/4/15 12/09/2015    Failed total hip arthroplasty

## 2024-06-19 NOTE — ED PROVIDER NOTES
[facility]  EMERGENCY DEPARTMENT ENCOUNTER        Pt Name: Twila Hall  MRN: [id number]  Birthdate 1955  Date of evaluation: 6/19/2024  Provider: HARRISON Martinez  PCP: Mitch Little MD  Note Started: 7:06 PM EDT 6/19/24       I have seen and evaluated this patient with my supervising physician Emeli Bey MD.      CHIEF COMPLAINT       Chief Complaint   Patient presents with    Dizziness     Pt to ED from home c/o lightheadedness, dehydration and fatigue. Pt states \"I was vomiting and diarrhea x2 days.\" Pt states \"my BP was low at home\" Pt bp 71/47       HISTORY OF PRESENT ILLNESS: 1 or more Elements     History from : Patient    Limitations to history : None    Twila Hall is a 68 y.o. female who presents to the emerged part due to nausea vomiting lightheadedness sensation dizziness when she stands up too quickly.  Patient states that this has been going on for the past 2 to 3 days.  She also has some generalized abdominal pain that is mild.  She admits to smoking marijuana.  She denies any fevers or chills.  She denies any chest pain, shortness of breath or difficulty breathing.  Patient states that she is been able to tolerate oral fluids has not been able to eat much since feeling this way over the last couple days.  She states that she does have a history of low blood pressure in the past but states that her blood pressure was low compared to what she is used to.    Nursing Notes were all reviewed and agreed with or any disagreements were addressed in the HPI.    REVIEW OF SYSTEMS :      Review of Systems   Constitutional:  Positive for fatigue. Negative for chills, diaphoresis and fever.   HENT:  Negative for congestion, rhinorrhea and sore throat.    Eyes:  Negative for pain and visual disturbance.   Respiratory:  Negative for cough and shortness of breath.    Cardiovascular:  Negative for chest pain and leg swelling.   Gastrointestinal:  Positive for abdominal pain,  Positive for abdominal pain, diarrhea, nausea and vomiting. Negative for constipation.   Genitourinary:  Negative for difficulty urinating, dysuria and frequency.   Musculoskeletal:  Negative for back pain and neck pain.   Skin:  Negative for rash and wound.   Neurological:  Positive for light-headedness. Negative for dizziness.       Positives and Pertinent negatives as per HPI.     SURGICAL HISTORY     Past Surgical History:   Procedure Laterality Date    ABSCESS DRAINAGE Left 04/19/2018    incision and drainage of right buttock abscess    APPENDECTOMY      CARPAL TUNNEL RELEASE Left 11/14/2017    CORONARY ARTERY BYPASS GRAFT  9/28/10    4 way bypass    HIP SURGERY Left 2016    I&D of joint    KNEE ARTHROSCOPY Right 2000    TOTAL HIP ARTHROPLASTY Left 11-4-2015    TOTAL KNEE ARTHROPLASTY Right 8-24-10    TOTAL KNEE ARTHROPLASTY Left 4/8/11    TUBAL LIGATION      UPPER GASTROINTESTINAL ENDOSCOPY N/A 7/10/2020    EGD DIAGNOSTIC ONLY performed by Michele Newsome MD at [facility] ENDOSCOPY       CURRENTMEDICATIONS       Previous Medications    ALBUTEROL SULFATE HFA (PROVENTIL;VENTOLIN;PROAIR) 108 (90 BASE) MCG/ACT INHALER    INHALE 2 PUFFS BY MOUTH EVERY 6 HOURS AS NEEDED FOR WHEEZE    BUPROPION (WELLBUTRIN XL) 150 MG EXTENDED RELEASE TABLET    Take 1 tablet by mouth daily    BUPROPION (WELLBUTRIN XL) 150 MG EXTENDED RELEASE TABLET    Take 1 tablet by mouth 2 times daily    CARBIDOPA-LEVODOPA (SINEMET)  MG PER TABLET    TAKE 2 TABLETS BY MOUTH EVERY DAY IN THE EVENING    CARVEDILOL (COREG) 12.5 MG TABLET    TAKE 1 TABLET BY MOUTH TWICE A DAY    CLOPIDOGREL (PLAVIX) 75 MG TABLET    Take 1 tablet by mouth daily    FLUOXETINE (PROZAC) 40 MG CAPSULE    Take 2 caps daily    FLUTICASONE-SALMETEROL (ADVAIR DISKUS) 250-50 MCG/ACT AEPB DISKUS INHALER    INHALE 1 PUFF INTO THE LUNGS IN THE MORNING AND IN THE EVENING    HYDROCODONE-ACETAMINOPHEN (LORCET PLUS) 7.5-325 MG PER TABLET    Take 1 tablet by mouth every 6 hours as needed

## 2024-06-20 PROBLEM — K52.9 GASTROENTERITIS: Status: ACTIVE | Noted: 2024-06-20

## 2024-06-20 PROBLEM — R79.89 ELEVATED BRAIN NATRIURETIC PEPTIDE (BNP) LEVEL: Status: ACTIVE | Noted: 2024-06-20

## 2024-06-20 PROBLEM — N39.0 UTI (URINARY TRACT INFECTION): Status: ACTIVE | Noted: 2024-06-20

## 2024-06-20 PROBLEM — Z72.0 TOBACCO ABUSE: Status: ACTIVE | Noted: 2017-01-23

## 2024-06-20 LAB
ANION GAP SERPL CALCULATED.3IONS-SCNC: 14 MMOL/L (ref 3–16)
BUN SERPL-MCNC: 66 MG/DL (ref 7–20)
CALCIUM SERPL-MCNC: 7.8 MG/DL (ref 8.3–10.6)
CHLORIDE SERPL-SCNC: 105 MMOL/L (ref 99–110)
CO2 SERPL-SCNC: 16 MMOL/L (ref 21–32)
CREAT SERPL-MCNC: 4.7 MG/DL (ref 0.6–1.2)
CREAT UR-MCNC: 119.5 MG/DL (ref 28–259)
EKG ATRIAL RATE: 77 BPM
EKG DIAGNOSIS: NORMAL
EKG P AXIS: 73 DEGREES
EKG P-R INTERVAL: 142 MS
EKG Q-T INTERVAL: 402 MS
EKG QRS DURATION: 86 MS
EKG QTC CALCULATION (BAZETT): 454 MS
EKG R AXIS: -22 DEGREES
EKG T AXIS: 22 DEGREES
EKG VENTRICULAR RATE: 77 BPM
GFR SERPLBLD CREATININE-BSD FMLA CKD-EPI: 10 ML/MIN/{1.73_M2}
GLUCOSE SERPL-MCNC: 136 MG/DL (ref 70–99)
POTASSIUM SERPL-SCNC: 4 MMOL/L (ref 3.5–5.1)
SODIUM SERPL-SCNC: 135 MMOL/L (ref 136–145)

## 2024-06-20 PROCEDURE — 82570 ASSAY OF URINE CREATININE: CPT

## 2024-06-20 PROCEDURE — 6370000000 HC RX 637 (ALT 250 FOR IP): Performed by: HOSPITALIST

## 2024-06-20 PROCEDURE — 2580000003 HC RX 258: Performed by: HOSPITALIST

## 2024-06-20 PROCEDURE — 2500000003 HC RX 250 WO HCPCS

## 2024-06-20 PROCEDURE — 87040 BLOOD CULTURE FOR BACTERIA: CPT

## 2024-06-20 PROCEDURE — 6360000002 HC RX W HCPCS: Performed by: HOSPITALIST

## 2024-06-20 PROCEDURE — 1200000000 HC SEMI PRIVATE

## 2024-06-20 PROCEDURE — 36415 COLL VENOUS BLD VENIPUNCTURE: CPT

## 2024-06-20 PROCEDURE — 94760 N-INVAS EAR/PLS OXIMETRY 1: CPT

## 2024-06-20 PROCEDURE — 51798 US URINE CAPACITY MEASURE: CPT

## 2024-06-20 PROCEDURE — 6370000000 HC RX 637 (ALT 250 FOR IP)

## 2024-06-20 PROCEDURE — 6370000000 HC RX 637 (ALT 250 FOR IP): Performed by: NURSE PRACTITIONER

## 2024-06-20 PROCEDURE — 2580000003 HC RX 258

## 2024-06-20 PROCEDURE — 93010 ELECTROCARDIOGRAM REPORT: CPT | Performed by: INTERNAL MEDICINE

## 2024-06-20 PROCEDURE — 80048 BASIC METABOLIC PNL TOTAL CA: CPT

## 2024-06-20 RX ORDER — LANOLIN ALCOHOL/MO/W.PET/CERES
3 CREAM (GRAM) TOPICAL NIGHTLY
Status: DISCONTINUED | OUTPATIENT
Start: 2024-06-20 | End: 2024-06-22 | Stop reason: HOSPADM

## 2024-06-20 RX ORDER — ONDANSETRON 4 MG/1
4 TABLET, ORALLY DISINTEGRATING ORAL EVERY 8 HOURS PRN
Status: DISCONTINUED | OUTPATIENT
Start: 2024-06-20 | End: 2024-06-22 | Stop reason: HOSPADM

## 2024-06-20 RX ORDER — HYDROCODONE BITARTRATE AND ACETAMINOPHEN 7.5; 325 MG/1; MG/1
1 TABLET ORAL 2 TIMES DAILY PRN
Status: DISCONTINUED | OUTPATIENT
Start: 2024-06-20 | End: 2024-06-20

## 2024-06-20 RX ORDER — SODIUM CHLORIDE 0.9 % (FLUSH) 0.9 %
5-40 SYRINGE (ML) INJECTION EVERY 12 HOURS SCHEDULED
Status: DISCONTINUED | OUTPATIENT
Start: 2024-06-20 | End: 2024-06-22 | Stop reason: HOSPADM

## 2024-06-20 RX ORDER — ONDANSETRON 2 MG/ML
4 INJECTION INTRAMUSCULAR; INTRAVENOUS EVERY 6 HOURS PRN
Status: DISCONTINUED | OUTPATIENT
Start: 2024-06-20 | End: 2024-06-22 | Stop reason: HOSPADM

## 2024-06-20 RX ORDER — ACETAMINOPHEN 650 MG/1
650 SUPPOSITORY RECTAL EVERY 6 HOURS PRN
Status: DISCONTINUED | OUTPATIENT
Start: 2024-06-20 | End: 2024-06-22 | Stop reason: HOSPADM

## 2024-06-20 RX ORDER — SODIUM CHLORIDE 9 MG/ML
INJECTION, SOLUTION INTRAVENOUS PRN
Status: DISCONTINUED | OUTPATIENT
Start: 2024-06-20 | End: 2024-06-22 | Stop reason: HOSPADM

## 2024-06-20 RX ORDER — SODIUM CHLORIDE 9 MG/ML
INJECTION, SOLUTION INTRAVENOUS CONTINUOUS
Status: DISCONTINUED | OUTPATIENT
Start: 2024-06-20 | End: 2024-06-20

## 2024-06-20 RX ORDER — MIDODRINE HYDROCHLORIDE 5 MG/1
2.5 TABLET ORAL
Status: DISCONTINUED | OUTPATIENT
Start: 2024-06-20 | End: 2024-06-21

## 2024-06-20 RX ORDER — HYDROCODONE BITARTRATE AND ACETAMINOPHEN 7.5; 325 MG/1; MG/1
1 TABLET ORAL EVERY 6 HOURS PRN
Status: DISCONTINUED | OUTPATIENT
Start: 2024-06-20 | End: 2024-06-22 | Stop reason: HOSPADM

## 2024-06-20 RX ORDER — POLYETHYLENE GLYCOL 3350 17 G/17G
17 POWDER, FOR SOLUTION ORAL DAILY PRN
Status: DISCONTINUED | OUTPATIENT
Start: 2024-06-20 | End: 2024-06-22 | Stop reason: HOSPADM

## 2024-06-20 RX ORDER — ENOXAPARIN SODIUM 100 MG/ML
30 INJECTION SUBCUTANEOUS DAILY
Status: DISCONTINUED | OUTPATIENT
Start: 2024-06-20 | End: 2024-06-22 | Stop reason: HOSPADM

## 2024-06-20 RX ORDER — NICOTINE 21 MG/24HR
1 PATCH, TRANSDERMAL 24 HOURS TRANSDERMAL DAILY
Status: DISCONTINUED | OUTPATIENT
Start: 2024-06-20 | End: 2024-06-22 | Stop reason: HOSPADM

## 2024-06-20 RX ORDER — SODIUM CHLORIDE 0.9 % (FLUSH) 0.9 %
5-40 SYRINGE (ML) INJECTION PRN
Status: DISCONTINUED | OUTPATIENT
Start: 2024-06-20 | End: 2024-06-22 | Stop reason: HOSPADM

## 2024-06-20 RX ORDER — ACETAMINOPHEN 325 MG/1
650 TABLET ORAL EVERY 6 HOURS PRN
Status: DISCONTINUED | OUTPATIENT
Start: 2024-06-20 | End: 2024-06-22 | Stop reason: HOSPADM

## 2024-06-20 RX ADMIN — Medication 3 MG: at 21:18

## 2024-06-20 RX ADMIN — CEFTRIAXONE SODIUM 2000 MG: 2 INJECTION, POWDER, FOR SOLUTION INTRAMUSCULAR; INTRAVENOUS at 01:10

## 2024-06-20 RX ADMIN — SODIUM CHLORIDE, PRESERVATIVE FREE 10 ML: 5 INJECTION INTRAVENOUS at 10:30

## 2024-06-20 RX ADMIN — ENOXAPARIN SODIUM 30 MG: 100 INJECTION SUBCUTANEOUS at 08:01

## 2024-06-20 RX ADMIN — Medication: at 15:37

## 2024-06-20 RX ADMIN — HYDROCODONE BITARTRATE AND ACETAMINOPHEN 1 TABLET: 7.5; 325 TABLET ORAL at 10:29

## 2024-06-20 RX ADMIN — CARBIDOPA AND LEVODOPA 2 TABLET: 10; 100 TABLET ORAL at 22:34

## 2024-06-20 RX ADMIN — HYDROCODONE BITARTRATE AND ACETAMINOPHEN 1 TABLET: 7.5; 325 TABLET ORAL at 18:43

## 2024-06-20 RX ADMIN — ACETAMINOPHEN 325MG 650 MG: 325 TABLET ORAL at 04:33

## 2024-06-20 RX ADMIN — MIDODRINE HYDROCHLORIDE 2.5 MG: 5 TABLET ORAL at 16:57

## 2024-06-20 RX ADMIN — HYDROCODONE BITARTRATE AND ACETAMINOPHEN 1 TABLET: 7.5; 325 TABLET ORAL at 02:07

## 2024-06-20 RX ADMIN — SODIUM CHLORIDE: 9 INJECTION, SOLUTION INTRAVENOUS at 01:05

## 2024-06-20 ASSESSMENT — PAIN DESCRIPTION - ONSET
ONSET: ON-GOING

## 2024-06-20 ASSESSMENT — PAIN SCALES - GENERAL
PAINLEVEL_OUTOF10: 4
PAINLEVEL_OUTOF10: 0
PAINLEVEL_OUTOF10: 0
PAINLEVEL_OUTOF10: 6
PAINLEVEL_OUTOF10: 4
PAINLEVEL_OUTOF10: 6
PAINLEVEL_OUTOF10: 6

## 2024-06-20 ASSESSMENT — PAIN DESCRIPTION - ORIENTATION
ORIENTATION: RIGHT;LEFT
ORIENTATION: MID;LOWER
ORIENTATION: RIGHT;LEFT
ORIENTATION: MID;LOWER

## 2024-06-20 ASSESSMENT — PAIN DESCRIPTION - PAIN TYPE
TYPE: CHRONIC PAIN

## 2024-06-20 ASSESSMENT — PAIN - FUNCTIONAL ASSESSMENT
PAIN_FUNCTIONAL_ASSESSMENT: ACTIVITIES ARE NOT PREVENTED
PAIN_FUNCTIONAL_ASSESSMENT: ACTIVITIES ARE NOT PREVENTED
PAIN_FUNCTIONAL_ASSESSMENT: PREVENTS OR INTERFERES SOME ACTIVE ACTIVITIES AND ADLS

## 2024-06-20 ASSESSMENT — PAIN SCALES - WONG BAKER
WONGBAKER_NUMERICALRESPONSE: NO HURT

## 2024-06-20 ASSESSMENT — PAIN DESCRIPTION - LOCATION
LOCATION: BACK

## 2024-06-20 ASSESSMENT — PAIN DESCRIPTION - FREQUENCY
FREQUENCY: CONTINUOUS

## 2024-06-20 ASSESSMENT — PAIN DESCRIPTION - DESCRIPTORS
DESCRIPTORS: ACHING
DESCRIPTORS: ACHING;DISCOMFORT
DESCRIPTORS: ACHING;DISCOMFORT
DESCRIPTORS: ACHING
DESCRIPTORS: ACHING;DISCOMFORT
DESCRIPTORS: ACHING;DISCOMFORT

## 2024-06-20 NOTE — ED PROVIDER NOTES
I independently examined and evaluated Twila Hall.    In brief, patient is a 68-year-old female presents to the emergency department for evaluation of nausea, vomiting, and diarrhea for few days and feeling lightheaded and fatigued.  Says she feels like she may be dehydrated.  On arrival to the emergency department, she was hypotensive with blood pressure of 70 systolic.  After 1 L, when I went in to evaluate the patient, the systolic blood pressure improved to 108.  She is clinically nontoxic-appearing.  Has benign abdomen.  Her troponin was 126.  However, suspect this is due to her renal function.  Her creatinine is 4.7, up from baseline between 1.2-1.5.  Suspect prerenal as BUN elevated at 62.  She was given additional 1 L IV fluid bolus.  Repeat troponin downtrending at 120.  Patient does not have any chest pain.  Urine specimen non sterile. Urine sent for culture. Will give one time dose of ceftriaxone. CT shows no acute pathology. Admit.    The Ekg interpreted by me shows  Normal sinus rhythm with sinus arrhythmia with a rate of 77  Left ventricular hypertrophy with repolarization abnormality  QTc is acceptable at 454  Intervals and Durations are unremarkable.      ST Segments: Nonspecific ST changes    All diagnostic, treatment, and disposition decisions were made by myself in conjunction with the advanced practice provider/resident physician.     I personally saw the patient and performed a substantive portion of the visit including aspects of the medical decision making. I approved management plan and take responsibility for the patient management.     I personally saw the patient and independently provided 10 minutes of non-concurrent critical care out of the total shared critical care time provided.    Comment: Please note this report has been produced using speech recognition software and may contain errors related to that system including errors in grammar, punctuation, and spelling, as well as words

## 2024-06-20 NOTE — PROGRESS NOTES
Patient is resting in bed, awake and quiet. Family at bedside. Room air. Side rails are up x2. Fall precautions are in place. Bed is in lowest position. Patient is UAL. Call light, telephone and bedside table are within reach. Patient denies needs at present. Will continue to monitor patient per unit protocols. Electronically signed by Diana Tony RN on 6/20/2024 at 5:03 PM

## 2024-06-20 NOTE — PROGRESS NOTES
Patient is alert and oriented, on room air, independent. Complained of back pain, Norco was ordered.

## 2024-06-20 NOTE — CARE COORDINATION
Case Management Assessment  Initial Evaluation    Date/Time of Evaluation: 6/20/2024 4:51 PM  Assessment Completed by: NIA Velasquez    If patient is discharged prior to next notation, then this note serves as note for discharge by case management.    Patient Name: Twila Hall                   YOB: 1955  Diagnosis: UTI (urinary tract infection) [N39.0]  Elevated troponin [R79.89]  STAN (acute kidney injury) (HCC) [N17.9]  Hypotension, unspecified hypotension type [I95.9]  Urinary tract infection without hematuria, site unspecified [N39.0]                   Date / Time: 6/19/2024  6:58 PM    Patient Admission Status: Inpatient   Readmission Risk (Low < 19, Mod (19-27), High > 27): Readmission Risk Score: 14.5    Current PCP: Mitch Little MD  PCP verified by CM? Yes    Chart Reviewed: Yes      History Provided by: Patient, Spouse, Medical Record  Patient Orientation: Alert and Oriented    Patient Cognition: Alert    Hospitalization in the last 30 days (Readmission):  No    If yes, Readmission Assessment in  Navigator will be completed.    Advance Directives:      Code Status: Full Code   Patient's Primary Decision Maker is: Legal Next of Kin    Primary Decision Maker: Julio Cesar Hall K - Spouse - [phone]    Discharge Planning:    Patient lives with: Spouse/Significant Other Type of Home: House  Primary Care Giver: Self  Patient Support Systems include: Spouse/Significant Other   Current Financial resources: Medicare  Current community resources: Lodging  Current services prior to admission: Durable Medical Equipment            Current DME: Walker, Shower Chair, Cane, Other (Comment) (scooter, all forms of walkers)            Type of Home Care services:  None    ADLS  Prior functional level: Independent in ADLs/IADLs  Current functional level: Independent in ADLs/IADLs    PT AM-PAC:   /24  OT AM-PAC:   /24    Family can provide assistance at DC: Yes  Would you like Case Management to

## 2024-06-20 NOTE — PROGRESS NOTES
Patient is A&Ox4. Patient is resting in bed, awake and quiet. Room air. Side rails are up x2. Fall precautions are in place. Bed is in lowest position. Patient is UAL. Call light, telephone and bedside table are within reach. VSS taken. AM meds given. Shift assessment completed. PRN pain medication given. See eMAR. Will continue to monitor patient per unit protocols. Electronically signed by Diana Tony RN on 6/20/2024 at 10:32 AM

## 2024-06-20 NOTE — PROGRESS NOTES
4 Eyes Skin Assessment     NAME:  Twila Hall  YOB: 1955  MEDICAL RECORD NUMBER:  [id number]    The patient is being assessed for  Admission    I agree that at least one RN has performed a thorough Head to Toe Skin Assessment on the patient. ALL assessment sites listed below have been assessed.      Areas assessed by both nurses:    Head, Face, Ears, Shoulders, Back, Chest, Arms, Elbows, Hands, Sacrum. Buttock, Coccyx, Ischium, and Legs. Feet and Heels        Does the Patient have a Wound? No noted wound(s)       [place] Prevention initiated by RN: Yes  Wound Care Orders initiated by RN: No    Pressure Injury (Stage 3,4, Unstageable, DTI, NWPT, and Complex wounds) if present, place Wound referral order by RN under : No    New Ostomies, if present place, Ostomy referral order under : No     Nurse 1 eSignature: Electronically signed by Christal Brandt RN on 6/20/24 at 6:20 AM EDT    **SHARE this note so that the co-signing nurse can place an eSignature**    Nurse 2 eSignature: Electronically signed by Gilmer Uribe RN on 6/20/24 at 7:57 AM EDT

## 2024-06-20 NOTE — PLAN OF CARE
Problem: Discharge Planning  Goal: Discharge to home or other facility with appropriate resources  Outcome: Progressing  Flowsheets (Taken 6/20/2024 0156 by Christal Brandt, RN)  Discharge to home or other facility with appropriate resources:   Identify barriers to discharge with patient and caregiver   Arrange for needed discharge resources and transportation as appropriate   Identify discharge learning needs (meds, wound care, etc)   Arrange for interpreters to assist at discharge as needed   Refer to discharge planning if patient needs post-hospital services based on physician order or complex needs related to functional status, cognitive ability or social support system   Assessed patients knowledge of discharge.  Will continue to work with patient on discharge planning and answer patient questions. Will consult case management and  as necessary.   Problem: Safety - Adult  Goal: Free from fall injury  Outcome: Progressing   Will remain free from falls. Fall precautions are in place. Call light, telephone and bedside table are within reach.

## 2024-06-20 NOTE — ED NOTES
ED SBAR report provider to TAMMY Siegel. Patient to be transported to Room 3116 via stretcher by RN.Patient transported with bedside cardiac monitor. IV site clean, dry, and intact. Updated patient and family on plan of care.

## 2024-06-20 NOTE — PROGRESS NOTES
Pharmacy Medication Reconciliation Note     List of medications patient is currently taking is complete.    Source of information:   1. Rx disp history  2. Patient interview    Notes regarding home medications:   1. Removed Wellbutrin XL      Denies taking any other OTC or herbal medications    Reynaldo Yeboah RPH, RP 6/20/2024 11:25 AM

## 2024-06-20 NOTE — CONSULTS
Morbid obesity (HCC)     Restless legs syndrome     pt on cabidopa/levodopa    Stage 3 chronic kidney disease (HCC) 4/3/2019    Wears glasses        Past Surgical History:   Procedure Laterality Date    ABSCESS DRAINAGE Left 04/19/2018    incision and drainage of right buttock abscess    APPENDECTOMY      CARPAL TUNNEL RELEASE Left 11/14/2017    CORONARY ARTERY BYPASS GRAFT  9/28/10    4 way bypass    HIP SURGERY Left 2016    I&D of joint    KNEE ARTHROSCOPY Right 2000    TOTAL HIP ARTHROPLASTY Left 11-4-2015    TOTAL KNEE ARTHROPLASTY Right 8-24-10    TOTAL KNEE ARTHROPLASTY Left 4/8/11    TUBAL LIGATION      UPPER GASTROINTESTINAL ENDOSCOPY N/A 7/10/2020    EGD DIAGNOSTIC ONLY performed by Michele Newsome MD at [facility] ENDOSCOPY       Allergies:  Allergies   Allergen Reactions    Mobic [Meloxicam] Nausea Only     Stomach would get upset after taking this medication    Morphine Other (See Comments)     When taken in 2013 pt had an adverse reaction to morphine. She ended up in  hospital with kidney failure, dehydration, and in ICU. She prefers this not to be given ever.     Other      ? Suture from CABG caused blister (10/30/2015)        Social Determinants of Health with Concerns     Tobacco Use: Medium Risk (6/19/2024)    Patient History     Smoking Tobacco Use: Former     Smokeless Tobacco Use: Never     Passive Exposure: Not on file   Physical Activity: Not on file   Stress: Not on file   Social Connections: Not on file   Intimate Partner Violence: Not on file        Scheduled Meds:   sodium chloride flush  5-40 mL IntraVENous 2 times per day    enoxaparin  30 mg SubCUTAneous Daily    melatonin  3 mg Oral Nightly    [START ON 6/21/2024] cefTRIAXone (ROCEPHIN) IV  2,000 mg IntraVENous Q24H    nicotine  1 patch TransDERmal Daily        sodium chloride      sodium bicarbonate 150 mEq in dextrose 5 % 1,000 mL infusion         PRN Meds:sodium chloride flush, sodium chloride, ondansetron **OR** ondansetron, polyethylene  glycol, acetaminophen **OR** acetaminophen, HYDROcodone-acetaminophen, iopamidol    Physical Exam:    TEMPERATURE:  Current - Temp: 97.6 °F (36.4 °C); Max - Temp  Av.7 °F (36.5 °C)  Min: 96.1 °F (35.6 °C)  Max: 98.4 °F (36.9 °C)  RESPIRATIONS RANGE: Resp  Av.1  Min: 12  Max: 27  PULSE RANGE: Pulse  Av.1  Min: 67  Max: 83  BLOOD PRESSURE RANGE:  Systolic (24hrs), Av , Min:71 , Max:140   ; Diastolic (24hrs), Av, Min:47, Max:109    24HR INTAKE/OUTPUT:    Intake/Output Summary (Last 24 hours) at 2024 1451  Last data filed at 2024 1257  Gross per 24 hour   Intake 960 ml   Output --   Net 960 ml       Patient Vitals for the past 96 hrs (Last 3 readings):   Weight   24 0509 111.1 kg (244 lb 14.9 oz)   24 0045 111.1 kg (244 lb 14.9 oz)   24 1848 106.6 kg (235 lb 0.2 oz)       General: Alert, Awake, NAD, Obese  HEENT: Normocephalic, atraumatic  Chest: intermittent expiratory wheezing to auscultation, no intercostal retractions  CVS: RRR, no murmur, no rub  Abdomen: soft, non tender  Extremities: no edema, no cyanosis.  Skin: normal texture, normal skin turgor, no rash  Neurological: CN intact, no focal motor neurological deficit  Psych: normal affect; AAO x 3      LAB DATA:    CBC:   Lab Results   Component Value Date/Time    WBC 8.9 2024 07:18 PM    RBC 4.34 2024 07:18 PM    HGB 13.4 2024 07:18 PM    HCT 40.1 2024 07:18 PM    MCV 92.4 2024 07:18 PM    MCH 30.9 2024 07:18 PM    MCHC 33.4 2024 07:18 PM    RDW 14.8 2024 07:18 PM     2024 07:18 PM    MPV 7.2 2024 07:18 PM     BMP:    Lab Results   Component Value Date/Time     2024 03:41 AM    K 4.0 2024 03:41 AM     2024 03:41 AM    CO2 16 2024 03:41 AM    BUN 66 2024 03:41 AM    CREATININE 4.7 2024 03:41 AM    CALCIUM 7.8 2024 03:41 AM    GFRAA 39 2022 08:58 PM    GFRAA 38 09/15/2012 04:15 AM

## 2024-06-20 NOTE — H&P
V2.0  History and Physical      Name:  Twila Hall /Age/Sex: 1955  (68 y.o. female)   MRN & CSN:  [id number] & [id number] Encounter Date/Time: 2024 12:09 AM EDT   Location:  [address] [place] [address] PCP: Mitch Little MD       Hospital Day: 2    Assessment and Plan:   Twila Hall is a 68 y.o. female with a pmh of CAD status post quadruple bypass; morbid obesity;  chronic kidney disease and tobacco abuse who presents with Gastroenteritis    Hospital Problems             Last Modified POA    * (Principal) Gastroenteritis 2024 Yes    Tobacco abuse 2024 Yes    Morbid obesity due to excess calories (HCC) 2024 Yes    Acute kidney injury superimposed on chronic kidney disease (HCC) 2024 Yes    Elevated troponin 2024 Yes    UTI (urinary tract infection) 2024 Yes    Elevated brain natriuretic peptide (BNP) level 2024 Yes       Plan:  Supportive treatment with IV fluids.  Trend BMP.  Avoid nephrotoxic's.  Tobacco abuse-nicotine patch prescribed.  Advanced care planning was discussed with patient for a total of 16 minutes.  Full code, DNR CC, DNR CCA, power of  and living will were discussed.  Patient elected to be a full code.   Disposition:   Current Living situation: Home  Expected Disposition: Home  Estimated D/C: In 3 days    Diet Regular; cardiac   DVT Prophylaxis [x] Lovenox, []  Heparin, [] SCDs, [] Ambulation,  [] Eliquis, [] Xarelto, [] Coumadin   Code Status Full code   Surrogate Decision Maker/ POA      Personally reviewed Lab Studies and Imaging     Discussed management of the case with ED provider who recommended admission    EKG interpreted personally and results normal sinus rhythm with sinus arrhythmia    Imaging that was interpreted personally includes CT chest abdomen and pelvis without contrast and results no acute abnormality          History from:     patient    History of Present Illness:     Chief Complaint: Flulike symptom  Twila Hall is a

## 2024-06-21 LAB
ALBUMIN SERPL-MCNC: 3 G/DL (ref 3.4–5)
ANION GAP SERPL CALCULATED.3IONS-SCNC: 13 MMOL/L (ref 3–16)
BACTERIA BLD CULT ORG #2: NORMAL
BACTERIA BLD CULT: NORMAL
BACTERIA UR CULT: NORMAL
BASOPHILS # BLD: 0 K/UL (ref 0–0.2)
BASOPHILS NFR BLD: 0.4 %
BUN SERPL-MCNC: 59 MG/DL (ref 7–20)
CALCIUM SERPL-MCNC: 7.7 MG/DL (ref 8.3–10.6)
CHLORIDE SERPL-SCNC: 105 MMOL/L (ref 99–110)
CO2 SERPL-SCNC: 19 MMOL/L (ref 21–32)
CREAT SERPL-MCNC: 3.5 MG/DL (ref 0.6–1.2)
DEPRECATED RDW RBC AUTO: 14.8 % (ref 12.4–15.4)
EOSINOPHIL # BLD: 0.3 K/UL (ref 0–0.6)
EOSINOPHIL NFR BLD: 4.4 %
GFR SERPLBLD CREATININE-BSD FMLA CKD-EPI: 14 ML/MIN/{1.73_M2}
GLUCOSE SERPL-MCNC: 108 MG/DL (ref 70–99)
HCT VFR BLD AUTO: 30.6 % (ref 36–48)
HGB BLD-MCNC: 10.1 G/DL (ref 12–16)
LYMPHOCYTES # BLD: 1.4 K/UL (ref 1–5.1)
LYMPHOCYTES NFR BLD: 23.4 %
MCH RBC QN AUTO: 30.6 PG (ref 26–34)
MCHC RBC AUTO-ENTMCNC: 33 G/DL (ref 31–36)
MCV RBC AUTO: 92.7 FL (ref 80–100)
MONOCYTES # BLD: 0.7 K/UL (ref 0–1.3)
MONOCYTES NFR BLD: 12.4 %
NEUTROPHILS # BLD: 3.5 K/UL (ref 1.7–7.7)
NEUTROPHILS NFR BLD: 59.4 %
PHOSPHATE SERPL-MCNC: 5.9 MG/DL (ref 2.5–4.9)
PLATELET # BLD AUTO: 154 K/UL (ref 135–450)
PMV BLD AUTO: 7.2 FL (ref 5–10.5)
POTASSIUM SERPL-SCNC: 4.1 MMOL/L (ref 3.5–5.1)
RBC # BLD AUTO: 3.29 M/UL (ref 4–5.2)
SODIUM SERPL-SCNC: 137 MMOL/L (ref 136–145)
WBC # BLD AUTO: 5.9 K/UL (ref 4–11)

## 2024-06-21 PROCEDURE — 6370000000 HC RX 637 (ALT 250 FOR IP)

## 2024-06-21 PROCEDURE — 2580000003 HC RX 258: Performed by: HOSPITALIST

## 2024-06-21 PROCEDURE — 6370000000 HC RX 637 (ALT 250 FOR IP): Performed by: HOSPITALIST

## 2024-06-21 PROCEDURE — 36415 COLL VENOUS BLD VENIPUNCTURE: CPT

## 2024-06-21 PROCEDURE — 1200000000 HC SEMI PRIVATE

## 2024-06-21 PROCEDURE — 6360000002 HC RX W HCPCS: Performed by: HOSPITALIST

## 2024-06-21 PROCEDURE — 6370000000 HC RX 637 (ALT 250 FOR IP): Performed by: NURSE PRACTITIONER

## 2024-06-21 PROCEDURE — 80069 RENAL FUNCTION PANEL: CPT

## 2024-06-21 PROCEDURE — 85025 COMPLETE CBC W/AUTO DIFF WBC: CPT

## 2024-06-21 PROCEDURE — 94760 N-INVAS EAR/PLS OXIMETRY 1: CPT

## 2024-06-21 RX ORDER — MIDODRINE HYDROCHLORIDE 5 MG/1
5 TABLET ORAL
Status: DISCONTINUED | OUTPATIENT
Start: 2024-06-21 | End: 2024-06-22

## 2024-06-21 RX ADMIN — ACETAMINOPHEN 325MG 650 MG: 325 TABLET ORAL at 02:04

## 2024-06-21 RX ADMIN — HYDROCODONE BITARTRATE AND ACETAMINOPHEN 1 TABLET: 7.5; 325 TABLET ORAL at 07:24

## 2024-06-21 RX ADMIN — MIDODRINE HYDROCHLORIDE 5 MG: 5 TABLET ORAL at 17:58

## 2024-06-21 RX ADMIN — ENOXAPARIN SODIUM 30 MG: 100 INJECTION SUBCUTANEOUS at 08:37

## 2024-06-21 RX ADMIN — HYDROCODONE BITARTRATE AND ACETAMINOPHEN 1 TABLET: 7.5; 325 TABLET ORAL at 19:28

## 2024-06-21 RX ADMIN — CEFTRIAXONE SODIUM 2000 MG: 2 INJECTION, POWDER, FOR SOLUTION INTRAMUSCULAR; INTRAVENOUS at 02:03

## 2024-06-21 RX ADMIN — HYDROCODONE BITARTRATE AND ACETAMINOPHEN 1 TABLET: 7.5; 325 TABLET ORAL at 13:18

## 2024-06-21 RX ADMIN — MIDODRINE HYDROCHLORIDE 2.5 MG: 5 TABLET ORAL at 08:37

## 2024-06-21 RX ADMIN — CARBIDOPA AND LEVODOPA 2 TABLET: 10; 100 TABLET ORAL at 21:52

## 2024-06-21 RX ADMIN — MIDODRINE HYDROCHLORIDE 5 MG: 5 TABLET ORAL at 12:12

## 2024-06-21 RX ADMIN — SODIUM CHLORIDE, PRESERVATIVE FREE 10 ML: 5 INJECTION INTRAVENOUS at 08:42

## 2024-06-21 ASSESSMENT — PAIN DESCRIPTION - ONSET
ONSET: ON-GOING

## 2024-06-21 ASSESSMENT — PAIN DESCRIPTION - PAIN TYPE
TYPE: CHRONIC PAIN

## 2024-06-21 ASSESSMENT — PAIN DESCRIPTION - LOCATION
LOCATION: BACK

## 2024-06-21 ASSESSMENT — PAIN DESCRIPTION - ORIENTATION
ORIENTATION: RIGHT;LEFT

## 2024-06-21 ASSESSMENT — PAIN DESCRIPTION - DESCRIPTORS
DESCRIPTORS: ACHING;DISCOMFORT
DESCRIPTORS: ACHING;DISCOMFORT
DESCRIPTORS: ACHING
DESCRIPTORS: ACHING;DISCOMFORT
DESCRIPTORS: ACHING;DISCOMFORT
DESCRIPTORS: ACHING

## 2024-06-21 ASSESSMENT — PAIN SCALES - WONG BAKER
WONGBAKER_NUMERICALRESPONSE: NO HURT

## 2024-06-21 ASSESSMENT — PAIN SCALES - GENERAL
PAINLEVEL_OUTOF10: 9
PAINLEVEL_OUTOF10: 4
PAINLEVEL_OUTOF10: 5
PAINLEVEL_OUTOF10: 3
PAINLEVEL_OUTOF10: 0
PAINLEVEL_OUTOF10: 0
PAINLEVEL_OUTOF10: 7
PAINLEVEL_OUTOF10: 6

## 2024-06-21 ASSESSMENT — PAIN - FUNCTIONAL ASSESSMENT
PAIN_FUNCTIONAL_ASSESSMENT: PREVENTS OR INTERFERES SOME ACTIVE ACTIVITIES AND ADLS
PAIN_FUNCTIONAL_ASSESSMENT: ACTIVITIES ARE NOT PREVENTED
PAIN_FUNCTIONAL_ASSESSMENT: ACTIVITIES ARE NOT PREVENTED
PAIN_FUNCTIONAL_ASSESSMENT: PREVENTS OR INTERFERES SOME ACTIVE ACTIVITIES AND ADLS

## 2024-06-21 ASSESSMENT — PAIN DESCRIPTION - FREQUENCY
FREQUENCY: CONTINUOUS

## 2024-06-21 NOTE — PROGRESS NOTES
Nephrology Progress Note   [email or web address]      Reason for consultation: STAN on CKD 3 or 4? -- baseline Cr difficult to determine. Cr 1.2 mg/dL in 2024. Also has many readings between 1.6-2.0 mg/dL.     Subjective:    The patient has been seen and examined. Labs and chart reviewed. Cr improving to 3.5 mg/dL. Bicarb improving. Patient would like to be discharged today -- discussed that this would not be in her best interest since renal function has not returned to baseline.     There were not complications overnight.    Patient review of systems: Denies SOB      Allergies:  Allergies   Allergen Reactions    Mobic [Meloxicam] Nausea Only     Stomach would get upset after taking this medication    Morphine Other (See Comments)     When taken in  pt had an adverse reaction to morphine. She ended up in he hospital with kidney failure, dehydration, and in ICU. She prefers this not to be given ever.     Other      ? Suture from CABG caused blister (10/30/2015)        Scheduled Meds:   midodrine  5 mg Oral TID WC    sodium chloride flush  5-40 mL IntraVENous 2 times per day    enoxaparin  30 mg SubCUTAneous Daily    melatonin  3 mg Oral Nightly    cefTRIAXone (ROCEPHIN) IV  2,000 mg IntraVENous Q24H    nicotine  1 patch TransDERmal Daily    carbidopa-levodopa  2 tablet Oral Nightly        sodium chloride      sodium bicarbonate 150 mEq in dextrose 5 % 1,000 mL infusion 75 mL/hr at 24 1537       PRN Meds:sodium chloride flush, sodium chloride, ondansetron **OR** ondansetron, polyethylene glycol, acetaminophen **OR** acetaminophen, HYDROcodone-acetaminophen, iopamidol    Physical Exam:    TEMPERATURE:  Current - Temp: 98.3 °F (36.8 °C); Max - Temp  Av °F (36.7 °C)  Min: 97.6 °F (36.4 °C)  Max: 98.3 °F (36.8 °C)  RESPIRATIONS RANGE: Resp  Av  Min: 16  Max: 16  PULSE RANGE: Pulse  Av  Min: 66  Max: 71  BLOOD PRESSURE RANGE:  Systolic (24hrs), Av , Min:95 , Max:118   ; Diastolic (24hrs), Av,  04/03/2019 05:29 PM    COLORU Yellow 06/19/2024 09:08 PM    PHUR 5.0 06/19/2024 09:08 PM    PHUR 5.5 01/13/2024 07:45 PM    LABCAST 5-10 Fine Gran. 04/01/2015 09:50 AM    WBCUA 49 06/19/2024 09:08 PM    RBCUA 3 06/19/2024 09:08 PM    MUCUS 1+ 09/09/2012 03:50 PM    BACTERIA 4+ 06/19/2024 09:08 PM    CLARITYU TURBID 06/19/2024 09:08 PM    SPECGRAV 1.020 04/03/2019 05:29 PM    LEUKOCYTESUR MODERATE 06/19/2024 09:08 PM    UROBILINOGEN 1.0 06/19/2024 09:08 PM    BILIRUBINUR Negative 06/19/2024 09:08 PM    BLOODU Negative 06/19/2024 09:08 PM    GLUCOSEU Negative 06/19/2024 09:08 PM    GLUCOSEU NEGATIVE 04/05/2011 09:20 AM         IMPRESSION/RECOMMENDATIONS:      STAN on CKD 3 or 4? -- baseline Cr difficult to determine. Cr 1.2 mg/dL in 1/2024. Also has many readings between 1.6-2.0 mg/dL. Etiology of STAN likely ATN and 2/2 volume depletion and hypotension.              - Cr trending down with IVF and small improvement in BP              - UA noted -- 15 ketones, 100 protein, mod leukocyte esterase, 3/5 hyaline casts.              - Urine studies and UPCR pending              - No ACE/ARBs. No NSAIDs.              - Avoid hypotension and nephrotoxic agents.              - Continue IVF              - Daily RFTs     Hypotension              - Increase midodrine to 5 mg TID              - Continue IVF     HAGMA: potentially 2/2 starvation ketoacidosis. ketones present in UA              - Continue bicarb gtt     ?UTI              - UA suggestive of UTI              - Urine cx reveals mixed skin/urogenital jaden              - On rocephin              - Management per primary     Gastroenteritis              - Supportive care per primary    Will discuss with nephrology attending physician, Dr. Navarro.  See attestation for additional recommendations.    DELIA Crabtree - CNP

## 2024-06-21 NOTE — PROGRESS NOTES
Hospitalist Progress Note  6/21/2024 8:29 AM    PCP: Mitch Little MD    [id number]     Date of Admission: 6/19/2024                                                                                                                     HOSPITAL COURSE    Patient demographics:  The patient  Twila Hall is a 68 y.o. female     Significant past medical history:   Patient Active Problem List   Diagnosis    Primary localized osteoarthrosis, lower leg    Class 2 severe obesity with serious comorbidity in adult (HCC)    HTN (hypertension)    Hyperlipidemia    Abscess of skin of abdomen    Intertrochanteric fracture of left femur (HCC)    Osteopenia    Failed total hip arthroplasty (HCC)    History of total left hip arthroplasty 11/4/15    Coronary artery disease involving native coronary artery of native heart without angina pectoris    Iron deficiency anemia    Status post coronary artery bypass graft    Superficial postoperative wound infection    Arthritis of knee, right    Tobacco abuse    Carpal tunnel syndrome of left wrist    Sepsis ([facility])    Acute-on-chronic kidney injury ([facility])    Metabolic acidosis, normal anion gap (NAG)    Morbid obesity due to excess calories ([facility])    Stage 3b chronic kidney disease (CKD) (HCC)    Chronic midline low back pain without sciatica    SOB (shortness of breath)    COPD exacerbation (HCC)    Unstable angina (HCC)    Restless legs syndrome    Iron deficiency anemia secondary to blood loss (chronic)    Acute kidney injury superimposed on chronic kidney disease (HCC)    CKD (chronic kidney disease) stage 4, GFR 15-29 ml/min (HCC)    Idiopathic acute pancreatitis    Acute pancreatitis    Hypertensive urgency    Elevated troponin    Acute hypercapnic respiratory failure (HCC)    UTI (urinary tract infection)    Gastroenteritis    Elevated brain natriuretic peptide (BNP) level         Presenting symptoms:      Diagnostic workup:      CONSULTS DURING ADMISSION :   IP CONSULT TO

## 2024-06-21 NOTE — PROGRESS NOTES
Patient awake and resting in bed. Alert and oriented x4. Assessment complete. PM meds tolerated well. IV clean dry and intact. Denies pain. Patient requesting order for sinemet as taken at home. Perfect serve sent to Kayla Parker NP. NP placed order. Sinemet given. Denies needs at this time. Call light within reach. Able to make needs known. Will continue with plan of care.

## 2024-06-21 NOTE — PLAN OF CARE
Problem: Discharge Planning  Goal: Discharge to home or other facility with appropriate resources  Outcome: Progressing  Flowsheets (Taken 6/20/2024 0156 by Christal Brandt, RN)  Discharge to home or other facility with appropriate resources:   Identify barriers to discharge with patient and caregiver   Arrange for needed discharge resources and transportation as appropriate   Identify discharge learning needs (meds, wound care, etc)   Arrange for interpreters to assist at discharge as needed   Refer to discharge planning if patient needs post-hospital services based on physician order or complex needs related to functional status, cognitive ability or social support system     Problem: Safety - Adult  Goal: Free from fall injury  Outcome: Progressing  Flowsheets (Taken 6/20/2024 5057)  Free From Fall Injury: Instruct family/caregiver on patient safety     Problem: Pain  Goal: Verbalizes/displays adequate comfort level or baseline comfort level  Outcome: Progressing  Flowsheets (Taken 6/20/2024 2354)  Verbalizes/displays adequate comfort level or baseline comfort level:   Encourage patient to monitor pain and request assistance   Administer analgesics based on type and severity of pain and evaluate response   Assess pain using appropriate pain scale   Implement non-pharmacological measures as appropriate and evaluate response

## 2024-06-21 NOTE — PROGRESS NOTES
Patient is resting in bed, awake and quiet. Room air. Side rails are up x2. Fall precautions are in place. Bed is in lowest position. Patient is UAL. Call light, telephone and bedside table are within reach. Patient denies needs at present. Will continue to monitor patient per unit protocols. Electronically signed by Diana Tony RN on 6/21/2024 at 4:09 PM

## 2024-06-21 NOTE — PLAN OF CARE
Problem: Discharge Planning  Goal: Discharge to home or other facility with appropriate resources  6/21/2024 0727 by Diana Tony, RN  Outcome: Progressing   Assessed patients knowledge of discharge.  Will continue to work with patient on discharge planning and answer patient questions. Will consult case management and  as necessary.   Problem: Safety - Adult  Goal: Free from fall injury  6/21/2024 0727 by Diana Tony, RN  Outcome: Progressing   Will remain free from falls. Fall precautions are in place. Call light, telephone and bedside table are within reach.   Problem: Pain  Goal: Verbalizes/displays adequate comfort level or baseline comfort level  6/21/2024 0727 by Diana Tony, RN  Outcome: Progressing   Patient educated on acute pain.  Taught patient to use call light to ask for pain medication.  PRN pain medication given for acute pain.  Will continue to monitor pain per unit protocol.

## 2024-06-21 NOTE — PROGRESS NOTES
Patient is A&Ox4. Patient is resting in bed, awake and quiet. Room air. Side rails are up x2. Fall precautions are in place. Bed is in lowest position. Patient is UAL. Call light, telephone and bedside table are within reach. VSS taken. AM meds given. Shift assessment completed. PRN pain medication given. See eMAR. Will continue to monitor patient per unit protocols. Electronically signed by Diana Tony RN on 6/21/2024 at 11:08 AM

## 2024-06-22 VITALS
HEART RATE: 52 BPM | DIASTOLIC BLOOD PRESSURE: 114 MMHG | WEIGHT: 254.19 LBS | RESPIRATION RATE: 18 BRPM | BODY MASS INDEX: 39.9 KG/M2 | OXYGEN SATURATION: 96 % | SYSTOLIC BLOOD PRESSURE: 143 MMHG | HEIGHT: 67 IN | TEMPERATURE: 98.4 F

## 2024-06-22 LAB
ALBUMIN SERPL-MCNC: 3.2 G/DL (ref 3.4–5)
ANION GAP SERPL CALCULATED.3IONS-SCNC: 10 MMOL/L (ref 3–16)
BASOPHILS # BLD: 0 K/UL (ref 0–0.2)
BASOPHILS NFR BLD: 0.7 %
BUN SERPL-MCNC: 46 MG/DL (ref 7–20)
CALCIUM SERPL-MCNC: 7.9 MG/DL (ref 8.3–10.6)
CHLORIDE SERPL-SCNC: 102 MMOL/L (ref 99–110)
CO2 SERPL-SCNC: 24 MMOL/L (ref 21–32)
CREAT SERPL-MCNC: 2.3 MG/DL (ref 0.6–1.2)
DEPRECATED RDW RBC AUTO: 15 % (ref 12.4–15.4)
EOSINOPHIL # BLD: 0.3 K/UL (ref 0–0.6)
EOSINOPHIL NFR BLD: 5 %
GFR SERPLBLD CREATININE-BSD FMLA CKD-EPI: 23 ML/MIN/{1.73_M2}
GLUCOSE SERPL-MCNC: 90 MG/DL (ref 70–99)
HCT VFR BLD AUTO: 32 % (ref 36–48)
HGB BLD-MCNC: 10.7 G/DL (ref 12–16)
LYMPHOCYTES # BLD: 1.5 K/UL (ref 1–5.1)
LYMPHOCYTES NFR BLD: 25.7 %
MCH RBC QN AUTO: 30.8 PG (ref 26–34)
MCHC RBC AUTO-ENTMCNC: 33.3 G/DL (ref 31–36)
MCV RBC AUTO: 92.7 FL (ref 80–100)
MONOCYTES # BLD: 0.7 K/UL (ref 0–1.3)
MONOCYTES NFR BLD: 11.3 %
NEUTROPHILS # BLD: 3.4 K/UL (ref 1.7–7.7)
NEUTROPHILS NFR BLD: 57.3 %
PHOSPHATE SERPL-MCNC: 4.3 MG/DL (ref 2.5–4.9)
PLATELET # BLD AUTO: 164 K/UL (ref 135–450)
PMV BLD AUTO: 7.4 FL (ref 5–10.5)
POTASSIUM SERPL-SCNC: 3.7 MMOL/L (ref 3.5–5.1)
PROCALCITONIN SERPL IA-MCNC: 0.16 NG/ML (ref 0–0.15)
RBC # BLD AUTO: 3.45 M/UL (ref 4–5.2)
SODIUM SERPL-SCNC: 136 MMOL/L (ref 136–145)
TROPONIN, HIGH SENSITIVITY: 43 NG/L (ref 0–14)
WBC # BLD AUTO: 5.9 K/UL (ref 4–11)

## 2024-06-22 PROCEDURE — 6370000000 HC RX 637 (ALT 250 FOR IP): Performed by: HOSPITALIST

## 2024-06-22 PROCEDURE — 36415 COLL VENOUS BLD VENIPUNCTURE: CPT

## 2024-06-22 PROCEDURE — 85025 COMPLETE CBC W/AUTO DIFF WBC: CPT

## 2024-06-22 PROCEDURE — 80069 RENAL FUNCTION PANEL: CPT

## 2024-06-22 PROCEDURE — 94760 N-INVAS EAR/PLS OXIMETRY 1: CPT

## 2024-06-22 PROCEDURE — 6360000002 HC RX W HCPCS: Performed by: HOSPITALIST

## 2024-06-22 PROCEDURE — 2580000003 HC RX 258: Performed by: HOSPITALIST

## 2024-06-22 PROCEDURE — 6370000000 HC RX 637 (ALT 250 FOR IP)

## 2024-06-22 PROCEDURE — 84145 PROCALCITONIN (PCT): CPT

## 2024-06-22 PROCEDURE — 84484 ASSAY OF TROPONIN QUANT: CPT

## 2024-06-22 RX ORDER — CIPROFLOXACIN 500 MG/1
250 TABLET, FILM COATED ORAL 2 TIMES DAILY
Qty: 7 TABLET | Refills: 0 | Status: SHIPPED | OUTPATIENT
Start: 2024-06-22 | End: 2024-06-29

## 2024-06-22 RX ADMIN — CEFTRIAXONE SODIUM 2000 MG: 2 INJECTION, POWDER, FOR SOLUTION INTRAMUSCULAR; INTRAVENOUS at 01:45

## 2024-06-22 RX ADMIN — HYDROCODONE BITARTRATE AND ACETAMINOPHEN 1 TABLET: 7.5; 325 TABLET ORAL at 04:11

## 2024-06-22 RX ADMIN — ENOXAPARIN SODIUM 30 MG: 100 INJECTION SUBCUTANEOUS at 08:46

## 2024-06-22 RX ADMIN — MIDODRINE HYDROCHLORIDE 5 MG: 5 TABLET ORAL at 08:47

## 2024-06-22 RX ADMIN — HYDROCODONE BITARTRATE AND ACETAMINOPHEN 1 TABLET: 7.5; 325 TABLET ORAL at 11:15

## 2024-06-22 RX ADMIN — HYDROCODONE BITARTRATE AND ACETAMINOPHEN 1 TABLET: 7.5; 325 TABLET ORAL at 18:25

## 2024-06-22 RX ADMIN — SODIUM CHLORIDE, PRESERVATIVE FREE 10 ML: 5 INJECTION INTRAVENOUS at 08:47

## 2024-06-22 ASSESSMENT — PAIN DESCRIPTION - LOCATION: LOCATION: BACK

## 2024-06-22 ASSESSMENT — PAIN DESCRIPTION - DESCRIPTORS: DESCRIPTORS: ACHING

## 2024-06-22 ASSESSMENT — PAIN SCALES - WONG BAKER
WONGBAKER_NUMERICALRESPONSE: NO HURT
WONGBAKER_NUMERICALRESPONSE: NO HURT

## 2024-06-22 ASSESSMENT — PAIN SCALES - GENERAL
PAINLEVEL_OUTOF10: 8
PAINLEVEL_OUTOF10: 6
PAINLEVEL_OUTOF10: 7

## 2024-06-22 ASSESSMENT — PAIN DESCRIPTION - ORIENTATION: ORIENTATION: MID

## 2024-06-22 NOTE — PROGRESS NOTES
Checking on patient Q2H for nutrition needs, hygiene needs, comfort measures, mobility, fall risk interventions, and safe environment. All precautions and interventions in place. Educated patient on use of call light and telephone. Patient verbalizes understanding. Call light/telephone in reach.Electronically signed by LEWIS RODRIGUEZ RN on 6/22/2024 at 10:58 AM

## 2024-06-22 NOTE — PLAN OF CARE
Problem: Discharge Planning  Goal: Discharge to home or other facility with appropriate resources  6/22/2024 1914 by Palma Wren RN  Outcome: Completed  6/22/2024 1058 by Fiona Harris RN  Outcome: Progressing     Problem: Safety - Adult  Goal: Free from fall injury  6/22/2024 1914 by Palma Wren RN  Outcome: Completed  6/22/2024 1058 by Fiona Harris RN  Outcome: Progressing  Flowsheets (Taken 6/22/2024 0542 by Palma Wren RN)  Free From Fall Injury: Instruct family/caregiver on patient safety     Problem: Pain  Goal: Verbalizes/displays adequate comfort level or baseline comfort level  6/22/2024 1914 by Palma Wren RN  Outcome: Completed  6/22/2024 1058 by Fiona Harris RN  Outcome: Progressing

## 2024-06-22 NOTE — PROGRESS NOTES
Hospitalist Progress Note  6/22/2024 10:34 AM  Subjective:   Admit Date: 6/19/2024  PCP: Mitch Little MD Status: Inpatient   Interval History: Hospital Day: 4, admitted with acute kidney injury on CKD on bicarbonate infusion.  Urinary tract infection treated with ceftriaxone. Recent episode of gastroenteritis resolved, no current nausea, vomiting, or diarrhea.  Elevated hsTnT resolved with recheck.     Diet: regular  Left antecubital peripheral IV (6/19, day #4)    Medications:     sodium bicarbonate 150 mEq in dextrose 5% 75 mL/hr     midodrine  5 mg Oral TID WC   enoxaparin  30 mg SubCUTAneous Daily   melatonin  3 mg Oral Nightly   ceftriaxone   2,000 mg IntraVENous Q24H   nicotine  1 patch TransDERmal Daily   carbidopa-levodopa  2 tablet Oral Nightly     Recent Labs     06/19/24 1918 06/21/24  0347 06/22/24  0330   WBC 8.9 5.9 5.9   HGB 13.4 10.1* 10.7*    154 164   MCV 92.4 92.7 92.7     Recent Labs     06/20/24  0341 06/21/24  0347 06/22/24  0330   * 137 136   K 4.0 4.1 3.7    105 102   CO2 16* 19* 24   BUN 66* 59* 46*   CREATININE 4.7* 3.5* 2.3*   GLUCOSE 136* 108* 90   eGFR (6/22) 23    Recent Labs     06/19/24 1918   AST 19   ALT 7*   BILITOT 0.3   ALKPHOS 147*     hsTnT (6/19) 126 / 120, (6/22) 43 ng/L  Procalcitonin (6/22) 0.16 ng/mL  Calcium (6/22) 7.9 corrects to 8.5 for albumin 3.2  Lactate (6/19) 1.6 / 1.2 mmol/L  proBNP (6/19) 5,624 pg/mL    Blood culture x 2 (6/20) no growth to date  Urine culture (6/19) <10,000 CFU/ml mixed skin/urogenital jaden. No further workup    Noncontrast CT chest / abd / pelvis (6/19) No acute abnormality in the chest, abdomen or pelvis.     ECG (6/19) Normal sinus rhythm with sinus arrhythmia. Left ventricular hypertrophy with repolarization abnormality (R in aVL) per Dr. Ba.     Objective:   Vitals:  /77   Pulse 71   Temp 98.3 °F (oral)   Resp 17   Ht 5' 7\"  Wt 115.3 kg  SpO2 97% on RA  BMI 39.81 kg/m²   General appearance: alert

## 2024-06-22 NOTE — PLAN OF CARE
Bed: [facility]02  Expected date:   Expected time:   Means of arrival:   Comments:  Do not use     Maxx Ho RN  08/31/19 8122 Received notification that patient needs to go home and does not want any treatment.  Will leave AMA if not discharged.  Will discharge.     Morris Gtz MD

## 2024-06-22 NOTE — PLAN OF CARE
Problem: Discharge Planning  Goal: Discharge to home or other facility with appropriate resources  Outcome: Progressing     Problem: Safety - Adult  Goal: Free from fall injury  Outcome: Progressing  Flowsheets (Taken 6/22/2024 4959 by Palma Wren RN)  Free From Fall Injury: Instruct family/caregiver on patient safety     Problem: Pain  Goal: Verbalizes/displays adequate comfort level or baseline comfort level  Outcome: Progressing

## 2024-06-22 NOTE — DISCHARGE SUMMARY
Hospital Medicine Discharge Summary    Twila Hall  :  1955  MRN:  [id number]    Admit date:  2024  Discharge date:  2024     Admitting Physician:  Marquise Dietrich MD  Primary Care Physician:  Mitch Little MD  Code Status:   Full Code  Status: Inpatient  Discharged Condition: Stable  Activity: activity as tolerated  Diet:  ADULT DIET; Regular; Low Fat/Low Chol/High Fiber/2 gm Na; Low Phosphorus (Less than 1000 mg)      Discharge Diagnoses:      Gastroenteritis    Acute kidney injury on CKD    Elevated troponin    Hypotension    Anion gap metabolic acidosis    Urinary tract infection    Class II obesity (BMI 39.8)       Hospital Course:   68 y.o. female admitted with acute kidney injury on CKD on bicarbonate infusion. Urinary tract infection treated with ceftriaxone. Recent episode of gastroenteritis resolved, no current nausea, vomiting, or diarrhea. Elevated hsTnT resolved with recheck.      Acute kidney injury on CKD on bicarbonate infusion.  Creatinine decreased from 4.7 to 2.3 mg/dL. Baseline CKD difficulty to determine per nephrology.  Avoid ACE/ARB and NSAIDs.   Recent episode of gastroenteritis resulting in dehydration resolved, no current nausea, vomiting, or diarrhea.    Elevated hsTnT x2 resolved with recheck.  Attributed to acute kidney injury rather than myocardial ischemia.    Hypotension on midodrine 5 mg TID.   Anion gap metabolic acidosis attributed to \"starvation ketoacidosis\".  Continues on bicarbonate infusion.   Urinary tract infection treated with ceftriaxone.   Elevated proBNP without evidence of heart failure.  Echo (19) showed EF 55% with dilated left atrium. Trivial mitral & tricuspid regurgitation. Normal right ventricular size. Left ventricular hypertrophy.   Class II obesity (BMI 39.8)     Discharge Medications:   START taking these medications    Details   ciprofloxacin (CIPRO) 500 MG tablet Take 0.5 tablets by mouth 2 times daily for 7 days  Qty: 7  date  Urine culture (6/19) <10,000 CFU/ml mixed skin/urogenital jaden. No further workup     Noncontrast CT chest / abd / pelvis (6/19) No acute abnormality in the chest, abdomen or pelvis.      ECG (6/19) Normal sinus rhythm with sinus arrhythmia. Left ventricular hypertrophy with repolarization abnormality (R in aVL) per Dr. Ba.     Treatments:   IV crystalloid volume expansion, IV antibiotic    Disposition:   Home with self care  Follow up with Mitch Little MD in 1-2 weeks.      Signed:  ASHLYN BRIGGS MD  6/22/2024, 6:34 PM

## 2024-06-22 NOTE — PROGRESS NOTES
Nephrology Progress Note   [facility]      Reason for consultation: STAN on CKD 3 or 4? -- baseline Cr difficult to determine. Cr 1.2 mg/dL in 1/2024. Also has many readings between 1.6-2.0 mg/dL.     Subjective:      HPI:  Breathing comfortably.  No CP.  Renal function improved.  BP is higher.  ROS:  In bed.  Low grade temperature  PMFSH:  medications reviewed.    Physical Exam:    BP (!) 152/91   Pulse 70   Temp 100 °F (37.8 °C) (Oral)   Resp 17   Ht 1.702 m (5' 7\")   Wt 115.3 kg (254 lb 3.1 oz)   LMP 01/01/2004   SpO2 96%   BMI 39.81 kg/m²       24HR INTAKE/OUTPUT:    Intake/Output Summary (Last 24 hours) at 6/22/2024 1545  Last data filed at 6/22/2024 1057  Gross per 24 hour   Intake 890 ml   Output --   Net 890 ml         Patient Vitals for the past 96 hrs (Last 3 readings):   Weight   06/22/24 0521 115.3 kg (254 lb 3.1 oz)   06/21/24 0508 113.9 kg (251 lb 1.7 oz)   06/20/24 0509 111.1 kg (244 lb 14.9 oz)         General: Alert, Awake, NAD, Obese  Chest: intermittent expiratory wheezing to auscultation, no intercostal retractions  CVS: RRR, no murmur, no rub  Abdomen: soft, non tender  Extremities: no edema, no cyanosis.  Skin: normal texture, normal skin turgor, no rash  Neurological: CN intact, no focal motor neurological deficit  Psych: normal affect; AAO x 3    LAB DATA:    CBC:   Lab Results   Component Value Date/Time    WBC 5.9 06/22/2024 03:30 AM    RBC 3.45 06/22/2024 03:30 AM    HGB 10.7 06/22/2024 03:30 AM    HCT 32.0 06/22/2024 03:30 AM    MCV 92.7 06/22/2024 03:30 AM    MCH 30.8 06/22/2024 03:30 AM    MCHC 33.3 06/22/2024 03:30 AM    RDW 15.0 06/22/2024 03:30 AM     06/22/2024 03:30 AM    MPV 7.4 06/22/2024 03:30 AM     BMP:    Lab Results   Component Value Date/Time     06/22/2024 03:30 AM    K 3.7 06/22/2024 03:30 AM    K 4.0 06/20/2024 03:41 AM     06/22/2024 03:30 AM    CO2 24 06/22/2024 03:30 AM    BUN 46 06/22/2024 03:30 AM    CREATININE 2.3 06/22/2024 03:30 AM

## 2024-06-23 NOTE — PROGRESS NOTES
Pt discharged home with family through private vehicle. AVS given, no further questions at his time.

## 2024-06-24 ENCOUNTER — TELEPHONE (OUTPATIENT)
Dept: FAMILY MEDICINE CLINIC | Age: 69
End: 2024-06-24

## 2024-06-24 LAB
BACTERIA BLD CULT ORG #2: NORMAL
BACTERIA BLD CULT: NORMAL

## 2024-07-01 ENCOUNTER — OFFICE VISIT (OUTPATIENT)
Dept: FAMILY MEDICINE CLINIC | Age: 69
End: 2024-07-01
Payer: MEDICARE

## 2024-07-01 VITALS
RESPIRATION RATE: 16 BRPM | HEART RATE: 66 BPM | HEIGHT: 67 IN | OXYGEN SATURATION: 96 % | WEIGHT: 260 LBS | TEMPERATURE: 97.7 F | SYSTOLIC BLOOD PRESSURE: 110 MMHG | DIASTOLIC BLOOD PRESSURE: 68 MMHG | BODY MASS INDEX: 40.81 KG/M2

## 2024-07-01 DIAGNOSIS — R60.0 LOWER EXTREMITY EDEMA: ICD-10-CM

## 2024-07-01 DIAGNOSIS — R79.89 ELEVATED SERUM CREATININE: ICD-10-CM

## 2024-07-01 DIAGNOSIS — L03.119 CELLULITIS OF LOWER LEG: Primary | ICD-10-CM

## 2024-07-01 PROCEDURE — G8399 PT W/DXA RESULTS DOCUMENT: HCPCS | Performed by: FAMILY MEDICINE

## 2024-07-01 PROCEDURE — G8427 DOCREV CUR MEDS BY ELIG CLIN: HCPCS | Performed by: FAMILY MEDICINE

## 2024-07-01 PROCEDURE — G2211 COMPLEX E/M VISIT ADD ON: HCPCS | Performed by: FAMILY MEDICINE

## 2024-07-01 PROCEDURE — 99214 OFFICE O/P EST MOD 30 MIN: CPT | Performed by: FAMILY MEDICINE

## 2024-07-01 PROCEDURE — G8417 CALC BMI ABV UP PARAM F/U: HCPCS | Performed by: FAMILY MEDICINE

## 2024-07-01 PROCEDURE — 1036F TOBACCO NON-USER: CPT | Performed by: FAMILY MEDICINE

## 2024-07-01 PROCEDURE — 1090F PRES/ABSN URINE INCON ASSESS: CPT | Performed by: FAMILY MEDICINE

## 2024-07-01 PROCEDURE — 1111F DSCHRG MED/CURRENT MED MERGE: CPT | Performed by: FAMILY MEDICINE

## 2024-07-01 PROCEDURE — 3074F SYST BP LT 130 MM HG: CPT | Performed by: FAMILY MEDICINE

## 2024-07-01 PROCEDURE — 3078F DIAST BP <80 MM HG: CPT | Performed by: FAMILY MEDICINE

## 2024-07-01 PROCEDURE — 3017F COLORECTAL CA SCREEN DOC REV: CPT | Performed by: FAMILY MEDICINE

## 2024-07-01 PROCEDURE — 1123F ACP DISCUSS/DSCN MKR DOCD: CPT | Performed by: FAMILY MEDICINE

## 2024-07-01 RX ORDER — SULFAMETHOXAZOLE AND TRIMETHOPRIM 800; 160 MG/1; MG/1
1 TABLET ORAL 2 TIMES DAILY
Qty: 20 TABLET | Refills: 0 | Status: SHIPPED | OUTPATIENT
Start: 2024-07-01 | End: 2024-07-11

## 2024-07-01 ASSESSMENT — PATIENT HEALTH QUESTIONNAIRE - PHQ9
SUM OF ALL RESPONSES TO PHQ QUESTIONS 1-9: 0
SUM OF ALL RESPONSES TO PHQ9 QUESTIONS 1 & 2: 0
2. FEELING DOWN, DEPRESSED OR HOPELESS: NOT AT ALL
1. LITTLE INTEREST OR PLEASURE IN DOING THINGS: NOT AT ALL
SUM OF ALL RESPONSES TO PHQ QUESTIONS 1-9: 0

## 2024-07-01 NOTE — PROGRESS NOTES
SpO2 96%   BMI 40.72 kg/m²     Wt Readings from Last 3 Encounters:   07/01/24 117.9 kg (260 lb)   06/22/24 115.3 kg (254 lb 3.1 oz)   06/19/24 106.6 kg (235 lb)        Physical Exam  General:  Well-appearing, NAD, alert, non-toxic  HEENT:  Normocephalic, atraumatic.   CHEST/LUNGS: No dyspnea  EXTREMETIES: Normal movement of all extremities.  + 3+ nonpitting edema of left lower leg and ankle.  SKIN: + Mild erythema of left lower leg and ankle.  + Warm to palpation.  No tenderness to palpation.  PSYCH:  A+O x 3; normal affect  NEURO:  GCS 15, CN2-12 grossly intact, no focal motor/sensory deficits, normal gait, normal speech      Assessment/Plan     68-year-old female with erythema and edema of left lower extremity.  Likely due to cellulitis.  Will treat with Bactrim.  No sign of sepsis at this time.  She recently had elevated serum creatinine that did not come down to baseline near the end of her hospital stay.  We will recheck renal function panel.    Discussed with patient medication/s dosage, instructions, potential S/E, A/R and Drug Interaction  Tylenol as needed for pain or fever  Advise to return here if worse or go to nearest ER  Encourage fluids  Pt discharged in stable condition at 16:32      1. Cellulitis of lower leg  -     sulfamethoxazole-trimethoprim (BACTRIM DS;SEPTRA DS) 800-160 MG per tablet; Take 1 tablet by mouth 2 times daily for 10 days, Disp-20 tablet, R-0Normal  2. Elevated serum creatinine  -     Renal Function Panel; Future  3. Lower extremity edema       Orders Placed This Encounter   Procedures    Renal Function Panel     Standing Status:   Future     Standing Expiration Date:   7/1/2025       No follow-ups on file.    NENO FIGUEROA MD, MD    7/1/2024  4:35 PM

## 2024-07-08 DIAGNOSIS — M17.11 ARTHRITIS OF KNEE, RIGHT: ICD-10-CM

## 2024-07-08 RX ORDER — HYDROCODONE BITARTRATE AND ACETAMINOPHEN 7.5; 325 MG/1; MG/1
1 TABLET ORAL EVERY 6 HOURS PRN
Qty: 108 TABLET | Refills: 0 | Status: SHIPPED | OUTPATIENT
Start: 2024-07-08 | End: 2024-08-03

## 2024-07-08 NOTE — TELEPHONE ENCOUNTER
Medication and Quantity requested:      HYDROcodone-acetaminophen (NORCO) 7.5-325 MG per tablet 1 tablet   [[id number]]         Last Visit  07/01/2024    Pharmacy and phone number updated in EPIC:  yes    [facility] [address]

## 2024-07-10 ENCOUNTER — TELEPHONE (OUTPATIENT)
Dept: FAMILY MEDICINE CLINIC | Age: 69
End: 2024-07-10

## 2024-07-10 NOTE — TELEPHONE ENCOUNTER
----- Message from Abhay Hart sent at 7/10/2024  3:01 PM EDT -----  Regarding: ECC Escalation To Practice  ECC Escalation To Practice      Type of Escalation: Red Flag Symptom  --------------------------------------------------------------------------------------------------------------------------    Information for Provider:  Patient is looking for appointment for: Symptom : not feeling well / dizziness  Reasons for Message: Unable to reach practice     Additional Information : patient wanted to booked an appointment for check up the symptoms that she's experiencing , because she is not feeling well and experience a dizziness  --------------------------------------------------------------------------------------------------------------------------    Relationship to Patient: Self     Call Back Info: OK to leave message on voicemail  Preferred Call Back Number: Phone [phone] (home)

## 2024-07-20 PROBLEM — N39.0 UTI (URINARY TRACT INFECTION): Status: RESOLVED | Noted: 2024-06-20 | Resolved: 2024-07-20

## 2024-07-20 PROBLEM — R79.89 ELEVATED TROPONIN: Status: RESOLVED | Noted: 2024-01-13 | Resolved: 2024-07-20

## 2024-07-29 RX ORDER — BUPROPION HYDROCHLORIDE 150 MG/1
150 TABLET ORAL 2 TIMES DAILY
Qty: 180 TABLET | Refills: 1 | Status: SHIPPED | OUTPATIENT
Start: 2024-07-29

## 2024-07-30 DIAGNOSIS — M17.11 ARTHRITIS OF KNEE, RIGHT: ICD-10-CM

## 2024-07-30 RX ORDER — HYDROCODONE BITARTRATE AND ACETAMINOPHEN 7.5; 325 MG/1; MG/1
1 TABLET ORAL EVERY 6 HOURS PRN
Qty: 108 TABLET | Refills: 0 | Status: SHIPPED | OUTPATIENT
Start: 2024-07-30 | End: 2024-08-25

## 2024-07-30 NOTE — TELEPHONE ENCOUNTER
Medication and Quantity requested:   HYDROcodone-acetaminophen (LORCET PLUS) 7.5-325 MG per tablet [[id number]]      Last Visit    07/01/2024  Pharmacy and phone number updated in EPIC:  yes    [facility] [address]

## 2024-07-30 NOTE — TELEPHONE ENCOUNTER
Lov 07/01/2024Medication:   Requested Prescriptions     Pending Prescriptions Disp Refills    HYDROcodone-acetaminophen (LORCET PLUS) 7.5-325 MG per tablet 108 tablet 0     Sig: Take 1 tablet by mouth every 6 hours as needed for Pain for up to 26 days. Intended supply: 30 days Max Daily Amount: 120 tablets        Last Filled:      Patient Phone Number: [phone] (home)     Last appt: 7/1/2024   Next appt: Visit date not found    Last OARRS:       6/11/2024    11:35 AM   RX Monitoring   Periodic Controlled Substance Monitoring No signs of potential drug abuse or diversion identified.                 Lrf 07/08/2024 108 tab 0 refills

## 2024-08-16 RX ORDER — CARVEDILOL 12.5 MG/1
12.5 TABLET ORAL 2 TIMES DAILY
Qty: 180 TABLET | Refills: 3 | Status: SHIPPED | OUTPATIENT
Start: 2024-08-16

## 2024-08-16 NOTE — TELEPHONE ENCOUNTER
Medication:   Requested Prescriptions     Pending Prescriptions Disp Refills    carvedilol (COREG) 12.5 MG tablet 180 tablet 3     Sig: Take 1 tablet by mouth 2 times daily        Last Filled:  180.3  05.26.23    Patient Phone Number: [phone] (home)     Last appt: 7/1/2024   Next appt: Visit date not found    Last OARRS:       6/11/2024    11:35 AM   RX Monitoring   Periodic Controlled Substance Monitoring No signs of potential drug abuse or diversion identified.

## 2024-08-16 NOTE — TELEPHONE ENCOUNTER
Medication and Quantity requested: carvedilol (COREG) 12.5 MG tablet      Last Visit  7/1/2024    Pharmacy and phone number updated in [facility]:  yes  [facility] #[id number] - [facility] [address] - [place] [phone] - F [phone]

## 2024-08-28 DIAGNOSIS — M17.11 ARTHRITIS OF KNEE, RIGHT: ICD-10-CM

## 2024-08-28 RX ORDER — HYDROCODONE BITARTRATE AND ACETAMINOPHEN 7.5; 325 MG/1; MG/1
1 TABLET ORAL EVERY 6 HOURS PRN
Qty: 108 TABLET | Refills: 0 | Status: SHIPPED | OUTPATIENT
Start: 2024-08-28 | End: 2024-09-23

## 2024-08-28 NOTE — TELEPHONE ENCOUNTER
Medication and Quantity requested:   HYDROcodone-acetaminophen (LORCET PLUS) 7.5-325 MG per tablet     Last Visit 07/01/2024    Qty unknown    Pharmacy updated  yes    Please send to [facility] #[id number] [facility] [address] - [place] [phone] - F [phone]

## 2024-08-28 NOTE — TELEPHONE ENCOUNTER
Medication:   Requested Prescriptions     Pending Prescriptions Disp Refills    HYDROcodone-acetaminophen (LORCET PLUS) 7.5-325 MG per tablet 108 tablet 0     Sig: Take 1 tablet by mouth every 6 hours as needed for Pain for up to 26 days. Intended supply: 30 days Max Daily Amount: 120 tablets        Last Filled:  108.0  07.30.2024    Patient Phone Number: [phone] (home)     Last appt: 7/1/2024   Next appt: Visit date not found    Last OARRS:       6/11/2024    11:35 AM   RX Monitoring   Periodic Controlled Substance Monitoring No signs of potential drug abuse or diversion identified.

## 2024-08-29 ENCOUNTER — TELEPHONE (OUTPATIENT)
Dept: FAMILY MEDICINE CLINIC | Age: 69
End: 2024-08-29

## 2024-09-23 DIAGNOSIS — M17.11 ARTHRITIS OF KNEE, RIGHT: ICD-10-CM

## 2024-09-23 RX ORDER — HYDROCODONE BITARTRATE AND ACETAMINOPHEN 7.5; 325 MG/1; MG/1
1 TABLET ORAL EVERY 6 HOURS PRN
Qty: 108 TABLET | Refills: 0 | Status: SHIPPED | OUTPATIENT
Start: 2024-09-23 | End: 2024-10-19

## 2024-10-18 DIAGNOSIS — M17.11 ARTHRITIS OF KNEE, RIGHT: ICD-10-CM

## 2024-10-18 RX ORDER — HYDROCODONE BITARTRATE AND ACETAMINOPHEN 7.5; 325 MG/1; MG/1
1 TABLET ORAL EVERY 6 HOURS PRN
Qty: 108 TABLET | Refills: 0 | Status: SHIPPED | OUTPATIENT
Start: 2024-10-18 | End: 2024-11-13

## 2024-11-04 ENCOUNTER — TELEPHONE (OUTPATIENT)
Dept: FAMILY MEDICINE CLINIC | Age: 69
End: 2024-11-04

## 2024-11-04 NOTE — TELEPHONE ENCOUNTER
Pt called and said that she has a form that needs to be filled out on her ability to drive. Pt wants to know if she can just drop off the form or if she needs and appt to be evaluated

## 2024-11-06 ENCOUNTER — OFFICE VISIT (OUTPATIENT)
Dept: FAMILY MEDICINE CLINIC | Age: 69
End: 2024-11-06

## 2024-11-06 VITALS
HEART RATE: 73 BPM | SYSTOLIC BLOOD PRESSURE: 135 MMHG | OXYGEN SATURATION: 97 % | WEIGHT: 258 LBS | DIASTOLIC BLOOD PRESSURE: 77 MMHG | BODY MASS INDEX: 40.41 KG/M2

## 2024-11-06 DIAGNOSIS — I25.10 CORONARY ARTERY DISEASE INVOLVING NATIVE CORONARY ARTERY OF NATIVE HEART WITHOUT ANGINA PECTORIS: ICD-10-CM

## 2024-11-06 DIAGNOSIS — Z95.1 STATUS POST CORONARY ARTERY BYPASS GRAFT: ICD-10-CM

## 2024-11-06 DIAGNOSIS — J44.1 COPD EXACERBATION (HCC): ICD-10-CM

## 2024-11-06 DIAGNOSIS — E78.2 MIXED HYPERLIPIDEMIA: ICD-10-CM

## 2024-11-06 DIAGNOSIS — I10 PRIMARY HYPERTENSION: ICD-10-CM

## 2024-11-06 DIAGNOSIS — N18.32 STAGE 3B CHRONIC KIDNEY DISEASE (CKD) (HCC): ICD-10-CM

## 2024-11-06 DIAGNOSIS — K85.00 IDIOPATHIC ACUTE PANCREATITIS WITHOUT INFECTION OR NECROSIS: ICD-10-CM

## 2024-11-06 DIAGNOSIS — R53.83 FATIGUE, UNSPECIFIED TYPE: ICD-10-CM

## 2024-11-06 DIAGNOSIS — M54.50 CHRONIC MIDLINE LOW BACK PAIN WITHOUT SCIATICA: ICD-10-CM

## 2024-11-06 DIAGNOSIS — D50.0 IRON DEFICIENCY ANEMIA SECONDARY TO BLOOD LOSS (CHRONIC): ICD-10-CM

## 2024-11-06 DIAGNOSIS — J96.02 ACUTE HYPERCAPNIC RESPIRATORY FAILURE: ICD-10-CM

## 2024-11-06 DIAGNOSIS — Z72.0 TOBACCO ABUSE: ICD-10-CM

## 2024-11-06 DIAGNOSIS — E66.01 MORBID OBESITY DUE TO EXCESS CALORIES: Primary | ICD-10-CM

## 2024-11-06 DIAGNOSIS — N18.4 CKD (CHRONIC KIDNEY DISEASE) STAGE 4, GFR 15-29 ML/MIN (HCC): ICD-10-CM

## 2024-11-06 DIAGNOSIS — G89.29 CHRONIC MIDLINE LOW BACK PAIN WITHOUT SCIATICA: ICD-10-CM

## 2024-11-06 DIAGNOSIS — G25.81 RESTLESS LEGS SYNDROME: ICD-10-CM

## 2024-11-06 NOTE — PROGRESS NOTES
acute pancreatitis without infection or necrosis  Fully recovered    13 immunizations/health maintenance  Due for urine drug screen, labs  We will discuss vaccinations and colon cancer screening at next visit

## 2024-11-08 NOTE — TELEPHONE ENCOUNTER
Needs an appointment for further refills I performed a history and physical exam of the patient and discussed their management with the resident. I reviewed the resident's note and agree with the documented findings and plan of care.  Mckenna Shaffer MD  see MDM Attending Attestation (For Attendings USE Only)...

## 2024-11-19 DIAGNOSIS — M17.11 ARTHRITIS OF KNEE, RIGHT: ICD-10-CM

## 2024-11-19 RX ORDER — HYDROCODONE BITARTRATE AND ACETAMINOPHEN 7.5; 325 MG/1; MG/1
1 TABLET ORAL EVERY 6 HOURS PRN
Qty: 108 TABLET | Refills: 0 | Status: SHIPPED | OUTPATIENT
Start: 2024-11-19 | End: 2024-12-15

## 2024-11-19 NOTE — TELEPHONE ENCOUNTER
Lov 11/6/24  Lrf 108 0 10/18/24 Medication:   Requested Prescriptions     Pending Prescriptions Disp Refills    HYDROcodone-acetaminophen (LORCET PLUS) 7.5-325 MG per tablet 108 tablet 0     Sig: Take 1 tablet by mouth every 6 hours as needed for Pain for up to 26 days. Intended supply: 30 days Max Daily Amount: 120 tablets        Last Filled:      Patient Phone Number: [phone] (home)     Last appt: 11/6/2024   Next appt: Visit date not found    Last OARRS:       9/23/2024    12:38 PM   RX Monitoring   Periodic Controlled Substance Monitoring No signs of potential drug abuse or diversion identified.

## 2024-11-19 NOTE — TELEPHONE ENCOUNTER
Medication and Quantity requested:   HYDROcodone-acetaminophen (LORCET PLUS) 7.5-325 MG per tablet [[id number]]  ENDED     Last Visit  11-6-2024    Pharmacy and phone number updated in [facility]:    [facility] #[id number] [facility] [address] - P [phone] - F [phone]

## 2024-11-23 DIAGNOSIS — R06.02 SOB (SHORTNESS OF BREATH): ICD-10-CM

## 2024-11-25 RX ORDER — ALBUTEROL SULFATE 90 UG/1
INHALANT RESPIRATORY (INHALATION)
Qty: 8.5 G | Refills: 2 | Status: SHIPPED | OUTPATIENT
Start: 2024-11-25

## 2024-11-25 NOTE — TELEPHONE ENCOUNTER
Last OV: 11/6/2024  Next OV: 11/23/2024    Next appointment due:    Last fill:12/01/2023  Refills:8.5/2

## 2024-11-25 NOTE — TELEPHONE ENCOUNTER
Last OV: 11/6/2024  Next OV: Visit date not found    Next appointment due:    Last fill:  Refills:

## 2024-11-27 RX ORDER — ALBUTEROL SULFATE 90 UG/1
INHALANT RESPIRATORY (INHALATION)
Qty: 6.7 G | OUTPATIENT
Start: 2024-11-27

## 2024-12-03 NOTE — TELEPHONE ENCOUNTER
Last OV: 23 - [facility]  Next OV: 1/15/25 - [place]  Last Labs: 24  Last EK24   Last Filled:    Disp Refills Start End    clopidogrel (PLAVIX) 75 MG tablet 90 tablet 3 2023 --    Sig - Route: Take 1 tablet by mouth daily - Oral    Sent to pharmacy as: Clopidogrel Bisulfate 75 MG Oral Tablet (PLAVIX)    E-Prescribing Status: Receipt confirmed by pharmacy (2023  1:56 PM EST)

## 2024-12-11 RX ORDER — CLOPIDOGREL BISULFATE 75 MG/1
75 TABLET ORAL DAILY
Qty: 90 TABLET | Refills: 3 | Status: SHIPPED | OUTPATIENT
Start: 2024-12-11

## 2024-12-18 DIAGNOSIS — M17.11 ARTHRITIS OF KNEE, RIGHT: ICD-10-CM

## 2024-12-18 RX ORDER — HYDROCODONE BITARTRATE AND ACETAMINOPHEN 7.5; 325 MG/1; MG/1
1 TABLET ORAL EVERY 6 HOURS PRN
Qty: 108 TABLET | Refills: 0 | Status: SHIPPED | OUTPATIENT
Start: 2024-12-18 | End: 2025-01-13

## 2024-12-18 NOTE — TELEPHONE ENCOUNTER
Medication:   Requested Prescriptions     Pending Prescriptions Disp Refills    HYDROcodone-acetaminophen (LORCET PLUS) 7.5-325 MG per tablet 108 tablet 0     Sig: Take 1 tablet by mouth every 6 hours as needed for Pain for up to 26 days. Intended supply: 30 days Max Daily Amount: 120 tablets        Last Filled:  11/19/24    Patient Phone Number: [phone] (home)     Last appt: 11/6/2024   Next appt: Visit date not found    Last OARRS:       9/23/2024    12:38 PM   RX Monitoring   Periodic Controlled Substance Monitoring No signs of potential drug abuse or diversion identified.

## 2024-12-18 NOTE — TELEPHONE ENCOUNTER
Medication and Quantity requested: HYDROcodone-acetaminophen (LORCET PLUS) 7.5-325 MG per tablet     Last Visit  11/06/2024      Pharmacy and phone number updated in EPIC:  yes    Please send to  [facility] #[id number] - [place], [place] - [address] - P [phone] - F [phone] [address] [facility] [address]  Phone: [phone]  Fax: [phone]

## 2024-12-20 RX ORDER — FLUOXETINE 40 MG/1
80 CAPSULE ORAL DAILY
Qty: 120 CAPSULE | Refills: 2 | Status: SHIPPED | OUTPATIENT
Start: 2024-12-20

## 2024-12-20 NOTE — TELEPHONE ENCOUNTER
Medication:   Requested Prescriptions     Pending Prescriptions Disp Refills    FLUoxetine (PROZAC) 40 MG capsule [Pharmacy Med Name: FLUOXETINE 40MG CAPSULES] 180 capsule 3     Sig: TAKE 2 CAPSULES BY MOUTH DAILY        Last Filled:11/13/23      Patient Phone Number: [phone] (home)     Last appt: 11/6/2024   Next appt: Visit date not found    Last OARRS:       9/23/2024    12:38 PM   RX Monitoring   Periodic Controlled Substance Monitoring No signs of potential drug abuse or diversion identified.

## 2024-12-23 RX ORDER — BUPROPION HYDROCHLORIDE 150 MG/1
150 TABLET ORAL DAILY
Qty: 90 TABLET | Refills: 1 | Status: SHIPPED | OUTPATIENT
Start: 2024-12-23

## 2025-01-02 ENCOUNTER — TELEPHONE (OUTPATIENT)
Dept: FAMILY MEDICINE CLINIC | Age: 70
End: 2025-01-02

## 2025-01-02 NOTE — TELEPHONE ENCOUNTER
Pt started with the following symptoms about 3 days ago:    Productive cough - clear phlegm  Headache  Fever  Tightness in chest  Fatigue  Body aches    [facility] - [address]    Pt has tried OTC mucinex and gets a little relief.  Pt would like something called into [facility].    Advise pt.

## 2025-01-14 NOTE — PROGRESS NOTES
[facility]  Cardiology Note      Twila Hall  1955, 69 y.o.      01/15/25      CC: CAD, HTN  Mitch Little MD:      Problem List  CAD  HLD  CKD  CABG     HPI:    Twila Hall is a 69-year-old patient with obesity, COPD CKD CABG (. She presented with severe chest pain with associated ST depressions in the high lateral leads and 9/2019.  This revealed SVG to OM stenosis.  This was stented      Patient is here today for an annual follow up for CAD.  She consents to having tightness in her chest when stressed or with exertion or exposure to cold weather.  It is infrequent perhaps once a week or so.        Past Medical History:   Diagnosis Date    Arthritis     ASHD (arteriosclerotic heart disease)     Chronic kidney disease     Chronic midline low back pain without sciatica 4/3/2019    COPD (chronic obstructive pulmonary disease) (HCC)     unknown     Coronary artery disease involving native coronary artery of native heart without angina pectoris 3/31/2016    Coronary artery disease involving native coronary artery of native heart without angina pectoris 3/31/2016    Coronary artery disease involving native coronary artery of native heart without angina pectoris 3/31/2016    Depression     Full dentures     HTN     HTN (hypertension) 1/11/2012    Hyperlipidemia     Idiopathic acute pancreatitis 10/26/2023    Iron deficiency anemia 3/31/2016    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

## 2025-01-15 ENCOUNTER — OFFICE VISIT (OUTPATIENT)
Dept: CARDIOLOGY CLINIC | Age: 70
End: 2025-01-15
Payer: MEDICARE

## 2025-01-15 VITALS
DIASTOLIC BLOOD PRESSURE: 86 MMHG | HEART RATE: 67 BPM | BODY MASS INDEX: 41.62 KG/M2 | OXYGEN SATURATION: 96 % | SYSTOLIC BLOOD PRESSURE: 132 MMHG | HEIGHT: 67 IN | WEIGHT: 265.2 LBS

## 2025-01-15 DIAGNOSIS — I25.10 CORONARY ARTERY DISEASE INVOLVING NATIVE CORONARY ARTERY OF NATIVE HEART WITHOUT ANGINA PECTORIS: Primary | ICD-10-CM

## 2025-01-15 DIAGNOSIS — E66.01 MORBID (SEVERE) OBESITY DUE TO EXCESS CALORIES: ICD-10-CM

## 2025-01-15 PROCEDURE — 3075F SYST BP GE 130 - 139MM HG: CPT | Performed by: INTERNAL MEDICINE

## 2025-01-15 PROCEDURE — 93000 ELECTROCARDIOGRAM COMPLETE: CPT | Performed by: INTERNAL MEDICINE

## 2025-01-15 PROCEDURE — 1123F ACP DISCUSS/DSCN MKR DOCD: CPT | Performed by: INTERNAL MEDICINE

## 2025-01-15 PROCEDURE — G8427 DOCREV CUR MEDS BY ELIG CLIN: HCPCS | Performed by: INTERNAL MEDICINE

## 2025-01-15 PROCEDURE — 4004F PT TOBACCO SCREEN RCVD TLK: CPT | Performed by: INTERNAL MEDICINE

## 2025-01-15 PROCEDURE — G8399 PT W/DXA RESULTS DOCUMENT: HCPCS | Performed by: INTERNAL MEDICINE

## 2025-01-15 PROCEDURE — G8417 CALC BMI ABV UP PARAM F/U: HCPCS | Performed by: INTERNAL MEDICINE

## 2025-01-15 PROCEDURE — 3079F DIAST BP 80-89 MM HG: CPT | Performed by: INTERNAL MEDICINE

## 2025-01-15 PROCEDURE — 1090F PRES/ABSN URINE INCON ASSESS: CPT | Performed by: INTERNAL MEDICINE

## 2025-01-15 PROCEDURE — 1159F MED LIST DOCD IN RCRD: CPT | Performed by: INTERNAL MEDICINE

## 2025-01-15 PROCEDURE — 3017F COLORECTAL CA SCREEN DOC REV: CPT | Performed by: INTERNAL MEDICINE

## 2025-01-15 PROCEDURE — 99214 OFFICE O/P EST MOD 30 MIN: CPT | Performed by: INTERNAL MEDICINE

## 2025-01-15 RX ORDER — CARVEDILOL 12.5 MG/1
12.5 TABLET ORAL 2 TIMES DAILY
Qty: 180 TABLET | Refills: 5 | Status: SHIPPED | OUTPATIENT
Start: 2025-01-15

## 2025-01-15 RX ORDER — CLOPIDOGREL BISULFATE 75 MG/1
75 TABLET ORAL DAILY
Qty: 90 TABLET | Refills: 5 | Status: SHIPPED | OUTPATIENT
Start: 2025-01-15

## 2025-01-15 RX ORDER — ROSUVASTATIN CALCIUM 40 MG/1
40 TABLET, COATED ORAL NIGHTLY
Qty: 90 TABLET | Refills: 5 | Status: SHIPPED | OUTPATIENT
Start: 2025-01-15

## 2025-01-16 DIAGNOSIS — M15.0 PRIMARY OSTEOARTHRITIS INVOLVING MULTIPLE JOINTS: ICD-10-CM

## 2025-01-16 DIAGNOSIS — Z76.0 ENCOUNTER FOR MEDICATION REFILL: ICD-10-CM

## 2025-01-16 RX ORDER — HYDROCODONE BITARTRATE AND ACETAMINOPHEN 7.5; 325 MG/1; MG/1
1 TABLET ORAL EVERY 6 HOURS PRN
Qty: 108 TABLET | Refills: 0 | Status: SHIPPED | OUTPATIENT
Start: 2025-01-16 | End: 2025-02-12

## 2025-01-16 NOTE — TELEPHONE ENCOUNTER
Medication and Quantity requested:      HYDROcodone-acetaminophen (LORCET PLUS) 7.5-325 MG per tablet [[id number]]  ENDED     Last Visit  11-6-24    Pharmacy and phone number updated in [facility]:      [facility] #[id number] [facility] [address] - P [phone] - F [phone]

## 2025-01-16 NOTE — TELEPHONE ENCOUNTER
Lov 11/6/24  Lrf 108 0 3/14/24 Medication:   Requested Prescriptions     Pending Prescriptions Disp Refills    HYDROcodone-acetaminophen (NORCO) 7.5-325 MG per tablet 108 tablet 0     Sig: Take 1 tablet by mouth every 6 hours as needed for Pain for up to 27 days. Max Daily Amount: 4 tablets        Last Filled:      Patient Phone Number: [phone] (home)     Last appt: 11/6/2024   Next appt: Visit date not found    Last OARRS:       9/23/2024    12:38 PM   RX Monitoring   Periodic Controlled Substance Monitoring No signs of potential drug abuse or diversion identified.

## 2025-02-17 DIAGNOSIS — M17.11 ARTHRITIS OF KNEE, RIGHT: ICD-10-CM

## 2025-02-17 RX ORDER — HYDROCODONE BITARTRATE AND ACETAMINOPHEN 7.5; 325 MG/1; MG/1
1 TABLET ORAL EVERY 6 HOURS PRN
Qty: 108 TABLET | Refills: 0 | OUTPATIENT
Start: 2025-02-17 | End: 2025-03-15

## 2025-02-17 NOTE — TELEPHONE ENCOUNTER
Medication and Quantity requested:   HYDROcodone-acetaminophen (LORCET PLUS) 7.5-325 MG per tablet [[id number]]  ENDED      Last Visit  11/6/24    Pharmacy and phone number updated in EPIC:  yes    Pt was advised she may need to be seen fpr refill.    Pt taking covid test due to fever, congestion, and body aches, pt calling back results to possibly schedule an appt.      [facility] #[id number] [facility] [address] [place] [phone] - F [phone]       Please advise pt   [phone]

## 2025-02-17 NOTE — TELEPHONE ENCOUNTER
Medication:   Requested Prescriptions     Pending Prescriptions Disp Refills    HYDROcodone-acetaminophen (LORCET PLUS) 7.5-325 MG per tablet 108 tablet 0     Sig: Take 1 tablet by mouth every 6 hours as needed for Pain for up to 26 days. Intended supply: 30 days Max Daily Amount: 120 tablets        Last Filled:  12/18/24    Patient Phone Number: [phone] (home)     Last appt: 11/6/2024   Next appt: Visit date not found    Last OARRS:       1/16/2025     1:10 PM   RX Monitoring   Periodic Controlled Substance Monitoring No signs of potential drug abuse or diversion identified.

## 2025-02-18 DIAGNOSIS — E66.9 OBESITY: ICD-10-CM

## 2025-02-18 DIAGNOSIS — M79.606 LEG PAIN: ICD-10-CM

## 2025-02-18 DIAGNOSIS — M19.90 DJD (DEGENERATIVE JOINT DISEASE): ICD-10-CM

## 2025-02-18 DIAGNOSIS — I10 HTN (HYPERTENSION): ICD-10-CM

## 2025-02-18 RX ORDER — HYDROCODONE BITARTRATE AND ACETAMINOPHEN 5; 325 MG/1; MG/1
1 TABLET ORAL EVERY 6 HOURS PRN
Qty: 60 TABLET | Refills: 0 | Status: SHIPPED | OUTPATIENT
Start: 2025-02-18 | End: 2025-03-20

## 2025-02-27 ENCOUNTER — OFFICE VISIT (OUTPATIENT)
Dept: FAMILY MEDICINE CLINIC | Age: 70
End: 2025-02-27

## 2025-02-27 ENCOUNTER — TELEPHONE (OUTPATIENT)
Dept: FAMILY MEDICINE CLINIC | Age: 70
End: 2025-02-27

## 2025-02-27 VITALS
DIASTOLIC BLOOD PRESSURE: 78 MMHG | OXYGEN SATURATION: 96 % | HEART RATE: 71 BPM | WEIGHT: 271.8 LBS | HEIGHT: 67 IN | BODY MASS INDEX: 42.66 KG/M2 | SYSTOLIC BLOOD PRESSURE: 140 MMHG

## 2025-02-27 DIAGNOSIS — J96.02 ACUTE HYPERCAPNIC RESPIRATORY FAILURE (HCC): ICD-10-CM

## 2025-02-27 DIAGNOSIS — E66.01 CLASS 2 SEVERE OBESITY DUE TO EXCESS CALORIES WITH SERIOUS COMORBIDITY AND BODY MASS INDEX (BMI) OF 39.0 TO 39.9 IN ADULT: Primary | ICD-10-CM

## 2025-02-27 DIAGNOSIS — N18.4 CKD (CHRONIC KIDNEY DISEASE) STAGE 4, GFR 15-29 ML/MIN (HCC): ICD-10-CM

## 2025-02-27 DIAGNOSIS — J44.1 COPD EXACERBATION (HCC): ICD-10-CM

## 2025-02-27 DIAGNOSIS — E66.812 CLASS 2 SEVERE OBESITY DUE TO EXCESS CALORIES WITH SERIOUS COMORBIDITY AND BODY MASS INDEX (BMI) OF 39.0 TO 39.9 IN ADULT: Primary | ICD-10-CM

## 2025-02-27 DIAGNOSIS — R35.0 URINARY FREQUENCY: ICD-10-CM

## 2025-02-27 LAB
BILIRUBIN, POC: NEGATIVE
BLOOD URINE, POC: NORMAL
CLARITY, POC: NORMAL
COLOR, POC: YELLOW
GLUCOSE URINE, POC: NEGATIVE MG/DL
KETONES, POC: NEGATIVE MG/DL
LEUKOCYTE EST, POC: NORMAL
NITRITE, POC: NEGATIVE
PH, POC: 5.5
PROTEIN, POC: 100 MG/DL
SPECIFIC GRAVITY, POC: 1.02
UROBILINOGEN, POC: 0.2 MG/DL

## 2025-02-27 RX ORDER — BUDESONIDE AND FORMOTEROL FUMARATE DIHYDRATE 160; 4.5 UG/1; UG/1
2 AEROSOL RESPIRATORY (INHALATION) 2 TIMES DAILY
Qty: 10.2 G | Refills: 3 | Status: SHIPPED | OUTPATIENT
Start: 2025-02-27

## 2025-02-27 RX ORDER — METHYLPREDNISOLONE 4 MG/1
TABLET ORAL
Qty: 1 KIT | Refills: 0 | Status: SHIPPED | OUTPATIENT
Start: 2025-02-27 | End: 2025-03-05

## 2025-02-27 RX ORDER — LOSARTAN POTASSIUM 25 MG/1
25 TABLET ORAL DAILY
Qty: 90 TABLET | Refills: 1 | Status: SHIPPED | OUTPATIENT
Start: 2025-02-27

## 2025-02-27 SDOH — ECONOMIC STABILITY: FOOD INSECURITY: WITHIN THE PAST 12 MONTHS, THE FOOD YOU BOUGHT JUST DIDN'T LAST AND YOU DIDN'T HAVE MONEY TO GET MORE.: NEVER TRUE

## 2025-02-27 SDOH — ECONOMIC STABILITY: FOOD INSECURITY: WITHIN THE PAST 12 MONTHS, YOU WORRIED THAT YOUR FOOD WOULD RUN OUT BEFORE YOU GOT MONEY TO BUY MORE.: NEVER TRUE

## 2025-02-27 ASSESSMENT — PATIENT HEALTH QUESTIONNAIRE - PHQ9
10. IF YOU CHECKED OFF ANY PROBLEMS, HOW DIFFICULT HAVE THESE PROBLEMS MADE IT FOR YOU TO DO YOUR WORK, TAKE CARE OF THINGS AT HOME, OR GET ALONG WITH OTHER PEOPLE: NOT DIFFICULT AT ALL
SUM OF ALL RESPONSES TO PHQ9 QUESTIONS 1 & 2: 3
2. FEELING DOWN, DEPRESSED OR HOPELESS: NEARLY EVERY DAY
SUM OF ALL RESPONSES TO PHQ QUESTIONS 1-9: 6
9. THOUGHTS THAT YOU WOULD BE BETTER OFF DEAD, OR OF HURTING YOURSELF: NOT AT ALL
SUM OF ALL RESPONSES TO PHQ QUESTIONS 1-9: 6
4. FEELING TIRED OR HAVING LITTLE ENERGY: NEARLY EVERY DAY
SUM OF ALL RESPONSES TO PHQ QUESTIONS 1-9: 6
SUM OF ALL RESPONSES TO PHQ QUESTIONS 1-9: 6
3. TROUBLE FALLING OR STAYING ASLEEP: NOT AT ALL
7. TROUBLE CONCENTRATING ON THINGS, SUCH AS READING THE NEWSPAPER OR WATCHING TELEVISION: NOT AT ALL
1. LITTLE INTEREST OR PLEASURE IN DOING THINGS: NOT AT ALL
6. FEELING BAD ABOUT YOURSELF - OR THAT YOU ARE A FAILURE OR HAVE LET YOURSELF OR YOUR FAMILY DOWN: NOT AT ALL
8. MOVING OR SPEAKING SO SLOWLY THAT OTHER PEOPLE COULD HAVE NOTICED. OR THE OPPOSITE, BEING SO FIGETY OR RESTLESS THAT YOU HAVE BEEN MOVING AROUND A LOT MORE THAN USUAL: NOT AT ALL
5. POOR APPETITE OR OVEREATING: NOT AT ALL

## 2025-02-27 NOTE — PROGRESS NOTES
Twila Hall is a 69 y.o. female.    HPI:here for complex medical visit  Last 2 weeks, achy, freq urination without dysuria or hematuria  Loose stools last 2 days, cough  , nonproductive cough  Still smokes 1/2 ppd, going through a divorce, moved in with father  Need new handicap placard  Ongoing low back pain and left foot pain, lower dose norco 5 mg  rather than 7.5 not as effective  Meds, vitamins and allergies reviewed with pt    ROS: No TIA's or unusual headaches, no dysphagia.  No prolonged cough. No dyspnea or chest pain on exertion.  No abdominal pain, change in bowel habits, black or bloody stools.  No urinary tract symptoms.  No new or unusual musculoskeletal symptoms.       Prior to Visit Medications    Medication Sig Taking? Authorizing Provider   HYDROcodone-acetaminophen (NORCO) 5-325 MG per tablet Take 1 tablet by mouth every 6 hours as needed for Pain for up to 30 days. Max Daily Amount: 4 tablets Yes Mitch Little MD   rosuvastatin (CRESTOR) 40 MG tablet Take 1 tablet by mouth nightly Yes Prabhakar Ba MD   carvedilol (COREG) 12.5 MG tablet Take 1 tablet by mouth 2 times daily Yes Prabhakar Ba MD   clopidogrel (PLAVIX) 75 MG tablet Take 1 tablet by mouth daily Yes Prabhakar Ba MD   buPROPion (WELLBUTRIN XL) 150 MG extended release tablet TAKE 1 TABLET BY MOUTH DAILY Yes Mitch Little MD   albuterol sulfate HFA (PROVENTIL;VENTOLIN;PROAIR) 108 (90 Base) MCG/ACT inhaler INHALE 2 PUFFS INTO THE LUNGS EVERY 6 HOURS AS NEEDED FOR WHEEZING Yes Mitch Little MD   carbidopa-levodopa (SINEMET)  MG per tablet TAKE 2 TABLETS BY MOUTH EVERY NIGHT Yes Mitch Little MD   nystatin (MYCOSTATIN) 435077 UNIT/GM cream Apply topically 2 times daily. Yes Mitch Little MD       Past Medical History:   Diagnosis Date    Arthritis     ASHD (arteriosclerotic heart disease)     Chronic kidney disease     Chronic midline low back pain without sciatica 4/3/2019    COPD (chronic obstructive

## 2025-03-01 LAB
BACTERIA UR CULT: ABNORMAL
ORGANISM: ABNORMAL

## 2025-03-01 RX ORDER — CIPROFLOXACIN 500 MG/1
500 TABLET, FILM COATED ORAL 2 TIMES DAILY
Qty: 14 TABLET | Refills: 0 | Status: SHIPPED | OUTPATIENT
Start: 2025-03-01 | End: 2025-03-08

## 2025-03-06 DIAGNOSIS — M17.11 ARTHRITIS OF KNEE, RIGHT: ICD-10-CM

## 2025-03-06 RX ORDER — HYDROCODONE BITARTRATE AND ACETAMINOPHEN 7.5; 325 MG/1; MG/1
1 TABLET ORAL EVERY 6 HOURS PRN
Qty: 108 TABLET | Refills: 0 | Status: SHIPPED | OUTPATIENT
Start: 2025-03-06 | End: 2025-04-01

## 2025-03-06 NOTE — TELEPHONE ENCOUNTER
Patient said the dosage should be 7.5 mg NOT 5 mg.     -----------------------------------------------------------------------------------------------    HYDROcodone-acetaminophen (NORCO) 5-325 MG per tablet [[id number]]     LOV 2-27-25    Last Refill 2-18-25    [id number] [address]

## 2025-03-06 NOTE — TELEPHONE ENCOUNTER
Medication:   Requested Prescriptions     Pending Prescriptions Disp Refills    HYDROcodone-acetaminophen (LORCET PLUS) 7.5-325 MG per tablet 108 tablet 0     Sig: Take 1 tablet by mouth every 6 hours as needed for Pain for up to 26 days. Intended supply: 30 days Max Daily Amount: 120 tablets        Last Filled:  15/18/24     Patient Phone Number: [phone] (home)     Last appt: 2/27/2025   Next appt: Visit date not found    Last OARRS:       1/16/2025     1:10 PM   RX Monitoring   Periodic Controlled Substance Monitoring No signs of potential drug abuse or diversion identified.

## 2025-04-07 DIAGNOSIS — M17.11 ARTHRITIS OF KNEE, RIGHT: ICD-10-CM

## 2025-04-07 RX ORDER — HYDROCODONE BITARTRATE AND ACETAMINOPHEN 7.5; 325 MG/1; MG/1
1 TABLET ORAL EVERY 6 HOURS PRN
Qty: 108 TABLET | Refills: 0 | OUTPATIENT
Start: 2025-04-07 | End: 2025-05-03

## 2025-04-07 NOTE — TELEPHONE ENCOUNTER
Medication and Quantity requested:   HYDROcodone-acetaminophen (LORCET PLUS) 7.5-325 MG per tablet [[id number]]  ENDED      Last Visit  2/27/25      Pharmacy and phone number updated in [facility]:  yes        [facility] #[id number] [facility] [address] - P [phone] - F [phone]

## 2025-04-07 NOTE — TELEPHONE ENCOUNTER
Medication:   Requested Prescriptions     Pending Prescriptions Disp Refills    HYDROcodone-acetaminophen (LORCET PLUS) 7.5-325 MG per tablet 108 tablet 0     Sig: Take 1 tablet by mouth every 6 hours as needed for Pain for up to 26 days. Intended supply: 30 days Max Daily Amount: 120 tablets        Last Filled:  3/6/25    Patient Phone Number: [phone] (home)     Last appt: 2/27/2025   Next appt: Visit date not found    Last OARRS:       1/16/2025     1:10 PM   RX Monitoring   Periodic Controlled Substance Monitoring No signs of potential drug abuse or diversion identified.

## 2025-04-23 ENCOUNTER — OFFICE VISIT (OUTPATIENT)
Dept: FAMILY MEDICINE CLINIC | Age: 70
End: 2025-04-23

## 2025-04-23 VITALS
BODY MASS INDEX: 41.66 KG/M2 | SYSTOLIC BLOOD PRESSURE: 118 MMHG | HEART RATE: 65 BPM | WEIGHT: 266 LBS | OXYGEN SATURATION: 96 % | DIASTOLIC BLOOD PRESSURE: 86 MMHG

## 2025-04-23 DIAGNOSIS — G89.4 CHRONIC PAIN SYNDROME: ICD-10-CM

## 2025-04-23 DIAGNOSIS — E66.812 CLASS 2 SEVERE OBESITY DUE TO EXCESS CALORIES WITH SERIOUS COMORBIDITY AND BODY MASS INDEX (BMI) OF 39.0 TO 39.9 IN ADULT (HCC): Primary | ICD-10-CM

## 2025-04-23 DIAGNOSIS — E66.01 CLASS 2 SEVERE OBESITY DUE TO EXCESS CALORIES WITH SERIOUS COMORBIDITY AND BODY MASS INDEX (BMI) OF 39.0 TO 39.9 IN ADULT (HCC): Primary | ICD-10-CM

## 2025-04-23 DIAGNOSIS — Z02.83 ENCOUNTER FOR DRUG SCREENING: ICD-10-CM

## 2025-04-23 DIAGNOSIS — J44.1 COPD EXACERBATION (HCC): ICD-10-CM

## 2025-04-23 DIAGNOSIS — I25.10 CORONARY ARTERY DISEASE INVOLVING NATIVE CORONARY ARTERY OF NATIVE HEART WITHOUT ANGINA PECTORIS: ICD-10-CM

## 2025-04-23 LAB
AMPHETAMINES UR QL SCN>1000 NG/ML: ABNORMAL
BARBITURATES UR QL SCN>200 NG/ML: ABNORMAL
BENZODIAZ UR QL SCN>200 NG/ML: ABNORMAL
CANNABINOIDS UR QL SCN>50 NG/ML: POSITIVE
COCAINE UR QL SCN: ABNORMAL
DRUG SCREEN COMMENT UR-IMP: ABNORMAL
FENTANYL SCREEN, URINE: ABNORMAL
METHADONE UR QL SCN>300 NG/ML: ABNORMAL
OPIATES UR QL SCN>300 NG/ML: ABNORMAL
OXYCODONE UR QL SCN: ABNORMAL
PCP UR QL SCN>25 NG/ML: ABNORMAL
PH UR STRIP: 6 [PH]

## 2025-04-23 RX ORDER — PREGABALIN 50 MG/1
50 CAPSULE ORAL 3 TIMES DAILY
Qty: 90 CAPSULE | Refills: 0 | Status: SHIPPED | OUTPATIENT
Start: 2025-04-23 | End: 2025-05-23

## 2025-04-23 RX ORDER — BUPROPION HYDROCHLORIDE 150 MG/1
150 TABLET ORAL 2 TIMES DAILY
Qty: 60 TABLET | Refills: 2 | Status: SHIPPED | OUTPATIENT
Start: 2025-04-23

## 2025-04-23 RX ORDER — BUDESONIDE AND FORMOTEROL FUMARATE DIHYDRATE 160; 4.5 UG/1; UG/1
2 AEROSOL RESPIRATORY (INHALATION) 2 TIMES DAILY
Qty: 10.2 G | Refills: 5 | Status: SHIPPED | OUTPATIENT
Start: 2025-04-23

## 2025-04-23 NOTE — PROGRESS NOTES
Twila Hall is a 69 y.o. female.    HPI: Here today with  for complex medical visit and to discuss medication  Living  with father,  from her  who is with her today, he appears to be overly protective  Mild Uri, wheezing more lately  Not using symbicort, using albuterol only  Still smoking 1/2ppd  Off wellbutrin, taking prozac  Taking norco bid - tid for low back pain but has been out of it for the last 3 weeks.  Willing to try something different  (Received anonymous phone call 3 to 4 weeks ago from someone concerned that she was not taking her pain medications properly)  Meds, vitamins and allergies reviewed with pt    ROS: No TIA's or unusual headaches, no dysphagia.  No prolonged cough. No dyspnea or chest pain on exertion.  No abdominal pain, change in bowel habits, black or bloody stools.  No urinary tract symptoms.  No new or unusual musculoskeletal symptoms.       Prior to Visit Medications    Medication Sig Taking? Authorizing Provider   losartan (COZAAR) 25 MG tablet Take 1 tablet by mouth daily Yes Mitch Little MD   budesonide-formoterol (SYMBICORT) 160-4.5 MCG/ACT AERO Inhale 2 puffs into the lungs 2 times daily Yes Mitch Little MD   rosuvastatin (CRESTOR) 40 MG tablet Take 1 tablet by mouth nightly Yes Prabhakar Ba MD   carvedilol (COREG) 12.5 MG tablet Take 1 tablet by mouth 2 times daily Yes Prabhakar Ba MD   clopidogrel (PLAVIX) 75 MG tablet Take 1 tablet by mouth daily Yes Prabhakar Ba MD   buPROPion (WELLBUTRIN XL) 150 MG extended release tablet TAKE 1 TABLET BY MOUTH DAILY Yes Mitch Little MD   albuterol sulfate HFA (PROVENTIL;VENTOLIN;PROAIR) 108 (90 Base) MCG/ACT inhaler INHALE 2 PUFFS INTO THE LUNGS EVERY 6 HOURS AS NEEDED FOR WHEEZING Yes Mitch Little MD   carbidopa-levodopa (SINEMET)  MG per tablet TAKE 2 TABLETS BY MOUTH EVERY NIGHT Yes Mitch Little MD   nystatin (MYCOSTATIN) 271582 UNIT/GM cream Apply topically 2 times daily.

## 2025-04-30 RX ORDER — BUDESONIDE AND FORMOTEROL FUMARATE DIHYDRATE 160; 4.5 UG/1; UG/1
2 AEROSOL RESPIRATORY (INHALATION) 2 TIMES DAILY
Qty: 10.2 G | Refills: 3 | Status: CANCELLED | OUTPATIENT
Start: 2025-04-30

## 2025-04-30 RX ORDER — FLUTICASONE PROPIONATE AND SALMETEROL 250; 50 UG/1; UG/1
1 POWDER RESPIRATORY (INHALATION) EVERY 12 HOURS
Qty: 60 EACH | Refills: 3 | Status: SHIPPED | OUTPATIENT
Start: 2025-04-30

## 2025-04-30 NOTE — TELEPHONE ENCOUNTER
Medication:   Requested Prescriptions     Pending Prescriptions Disp Refills    budesonide-formoterol (SYMBICORT) 160-4.5 MCG/ACT AERO 10.2 g 3     Sig: Inhale 2 puffs into the lungs 2 times daily        Last Filled:  04/23/25     Patient Phone Number: [phone] (home)     Last appt: 4/23/2025   Next appt: Visit date not found    Last OARRS:       1/16/2025     1:10 PM   RX Monitoring   Periodic Controlled Substance Monitoring No signs of potential drug abuse or diversion identified.

## 2025-04-30 NOTE — TELEPHONE ENCOUNTER
budesonide-formoterol (SYMBICORT) 160-4.5 MCG/ACT AERO   Pt said she can not afford the above medication and needs another alternative called in.      [id number] - [facility] [address] - [place] [phone] - F [phone]

## 2025-05-01 RX ORDER — FLUTICASONE PROPIONATE AND SALMETEROL 250; 50 UG/1; UG/1
POWDER RESPIRATORY (INHALATION)
Qty: 180 EACH | OUTPATIENT
Start: 2025-05-01

## 2025-05-02 ENCOUNTER — TELEPHONE (OUTPATIENT)
Dept: FAMILY MEDICINE CLINIC | Age: 70
End: 2025-05-02

## 2025-05-02 NOTE — TELEPHONE ENCOUNTER
Patient had dizzy spells all day yesterday and getting worse today.     Patient is dizzy  all the time now even when she is just sitting and not moving      Patient needs to know what would be causing it and what she needs to do     Please call and advise    No

## 2025-06-27 NOTE — TELEPHONE ENCOUNTER
Medication and Quantity requested: HYDROcodone-acetaminophen (NORCO) 7.5-325 MG per tablet        Last Visit  8/11/2023    Pharmacy and phone number updated in EPIC:  yes
done

## 2025-07-08 RX ORDER — CARBIDOPA/LEVODOPA 10MG-100MG
2 TABLET ORAL NIGHTLY PRN
Qty: 180 TABLET | Refills: 3 | Status: SHIPPED | OUTPATIENT
Start: 2025-07-08 | End: 2025-07-11 | Stop reason: SDUPTHER

## 2025-07-08 NOTE — TELEPHONE ENCOUNTER
Medication:   Requested Prescriptions     Pending Prescriptions Disp Refills    carbidopa-levodopa (SINEMET)  MG per tablet [Pharmacy Med Name: CARBIDOPA/LEVODOPA 10-100MG TABS] 180 tablet 3     Sig: TAKE 2 TABLETS BY MOUTH EVERY NIGHT        Last Filled:      Patient Phone Number: [phone] (home)     Last appt: 4/23/2025   Next appt: Visit date not found    Last OARRS:       1/16/2025     1:10 PM   RX Monitoring   Periodic Controlled Substance Monitoring No signs of potential drug abuse or diversion identified.

## 2025-07-09 RX ORDER — CARBIDOPA AND LEVODOPA 10; 100 MG/1; MG/1
2 TABLET ORAL NIGHTLY PRN
Qty: 180 TABLET | Refills: 2 | OUTPATIENT
Start: 2025-07-09

## 2025-07-11 RX ORDER — CARBIDOPA/LEVODOPA 10MG-100MG
2 TABLET ORAL NIGHTLY PRN
Qty: 180 TABLET | Refills: 3 | Status: SHIPPED | OUTPATIENT
Start: 2025-07-11

## 2025-07-11 NOTE — TELEPHONE ENCOUNTER
carbidopa-levodopa (SINEMET)  MG per tablet [[id number]]     Patient called to get a refill on the following medication.    Patient was informed the medication was sent to the pharmacy on 07/08.    Patient stated the pharmacy stated they have not received the medication as of yet.    Please advise.  [phone]      [facility] #[id number] [address] - [place] [phone] - F [phone]

## 2025-07-11 NOTE — TELEPHONE ENCOUNTER
Medication:   Requested Prescriptions     Pending Prescriptions Disp Refills    carbidopa-levodopa (SINEMET)  MG per tablet 180 tablet 3     Sig: Take 2 tablets by mouth nightly as needed        Last Filled:  7/8/25    Patient Phone Number: [phone] (home)     Last appt: 4/23/2025   Next appt: Visit date not found    Last OARRS:       1/16/2025     1:10 PM   RX Monitoring   Periodic Controlled Substance Monitoring No signs of potential drug abuse or diversion identified.

## 2025-07-16 ENCOUNTER — TELEPHONE (OUTPATIENT)
Dept: PHARMACY | Facility: CLINIC | Age: 70
End: 2025-07-16

## 2025-08-27 ENCOUNTER — APPOINTMENT (OUTPATIENT)
Dept: GENERAL RADIOLOGY | Age: 70
End: 2025-08-27
Payer: MEDICARE

## 2025-08-27 ENCOUNTER — APPOINTMENT (OUTPATIENT)
Dept: CT IMAGING | Age: 70
End: 2025-08-27
Payer: MEDICARE

## 2025-08-27 ENCOUNTER — HOSPITAL ENCOUNTER (INPATIENT)
Age: 70
LOS: 2 days | Discharge: HOME | End: 2025-08-30
Attending: HOSPITALIST | Admitting: HOSPITALIST
Payer: MEDICARE

## 2025-08-27 DIAGNOSIS — N18.9 CHRONIC KIDNEY DISEASE, UNSPECIFIED CKD STAGE: ICD-10-CM

## 2025-08-27 DIAGNOSIS — R40.4 TRANSIENT ALTERATION OF AWARENESS: ICD-10-CM

## 2025-08-27 DIAGNOSIS — N39.0 URINARY TRACT INFECTION WITHOUT HEMATURIA, SITE UNSPECIFIED: Primary | ICD-10-CM

## 2025-08-27 LAB
ALBUMIN SERPL-MCNC: 4.1 G/DL (ref 3.4–5)
ALBUMIN/GLOB SERPL: 1.2 {RATIO} (ref 1.1–2.2)
ALP SERPL-CCNC: 132 U/L (ref 40–129)
ALT SERPL-CCNC: <5 U/L (ref 10–40)
AMPHETAMINES UR QL SCN>1000 NG/ML: ABNORMAL
ANION GAP SERPL CALCULATED.3IONS-SCNC: 11 MMOL/L (ref 3–16)
AST SERPL-CCNC: 17 U/L (ref 15–37)
BACTERIA URNS QL MICRO: ABNORMAL /HPF
BARBITURATES UR QL SCN>200 NG/ML: ABNORMAL
BASE EXCESS BLDV CALC-SCNC: -4.1 MMOL/L
BASOPHILS # BLD: 0 K/UL (ref 0–0.2)
BASOPHILS NFR BLD: 0.3 %
BENZODIAZ UR QL SCN>200 NG/ML: ABNORMAL
BILIRUB SERPL-MCNC: 0.5 MG/DL (ref 0–1)
BILIRUB UR QL STRIP.AUTO: NEGATIVE
BUN SERPL-MCNC: 31 MG/DL (ref 7–20)
CALCIUM SERPL-MCNC: 9.1 MG/DL (ref 8.3–10.6)
CANNABINOIDS UR QL SCN>50 NG/ML: POSITIVE
CHARACTER UR: ABNORMAL
CHLORIDE SERPL-SCNC: 101 MMOL/L (ref 99–110)
CHP ED QC CHECK: NORMAL
CLARITY UR: CLEAR
CO2 BLDV-SCNC: 23 MMOL/L
CO2 SERPL-SCNC: 22 MMOL/L (ref 21–32)
COCAINE UR QL SCN: ABNORMAL
COHGB MFR BLDV: 5 %
COLOR UR: YELLOW
CREAT SERPL-MCNC: 1.9 MG/DL (ref 0.6–1.2)
DEPRECATED RDW RBC AUTO: 14.8 % (ref 12.4–15.4)
DRUG SCREEN COMMENT UR-IMP: ABNORMAL
EOSINOPHIL # BLD: 0 K/UL (ref 0–0.6)
EOSINOPHIL NFR BLD: 0.2 %
EPI CELLS #/AREA URNS AUTO: 1 /HPF (ref 0–5)
ETHANOLAMINE SERPL-MCNC: NORMAL MG/DL (ref 0–0.08)
FENTANYL SCREEN, URINE: ABNORMAL
GFR SERPLBLD CREATININE-BSD FMLA CKD-EPI: 28 ML/MIN/{1.73_M2}
GLUCOSE BLD-MCNC: 132 MG/DL (ref 70–99)
GLUCOSE SERPL-MCNC: 126 MG/DL (ref 70–99)
GLUCOSE UR STRIP.AUTO-MCNC: NEGATIVE MG/DL
HCO3 BLDV-SCNC: 22 MMOL/L (ref 23–29)
HCT VFR BLD AUTO: 33.5 % (ref 36–48)
HGB BLD-MCNC: 11.4 G/DL (ref 12–16)
HGB UR QL STRIP.AUTO: ABNORMAL
HYALINE CASTS #/AREA URNS AUTO: 2 /LPF (ref 0–8)
KETONES UR STRIP.AUTO-MCNC: NEGATIVE MG/DL
LACTATE BLDV-SCNC: 0.8 MMOL/L (ref 0.4–2)
LEUKOCYTE ESTERASE UR QL STRIP.AUTO: ABNORMAL
LIPASE SERPL-CCNC: 12 U/L (ref 13–60)
LYMPHOCYTES # BLD: 0.5 K/UL (ref 1–5.1)
LYMPHOCYTES NFR BLD: 6.7 %
MCH RBC QN AUTO: 30.1 PG (ref 26–34)
MCHC RBC AUTO-ENTMCNC: 34 G/DL (ref 31–36)
MCV RBC AUTO: 88.5 FL (ref 80–100)
METHADONE UR QL SCN>300 NG/ML: ABNORMAL
METHGB MFR BLDV: 0 %
MONOCYTES # BLD: 0.6 K/UL (ref 0–1.3)
MONOCYTES NFR BLD: 7.8 %
NEUTROPHILS # BLD: 6.2 K/UL (ref 1.7–7.7)
NEUTROPHILS NFR BLD: 85 %
NITRITE UR QL STRIP.AUTO: NEGATIVE
O2 THERAPY: ABNORMAL
OPIATES UR QL SCN>300 NG/ML: ABNORMAL
OXYCODONE UR QL SCN: ABNORMAL
PCO2 BLDV: 43.9 MMHG (ref 40–50)
PCP UR QL SCN>25 NG/ML: ABNORMAL
PERFORMED ON: ABNORMAL
PH BLDV: 7.31 [PH] (ref 7.35–7.45)
PH UR STRIP.AUTO: 6 [PH] (ref 5–8)
PH UR STRIP: 6 [PH]
PLATELET # BLD AUTO: 139 K/UL (ref 135–450)
PMV BLD AUTO: 7.4 FL (ref 5–10.5)
PO2 BLDV: 42 MMHG
POTASSIUM SERPL-SCNC: 4.7 MMOL/L (ref 3.5–5.1)
PROT SERPL-MCNC: 7.4 G/DL (ref 6.4–8.2)
PROT UR STRIP.AUTO-MCNC: 300 MG/DL
RBC # BLD AUTO: 3.78 M/UL (ref 4–5.2)
RBC #/AREA URNS HPF: ABNORMAL /HPF (ref 0–4)
SAO2 % BLDV: 77 %
SODIUM SERPL-SCNC: 134 MMOL/L (ref 136–145)
SP GR UR STRIP.AUTO: 1.02 (ref 1–1.03)
UA COMPLETE W REFLEX CULTURE PNL UR: YES
UA DIPSTICK W REFLEX MICRO PNL UR: YES
URN SPEC COLLECT METH UR: ABNORMAL
UROBILINOGEN UR STRIP-ACNC: 0.2 E.U./DL
WBC # BLD AUTO: 7.3 K/UL (ref 4–11)
WBC #/AREA URNS HPF: ABNORMAL /HPF (ref 0–5)
YEAST URNS QL MICRO: PRESENT /HPF

## 2025-08-27 PROCEDURE — 83690 ASSAY OF LIPASE: CPT

## 2025-08-27 PROCEDURE — 94640 AIRWAY INHALATION TREATMENT: CPT

## 2025-08-27 PROCEDURE — 87086 URINE CULTURE/COLONY COUNT: CPT

## 2025-08-27 PROCEDURE — 87186 SC STD MICRODIL/AGAR DIL: CPT

## 2025-08-27 PROCEDURE — 93005 ELECTROCARDIOGRAM TRACING: CPT | Performed by: HOSPITALIST

## 2025-08-27 PROCEDURE — 80307 DRUG TEST PRSMV CHEM ANLYZR: CPT

## 2025-08-27 PROCEDURE — 6370000000 HC RX 637 (ALT 250 FOR IP): Performed by: PHYSICIAN ASSISTANT

## 2025-08-27 PROCEDURE — 6360000002 HC RX W HCPCS: Performed by: PHYSICIAN ASSISTANT

## 2025-08-27 PROCEDURE — 85025 COMPLETE CBC W/AUTO DIFF WBC: CPT

## 2025-08-27 PROCEDURE — 99285 EMERGENCY DEPT VISIT HI MDM: CPT

## 2025-08-27 PROCEDURE — 82077 ASSAY SPEC XCP UR&BREATH IA: CPT

## 2025-08-27 PROCEDURE — 36415 COLL VENOUS BLD VENIPUNCTURE: CPT

## 2025-08-27 PROCEDURE — 80053 COMPREHEN METABOLIC PANEL: CPT

## 2025-08-27 PROCEDURE — 96365 THER/PROPH/DIAG IV INF INIT: CPT

## 2025-08-27 PROCEDURE — 70450 CT HEAD/BRAIN W/O DYE: CPT

## 2025-08-27 PROCEDURE — 87077 CULTURE AEROBIC IDENTIFY: CPT

## 2025-08-27 PROCEDURE — 87040 BLOOD CULTURE FOR BACTERIA: CPT

## 2025-08-27 PROCEDURE — 81001 URINALYSIS AUTO W/SCOPE: CPT

## 2025-08-27 PROCEDURE — 71045 X-RAY EXAM CHEST 1 VIEW: CPT

## 2025-08-27 PROCEDURE — 96366 THER/PROPH/DIAG IV INF ADDON: CPT

## 2025-08-27 PROCEDURE — 82803 BLOOD GASES ANY COMBINATION: CPT

## 2025-08-27 PROCEDURE — 83605 ASSAY OF LACTIC ACID: CPT

## 2025-08-27 RX ORDER — LEVOFLOXACIN 5 MG/ML
750 INJECTION, SOLUTION INTRAVENOUS ONCE
Status: COMPLETED | OUTPATIENT
Start: 2025-08-27 | End: 2025-08-28

## 2025-08-27 RX ORDER — IPRATROPIUM BROMIDE AND ALBUTEROL SULFATE 2.5; .5 MG/3ML; MG/3ML
1 SOLUTION RESPIRATORY (INHALATION) ONCE
Status: COMPLETED | OUTPATIENT
Start: 2025-08-27 | End: 2025-08-27

## 2025-08-27 RX ADMIN — IPRATROPIUM BROMIDE AND ALBUTEROL SULFATE 1 DOSE: .5; 2.5 SOLUTION RESPIRATORY (INHALATION) at 21:32

## 2025-08-27 RX ADMIN — LEVOFLOXACIN 750 MG: 5 INJECTION, SOLUTION INTRAVENOUS at 23:39

## 2025-08-28 ENCOUNTER — CARE COORDINATION (OUTPATIENT)
Dept: CARE COORDINATION | Age: 70
End: 2025-08-28

## 2025-08-28 PROBLEM — R41.82 AMS (ALTERED MENTAL STATUS): Status: ACTIVE | Noted: 2025-08-28

## 2025-08-28 LAB
ALBUMIN SERPL-MCNC: 3.4 G/DL (ref 3.4–5)
ALP SERPL-CCNC: 114 U/L (ref 40–129)
ALT SERPL-CCNC: <5 U/L (ref 10–40)
ANION GAP SERPL CALCULATED.3IONS-SCNC: 11 MMOL/L (ref 3–16)
ANION GAP SERPL CALCULATED.3IONS-SCNC: 8 MMOL/L (ref 3–16)
AST SERPL-CCNC: 17 U/L (ref 15–37)
BASE EXCESS BLDV CALC-SCNC: -4.5 MMOL/L
BASOPHILS # BLD: 0 K/UL (ref 0–0.2)
BASOPHILS NFR BLD: 0.6 %
BILIRUB DIRECT SERPL-MCNC: 0.2 MG/DL (ref 0–0.3)
BILIRUB INDIRECT SERPL-MCNC: 0.3 MG/DL (ref 0–1)
BILIRUB SERPL-MCNC: 0.5 MG/DL (ref 0–1)
BUN SERPL-MCNC: 31 MG/DL (ref 7–20)
BUN SERPL-MCNC: 31 MG/DL (ref 7–20)
CALCIUM SERPL-MCNC: 8.6 MG/DL (ref 8.3–10.6)
CALCIUM SERPL-MCNC: 9 MG/DL (ref 8.3–10.6)
CHLORIDE SERPL-SCNC: 101 MMOL/L (ref 99–110)
CHLORIDE SERPL-SCNC: 97 MMOL/L (ref 99–110)
CO2 BLDV-SCNC: 25 MMOL/L
CO2 SERPL-SCNC: 20 MMOL/L (ref 21–32)
CO2 SERPL-SCNC: 22 MMOL/L (ref 21–32)
COHGB MFR BLDV: 2.5 %
CREAT SERPL-MCNC: 1.8 MG/DL (ref 0.6–1.2)
CREAT SERPL-MCNC: 2 MG/DL (ref 0.6–1.2)
DEPRECATED RDW RBC AUTO: 14.6 % (ref 12.4–15.4)
EKG ATRIAL RATE: 62 BPM
EKG DIAGNOSIS: NORMAL
EKG P AXIS: 85 DEGREES
EKG P-R INTERVAL: 156 MS
EKG Q-T INTERVAL: 428 MS
EKG QRS DURATION: 86 MS
EKG QTC CALCULATION (BAZETT): 434 MS
EKG R AXIS: 31 DEGREES
EKG T AXIS: -49 DEGREES
EKG VENTRICULAR RATE: 62 BPM
EOSINOPHIL # BLD: 0 K/UL (ref 0–0.6)
EOSINOPHIL NFR BLD: 0.7 %
FLUAV + FLUBV AG NOSE IA.RAPID: NOT DETECTED
FLUAV + FLUBV AG NOSE IA.RAPID: NOT DETECTED
GFR SERPLBLD CREATININE-BSD FMLA CKD-EPI: 27 ML/MIN/{1.73_M2}
GFR SERPLBLD CREATININE-BSD FMLA CKD-EPI: 30 ML/MIN/{1.73_M2}
GLUCOSE SERPL-MCNC: 94 MG/DL (ref 70–99)
GLUCOSE SERPL-MCNC: 97 MG/DL (ref 70–99)
HCO3 BLDV-SCNC: 24 MMOL/L (ref 23–29)
HCT VFR BLD AUTO: 31.6 % (ref 36–48)
HGB BLD-MCNC: 10.6 G/DL (ref 12–16)
LYMPHOCYTES # BLD: 0.5 K/UL (ref 1–5.1)
LYMPHOCYTES NFR BLD: 8.1 %
MCH RBC QN AUTO: 29.6 PG (ref 26–34)
MCHC RBC AUTO-ENTMCNC: 33.6 G/DL (ref 31–36)
MCV RBC AUTO: 88.1 FL (ref 80–100)
METHGB MFR BLDV: 1 %
MONOCYTES # BLD: 0.9 K/UL (ref 0–1.3)
MONOCYTES NFR BLD: 13.7 %
NEUTROPHILS # BLD: 5.2 K/UL (ref 1.7–7.7)
NEUTROPHILS NFR BLD: 76.9 %
O2 THERAPY: ABNORMAL
PCO2 BLDV: 56.7 MMHG (ref 40–50)
PH BLDV: 7.23 [PH] (ref 7.35–7.45)
PLATELET # BLD AUTO: 129 K/UL (ref 135–450)
PMV BLD AUTO: 7.4 FL (ref 5–10.5)
PO2 BLDV: 51 MMHG
POTASSIUM SERPL-SCNC: 4.1 MMOL/L (ref 3.5–5.1)
POTASSIUM SERPL-SCNC: 4.7 MMOL/L (ref 3.5–5.1)
PROT SERPL-MCNC: 6.5 G/DL (ref 6.4–8.2)
RBC # BLD AUTO: 3.58 M/UL (ref 4–5.2)
SAO2 % BLDV: 83 %
SARS-COV-2 RDRP RESP QL NAA+PROBE: NOT DETECTED
SODIUM SERPL-SCNC: 125 MMOL/L (ref 136–145)
SODIUM SERPL-SCNC: 134 MMOL/L (ref 136–145)
WBC # BLD AUTO: 6.8 K/UL (ref 4–11)

## 2025-08-28 PROCEDURE — 87502 INFLUENZA DNA AMP PROBE: CPT

## 2025-08-28 PROCEDURE — 80048 BASIC METABOLIC PNL TOTAL CA: CPT

## 2025-08-28 PROCEDURE — 36415 COLL VENOUS BLD VENIPUNCTURE: CPT

## 2025-08-28 PROCEDURE — 94640 AIRWAY INHALATION TREATMENT: CPT

## 2025-08-28 PROCEDURE — 2500000003 HC RX 250 WO HCPCS: Performed by: NURSE PRACTITIONER

## 2025-08-28 PROCEDURE — 6370000000 HC RX 637 (ALT 250 FOR IP): Performed by: NURSE PRACTITIONER

## 2025-08-28 PROCEDURE — 6360000002 HC RX W HCPCS: Performed by: NURSE PRACTITIONER

## 2025-08-28 PROCEDURE — 87635 SARS-COV-2 COVID-19 AMP PRB: CPT

## 2025-08-28 PROCEDURE — 2700000000 HC OXYGEN THERAPY PER DAY

## 2025-08-28 PROCEDURE — 80076 HEPATIC FUNCTION PANEL: CPT

## 2025-08-28 PROCEDURE — 94761 N-INVAS EAR/PLS OXIMETRY MLT: CPT

## 2025-08-28 PROCEDURE — 82803 BLOOD GASES ANY COMBINATION: CPT

## 2025-08-28 PROCEDURE — 1200000000 HC SEMI PRIVATE

## 2025-08-28 PROCEDURE — 6370000000 HC RX 637 (ALT 250 FOR IP): Performed by: HOSPITALIST

## 2025-08-28 PROCEDURE — 85025 COMPLETE CBC W/AUTO DIFF WBC: CPT

## 2025-08-28 PROCEDURE — 93010 ELECTROCARDIOGRAM REPORT: CPT | Performed by: INTERNAL MEDICINE

## 2025-08-28 RX ORDER — HEPARIN SODIUM 5000 [USP'U]/ML
5000 INJECTION, SOLUTION INTRAVENOUS; SUBCUTANEOUS EVERY 8 HOURS SCHEDULED
Status: DISCONTINUED | OUTPATIENT
Start: 2025-08-28 | End: 2025-08-30 | Stop reason: HOSPADM

## 2025-08-28 RX ORDER — CARBIDOPA AND LEVODOPA 10; 100 MG/1; MG/1
2 TABLET ORAL NIGHTLY PRN
Status: DISCONTINUED | OUTPATIENT
Start: 2025-08-28 | End: 2025-08-30 | Stop reason: HOSPADM

## 2025-08-28 RX ORDER — FLUOXETINE HYDROCHLORIDE 40 MG/1
40 CAPSULE ORAL 2 TIMES DAILY
COMMUNITY

## 2025-08-28 RX ORDER — LEVOFLOXACIN 5 MG/ML
750 INJECTION, SOLUTION INTRAVENOUS
Status: DISCONTINUED | OUTPATIENT
Start: 2025-08-29 | End: 2025-08-30 | Stop reason: HOSPADM

## 2025-08-28 RX ORDER — SODIUM CHLORIDE 9 MG/ML
INJECTION, SOLUTION INTRAVENOUS PRN
Status: DISCONTINUED | OUTPATIENT
Start: 2025-08-28 | End: 2025-08-30 | Stop reason: HOSPADM

## 2025-08-28 RX ORDER — POLYETHYLENE GLYCOL 3350 17 G/17G
17 POWDER, FOR SOLUTION ORAL DAILY PRN
Status: DISCONTINUED | OUTPATIENT
Start: 2025-08-28 | End: 2025-08-30 | Stop reason: HOSPADM

## 2025-08-28 RX ORDER — ACETAMINOPHEN 325 MG/1
650 TABLET ORAL EVERY 6 HOURS PRN
Status: DISCONTINUED | OUTPATIENT
Start: 2025-08-28 | End: 2025-08-30 | Stop reason: HOSPADM

## 2025-08-28 RX ORDER — ACETAMINOPHEN 650 MG/1
650 SUPPOSITORY RECTAL EVERY 6 HOURS PRN
Status: DISCONTINUED | OUTPATIENT
Start: 2025-08-28 | End: 2025-08-30 | Stop reason: HOSPADM

## 2025-08-28 RX ORDER — CARVEDILOL 12.5 MG/1
12.5 TABLET ORAL 2 TIMES DAILY
Status: DISCONTINUED | OUTPATIENT
Start: 2025-08-28 | End: 2025-08-30 | Stop reason: HOSPADM

## 2025-08-28 RX ORDER — BUPROPION HYDROCHLORIDE 150 MG/1
150 TABLET ORAL 2 TIMES DAILY
Status: DISCONTINUED | OUTPATIENT
Start: 2025-08-28 | End: 2025-08-28

## 2025-08-28 RX ORDER — ONDANSETRON 4 MG/1
4 TABLET, ORALLY DISINTEGRATING ORAL EVERY 8 HOURS PRN
Status: DISCONTINUED | OUTPATIENT
Start: 2025-08-28 | End: 2025-08-30 | Stop reason: HOSPADM

## 2025-08-28 RX ORDER — ONDANSETRON 2 MG/ML
4 INJECTION INTRAMUSCULAR; INTRAVENOUS EVERY 6 HOURS PRN
Status: DISCONTINUED | OUTPATIENT
Start: 2025-08-28 | End: 2025-08-30 | Stop reason: HOSPADM

## 2025-08-28 RX ORDER — MAGNESIUM SULFATE IN WATER 40 MG/ML
2000 INJECTION, SOLUTION INTRAVENOUS PRN
Status: DISCONTINUED | OUTPATIENT
Start: 2025-08-28 | End: 2025-08-30 | Stop reason: HOSPADM

## 2025-08-28 RX ORDER — SODIUM CHLORIDE 0.9 % (FLUSH) 0.9 %
5-40 SYRINGE (ML) INJECTION PRN
Status: DISCONTINUED | OUTPATIENT
Start: 2025-08-28 | End: 2025-08-30 | Stop reason: HOSPADM

## 2025-08-28 RX ORDER — SODIUM CHLORIDE 0.9 % (FLUSH) 0.9 %
5-40 SYRINGE (ML) INJECTION EVERY 12 HOURS SCHEDULED
Status: DISCONTINUED | OUTPATIENT
Start: 2025-08-28 | End: 2025-08-30 | Stop reason: HOSPADM

## 2025-08-28 RX ORDER — ALBUTEROL SULFATE 90 UG/1
2 INHALANT RESPIRATORY (INHALATION) EVERY 4 HOURS PRN
Status: DISCONTINUED | OUTPATIENT
Start: 2025-08-28 | End: 2025-08-30 | Stop reason: HOSPADM

## 2025-08-28 RX ORDER — BUDESONIDE AND FORMOTEROL FUMARATE DIHYDRATE 160; 4.5 UG/1; UG/1
2 AEROSOL RESPIRATORY (INHALATION)
Status: DISCONTINUED | OUTPATIENT
Start: 2025-08-28 | End: 2025-08-30 | Stop reason: HOSPADM

## 2025-08-28 RX ORDER — ROSUVASTATIN CALCIUM 40 MG/1
40 TABLET, COATED ORAL NIGHTLY
Status: DISCONTINUED | OUTPATIENT
Start: 2025-08-28 | End: 2025-08-30 | Stop reason: HOSPADM

## 2025-08-28 RX ORDER — CLOPIDOGREL BISULFATE 75 MG/1
75 TABLET ORAL DAILY
Status: DISCONTINUED | OUTPATIENT
Start: 2025-08-28 | End: 2025-08-30 | Stop reason: HOSPADM

## 2025-08-28 RX ORDER — PREGABALIN 25 MG/1
50 CAPSULE ORAL 3 TIMES DAILY
Status: DISCONTINUED | OUTPATIENT
Start: 2025-08-28 | End: 2025-08-30 | Stop reason: HOSPADM

## 2025-08-28 RX ORDER — LOSARTAN POTASSIUM 25 MG/1
25 TABLET ORAL DAILY
Status: DISCONTINUED | OUTPATIENT
Start: 2025-08-28 | End: 2025-08-30 | Stop reason: HOSPADM

## 2025-08-28 RX ADMIN — ACETAMINOPHEN 650 MG: 325 TABLET ORAL at 16:25

## 2025-08-28 RX ADMIN — BUDESONIDE AND FORMOTEROL FUMARATE DIHYDRATE 2 PUFF: 160; 4.5 AEROSOL RESPIRATORY (INHALATION) at 19:55

## 2025-08-28 RX ADMIN — BUDESONIDE AND FORMOTEROL FUMARATE DIHYDRATE 2 PUFF: 160; 4.5 AEROSOL RESPIRATORY (INHALATION) at 07:34

## 2025-08-28 RX ADMIN — PREGABALIN 50 MG: 25 CAPSULE ORAL at 21:17

## 2025-08-28 RX ADMIN — CLOPIDOGREL BISULFATE 75 MG: 75 TABLET, FILM COATED ORAL at 08:32

## 2025-08-28 RX ADMIN — PREGABALIN 50 MG: 25 CAPSULE ORAL at 08:32

## 2025-08-28 RX ADMIN — Medication 10 ML: at 08:36

## 2025-08-28 RX ADMIN — HEPARIN SODIUM 5000 UNITS: 5000 INJECTION, SOLUTION INTRAVENOUS; SUBCUTANEOUS at 21:17

## 2025-08-28 RX ADMIN — FLUOXETINE HYDROCHLORIDE 40 MG: 20 CAPSULE ORAL at 21:17

## 2025-08-28 RX ADMIN — BUPROPION HYDROCHLORIDE 150 MG: 150 TABLET, EXTENDED RELEASE ORAL at 08:32

## 2025-08-28 RX ADMIN — HEPARIN SODIUM 5000 UNITS: 5000 INJECTION, SOLUTION INTRAVENOUS; SUBCUTANEOUS at 05:26

## 2025-08-28 RX ADMIN — LOSARTAN POTASSIUM 25 MG: 25 TABLET, FILM COATED ORAL at 08:32

## 2025-08-28 RX ADMIN — HEPARIN SODIUM 5000 UNITS: 5000 INJECTION, SOLUTION INTRAVENOUS; SUBCUTANEOUS at 16:16

## 2025-08-28 RX ADMIN — ALBUTEROL SULFATE 2 PUFF: 90 AEROSOL, METERED RESPIRATORY (INHALATION) at 14:11

## 2025-08-28 RX ADMIN — CARVEDILOL 12.5 MG: 12.5 TABLET, FILM COATED ORAL at 08:32

## 2025-08-28 RX ADMIN — ALBUTEROL SULFATE 2 PUFF: 90 AEROSOL, METERED RESPIRATORY (INHALATION) at 19:55

## 2025-08-28 RX ADMIN — PREGABALIN 50 MG: 25 CAPSULE ORAL at 16:16

## 2025-08-28 RX ADMIN — ROSUVASTATIN CALCIUM 40 MG: 40 TABLET, FILM COATED ORAL at 21:17

## 2025-08-28 ASSESSMENT — ENCOUNTER SYMPTOMS
SHORTNESS OF BREATH: 0
BACK PAIN: 0
WHEEZING: 1
COUGH: 1
VOMITING: 1
NAUSEA: 1
ABDOMINAL PAIN: 0
COLOR CHANGE: 0

## 2025-08-28 ASSESSMENT — PAIN DESCRIPTION - LOCATION: LOCATION: BACK

## 2025-08-28 ASSESSMENT — PAIN - FUNCTIONAL ASSESSMENT
PAIN_FUNCTIONAL_ASSESSMENT: 0-10
PAIN_FUNCTIONAL_ASSESSMENT: 0-10

## 2025-08-28 ASSESSMENT — PAIN SCALES - GENERAL
PAINLEVEL_OUTOF10: 6
PAINLEVEL_OUTOF10: 4
PAINLEVEL_OUTOF10: 6
PAINLEVEL_OUTOF10: 0

## 2025-08-29 LAB
ANION GAP SERPL CALCULATED.3IONS-SCNC: 11 MMOL/L (ref 3–16)
ANION GAP SERPL CALCULATED.3IONS-SCNC: 9 MMOL/L (ref 3–16)
BACTERIA UR CULT: ABNORMAL
BUN SERPL-MCNC: 42 MG/DL (ref 7–20)
BUN SERPL-MCNC: 43 MG/DL (ref 7–20)
CALCIUM SERPL-MCNC: 8.7 MG/DL (ref 8.3–10.6)
CALCIUM SERPL-MCNC: 8.7 MG/DL (ref 8.3–10.6)
CHLORIDE SERPL-SCNC: 102 MMOL/L (ref 99–110)
CHLORIDE SERPL-SCNC: 102 MMOL/L (ref 99–110)
CO2 SERPL-SCNC: 19 MMOL/L (ref 21–32)
CO2 SERPL-SCNC: 24 MMOL/L (ref 21–32)
CREAT SERPL-MCNC: 2.5 MG/DL (ref 0.6–1.2)
CREAT SERPL-MCNC: 2.6 MG/DL (ref 0.6–1.2)
GFR SERPLBLD CREATININE-BSD FMLA CKD-EPI: 20 ML/MIN/{1.73_M2}
GFR SERPLBLD CREATININE-BSD FMLA CKD-EPI: 20 ML/MIN/{1.73_M2}
GLUCOSE SERPL-MCNC: 105 MG/DL (ref 70–99)
GLUCOSE SERPL-MCNC: 126 MG/DL (ref 70–99)
ORGANISM: ABNORMAL
POTASSIUM SERPL-SCNC: 4.6 MMOL/L (ref 3.5–5.1)
POTASSIUM SERPL-SCNC: 5.1 MMOL/L (ref 3.5–5.1)
POTASSIUM SERPL-SCNC: ABNORMAL MMOL/L (ref 3.5–5.1)
SODIUM SERPL-SCNC: 132 MMOL/L (ref 136–145)
SODIUM SERPL-SCNC: 135 MMOL/L (ref 136–145)

## 2025-08-29 PROCEDURE — 2500000003 HC RX 250 WO HCPCS: Performed by: NURSE PRACTITIONER

## 2025-08-29 PROCEDURE — 94640 AIRWAY INHALATION TREATMENT: CPT

## 2025-08-29 PROCEDURE — 6370000000 HC RX 637 (ALT 250 FOR IP): Performed by: HOSPITALIST

## 2025-08-29 PROCEDURE — 2700000000 HC OXYGEN THERAPY PER DAY

## 2025-08-29 PROCEDURE — 6370000000 HC RX 637 (ALT 250 FOR IP): Performed by: NURSE PRACTITIONER

## 2025-08-29 PROCEDURE — 1200000000 HC SEMI PRIVATE

## 2025-08-29 PROCEDURE — 6360000002 HC RX W HCPCS: Performed by: NURSE PRACTITIONER

## 2025-08-29 PROCEDURE — 36415 COLL VENOUS BLD VENIPUNCTURE: CPT

## 2025-08-29 PROCEDURE — 80048 BASIC METABOLIC PNL TOTAL CA: CPT

## 2025-08-29 PROCEDURE — 2580000003 HC RX 258: Performed by: HOSPITALIST

## 2025-08-29 PROCEDURE — 94761 N-INVAS EAR/PLS OXIMETRY MLT: CPT

## 2025-08-29 RX ORDER — LIDOCAINE 4 G/G
1 PATCH TOPICAL EVERY EVENING
Status: DISCONTINUED | OUTPATIENT
Start: 2025-08-29 | End: 2025-08-30 | Stop reason: HOSPADM

## 2025-08-29 RX ORDER — MIDODRINE HYDROCHLORIDE 10 MG/1
10 TABLET ORAL ONCE
Status: COMPLETED | OUTPATIENT
Start: 2025-08-29 | End: 2025-08-29

## 2025-08-29 RX ORDER — 0.9 % SODIUM CHLORIDE 0.9 %
250 INTRAVENOUS SOLUTION INTRAVENOUS ONCE
Status: COMPLETED | OUTPATIENT
Start: 2025-08-29 | End: 2025-08-29

## 2025-08-29 RX ORDER — NICOTINE 21 MG/24HR
1 PATCH, TRANSDERMAL 24 HOURS TRANSDERMAL EVERY EVENING
Status: DISCONTINUED | OUTPATIENT
Start: 2025-08-29 | End: 2025-08-30 | Stop reason: HOSPADM

## 2025-08-29 RX ORDER — SODIUM CHLORIDE 9 MG/ML
INJECTION, SOLUTION INTRAVENOUS CONTINUOUS
Status: DISCONTINUED | OUTPATIENT
Start: 2025-08-29 | End: 2025-08-30

## 2025-08-29 RX ADMIN — LEVOFLOXACIN 750 MG: 5 INJECTION, SOLUTION INTRAVENOUS at 22:39

## 2025-08-29 RX ADMIN — FLUOXETINE HYDROCHLORIDE 40 MG: 20 CAPSULE ORAL at 08:22

## 2025-08-29 RX ADMIN — HEPARIN SODIUM 5000 UNITS: 5000 INJECTION, SOLUTION INTRAVENOUS; SUBCUTANEOUS at 21:34

## 2025-08-29 RX ADMIN — Medication 10 ML: at 21:24

## 2025-08-29 RX ADMIN — ROSUVASTATIN CALCIUM 40 MG: 40 TABLET, FILM COATED ORAL at 21:25

## 2025-08-29 RX ADMIN — CLOPIDOGREL BISULFATE 75 MG: 75 TABLET, FILM COATED ORAL at 08:22

## 2025-08-29 RX ADMIN — MIDODRINE HYDROCHLORIDE 10 MG: 10 TABLET ORAL at 13:07

## 2025-08-29 RX ADMIN — FLUOXETINE HYDROCHLORIDE 40 MG: 20 CAPSULE ORAL at 21:25

## 2025-08-29 RX ADMIN — HEPARIN SODIUM 5000 UNITS: 5000 INJECTION, SOLUTION INTRAVENOUS; SUBCUTANEOUS at 06:24

## 2025-08-29 RX ADMIN — Medication 10 ML: at 08:12

## 2025-08-29 RX ADMIN — PREGABALIN 50 MG: 25 CAPSULE ORAL at 13:36

## 2025-08-29 RX ADMIN — BUDESONIDE AND FORMOTEROL FUMARATE DIHYDRATE 2 PUFF: 160; 4.5 AEROSOL RESPIRATORY (INHALATION) at 19:46

## 2025-08-29 RX ADMIN — SODIUM CHLORIDE 250 ML: 0.9 INJECTION, SOLUTION INTRAVENOUS at 13:06

## 2025-08-29 RX ADMIN — PREGABALIN 50 MG: 25 CAPSULE ORAL at 08:22

## 2025-08-29 RX ADMIN — BUDESONIDE AND FORMOTEROL FUMARATE DIHYDRATE 2 PUFF: 160; 4.5 AEROSOL RESPIRATORY (INHALATION) at 08:29

## 2025-08-29 RX ADMIN — SODIUM CHLORIDE: 0.9 INJECTION, SOLUTION INTRAVENOUS at 08:13

## 2025-08-29 RX ADMIN — HEPARIN SODIUM 5000 UNITS: 5000 INJECTION, SOLUTION INTRAVENOUS; SUBCUTANEOUS at 13:36

## 2025-08-29 RX ADMIN — ALBUTEROL SULFATE 2 PUFF: 90 AEROSOL, METERED RESPIRATORY (INHALATION) at 19:46

## 2025-08-29 RX ADMIN — SODIUM CHLORIDE: 0.9 INJECTION, SOLUTION INTRAVENOUS at 16:19

## 2025-08-29 RX ADMIN — PREGABALIN 50 MG: 25 CAPSULE ORAL at 21:25

## 2025-08-29 RX ADMIN — POLYETHYLENE GLYCOL 3350 17 G: 17 POWDER, FOR SOLUTION ORAL at 08:25

## 2025-08-29 RX ADMIN — ACETAMINOPHEN 650 MG: 325 TABLET ORAL at 08:24

## 2025-08-29 ASSESSMENT — PAIN - FUNCTIONAL ASSESSMENT: PAIN_FUNCTIONAL_ASSESSMENT: 0-10

## 2025-08-29 ASSESSMENT — PAIN DESCRIPTION - LOCATION: LOCATION: BACK

## 2025-08-29 ASSESSMENT — PAIN SCALES - GENERAL
PAINLEVEL_OUTOF10: 0
PAINLEVEL_OUTOF10: 5
PAINLEVEL_OUTOF10: 6
PAINLEVEL_OUTOF10: 0

## 2025-08-30 VITALS
DIASTOLIC BLOOD PRESSURE: 76 MMHG | RESPIRATION RATE: 24 BRPM | TEMPERATURE: 98.3 F | SYSTOLIC BLOOD PRESSURE: 97 MMHG | BODY MASS INDEX: 41.73 KG/M2 | HEART RATE: 74 BPM | OXYGEN SATURATION: 94 % | WEIGHT: 265.88 LBS | HEIGHT: 67 IN

## 2025-08-30 LAB
ANION GAP SERPL CALCULATED.3IONS-SCNC: 7 MMOL/L (ref 3–16)
BUN SERPL-MCNC: 44 MG/DL (ref 7–20)
CALCIUM SERPL-MCNC: 8.1 MG/DL (ref 8.3–10.6)
CHLORIDE SERPL-SCNC: 106 MMOL/L (ref 99–110)
CO2 SERPL-SCNC: 20 MMOL/L (ref 21–32)
CREAT SERPL-MCNC: 2.4 MG/DL (ref 0.6–1.2)
GFR SERPLBLD CREATININE-BSD FMLA CKD-EPI: 21 ML/MIN/{1.73_M2}
GLUCOSE SERPL-MCNC: 108 MG/DL (ref 70–99)
POTASSIUM SERPL-SCNC: 5.1 MMOL/L (ref 3.5–5.1)
SODIUM SERPL-SCNC: 133 MMOL/L (ref 136–145)

## 2025-08-30 PROCEDURE — 2500000003 HC RX 250 WO HCPCS: Performed by: NURSE PRACTITIONER

## 2025-08-30 PROCEDURE — 94761 N-INVAS EAR/PLS OXIMETRY MLT: CPT

## 2025-08-30 PROCEDURE — 94760 N-INVAS EAR/PLS OXIMETRY 1: CPT

## 2025-08-30 PROCEDURE — 6370000000 HC RX 637 (ALT 250 FOR IP): Performed by: NURSE PRACTITIONER

## 2025-08-30 PROCEDURE — 80048 BASIC METABOLIC PNL TOTAL CA: CPT

## 2025-08-30 PROCEDURE — 94640 AIRWAY INHALATION TREATMENT: CPT

## 2025-08-30 PROCEDURE — 6370000000 HC RX 637 (ALT 250 FOR IP): Performed by: HOSPITALIST

## 2025-08-30 PROCEDURE — 2580000003 HC RX 258: Performed by: HOSPITALIST

## 2025-08-30 PROCEDURE — 6360000002 HC RX W HCPCS: Performed by: NURSE PRACTITIONER

## 2025-08-30 PROCEDURE — 36415 COLL VENOUS BLD VENIPUNCTURE: CPT

## 2025-08-30 PROCEDURE — 2700000000 HC OXYGEN THERAPY PER DAY

## 2025-08-30 RX ORDER — CIPROFLOXACIN 250 MG/1
250 TABLET, FILM COATED ORAL 2 TIMES DAILY
Qty: 10 TABLET | Refills: 0 | Status: SHIPPED | OUTPATIENT
Start: 2025-08-30 | End: 2025-09-04

## 2025-08-30 RX ADMIN — ALBUTEROL SULFATE 2 PUFF: 90 AEROSOL, METERED RESPIRATORY (INHALATION) at 08:49

## 2025-08-30 RX ADMIN — BUDESONIDE AND FORMOTEROL FUMARATE DIHYDRATE 2 PUFF: 160; 4.5 AEROSOL RESPIRATORY (INHALATION) at 08:49

## 2025-08-30 RX ADMIN — Medication 10 ML: at 08:43

## 2025-08-30 RX ADMIN — FLUOXETINE HYDROCHLORIDE 40 MG: 20 CAPSULE ORAL at 08:43

## 2025-08-30 RX ADMIN — PREGABALIN 50 MG: 25 CAPSULE ORAL at 08:43

## 2025-08-30 RX ADMIN — HEPARIN SODIUM 5000 UNITS: 5000 INJECTION, SOLUTION INTRAVENOUS; SUBCUTANEOUS at 05:47

## 2025-08-30 RX ADMIN — CLOPIDOGREL BISULFATE 75 MG: 75 TABLET, FILM COATED ORAL at 08:43

## 2025-08-30 RX ADMIN — SODIUM CHLORIDE: 0.9 INJECTION, SOLUTION INTRAVENOUS at 01:08

## 2025-08-30 ASSESSMENT — PAIN SCALES - GENERAL
PAINLEVEL_OUTOF10: 0
PAINLEVEL_OUTOF10: 0

## 2025-08-31 LAB
BACTERIA BLD CULT ORG #2: NORMAL
BACTERIA BLD CULT: NORMAL

## 2025-09-02 ENCOUNTER — CARE COORDINATION (OUTPATIENT)
Dept: CASE MANAGEMENT | Age: 70
End: 2025-09-02

## 2025-09-03 ENCOUNTER — CARE COORDINATION (OUTPATIENT)
Dept: CASE MANAGEMENT | Age: 70
End: 2025-09-03

## 2025-09-03 DIAGNOSIS — R41.82 ALTERED MENTAL STATUS, UNSPECIFIED ALTERED MENTAL STATUS TYPE: Primary | ICD-10-CM

## 2025-09-03 PROCEDURE — 1111F DSCHRG MED/CURRENT MED MERGE: CPT | Performed by: FAMILY MEDICINE

## 2025-09-04 ENCOUNTER — TELEPHONE (OUTPATIENT)
Dept: FAMILY MEDICINE CLINIC | Age: 70
End: 2025-09-04

## 2025-09-04 RX ORDER — FLUTICASONE PROPIONATE AND SALMETEROL 250; 50 UG/1; UG/1
1 POWDER RESPIRATORY (INHALATION) EVERY 12 HOURS
Qty: 60 EACH | Refills: 3 | Status: SHIPPED | OUTPATIENT
Start: 2025-09-04

## 2025-09-05 ENCOUNTER — OFFICE VISIT (OUTPATIENT)
Dept: FAMILY MEDICINE CLINIC | Age: 70
End: 2025-09-05

## 2025-09-05 VITALS
SYSTOLIC BLOOD PRESSURE: 126 MMHG | HEART RATE: 60 BPM | WEIGHT: 262 LBS | BODY MASS INDEX: 41.12 KG/M2 | DIASTOLIC BLOOD PRESSURE: 80 MMHG | HEIGHT: 67 IN | OXYGEN SATURATION: 98 %

## 2025-09-05 DIAGNOSIS — R41.0 CONFUSION: Primary | ICD-10-CM

## (undated) DEVICE — BITE BLOCK ENDOSCP AD 60 FR W/ ADJ STRP PLAS GRN BLOX

## (undated) DEVICE — ENDOSCOPY KIT: Brand: MEDLINE INDUSTRIES, INC.